# Patient Record
Sex: MALE | Race: WHITE | Employment: FULL TIME | URBAN - METROPOLITAN AREA
[De-identification: names, ages, dates, MRNs, and addresses within clinical notes are randomized per-mention and may not be internally consistent; named-entity substitution may affect disease eponyms.]

---

## 2017-06-19 ENCOUNTER — OFFICE VISIT (OUTPATIENT)
Dept: PRIMARY CARE CLINIC | Age: 47
End: 2017-06-19

## 2017-06-19 VITALS
TEMPERATURE: 98.1 F | DIASTOLIC BLOOD PRESSURE: 101 MMHG | RESPIRATION RATE: 16 BRPM | BODY MASS INDEX: 33.96 KG/M2 | SYSTOLIC BLOOD PRESSURE: 144 MMHG | OXYGEN SATURATION: 97 % | HEIGHT: 71 IN | WEIGHT: 242.6 LBS | HEART RATE: 93 BPM

## 2017-06-19 DIAGNOSIS — I10 ESSENTIAL HYPERTENSION: ICD-10-CM

## 2017-06-19 DIAGNOSIS — L01.00 IMPETIGO: Primary | ICD-10-CM

## 2017-06-19 RX ORDER — MUPIROCIN 20 MG/G
OINTMENT TOPICAL 3 TIMES DAILY
Qty: 22 G | Refills: 0 | Status: SHIPPED | OUTPATIENT
Start: 2017-06-19 | End: 2017-06-26

## 2017-06-19 RX ORDER — DICLOFENAC POTASSIUM 50 MG/1
TABLET, FILM COATED ORAL
Refills: 0 | COMMUNITY
Start: 2017-03-13 | End: 2018-03-28

## 2017-06-19 RX ORDER — CYCLOBENZAPRINE HCL 10 MG
TABLET ORAL
Refills: 0 | COMMUNITY
Start: 2017-03-13 | End: 2018-03-28

## 2017-06-19 NOTE — MR AVS SNAPSHOT
Visit Information Date & Time Provider Department Dept. Phone Encounter #  
 6/19/2017  2:30 PM Emory Simmons NP [address] [address] [phone] [id number] Follow-up Instructions Return if symptoms worsen or fail to improve. Upcoming Health Maintenance Date Due  
 FOOT EXAM Q1 2/9/1978 MICROALBUMIN Q1 2/9/1978 EYE EXAM RETINAL OR DILATED Q1 2/9/1978 Pneumococcal 19-64 Medium Risk (1 of 1 - PPSV23) 2/9/1987 DTaP/Tdap/Td series (1 - Tdap) 2/9/1989 LIPID PANEL Q1 1/22/2014 HEMOGLOBIN A1C Q6M 4/23/2014 INFLUENZA AGE 9 TO ADULT 8/1/2017 Allergies as of 6/19/2017  Review Complete On: 6/19/2017 By: Emory Simmons NP Severity Noted Reaction Type Reactions Lipitor [Atorvastatin] Low 06/24/2010   Side Effect Other (comments) Muscle cramps Current Immunizations  Never Reviewed No immunizations on file. Not reviewed this visit You Were Diagnosed With   
  
 Codes Comments Impetigo    -  Primary ICD-10-CM: L01.00 ICD-9-CM: 635 Essential hypertension     ICD-10-CM: I10 
ICD-9-CM: 401.9 Vitals BP Pulse Temp Resp Height(growth percentile) Weight(growth percentile) (!) 144/101 93 98.1 °F (36.7 °C) (Oral) 16 5' 11\" (1.803 m) 242 lb 9.6 oz (110 kg) SpO2 BMI Smoking Status 97% 33.84 kg/m2 Never Smoker Vitals History BMI and BSA Data Body Mass Index Body Surface Area  
 33.84 kg/m 2 2.35 m 2 Preferred Pharmacy Pharmacy Name Phone [address] [place] [address] Sherri Spencer Said [phone] Your Updated Medication List  
  
   
This list is accurate as of: 6/19/17  3:03 PM.  Always use your most recent med list.  
  
  
  
  
 canagliflozin 100 mg tablet Commonly known as:  Ulysess Mccreedy Take 100 mg by mouth daily. cyclobenzaprine 10 mg tablet Commonly known as:  FLEXERIL  
TAKE 1 TABLET BY MOUTH 3 TIMES A DAY  
  
 diclofenac potassium 50 mg tablet Commonly known as:  CATAFLAM  
TAKE 1 TABLET (50 MG TOTAL) BY MOUTH 2 (TWO) TIMES A DAY AS NEEDED (PAIN) FOR UP TO 7 DAYS. glipiZIDE SR 5 mg CR tablet Commonly known as:  GLUCOTROL XL  
TAKE 1 TABLET BY MOUTH TWICE DAILY losartan-hydroCHLOROthiazide 100-25 mg per tablet Commonly known as:  HYZAAR Take 1 Tab by mouth daily. metFORMIN 500 mg tablet Commonly known as:  GLUCOPHAGE  
TAKE 2 TABLETS BY MOUTH TWICE DAILY  
  
 mupirocin 2 % ointment Commonly known as:  [employer] Apply  to affected area three (3) times daily for 7 days. niacin  mg tablet Commonly known as:  Jordis Alto Take 1 Tab by mouth nightly. omeprazole 40 mg capsule Commonly known as:  PRILOSEC  
TAKE 1 CAPSULE BY MOUTH EVERY DAY  
  
 oxyCODONE-acetaminophen 5-325 mg per tablet Commonly known as:  PERCOCET Take 1 Tab by mouth every four (4) hours as needed for Pain. promethazine 25 mg tablet Commonly known as:  PHENERGAN Take 1 Tab by mouth every six (6) hours as needed for Nausea. simvastatin 40 mg tablet Commonly known as:  ZOCOR Take 1 Tab by mouth nightly. SITagliptin 100 mg tablet Commonly known as:  Janece Nares Take 1 Tab by mouth daily. Prescriptions Sent to Pharmacy Refills  
 mupirocin (BACTROBAN) 2 % ointment 0 Sig: Apply  to affected area three (3) times daily for 7 days. Class: Normal  
 Pharmacy: [facility]ir [facility]ara  at [address] #: [phone] Route: Topical  
  
Follow-up Instructions Return if symptoms worsen or fail to improve. Patient Instructions Impetigo: Care Instructions Your Care Instructions Impetigo (say \"mz-yyf-GO-go\") is a skin infection caused by bacteria. It causes blisters that break and become oozing, yellow, crusty sores. Impetigo can be anywhere on the body.  Scratching the sores may spread the infection to other parts of the body. You can also spread it to others through close contact or when you share towels, clothing, and other items. Prescription antibiotic ointment or pills can usually cure impetigo. (After a day of antibiotics, the infection should not spread.) Follow-up care is a key part of your treatment and safety. Be sure to make and go to all appointments, and call your doctor if you are having problems. It's also a good idea to know your test results and keep a list of the medicines you take. How can you care for yourself at home? · Apply antibiotic ointment exactly as instructed. · If your doctor prescribed antibiotic pills, take them as directed. Do not stop using them just because you feel better. You need to take the full course of antibiotics. · Gently wash the sores with soap and water each day. If crusts form, your doctor may advise you to soften or remove the crusts. You can do this by soaking them in warm water and patting them dry. This can help the cream or ointment treat impetigo. · After you touch the area, wash your hands with soap and water. Or you can use an alcohol-based hand . · Don't share items such as towels, sheets, and clothing until the infection is gone. · Wash anything that may have touched the infected area. · Try to avoid scratching the area. When should you call for help? Call your doctor now or seek immediate medical care if: 
· You have symptoms of a worse infection, such as: 
¨ Increased pain, swelling, warmth, or redness. ¨ Red streaks leading from the area. ¨ Pus draining from the area. ¨ A fever. · Impetigo gets worse or spreads to other areas. Watch closely for changes in your health, and be sure to contact your doctor if: 
· You do not get better as expected. Where can you learn more? Go to http://janny-brien.info/. Enter C917 in the search box to learn more about \"Impetigo: Care Instructions. \" 
 Current as of: July 26, 2016 Content Version: 11.2 © 2243-4353 [employer] [place], [employer]. Care instructions adapted under license by [employer] (which disclaims liability or warranty for this information). If you have questions about a medical condition or this instruction, always ask your healthcare professional. [address] any warranty or liability for your use of this information. Introducing [facility]! Dear Rosalina Powers: Thank you for requesting a inDinero account. Our records indicate that you already have an active inDinero account. You can access your account anytime at [email or web address] Did you know that you can access your hospital and ER discharge instructions at any time in inDinero? You can also review all of your test results from your hospital stay or ER visit. Additional Information If you have questions, please visit the Frequently Asked Questions section of the inDinero website at [email or web address] Remember, inDinero is NOT to be used for urgent needs. For medical emergencies, dial 911. Now available from your iPhone and Android! Please provide this summary of care documentation to your next provider. Your primary care clinician is listed as Pietro. If you have any questions after today's visit, please call [phone].

## 2017-06-19 NOTE — PROGRESS NOTES
Chief Complaint   Patient presents with    Rash     pt here today c/o R on R side of ear, x 2 days, also concerned states he has been breaking out into night sweats and has had feet and hand joint pain, states he recently lost 60 pounds over 2 years,, states currently he is not taking any medications due to weight loss      This note will not be viewable in MyChart.

## 2017-06-19 NOTE — PATIENT INSTRUCTIONS
Impetigo: Care Instructions  Your Care Instructions  Impetigo (say \"cy-hoj-HC-go\") is a skin infection caused by bacteria. It causes blisters that break and become oozing, yellow, crusty sores. Impetigo can be anywhere on the body. Scratching the sores may spread the infection to other parts of the body. You can also spread it to others through close contact or when you share towels, clothing, and other items. Prescription antibiotic ointment or pills can usually cure impetigo. (After a day of antibiotics, the infection should not spread.)  Follow-up care is a key part of your treatment and safety. Be sure to make and go to all appointments, and call your doctor if you are having problems. It's also a good idea to know your test results and keep a list of the medicines you take. How can you care for yourself at home? · Apply antibiotic ointment exactly as instructed. · If your doctor prescribed antibiotic pills, take them as directed. Do not stop using them just because you feel better. You need to take the full course of antibiotics. · Gently wash the sores with soap and water each day. If crusts form, your doctor may advise you to soften or remove the crusts. You can do this by soaking them in warm water and patting them dry. This can help the cream or ointment treat impetigo. · After you touch the area, wash your hands with soap and water. Or you can use an alcohol-based hand . · Don't share items such as towels, sheets, and clothing until the infection is gone. · Wash anything that may have touched the infected area. · Try to avoid scratching the area. When should you call for help? Call your doctor now or seek immediate medical care if:  · You have symptoms of a worse infection, such as:  ¨ Increased pain, swelling, warmth, or redness. ¨ Red streaks leading from the area. ¨ Pus draining from the area. ¨ A fever. · Impetigo gets worse or spreads to other areas.   Watch closely for changes in your health, and be sure to contact your doctor if:  · You do not get better as expected. Where can you learn more? Go to http://janny-brien.info/. Enter U022 in the search box to learn more about \"Impetigo: Care Instructions. \"  Current as of: July 26, 2016  Content Version: 11.2  © 3273-6136 [employer]. Care instructions adapted under license by [employer] (which disclaims liability or warranty for this information). If you have questions about a medical condition or this instruction, always ask your healthcare professional. [address] [address] any warranty or liability for your use of this information.

## 2017-06-19 NOTE — PROGRESS NOTES
HISTORY OF PRESENT ILLNESS  Alli Portillo is a 50 y.o. male. HPI  Chief Complaint   Patient presents with    Rash     pt here today c/o R on R side of ear, x 2 days, also concerned states he has been breaking out into night sweats and has had feet and hand joint pain, states he recently lost 60 pounds over 2 years,, states currently he is not taking any medications due to weight loss      Pt is here today for c/o rash to his ear and neck. Started about 2 days ago. Denies any pain, discomfort, or itching. The area does get a little crust to it. He also mentions that he had some joint pain in hands and feet. He has had some low grade fevers and sweats. He has not seen his PCP in the last few years. Reports a 60lb intentional weight loss in the last 2 years and is trying to lose more weight to be at his goal.  He has stopped his meds for DM, HTN, and hyperlipidemia as he didn't like how he felt on the meds. He plans to see his PCP when on vac the week of July 4. Past Medical History:   Diagnosis Date    Diabetes ([address]     type 2    Hyperlipidemia     Hypertension     Other ill-defined conditions     high cholesterol    Other ill-defined conditions     gout     Past Surgical History:   Procedure Laterality Date    HX APPENDECTOMY      HX GI      polyps removed from colon    HX OTHER SURGICAL      anal fissure     Allergies   Allergen Reactions    Lipitor [Atorvastatin] Other (comments)     Muscle cramps     PCP - Dr. Peter Wren    I  Review of Systems   Constitutional: Positive for fever. Musculoskeletal: Positive for joint pain. Skin: Positive for rash. Physical Exam   Constitutional: He appears well-developed and well-nourished. No distress. Skin:   Erythematous vesicular rash to posterior helix of R ear, small patch to right occipital area     ASSESSMENT and PLAN    ICD-10-CM ICD-9-CM    1. Impetigo L01.00 684    2.  Essential hypertension I10 401.9      Orders Placed This Encounter    mupirocin (BACTROBAN) 2 % ointment     Sig: Apply  to affected area three (3) times daily for 7 days. Dispense:  22 g     Refill:  0   BP rechecked in office, improved but still needs close f/u. Pt needs to see PCP ASAP for routine health maintenance and further eval of his complaints. RTC prn. Leatha Chamberlain NP  This note will not be viewable in [address]

## 2018-03-28 PROBLEM — E11.59 HYPERTENSION COMPLICATING DIABETES (HCC): Status: ACTIVE | Noted: 2018-03-28

## 2018-03-28 PROBLEM — I15.2 HYPERTENSION COMPLICATING DIABETES (HCC): Status: ACTIVE | Noted: 2018-03-28

## 2018-03-31 PROBLEM — E11.21 TYPE 2 DIABETES WITH NEPHROPATHY (HCC): Status: ACTIVE | Noted: 2018-03-31

## 2018-05-11 ENCOUNTER — OFFICE VISIT (OUTPATIENT)
Dept: PRIMARY CARE CLINIC | Age: 48
End: 2018-05-11

## 2018-05-11 VITALS
HEIGHT: 71 IN | DIASTOLIC BLOOD PRESSURE: 92 MMHG | HEART RATE: 104 BPM | WEIGHT: 241.8 LBS | RESPIRATION RATE: 17 BRPM | OXYGEN SATURATION: 95 % | BODY MASS INDEX: 33.85 KG/M2 | TEMPERATURE: 98.7 F | SYSTOLIC BLOOD PRESSURE: 149 MMHG

## 2018-05-11 DIAGNOSIS — L03.031 CELLULITIS OF TOE OF RIGHT FOOT: Primary | ICD-10-CM

## 2018-05-11 RX ORDER — DOXYCYCLINE 100 MG/1
100 CAPSULE ORAL 2 TIMES DAILY
Qty: 20 CAP | Refills: 0 | Status: SHIPPED | OUTPATIENT
Start: 2018-05-11 | End: 2018-05-21

## 2018-05-11 RX ORDER — SODIUM, POTASSIUM,MAG SULFATES 17.5-3.13G
SOLUTION, RECONSTITUTED, ORAL ORAL
Refills: 0 | COMMUNITY
Start: 2018-04-04 | End: 2018-05-11

## 2018-05-11 NOTE — PROGRESS NOTES
Chief Complaint   Patient presents with    Toe Pain   pt c/o R toe pain, he states last week he hit R toe while playing with dogs, he states he has cleaned wound, wants to make sure it is healing properly because he is a type 2 diabetic, he states he does have neuropathy and feels some pressure on R toe. This note will not be viewable in [address]

## 2018-05-11 NOTE — PROGRESS NOTES
Subjective:      Estrella Beltran is an 46 y.o. male who presents for evaluation of a probable skin infection located on the right great toe along the cuticle on the medial side of the nail. Onset of symptoms was gradual, with rapidly worsening since that time. Symptoms include erythema, tenderness, pain and drainage. Patient denies  fever, chills, nausea and vomiting. There is a history trauma to the area. Treatment to date has included OTC analgesics and elevation of the area with minimal relief. Patient Active Problem List   Diagnosis Code    DM (diabetes mellitus) ([facility] E11.9    Encounter for long-term (current) use of other medications Z79.899    Hyperlipidemia E78.5    Hypertension complicating diabetes ([facility] E11.59, I10    Type 2 diabetes with nephropathy ([facility] E11.21     Patient Active Problem List    Diagnosis Date Noted    Type 2 diabetes with nephropathy ([facility] 03/31/2018    Hypertension complicating diabetes ([facility] 03/28/2018    DM (diabetes mellitus) ([facility] 11/16/2011    Encounter for long-term (current) use of other medications 11/16/2011    Hyperlipidemia 11/16/2011     Current Outpatient Prescriptions   Medication Sig Dispense Refill    doxycycline (MONODOX) 100 mg capsule Take 1 Cap by mouth two (2) times a day for 10 days. 20 Cap 0    gabapentin (NEURONTIN) 100 mg capsule Take 1 Cap by mouth two (2) times a day. 60 Cap 6    metFORMIN (GLUCOPHAGE) 500 mg tablet TAKE 2 TABLETS BY MOUTH TWICE DAILY 360 Tab 3    losartan-hydroCHLOROthiazide (HYZAAR) 50-12.5 mg per tablet Take 1 Tab by mouth daily.  90 Tab 3     Allergies   Allergen Reactions    Lipitor [Atorvastatin] Other (comments)     Muscle cramps     Past Medical History:   Diagnosis Date    Diabetes ([address]     type 2    Hyperlipidemia     Hypertension     Other ill-defined conditions(799.89)     high cholesterol    Other ill-defined conditions(799.89)     gout     Past Surgical History:   Procedure Laterality Date    HX APPENDECTOMY      HX GI      polyps removed from colon    HX OTHER SURGICAL      anal fissure     Family History   Problem Relation Age of Onset    Hypertension Mother     Diabetes Mother    [de-identified] Elevated Lipids Mother     Other Mother      GOUT     Social History   Substance Use Topics    Smoking status: Never Smoker    Smokeless tobacco: Never Used    Alcohol use No        Objective:     Visit Vitals    BP (!) 149/92 (BP 1 Location: Left arm, BP Patient Position: Sitting)    Pulse (!) 104    Temp 98.7 °F (37.1 °C) (Oral)    Resp 17    Ht 5' 11\" (1.803 m)    Wt 241 lb 12.8 oz (109.7 kg)    SpO2 95%    BMI 33.72 kg/m2     General appearance: alert, well appearing, and in no distress. Skin exam: cellulitis, erythema, warmth and drainage, scant bloody,noted on the right great toe. I & D completed under aseptic technique, drained of pus and bloody drainage, wound dressed with antibiotic ointment and telfa, rolled gauze, tape. Written instructions given to soak in epsom salt warm water and apply clean dressing at least 2 x daily until healed. Assessment/Plan:     cellulitis as described  I do not believe this to be a high risk lesion for MRSA. Thus I am treating the patient with doxycycline. Antibiotics given. Educational material distributed. Wound cleansed. Wound debrided. Wound dressing applied. ICD-10-CM ICD-9-CM    1. Cellulitis of toe of right foot L03.031 681.10 doxycycline (MONODOX) 100 mg capsule   Elevated blood pressure discussed, he will follow up with PCP.

## 2018-05-11 NOTE — PATIENT INSTRUCTIONS
Paronychia: Care Instructions  Your Care Instructions  Paronychia (say \"pora-um-BF-bronson-uh\") is an infection of the skin around a fingernail or toenail. It happens when germs enter through a break in the skin. The doctor may have made a small cut in the infected area to drain the pus. Most cases of paronychia improve in a few days. But watch your symptoms and follow your doctor's advice. Though rare, a mild case can turn into something more serious and infect your entire finger or toe. Also, it is possible for an infection to return. Follow-up care is a key part of your treatment and safety. Be sure to make and go to all appointments, and call your doctor if you are having problems. It's also a good idea to know your test results and keep a list of the medicines you take. How can you care for yourself at home? · If your doctor told you how to care for your infected nail, follow the doctor's instructions. If you did not get instructions, follow this general advice:  ¨ Wash the area with clean water 2 times a day. Don't use hydrogen peroxide or alcohol, which can slow healing. ¨ You may cover the area with a thin layer of petroleum jelly, such as Vaseline, and a nonstick bandage. ¨ Apply more petroleum jelly and replace the bandage as needed. · If your doctor prescribed antibiotics, take them as directed. Do not stop taking them just because you feel better. You need to take the full course of antibiotics. · Take an over-the-counter pain medicine, such as acetaminophen (Tylenol), ibuprofen (Advil, Motrin), or naproxen (Aleve). Read and follow all instructions on the label. · Do not take two or more pain medicines at the same time unless the doctor told you to. Many pain medicines have acetaminophen, which is Tylenol. Too much acetaminophen (Tylenol) can be harmful. · Prop up the toe or finger so that it is higher than the level of your heart. This will help with pain and swelling. · Apply heat.  Put a warm water bottle, heating pad set on low, or warm cloth on your finger or toe. Do not go to sleep with a heating pad on your skin. · Soak the area in warm water twice a day for 15 minutes each time. After soaking, dry the area well and apply a thin layer of petroleum jelly, such as Vaseline. Put on a new bandage. When should you call for help? Call your doctor now or seek immediate medical care if:  ? · You have signs of new or worsening infection, such as:  ¨ Increased pain, swelling, warmth, or redness. ¨ Red streaks leading from the infected skin. ¨ Pus draining from the area. ¨ A fever. ? Watch closely for changes in your health, and be sure to contact your doctor if:  ? · You do not get better as expected. Where can you learn more? Go to http://janny-brien.info/. Enter C435 in the search box to learn more about \"Paronychia: Care Instructions. \"  Current as of: October 13, 2016  Content Version: 11.4  © 2506-1908 [employer]. Care instructions adapted under license by [employer] (which disclaims liability or warranty for this information). If you have questions about a medical condition or this instruction, always ask your healthcare professional. [address] any warranty or liability for your use of this information.

## 2018-06-27 PROBLEM — E11.40 TYPE 2 DIABETES MELLITUS WITH DIABETIC NEUROPATHY (HCC): Status: ACTIVE | Noted: 2018-06-27

## 2018-10-26 ENCOUNTER — HOSPITAL ENCOUNTER (OUTPATIENT)
Dept: DIABETES SERVICES | Age: 48
Discharge: HOME | End: 2018-10-26
Payer: COMMERCIAL

## 2018-10-26 PROCEDURE — G0108 DIAB MANAGE TRN  PER INDIV: HCPCS

## 2018-11-28 ENCOUNTER — HOSPITAL ENCOUNTER (OUTPATIENT)
Dept: DIABETES SERVICES | Age: 48
Discharge: HOME | End: 2018-11-28
Payer: COMMERCIAL

## 2018-11-28 DIAGNOSIS — E11.9 DIABETES MELLITUS WITHOUT COMPLICATION (HCC): ICD-10-CM

## 2018-11-29 PROCEDURE — G0109 DIAB MANAGE TRN IND/GROUP: HCPCS | Performed by: DIETITIAN, REGISTERED

## 2018-12-17 ENCOUNTER — HOSPITAL ENCOUNTER (OUTPATIENT)
Dept: DIABETES SERVICES | Age: 48
Discharge: HOME | End: 2018-12-17
Payer: COMMERCIAL

## 2018-12-17 DIAGNOSIS — E11.9 DIABETES MELLITUS WITHOUT COMPLICATION (HCC): ICD-10-CM

## 2018-12-17 PROCEDURE — G0109 DIAB MANAGE TRN IND/GROUP: HCPCS

## 2019-06-20 PROBLEM — E66.01 SEVERE OBESITY (HCC): Status: ACTIVE | Noted: 2019-06-20

## 2020-01-17 ENCOUNTER — HOSPITAL ENCOUNTER (EMERGENCY)
Age: 50
Discharge: HOME | End: 2020-01-17
Attending: EMERGENCY MEDICINE
Payer: COMMERCIAL

## 2020-01-17 VITALS
OXYGEN SATURATION: 99 % | RESPIRATION RATE: 16 BRPM | SYSTOLIC BLOOD PRESSURE: 190 MMHG | WEIGHT: 275.8 LBS | HEART RATE: 93 BPM | TEMPERATURE: 98.5 F | DIASTOLIC BLOOD PRESSURE: 97 MMHG | BODY MASS INDEX: 38.61 KG/M2 | HEIGHT: 71 IN
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
## 2020-01-17 DIAGNOSIS — R03.0 ELEVATED BLOOD PRESSURE READING: ICD-10-CM

## 2020-01-17 DIAGNOSIS — S61.412A LACERATION OF LEFT HAND, FOREIGN BODY PRESENCE UNSPECIFIED, INITIAL ENCOUNTER: ICD-10-CM

## 2020-01-17 PROCEDURE — 74011250637 HC RX REV CODE- 250/637: Performed by: PHYSICIAN ASSISTANT

## 2020-01-17 PROCEDURE — 90715 TDAP VACCINE 7 YRS/> IM: CPT | Performed by: PHYSICIAN ASSISTANT

## 2020-01-17 PROCEDURE — 90471 IMMUNIZATION ADMIN: CPT

## 2020-01-17 PROCEDURE — 74011250636 HC RX REV CODE- 250/636: Performed by: PHYSICIAN ASSISTANT

## 2020-01-17 PROCEDURE — 99283 EMERGENCY DEPT VISIT LOW MDM: CPT

## 2020-01-17 PROCEDURE — 75810000293 HC SIMP/SUPERF WND  RPR

## 2020-01-17 RX ORDER — HYDROCODONE BITARTRATE AND ACETAMINOPHEN 5; 325 MG/1; MG/1
1 TABLET ORAL
Qty: 15 TAB | Refills: 0 | Status: SHIPPED | OUTPATIENT
Start: 2020-01-17 | End: 2020-01-20

## 2020-01-17 RX ORDER — AMOXICILLIN AND CLAVULANATE POTASSIUM 875; 125 MG/1; MG/1
1 TABLET, FILM COATED ORAL 2 TIMES DAILY
Qty: 14 TAB | Refills: 0 | Status: SHIPPED | OUTPATIENT
Start: 2020-01-17 | End: 2020-01-24

## 2020-01-17 RX ORDER — BUPIVACAINE HYDROCHLORIDE 5 MG/ML
5 INJECTION, SOLUTION EPIDURAL; INTRACAUDAL
Status: DISCONTINUED | OUTPATIENT
Start: 2020-01-17 | End: 2020-01-18 | Stop reason: HOSPADM

## 2020-01-17 RX ORDER — AMOXICILLIN AND CLAVULANATE POTASSIUM 875; 125 MG/1; MG/1
1 TABLET, FILM COATED ORAL
Status: COMPLETED | OUTPATIENT
Start: 2020-01-17 | End: 2020-01-17

## 2020-01-17 RX ADMIN — AMOXICILLIN AND CLAVULANATE POTASSIUM 1 TABLET: 875; 125 TABLET, FILM COATED ORAL at 23:00

## 2020-01-17 RX ADMIN — TETANUS TOXOID, REDUCED DIPHTHERIA TOXOID AND ACELLULAR PERTUSSIS VACCINE, ADSORBED 0.5 ML: 5; 2.5; 8; 8; 2.5 SUSPENSION INTRAMUSCULAR at 23:00

## 2020-01-17 NOTE — LETTER
[address] 
[facility] EMERGENCY DEPT 
[address] [place] [id number] [phone] Work/School Note Date: 1/17/2020 To Whom It May concern: 
 
Maggy Brandon was seen and treated today in the emergency room by the following provider(s): 
Attending Provider: Radhames Rothman MD 
Physician Assistant: Slade Freed. Maggy Brandon may return to work in 2 days Sincerely, 
 
 
 
 
ADRIAN Brewster

## 2020-01-18 NOTE — ED PROVIDER NOTES
EMERGENCY DEPARTMENT HISTORY AND PHYSICAL EXAM      Date: 1/17/2020  Patient Name: Denita Dutta    History of Presenting Illness     HPI: Denita Dutta is a 44 y.o. male with past medical history of insulin-dependent diabetes, hypertension, hyperlipidemia, gout, presents to the emergency room for laceration to his thumb that happened immediately before arrival to the emergency room. He says that he was breaking up a dog fight between his dogs and accidentally got bit. He reports both dogs are up-to-date on immunizations including rabies. He says he is unsure of when his last tetanus was. Reports his pain is currently a 1 out of 10, constant, throbbing, nonradiating pain. He denies focal weakness, numbness, among other associated symptoms. Pertinent social history: None    Pertinent surgical history: Appendectomy    PCP: Eliza Wong MD    Current Facility-Administered Medications   Medication Dose Route Frequency Provider Last Rate Last Dose    bupivacaine (PF) (MARCAINE) 0.5 % (5 mg/mL) injection 25 mg  5 mL SubCUTAneous NOW ADRIAN Mccann         Current Outpatient Medications   Medication Sig Dispense Refill    HYDROcodone-acetaminophen (NORCO) 5-325 mg per tablet Take 1 Tab by mouth every six (6) hours as needed for Pain for up to 3 days. Max Daily Amount: 4 Tabs. 15 Tab 0    amoxicillin-clavulanate (AUGMENTIN) 875-125 mg per tablet Take 1 Tab by mouth two (2) times a day for 7 days. 14 Tab 0    rosuvastatin (CRESTOR) 5 mg tablet TAKE 0.5 TABS BY MOUTH DAILY. 45 Tab 3    JANUMET 50-1,000 mg per tablet TAKE 1 TABLET BY MOUTH TWICE A DAY WITH FOOD 180 Tab 3    gabapentin (NEURONTIN) 300 mg capsule TAKE 2 CAPS BY MOUTH THREE (3) TIMES DAILY.  540 Cap 3    losartan-hydroCHLOROthiazide (HYZAAR) 100-12.5 mg per tablet TAKE 1 TABLET BY MOUTH EVERY DAY 90 Tab 3    BD ULTRA-FINE MINI PEN NEEDLE 31 gauge x 3/16\" ndle USE WITH LANJARED GONZÁLESAR AS DIRECTED 100 Pen Needle 3    insulin glargine (BASAGLAR KWIKPEN U-100 INSULIN) 100 unit/mL (3 mL) inpn INJECT 45-80 UNITS SUBCUTANEOUSLY EACH MORNING AND INCREASE AS NEEDED/DIRECTED PER PROTOCOL (Patient taking differently: INJECT 100 units every day.) 30 mL 11    glucose blood VI test strips (ASCENSIA AUTODISC VI, ONE TOUCH ULTRA TEST VI) strip Check BS BID, or as directed. 200 Strip 11       Past History     Past Medical History:  Past Medical History:   Diagnosis Date    Diabetes ([address]     type 2    Hyperlipidemia     Hypertension     Other ill-defined conditions(799.89)     high cholesterol    Other ill-defined conditions(799.89)     gout       Past Surgical History:  Past Surgical History:   Procedure Laterality Date    HX APPENDECTOMY      HX GI      polyps removed from colon    HX OTHER SURGICAL      anal fissure       Family History:  Family History   Problem Relation Age of Onset    Hypertension Mother     Diabetes Mother     Elevated Lipids Mother     Other Mother         GOUT       Social History:  Social History     Tobacco Use    Smoking status: Never Smoker    Smokeless tobacco: Never Used   Substance Use Topics    Alcohol use: No    Drug use: No       Allergies: Allergies   Allergen Reactions    Lipitor [Atorvastatin] Other (comments)     Muscle cramps         Review of Systems   Review of Systems   Constitutional: Negative for chills and fever. Respiratory: Negative for shortness of breath. Cardiovascular: Negative for chest pain. Gastrointestinal: Negative for nausea and vomiting. Musculoskeletal: Negative for arthralgias. Skin: Positive for wound. Neurological: Negative for light-headedness and headaches. Physical Exam     Vitals:    01/17/20 2153   BP: (!) 190/97   Pulse: 93   Resp: 16   Temp: 98.5 °F (36.9 °C)   SpO2: 99%   Weight: 125.1 kg (275 lb 12.7 oz)   Height: 5' 11\" (1.803 m)     Physical Exam  Vitals signs and nursing note reviewed.    Constitutional:       Appearance: He is well-developed. HENT:      Head: Normocephalic and atraumatic. Cardiovascular:      Rate and Rhythm: Normal rate and regular rhythm. Heart sounds: Normal heart sounds. Pulmonary:      Effort: Pulmonary effort is normal.      Breath sounds: Normal breath sounds. Musculoskeletal:      Comments: Left hand exam: Patient has approximately 4 cm gaping laceration to pad of thumb that is oozing blood, also there is a 2 cm laceration to dorsal side of thumb overlying the joint, the wound edges appeared lined up on this laceration and there is no bleeding, full range of motion in thumb, mild tenderness to palpation, no surrounding erythema, neurovascularly intact   Skin:     General: Skin is warm and dry. Neurological:      Mental Status: He is alert and oriented to person, place, and time. Psychiatric:         Behavior: Behavior normal.         Thought Content: Thought content normal.         Judgment: Judgment normal.           Diagnostic Study Results     Labs -   No results found for this or any previous visit (from the past 12 hour(s)). Radiologic Studies -   No orders to display     CT Results  (Last 48 hours)    None                Medical Decision Making   I am the first provider for this patient. I reviewed the vital signs, available nursing notes, past medical history, past surgical history, social history    ED Course and Progress notes:   Initial assessment performed. The patients presenting problems have been discussed, and they are in agreement with the care plan formulated and outlined with them. I have encouraged them to ask questions as they arise throughout their visit. on re evaluation pt is resting comfortably, and has no new complaints, changes, or physical findings. The patient has improved and is stable. Procedures:  Procedures     Wound repair Procedure:   Performed by: Lorraine Do PA-C  Verbal and written consent obtained. Consent given by patient.    Risks discussed: bleeding, pain, damage to other organs, infection. Alternatives discussed: no treatment, delayed treatment, alternative treatment, observation, referral.   Patient identity confirmed verbally with patient  Type: laceration  Size: Wound length: 3 cm  Location: thumb  Pre-procedure details: antiseptic wash, betadine, sterile filed established. Sedation: none  Anesthesia method: Digital block with Marcaine  Foreign bodies explored for and none found  Irrigation fluid: 250cc NSS  Debridement: none  Skin closure: Nylon  Number of sutures/staples: 3  Size of suture: 5-0  Approximation: loose  Dressing: antibiotic ointment, non-adhesive pressure dressing placed  Estimated blood loss: 5cc  Pt tolerated the procedure will with no immediate complications    Critical Care Time: none    Vital Signs-Reviewed the patient's vital signs. Vitals:    01/17/20 2153   BP: (!) 190/97   BP 1 Location: Right arm   BP Patient Position: At rest   Pulse: 93   Resp: 16   Temp: 98.5 °F (36.9 °C)   SpO2: 99%   Weight: 125.1 kg (275 lb 12.7 oz)   Height: 5' 11\" (1.803 m)       Medications Administered During ED Course  Medications   bupivacaine (PF) (MARCAINE) 0.5 % (5 mg/mL) injection 25 mg (has no administration in time range)   amoxicillin-clavulanate (AUGMENTIN) 875-125 mg per tablet 1 Tab (1 Tab Oral Given 1/17/20 2300)   diph,Pertuss(AC),Tet Vac-PF (BOOSTRIX) suspension 0.5 mL (0.5 mL IntraMUSCular Given 1/17/20 2300)       HYPERTENSION COUNSELING  Patient denies chest pain, headache, shortness of breath,  sx's, abd pain. Patient is made aware of their elevated blood pressure and is instructed to follow up this week with their Primary Care for a recheck. Patient is counseled regarding consequences of chronic, uncontrolled hypertension including kidney disease, heart disease, stroke or even death. Patient states their understanding and agrees to follow up this week.     Disposition:  D/c home    DISCHARGE NOTE:   The patient was counseled on diagnosis and care plan. All available lab and imaging results have been reviewed and were discussed with the patient, including all incidental findings. The likelihood of other entities in the differential is insufficient to justify any further testing for them. This was explained to the patient. Patient agrees with plan and agrees to follow up with PCP as recommended, or return to the ED immediately if their symptoms worsen. All medications were reviewed with the patient. All of pt's questions and concerns were addressed. The patient was advised that new or worsening symptoms would require further evaluation and should prompt immediate return to the Emergency Department. Discharge instructions have been provided and explained to the patient, along with reasons to return to the ED. Patient voices understanding and is agreeable with the plan for discharge. Patient is ready to go home. Follow-up Information     Follow up With Specialties Details Why Contact Info    Yong Clancy MD Internal Medicine Schedule an appointment as soon as possible for a visit On Monday for above diagnosis [address] [id number]  [phone]      [facility] EMERGENCY DEPT Emergency Medicine Go to If symptoms worsen [address] [facility] [address] [facility] [address]    Homero Dance, MD Hand Surgery Schedule an appointment as soon as possible for a visit As needed [address]  [phone]            Discharge Medication List as of 1/17/2020 11:02 PM      START taking these medications    Details   HYDROcodone-acetaminophen (NORCO) 5-325 mg per tablet Take 1 Tab by mouth every six (6) hours as needed for Pain for up to 3 days. Max Daily Amount: 4 Tabs., Print, Disp-15 Tab, R-0      amoxicillin-clavulanate (AUGMENTIN) 875-125 mg per tablet Take 1 Tab by mouth two (2) times a day for 7 days. , Print, Disp-14 Tab, R-0         CONTINUE these medications which have NOT CHANGED    Details   rosuvastatin (CRESTOR) 5 mg tablet TAKE 0.5 TABS BY MOUTH DAILY., Normal, Disp-45 Tab, R-3      JANUMET 50-1,000 mg per tablet TAKE 1 TABLET BY MOUTH TWICE A DAY WITH FOOD, Normal, Disp-180 Tab, R-3      gabapentin (NEURONTIN) 300 mg capsule TAKE 2 CAPS BY MOUTH THREE (3) TIMES DAILY., No PrintNot to exceed 5 additional fills before 12/17/2019 DX Code Needed  . Disp-540 Cap, R-3      losartan-hydroCHLOROthiazide (HYZAAR) 100-12.5 mg per tablet TAKE 1 TABLET BY MOUTH EVERY DAY, Normal, Disp-90 Tab, R-3      BD ULTRA-FINE MINI PEN NEEDLE 31 gauge x 3/16\" ndle USE WITH LANTUS SOLOSTAR AS DIRECTED, NormalPATIENT IS REQUESTING FOR 3 MONTHS WORTH AND PLEASE SEND TO STOREDisp-100 Pen Needle, R-3      insulin glargine (BASAGLAR KWIKPEN U-100 INSULIN) 100 unit/mL (3 mL) inpn INJECT 45-80 UNITS SUBCUTANEOUSLY EACH MORNING AND INCREASE AS NEEDED/DIRECTED PER PROTOCOL, Normal, Disp-30 mL, R-11      glucose blood VI test strips (ASCENSIA AUTODISC VI, ONE TOUCH ULTRA TEST VI) strip Check BS BID, or as directed., Normal, Disp-200 Strip, R-11             Provider Notes (Medical Decision Making):   DDx: Laceration, contusion, low concern for fracture, flexor tenosynovitis, or tendon injury      Diagnosis     Clinical Impression:   1. Dog bite, initial encounter    2. Laceration of left hand, foreign body presence unspecified, initial encounter    3. Elevated blood pressure reading        Please note that this dictation was completed with [employer], the [employer] voice recognition software. Quite often unanticipated grammatical, syntax, homophones, and other interpretive errors are inadvertently transcribed by the computer software. Please disregard these errors. Please excuse any errors that have escaped final proofreading. This note will not be viewable in [address]

## 2020-01-27 ENCOUNTER — HOSPITAL ENCOUNTER (EMERGENCY)
Age: 50
Discharge: HOME | End: 2020-01-27
Attending: EMERGENCY MEDICINE
Payer: COMMERCIAL

## 2020-01-27 VITALS
RESPIRATION RATE: 20 BRPM | HEIGHT: 71 IN | WEIGHT: 273.15 LBS | SYSTOLIC BLOOD PRESSURE: 146 MMHG | HEART RATE: 96 BPM | BODY MASS INDEX: 38.24 KG/M2 | OXYGEN SATURATION: 98 % | DIASTOLIC BLOOD PRESSURE: 103 MMHG | TEMPERATURE: 98.7 F

## 2020-01-27 DIAGNOSIS — Z48.02 ENCOUNTER FOR REMOVAL OF SUTURES: Primary | ICD-10-CM

## 2020-01-27 PROCEDURE — 75810000275 HC EMERGENCY DEPT VISIT NO LEVEL OF CARE

## 2020-01-27 NOTE — ED PROVIDER NOTES
EMERGENCY DEPARTMENT HISTORY AND PHYSICAL EXAM      Date: 1/27/2020  Patient Name: Celeste Painting    History of Presenting Illness     Chief Complaint   Patient presents with    Suture Removal       History Provided By: Patient    HPI: Celeste Painting, 44 y.o. male with PMHx significant for HTN, HLD, IDDM, gout, presents to the ED for suture removal.  Patient sustained a dog bite to his left thumb when attempting to break up a fight with his own dog 10 days ago. He completed his course of antibiotics without difficulty. Tetanus was updated at prior visit. Patient has no complaints. There are no other complaints, changes, or physical findings at this time. PCP: Max Morin MD    No current facility-administered medications on file prior to encounter. Current Outpatient Medications on File Prior to Encounter   Medication Sig Dispense Refill    rosuvastatin (CRESTOR) 5 mg tablet TAKE 0.5 TABS BY MOUTH DAILY. 45 Tab 3    JANUMET 50-1,000 mg per tablet TAKE 1 TABLET BY MOUTH TWICE A DAY WITH FOOD 180 Tab 3    gabapentin (NEURONTIN) 300 mg capsule TAKE 2 CAPS BY MOUTH THREE (3) TIMES DAILY. 540 Cap 3    losartan-hydroCHLOROthiazide (HYZAAR) 100-12.5 mg per tablet TAKE 1 TABLET BY MOUTH EVERY DAY 90 Tab 3    BD ULTRA-FINE MINI PEN NEEDLE 31 gauge x 3/16\" ndle USE WITH LANTUS SOLOSTAR AS DIRECTED 100 Pen Needle 3    insulin glargine (BASAGLAR KWIKPEN U-100 INSULIN) 100 unit/mL (3 mL) inpn INJECT 45-80 UNITS SUBCUTANEOUSLY EACH MORNING AND INCREASE AS NEEDED/DIRECTED PER PROTOCOL (Patient taking differently: INJECT 100 units every day.) 30 mL 11    glucose blood VI test strips (ASCENSIA AUTODISC VI, ONE TOUCH ULTRA TEST VI) strip Check BS BID, or as directed.  200 Strip 11       Past History     Past Medical History:  Past Medical History:   Diagnosis Date    Diabetes [address] [address]     type 2    Hyperlipidemia     Hypertension     Other ill-defined conditions(799.89)     high cholesterol    Other ill-defined conditions(799.89)     gout       Past Surgical History:  Past Surgical History:   Procedure Laterality Date    HX APPENDECTOMY      HX GI      polyps removed from colon    HX OTHER SURGICAL      anal fissure       Family History:  Family History   Problem Relation Age of Onset    Hypertension Mother     Diabetes Mother     Elevated Lipids Mother     Other Mother         GOUT       Social History:  Social History     Tobacco Use    Smoking status: Never Smoker    Smokeless tobacco: Never Used   Substance Use Topics    Alcohol use: No    Drug use: No       Allergies: Allergies   Allergen Reactions    Lipitor [Atorvastatin] Other (comments)     Muscle cramps         Review of Systems   Review of Systems   Constitutional: Negative for fever. Skin:        S/p lac repair    Hematological: Does not bruise/bleed easily. Physical Exam   Physical Exam  Vitals signs and nursing note reviewed. Constitutional:       General: He is not in acute distress. Appearance: Normal appearance. He is well-developed and well-groomed. He is not toxic-appearing. HENT:      Head: Normocephalic and atraumatic. Nose: Nose normal.   Eyes:      Extraocular Movements: Extraocular movements intact. Conjunctiva/sclera: Conjunctivae normal.   Neck:      Musculoskeletal: Normal range of motion and neck supple. Trachea: Phonation normal.   Pulmonary:      Effort: Pulmonary effort is normal.   Musculoskeletal: Normal range of motion. Comments: L thumb pad with healed laceration, 3 sutures in place, C/D/I. Wound very dry. No erythema or drainage. Skin:     General: Skin is warm and dry. Neurological:      Mental Status: He is alert and oriented to person, place, and time. GCS: GCS eye subscore is 4. GCS verbal subscore is 5. GCS motor subscore is 6. Psychiatric:         Mood and Affect: Mood normal.         Behavior: Behavior normal. Behavior is cooperative.          Diagnostic Study Results     Labs -   No results found for this or any previous visit (from the past 12 hour(s)). Radiologic Studies -   No orders to display     CT Results  (Last 48 hours)    None        CXR Results  (Last 48 hours)    None            Medical Decision Making   I am the first provider for this patient. I reviewed the vital signs, available nursing notes, past medical history, past surgical history, family history and social history. Vital Signs-Reviewed the patient's vital signs. Patient Vitals for the past 12 hrs:   Temp Pulse Resp BP SpO2   01/27/20 1833 98.7 °F (37.1 °C) 96 20 (!) 146/103 98 %       Records Reviewed: Nursing Notes and Old Medical Records    Provider Notes (Medical Decision Making):   Sutures removed without complication. Advised on continued wound care at home. Follow-up with PCP. Other Procedure  Date/Time: 1/27/2020 6:39 PM  Performed by: Jessie Benitez 49Sigifredo Nuñez  Authorized by: Korey Bhat18 Kate Nuñez     Consent:     Consent obtained:  Verbal    Consent given by:  Patient    Risks discussed:  Bleeding, infection and pain  Indications:     Indications:  10 days s/p lac repair. Anesthesia (see MAR for exact dosages): Anesthesia method:  None  Post-procedure details:     Patient tolerance of procedure: Tolerated well, no immediate complications          ED Course:   Initial assessment performed. The patients presenting problems have been discussed, and they are in agreement with the care plan formulated and outlined with them. I have encouraged them to ask questions as they arise throughout their visit. Critical Care Time: None    Disposition:  D/c home. PLAN:  1. Current Discharge Medication List        2.    Follow-up Information     Follow up With Specialties Details Why Contact [facility] EMERGENCY DEPT Emergency Medicine  As needed, If symptoms worsen [address] [facility] [place] [address]    Kyung Martinez MD Internal Medicine For follow up [address]  [phone]          Return to ED if worse     Diagnosis     Clinical Impression:   1. Encounter for removal of sutures          Please note that this dictation was completed with [employer], the computer voice recognition software. Quite often unanticipated grammatical, syntax, homophones, and other interpretive errors are inadvertently transcribed by the computer software. Please disregards these errors. Please excuse any errors that have escaped final proofreading. This note will not be viewable in [address]

## 2020-01-27 NOTE — DISCHARGE INSTRUCTIONS
Patient Education        Learning About Stitches and Staples Removal  When are stitches and staples removed? Your doctor will tell you when to have your stitches or staples removed, usually in 7 to 14 days. How long you'll be told to wait will depend on things like where the wound is located, how big and how deep the wound is, and what your general health is like. Do not remove the stitches on your own. Stitches on the face are usually removed within a week. But stitches and staples on other areas of the body, such as on the back or belly or over a joint, may need to stay in place longer, often a week or two. Be sure to follow your doctor's instructions. How are stitches and staples removed? It usually doesn't hurt when the doctor removes the stitches or staples. You may feel a tug as each stitch or staple is removed. · You will either be seated or lying down. · To remove stitches, the doctor will use scissors to cut each of the knots and then pull the threads out. · To remove staples, the doctor will use a tool to take out the staples one at a time. · The area may still feel tender after the stitches or staples are gone. But it should feel better within a few minutes or up to a few hours. What can you expect after stitches and staples are removed? Depending on the type and location of the cut, you will have a scar. Scars usually fade over time. Keep the area clean, but you won't need a bandage. When should you call for help? Call your doctor now or seek immediate medical care if :  · You have new pain, or your pain gets worse. · You have trouble moving the area near the scar. · You have symptoms of infection, such as:  ? Increased pain, swelling, warmth, or redness around the scar. ? Red streaks leading from the scar. ? Pus draining from the scar. ? A fever. Watch closely for changes in your health, and be sure to contact your doctor if:  · The scar opens.   · You do not get better as expected. Follow-up care is a key part of your treatment and safety. Be sure to make and go to all appointments, and call your doctor if you do not get better as expected. It's also a good idea to keep a list of the medicines you take. Where can you learn more? Go to http://janny-brien.info/. Enter C902 in the search box to learn more about \"Learning About Stitches and Staples Removal.\"  Current as of: June 26, 2019  Content Version: 12.2  © 8822-0224 [employer]. Care instructions adapted under license by [employer] (which disclaims liability or warranty for this information). If you have questions about a medical condition or this instruction, always ask your healthcare professional. [address] any warranty or liability for your use of this information.

## 2020-10-02 ENCOUNTER — TRANSCRIBE ORDER (OUTPATIENT)
Dept: SCHEDULING | Age: 50
End: 2020-10-02

## 2020-10-02 DIAGNOSIS — M86.172 OSTEOMYELITIS, ACUTE, ANKLE OR FOOT, LEFT (HCC): Primary | ICD-10-CM

## 2020-10-12 ENCOUNTER — HOSPITAL ENCOUNTER (OUTPATIENT)
Dept: MRI IMAGING | Age: 50
Discharge: HOME | End: 2020-10-12
Attending: PODIATRIST
Payer: COMMERCIAL

## 2020-10-12 DIAGNOSIS — M86.172 OSTEOMYELITIS, ACUTE, ANKLE OR FOOT, LEFT (HCC): ICD-10-CM

## 2020-10-12 PROCEDURE — 73718 MRI LOWER EXTREMITY W/O DYE: CPT

## 2020-11-05 NOTE — PERIOP NOTES
[facility]  Ambulatory Surgery Unit  Pre-operative Instructions    Surgery/Procedure Date  Thursday, November 12, 2020            Tentative Arrival Time 0900      1. On the day of your surgery/procedure, please report to the Ambulatory Surgery Unit Registration Desk and sign in at your designated time. The Ambulatory Surgery Unit is located in [facility] on the [facility] side of the [facility] across from the [address] [place] [address]. Please have all of your health insurance cards and a photo ID. 2. You must have someone with you to drive you home, as you should not drive a car for 24 hours following anesthesia. Please make arrangements for a responsible adult friend or family member to stay with you for at least the first 24 hours after your surgery. 3. Do not have anything to eat or drink (including water, gum, mints, coffee, juice) after 11:59 PM, Wednesday. This may not apply to medications prescribed by your physician. (Please note below the special instructions with medications to take the morning of surgery, if applicable.)    4. We recommend you do not drink any alcoholic beverages for 24 hours before and after your surgery. 5. Contact your surgeons office for instructions on the following medications: non-steroidal anti-inflammatory drugs (i.e. Advil, Aleve), vitamins, and supplements. (Some surgeons will want you to stop these medications prior to surgery and others may allow you to take them)   **If you are currently taking Plavix, Coumadin, Aspirin and/or other blood-thinning agents, contact your surgeon for instructions. ** Your surgeon will partner with the physician prescribing these medications to determine if it is safe to stop or if you need to continue taking. Please do not stop taking these medications without instructions from your surgeon.     6. In an effort to help prevent surgical site infection, we ask that you shower with an anti-bacterial soap (i.e. Dial/Safeguard, or the soap provided to you at your preadmission testing appointment) for 3 days prior to and on the morning of surgery, using a fresh towel after each shower. (Please begin this process with fresh bed linens.) Do not apply any lotions, powders, or deodorants after the shower on the day of your procedure. If applicable, please do not shave the operative site for 48 hours prior to surgery. 7. Wear comfortable clothes. Wear glasses instead of contacts. Do not bring any jewelry or money (other than copays or fees as instructed). Do not wear make-up, particularly mascara, the morning of your surgery. Do not wear nail polish, particularly if you are having foot /hand surgery. Wear your hair loose or down, no ponytails, buns, yesy pins or clips. All body piercings must be removed. 8. You should understand that if you do not follow these instructions your surgery may be cancelled. If your physical condition changes (i.e. fever, cold or flu) please contact your surgeon as soon as possible. 9. It is important that you be on time. If a situation occurs where you may be late, or if you have any questions or problems, please call [phone].    10. Your surgery time may be subject to change. You will receive a phone call the day prior to surgery to confirm your arrival time. 11. Pediatric patients: please bring a change of clothes, diapers, bottle/sippy cup, pacifier, etc.      Special Instructions: Take all medications and inhalers, as prescribed, on the morning of surgery with a sip of water EXCEPT: no diabetic medications day of surgery. Insulin Dependent Diabetic patients: Take your diabetic medications as prescribed the day before surgery. Hold all diabetic medications the day of surgery. If you are scheduled to arrive for surgery after 8:00 AM, and your AM blood sugar is >200, please call Ambulatory Surgery.     I understand a pre-operative phone call will be made to verify my surgery time. In the event that I am not available, I give permission for a message to be left on my answering service and/or with another person?       yes    Preop instructions reviewed  Pt verbalized understanding.      ___________________      ___________________      ________________  (Signature of Patient)          (Witness)                   (Date and Time)

## 2020-11-08 ENCOUNTER — HOSPITAL ENCOUNTER (OUTPATIENT)
Dept: PREADMISSION TESTING | Age: 50
Discharge: HOME | End: 2020-11-08
Payer: COMMERCIAL

## 2020-11-08 PROCEDURE — 87635 SARS-COV-2 COVID-19 AMP PRB: CPT

## 2020-11-09 LAB
HEALTH STATUS, XMCV2T: NORMAL
SARS-COV-2, COV2NT: NOT DETECTED
SOURCE, COVRS: NORMAL
SPECIMEN SOURCE, FCOV2M: NORMAL
SPECIMEN TYPE, XMCV1T: NORMAL

## 2020-11-10 ENCOUNTER — ANESTHESIA EVENT (OUTPATIENT)
Dept: SURGERY | Age: 50
End: 2020-11-10
Payer: COMMERCIAL

## 2020-11-12 ENCOUNTER — ANESTHESIA (OUTPATIENT)
Dept: SURGERY | Age: 50
End: 2020-11-12
Payer: COMMERCIAL

## 2020-11-12 ENCOUNTER — HOSPITAL ENCOUNTER (OUTPATIENT)
Age: 50
Setting detail: OUTPATIENT SURGERY
Discharge: HOME | End: 2020-11-12
Attending: PODIATRIST | Admitting: PODIATRIST
Payer: COMMERCIAL

## 2020-11-12 VITALS
SYSTOLIC BLOOD PRESSURE: 114 MMHG | OXYGEN SATURATION: 95 % | TEMPERATURE: 98.3 F | HEIGHT: 71 IN | HEART RATE: 84 BPM | WEIGHT: 270 LBS | RESPIRATION RATE: 13 BRPM | BODY MASS INDEX: 37.8 KG/M2 | DIASTOLIC BLOOD PRESSURE: 70 MMHG

## 2020-11-12 DIAGNOSIS — G89.18 POST-OPERATIVE PAIN: Primary | ICD-10-CM

## 2020-11-12 LAB
GLUCOSE BLD STRIP.AUTO-MCNC: 129 MG/DL (ref 65–100)
GLUCOSE BLD STRIP.AUTO-MCNC: 139 MG/DL (ref 65–100)
SERVICE CMNT-IMP: ABNORMAL
SERVICE CMNT-IMP: ABNORMAL

## 2020-11-12 PROCEDURE — 87077 CULTURE AEROBIC IDENTIFY: CPT

## 2020-11-12 PROCEDURE — 88307 TISSUE EXAM BY PATHOLOGIST: CPT

## 2020-11-12 PROCEDURE — 74011000250 HC RX REV CODE- 250: Performed by: PODIATRIST

## 2020-11-12 PROCEDURE — 2709999900 HC NON-CHARGEABLE SUPPLY: Performed by: PODIATRIST

## 2020-11-12 PROCEDURE — 76060000062 HC AMB SURG ANES 1 TO 1.5 HR: Performed by: PODIATRIST

## 2020-11-12 PROCEDURE — 77030000032 HC CUF TRNQT ZIMM -B: Performed by: PODIATRIST

## 2020-11-12 PROCEDURE — 87205 SMEAR GRAM STAIN: CPT

## 2020-11-12 PROCEDURE — 74011250636 HC RX REV CODE- 250/636: Performed by: PODIATRIST

## 2020-11-12 PROCEDURE — 74011000258 HC RX REV CODE- 258: Performed by: PODIATRIST

## 2020-11-12 PROCEDURE — 87186 SC STD MICRODIL/AGAR DIL: CPT

## 2020-11-12 PROCEDURE — 74011250636 HC RX REV CODE- 250/636: Performed by: NURSE ANESTHETIST, CERTIFIED REGISTERED

## 2020-11-12 PROCEDURE — 76030000001 HC AMB SURG OR TIME 1 TO 1.5: Performed by: PODIATRIST

## 2020-11-12 PROCEDURE — 77030031139 HC SUT VCRL2 J&J -A: Performed by: PODIATRIST

## 2020-11-12 PROCEDURE — 87076 CULTURE ANAEROBE IDENT EACH: CPT

## 2020-11-12 PROCEDURE — 87075 CULTR BACTERIA EXCEPT BLOOD: CPT

## 2020-11-12 PROCEDURE — 88311 DECALCIFY TISSUE: CPT

## 2020-11-12 PROCEDURE — 77030039461 HC BLD SURG BN MSNX -E: Performed by: PODIATRIST

## 2020-11-12 PROCEDURE — 76210000046 HC AMBSU PH II REC FIRST 0.5 HR: Performed by: PODIATRIST

## 2020-11-12 PROCEDURE — 77030002916 HC SUT ETHLN J&J -A: Performed by: PODIATRIST

## 2020-11-12 PROCEDURE — 74011250636 HC RX REV CODE- 250/636: Performed by: ANESTHESIOLOGY

## 2020-11-12 PROCEDURE — 82962 GLUCOSE BLOOD TEST: CPT

## 2020-11-12 PROCEDURE — 77030018836 HC SOL IRR NACL ICUM -A: Performed by: PODIATRIST

## 2020-11-12 PROCEDURE — 76210000040 HC AMBSU PH I REC FIRST 0.5 HR: Performed by: PODIATRIST

## 2020-11-12 PROCEDURE — 74011250637 HC RX REV CODE- 250/637: Performed by: PODIATRIST

## 2020-11-12 PROCEDURE — 74011000250 HC RX REV CODE- 250: Performed by: NURSE ANESTHETIST, CERTIFIED REGISTERED

## 2020-11-12 RX ORDER — SODIUM CHLORIDE, SODIUM LACTATE, POTASSIUM CHLORIDE, CALCIUM CHLORIDE 600; 310; 30; 20 MG/100ML; MG/100ML; MG/100ML; MG/100ML
25 INJECTION, SOLUTION INTRAVENOUS CONTINUOUS
Status: DISCONTINUED | OUTPATIENT
Start: 2020-11-12 | End: 2020-11-12 | Stop reason: HOSPADM

## 2020-11-12 RX ORDER — FENTANYL CITRATE 50 UG/ML
INJECTION, SOLUTION INTRAMUSCULAR; INTRAVENOUS AS NEEDED
Status: DISCONTINUED | OUTPATIENT
Start: 2020-11-12 | End: 2020-11-12 | Stop reason: HOSPADM

## 2020-11-12 RX ORDER — HYDROMORPHONE HYDROCHLORIDE 1 MG/ML
.2-.5 INJECTION, SOLUTION INTRAMUSCULAR; INTRAVENOUS; SUBCUTANEOUS ONCE
Status: DISCONTINUED | OUTPATIENT
Start: 2020-11-12 | End: 2020-11-12 | Stop reason: HOSPADM

## 2020-11-12 RX ORDER — DIPHENHYDRAMINE HYDROCHLORIDE 50 MG/ML
12.5 INJECTION, SOLUTION INTRAMUSCULAR; INTRAVENOUS AS NEEDED
Status: DISCONTINUED | OUTPATIENT
Start: 2020-11-12 | End: 2020-11-12 | Stop reason: HOSPADM

## 2020-11-12 RX ORDER — SODIUM CHLORIDE 0.9 % (FLUSH) 0.9 %
5-40 SYRINGE (ML) INJECTION AS NEEDED
Status: DISCONTINUED | OUTPATIENT
Start: 2020-11-12 | End: 2020-11-12 | Stop reason: HOSPADM

## 2020-11-12 RX ORDER — SODIUM CHLORIDE 0.9 % (FLUSH) 0.9 %
5-40 SYRINGE (ML) INJECTION EVERY 8 HOURS
Status: DISCONTINUED | OUTPATIENT
Start: 2020-11-12 | End: 2020-11-12 | Stop reason: HOSPADM

## 2020-11-12 RX ORDER — OXYCODONE AND ACETAMINOPHEN 5; 325 MG/1; MG/1
1 TABLET ORAL
Status: DISCONTINUED | OUTPATIENT
Start: 2020-11-12 | End: 2020-11-12 | Stop reason: HOSPADM

## 2020-11-12 RX ORDER — FENTANYL CITRATE 50 UG/ML
25 INJECTION, SOLUTION INTRAMUSCULAR; INTRAVENOUS
Status: DISCONTINUED | OUTPATIENT
Start: 2020-11-12 | End: 2020-11-12 | Stop reason: HOSPADM

## 2020-11-12 RX ORDER — MORPHINE SULFATE 10 MG/ML
2 INJECTION, SOLUTION INTRAMUSCULAR; INTRAVENOUS
Status: DISCONTINUED | OUTPATIENT
Start: 2020-11-12 | End: 2020-11-12 | Stop reason: HOSPADM

## 2020-11-12 RX ORDER — PHENYLEPHRINE HCL IN 0.9% NACL 0.4MG/10ML
SYRINGE (ML) INTRAVENOUS AS NEEDED
Status: DISCONTINUED | OUTPATIENT
Start: 2020-11-12 | End: 2020-11-12 | Stop reason: HOSPADM

## 2020-11-12 RX ORDER — PROPOFOL 10 MG/ML
INJECTION, EMULSION INTRAVENOUS AS NEEDED
Status: DISCONTINUED | OUTPATIENT
Start: 2020-11-12 | End: 2020-11-12 | Stop reason: HOSPADM

## 2020-11-12 RX ORDER — LIDOCAINE HYDROCHLORIDE 20 MG/ML
INJECTION, SOLUTION EPIDURAL; INFILTRATION; INTRACAUDAL; PERINEURAL AS NEEDED
Status: DISCONTINUED | OUTPATIENT
Start: 2020-11-12 | End: 2020-11-12 | Stop reason: HOSPADM

## 2020-11-12 RX ORDER — HYDROCODONE BITARTRATE AND ACETAMINOPHEN 7.5; 325 MG/1; MG/1
1 TABLET ORAL
Qty: 28 TAB | Refills: 0 | Status: SHIPPED | OUTPATIENT
Start: 2020-11-12 | End: 2020-11-19

## 2020-11-12 RX ORDER — LIDOCAINE HYDROCHLORIDE 10 MG/ML
0.1 INJECTION, SOLUTION EPIDURAL; INFILTRATION; INTRACAUDAL; PERINEURAL AS NEEDED
Status: DISCONTINUED | OUTPATIENT
Start: 2020-11-12 | End: 2020-11-12 | Stop reason: HOSPADM

## 2020-11-12 RX ORDER — ONDANSETRON 2 MG/ML
INJECTION INTRAMUSCULAR; INTRAVENOUS AS NEEDED
Status: DISCONTINUED | OUTPATIENT
Start: 2020-11-12 | End: 2020-11-12 | Stop reason: HOSPADM

## 2020-11-12 RX ORDER — AMOXICILLIN AND CLAVULANATE POTASSIUM 875; 125 MG/1; MG/1
1 TABLET, FILM COATED ORAL 2 TIMES DAILY
Qty: 20 TAB | Refills: 0 | Status: SHIPPED | OUTPATIENT
Start: 2020-11-12 | End: 2020-11-22

## 2020-11-12 RX ORDER — BUPIVACAINE HYDROCHLORIDE 5 MG/ML
20 INJECTION, SOLUTION EPIDURAL; INTRACAUDAL ONCE
Status: COMPLETED | OUTPATIENT
Start: 2020-11-12 | End: 2020-11-12

## 2020-11-12 RX ORDER — PROPOFOL 10 MG/ML
INJECTION, EMULSION INTRAVENOUS
Status: DISCONTINUED | OUTPATIENT
Start: 2020-11-12 | End: 2020-11-12 | Stop reason: HOSPADM

## 2020-11-12 RX ADMIN — Medication 3 AMPULE: at 08:14

## 2020-11-12 RX ADMIN — ONDANSETRON HYDROCHLORIDE 4 MG: 2 INJECTION, SOLUTION INTRAMUSCULAR; INTRAVENOUS at 09:50

## 2020-11-12 RX ADMIN — FENTANYL CITRATE 25 MCG: 50 INJECTION, SOLUTION INTRAMUSCULAR; INTRAVENOUS at 09:12

## 2020-11-12 RX ADMIN — CEFAZOLIN SODIUM 3 G: 10 INJECTION, POWDER, FOR SOLUTION INTRAVENOUS at 09:00

## 2020-11-12 RX ADMIN — Medication 80 MCG: at 09:52

## 2020-11-12 RX ADMIN — PROPOFOL 100 MCG/KG/MIN: 10 INJECTION, EMULSION INTRAVENOUS at 09:00

## 2020-11-12 RX ADMIN — SODIUM CHLORIDE, SODIUM LACTATE, POTASSIUM CHLORIDE, AND CALCIUM CHLORIDE 25 ML/HR: 600; 310; 30; 20 INJECTION, SOLUTION INTRAVENOUS at 08:12

## 2020-11-12 RX ADMIN — PROPOFOL 50 MG: 10 INJECTION, EMULSION INTRAVENOUS at 09:00

## 2020-11-12 RX ADMIN — Medication 80 MCG: at 09:37

## 2020-11-12 RX ADMIN — LIDOCAINE HYDROCHLORIDE 100 MG: 20 INJECTION, SOLUTION INTRAVENOUS at 08:58

## 2020-11-12 RX ADMIN — PROPOFOL 30 MG: 10 INJECTION, EMULSION INTRAVENOUS at 09:03

## 2020-11-12 RX ADMIN — Medication 80 MCG: at 09:57

## 2020-11-12 NOTE — PERIOP NOTES
Patient states that Fe Lim will be with them for at least 24 hours following today's procedure. Permission received to review discharge instructions and discuss private health information with Fe Lim. Mistral-Air warming blanket applied at this time. Set to appropriate setting that is comfortable to patient. Will continue to monitor.

## 2020-11-12 NOTE — PERIOP NOTES
Norberto Kaveh  1970  [id number]    Situation:  Verbal report given from: Joe Dhillon CRNA, Jennifer Peoples, RN  Procedure: Procedure(s):  DEBRIDEMENT OF LEFT FOOT WOUND TO JOINT CAPSULE AND LEFT FOOT OPEN BONE BIOPSY (MAC W/LOCAL BLOCK BY SURGEON)    Background:    Preoperative diagnosis: ULCER LEFT FOOT, BONE, OSTEOMYELITIS, ACUTE LEFT ANKLE, FOOT    Postoperative diagnosis: ULCER LEFT FOOT, BONE, OSTEOMYELITIS, ACUTE LEFT ANKLE, FOOT    :  Dr. Love Chaparro    Assistant(s): Circ-1: Mariam Harding RN  Scrub Tech-1: RT Bella  Scrub Tech-2: Meryl Rodriguez    Specimens:   ID Type Source Tests Collected by Time Destination   1 : LEFT FOOT POSSIBLE MYELITIS BIOPSY 1ST METATARSAL Preservative Foot, left  Kelsi Inks, DP 11/12/2020 6621 Pathology   1 : DIABETIC WOUND LEFT FOOT Wound Foot, left CULTURE, ANAEROBIC, CULTURE, WOUND W Zenon Heft, ANAEROBIC/AEROBIC/GRAM STAIN Kelsi Inks, DP 11/12/2020 0920 Microbiology       Assessment:  Intra-procedure medications         Anesthesia gave intra-procedure sedation and medications, see anesthesia flow sheet     Intravenous fluids: LR@ KVO     Vital signs stable       Recommendation:    Permission to share finding with wife : yes

## 2020-11-12 NOTE — ANESTHESIA PREPROCEDURE EVALUATION
Relevant Problems   No relevant active problems       Anesthetic History   No history of anesthetic complications            Review of Systems / Medical History  Patient summary reviewed, nursing notes reviewed and pertinent labs reviewed    Pulmonary  Within defined limits                 Neuro/Psych   Within defined limits           Cardiovascular    Hypertension          Hyperlipidemia    Exercise tolerance: >4 METS     GI/Hepatic/Renal  Within defined limits              Endo/Other    Diabetes: type 2, using insulin    Obesity     Other Findings   Comments: Neuropathy  Gout           Physical Exam    Airway  Mallampati: II  TM Distance: 4 - 6 cm  Neck ROM: normal range of motion   Mouth opening: Normal     Cardiovascular  Regular rate and rhythm,  S1 and S2 normal,  no murmur, click, rub, or gallop             Dental    Dentition: Caps/crowns     Pulmonary  Breath sounds clear to auscultation               Abdominal  GI exam deferred       Other Findings            Anesthetic Plan    ASA: 2  Anesthesia type: total IV anesthesia and MAC          Induction: Intravenous  Anesthetic plan and risks discussed with: Patient

## 2020-11-12 NOTE — ANESTHESIA POSTPROCEDURE EVALUATION
Procedure(s):  DEBRIDEMENT OF LEFT FOOT WOUND TO JOINT CAPSULE AND LEFT FOOT OPEN BONE BIOPSY (MAC W/LOCAL BLOCK BY SURGEON). total IV anesthesia, MAC    Anesthesia Post Evaluation        Patient location during evaluation: PACU  Note status: Adequate. Level of consciousness: responsive to verbal stimuli and sleepy but conscious  Pain management: satisfactory to patient  Airway patency: patent  Anesthetic complications: no  Cardiovascular status: acceptable  Respiratory status: acceptable  Hydration status: acceptable  Comments: +Post-Anesthesia Evaluation and Assessment    Patient: Jamie Santoyo MRN: [id number]  SSN: [id number]   YOB: 1970  Age: 44 y.o. Sex: male      Cardiovascular Function/Vital Signs    /70 (BP 1 Location: Left arm, BP Patient Position: At rest)   Pulse 84   Temp 36.8 °C (98.3 °F)   Resp 13   Ht 5' 11\" (1.803 m)   Wt 122.5 kg (270 lb)   SpO2 95%   BMI 37.66 kg/m²     Patient is status post Procedure(s):  DEBRIDEMENT OF LEFT FOOT WOUND TO JOINT CAPSULE AND LEFT FOOT OPEN BONE BIOPSY (MAC W/LOCAL BLOCK BY SURGEON). Nausea/Vomiting: Controlled. Postoperative hydration reviewed and adequate. Pain:  Pain Scale 1: Numeric (0 - 10) (11/12/20 1046)  Pain Intensity 1: 0 (11/12/20 1046)   Managed. Neurological Status:   Neuro (WDL): Within Defined Limits (11/12/20 1046)   At baseline. Mental Status and Level of Consciousness: Arousable. Pulmonary Status:   O2 Device: Room air (11/12/20 1046)   Adequate oxygenation and airway patent. Complications related to anesthesia: None    Post-anesthesia assessment completed. No concerns.     Signed By: Brendan Mayorga MD    11/12/2020  Post anesthesia nausea and vomiting:  controlled      INITIAL Post-op Vital signs:   Vitals Value Taken Time   /69 11/12/2020 10:37 AM   Temp 36.8 °C (98.3 °F) 11/12/2020 10:26 AM   Pulse 86 11/12/2020 10:43 AM   Resp 19 11/12/2020 10:43 AM   SpO2 95 % 11/12/2020 10:43 AM Vitals shown include unvalidated device data.

## 2020-11-12 NOTE — DISCHARGE INSTRUCTIONS
1. Call the office for any post op problems, including but not limited to: severe pain, excessive bleeding, fever, nausea, vomiting. 2. Call the office for a post op appointment to be seen in 7-10 days. 3. Take the antibiotic, Augmentin, as directed. Take the pain medication (hydrocodone) as needed/directed. 4. Keep the bandage clean, dry and intact. 5. Walk on the left foot only for essential activity, use the post op shoe when walking. 6. Elevate the left foot frequently. You may apply ice for swelling, but no more than 20 minutes per hour. DO NOT TAKE TYLENOL/ACETAMINOPHEN WITH PERCOCET, [facility], [address] TAKE NARCOTIC PAIN MEDICATIONS WITH FOOD     Narcotics tend to be constipating, we suggest taking a stool softener such as Colace or Miralax (follow package instructions). DO NOT DRIVE WHILE TAKING NARCOTIC PAIN MEDICATIONS. DO NOT TAKE SLEEPING MEDICATIONS OR ANTIANXIETY MEDICATIONS WHILE TAKING NARCOTIC PAIN MEDICATIONS,  ESPECIALLY THE NIGHT OF ANESTHESIA! CPAP PATIENTS BE SURE TO WEAR MACHINE WHENEVER NAPPING OR SLEEPING! DISCHARGE SUMMARY from Nurse    The following personal items collected during your admission are returned to you:   Dental Appliance: Dental Appliances: None  Vision: Visual Aid: None  Hearing Aid:    Jewelry: Jewelry: None  Clothing: Clothing: At bedside  Other Valuables: Other Valuables: None  Valuables sent to safe:        PATIENT INSTRUCTIONS:    After General Anesthesia or Intravenous Sedation, for 24 hours or while taking prescription Narcotics:        Someone should be with you for the next 24 hours. For your own safety, a responsible adult must drive you home. · Limit your activities  · Recommended activity: Rest today, up with assistance today. Do not climb stairs or shower unattended for the next 24 hours. · Please start with a soft bland diet and advance as tolerated (no nausea) to regular diet.   · If you have a sore throat you should try the following: fluids, warm salt water gargles, or throat lozenges. If it does not improve after several days please follow up with your primary physician. · Do not drive and operate hazardous machinery  · Do not make important personal or business decisions  · Do  not drink alcoholic beverages  · If you have not urinated within 8 hours after discharge, please contact your surgeon on call. Report the following to your surgeon:  · Excessive pain, swelling, redness or odor of or around the surgical area  · Temperature over 100.5  · Nausea and vomiting lasting longer than 4 hours or if unable to take medications  · Any signs of decreased circulation or nerve impairment to extremity: change in color, persistent  numbness, tingling, coldness or increase pain      · You will receive a Post Operative Call from one of the Recovery Room Nurses on the day after your surgery to check on you. It is very important for us to know how you are recovering after your surgery. If you have an issue or need to speak with someone, please call your surgeon, do not wait for the post operative call. · You may receive an e-mail or letter in the mail from [employer] regarding your experience with us in the Ambulatory Surgery Unit. Your feedback is valuable to us and we appreciate your participation in the survey. · If the above instructions are not adequate or you are having problems after your surgery, call the physician at their office number. · We wish you a speedy recovery ? What to do at Home:      *  Please give a list of your current medications to your Primary Care Provider. *  Please update this list whenever your medications are discontinued, doses are      changed, or new medications (including over-the-counter products) are added. *  Please carry medication information at all times in case of emergency situations.     If you have not received your influenza and/or pneumococcal vaccine, please follow up with your primary care physician. The discharge information has been reviewed with the patient and caregiver. The patient and caregiver verbalized understanding. TO PREVENT AN INFECTION      1. [address] YOUR HANDS     To prevent infection, good handwashing is the most important thing you or your caregiver can do.  Wash your hands with soap and water or use the hand  we gave you before you touch any wounds. 2. SHOWER     Use the antibacterial soap we gave you when you take a shower.  Shower with this soap until your wounds are healed.  To reach all areas of your body, you may need someone to help you.  Dont forget to clean your belly button with every shower. 3.  USE CLEAN SHEETS     Use freshly cleaned sheets on your bed after surgery.  To keep the surgery site clean, do not allow pets to sleep with you while your wound is still healing. 4. STOP SMOKING     Stop smoking, or at least cut back on smoking     Smoking slows your healing. 5.  CONTROL YOUR BLOOD SUGAR     High blood sugars slow wound healing. If you are diabetic, control your blood sugar levels before and after your surgery. [place] THROMBOSIS AND PULMONARY EMBOLUS    SURGICAL PATIENTS  Surgical patients are the #1 risk for DVT and PE. WHAT IS DVT? WHAT IS PE?  DVT is a serious condition where blood clots develop deep in the veins of the legs. PE occurs when a blood clot breaks loose from the wall of a vein and travels to the lungs blocking the pulmonary artery or one of its branches impairing blood flow from the heart, which could result in death.   RISK FACTORS   Surgery lasting longer than 45 minutes   History of inflammatory bowel disease   Oral contraceptive or hormone replacement therapy   Immobilization   Varicose veins / swollen legs   Smoking    CHF / Acute MI / Irregular heart beat   Family history of thrombosis   General anesthesia greater than 2 hours   Obesity   Infection of less than one month   Less than 1 month postpartum   COPD / Pneumonia   Arthroscopic surgery   Malignancy / cancer   Spine surgery   Blood abnormalities   Stroke / Paralysis / Coma    SIGNS AND SYMPTOMS OF DEEP VEIN TROMBOSIS   -  ER VISIT  Usually occurs in one leg, above or below the knee   Swelling - one calf or thigh may be larger than the other   Feeling increased warmth in the area of the leg that is swollen or painful   Leg pain, which may increase when standing or walking   Swelling along the vein of the leg   When swollen areas is pressed with a finger, a depression may remain   Tenderness of the leg that may be confined to one area   Change in leg color (bluish or red)    SIGNS AND SYMPTOMS OF PULMONARY EMBOLUS  - 911 CALL   Chest pain that gets worse with deep breath, coughing or chest movement   Coughing up blood   Sweating   Shortness of breath or difficulty breathing   Rapid heart beat   Lightheadedness    HOW TO REDUCE THE POSSIBLE RISK OF DVT   Exercise - simple activities as rotating ankles and wrists, wiggling toes and fingers, tightening and relaxing muscles in calves and thighs promotes circulation while recovering from surgery. Please do these exercises every hour during waking hours   Take mediation as prescribed by your physician (Lovenox, Coumadin, Aspirin)   Resume your normal activities as soon as your doctor advises you to do so.  Remember, when traveling, to [place] your legs frequently.         PATIENTS WHO BELIEVE THEY MAY BE EXPERIENCING SIGNS AND SYMPTOMS OF DVT OR PE SHOULD SEEK MEDICAL HELP IMMEDIATELY

## 2020-11-12 NOTE — PERIOP NOTES
Patient: Zoya Self MRN: [id number]  SSN: [id number]   YOB: 1970  Age: 44 y.o. Sex: male     Patient is status post Procedure(s):  DEBRIDEMENT OF LEFT FOOT WOUND TO JOINT CAPSULE AND LEFT FOOT OPEN BONE BIOPSY (MAC W/LOCAL BLOCK BY SURGEON). Surgeon(s) and Role:     * Mg Michele DPM - Primary    Local/Dose/Irrigation:  SEE MAR                  Peripheral IV 11/12/20 Right Forearm (Active)   Site Assessment Clean, dry, & intact 11/12/20 0811   Phlebitis Assessment 0 11/12/20 0811   Infiltration Assessment 0 11/12/20 0811   Dressing Status Clean, dry, & intact 11/12/20 0811   Dressing Type Tape;Transparent 11/12/20 0811   Hub Color/Line Status Pink; Infusing 11/12/20 1527                           Dressing/Packing:  Wound Foot Left-Dressing/Treatment: Other (Comment)(4X4S, ADAPTIC, KATELYNL, KARINA, &COBAN) (11/12/20 0900)

## 2020-11-12 NOTE — BRIEF OP NOTE
Brief Postoperative Note    Patient: Yasemin Harrison  YOB: 1970  MRN: [id number]    Date of Procedure: 11/12/2020     Pre-Op Diagnosis: 1. ULCER LEFT FOOT, BONE, 2. OSTEOMYELITIS, ACUTE LEFT FOOT    Post-Op Diagnosis: 1. Ulcer to joint capsule left 1st MPJ, 2. Possible osteomyelitis left 1st metatarsal head      Procedure(s):  1. DEBRIDEMENT OF LEFT FOOT WOUND TO JOINT CAPSULE AND 2. LEFT FOOT OPEN BONE BIOPSY (MAC W/LOCAL BLOCK BY SURGEON)    Surgeon(s):  Gregoria Michele DPM    Surgical Assistant: None    Anesthesia: MAC     Estimated Blood Loss (mL): Minimal    Complications: None    Specimens:   ID Type Source Tests Collected by Time Destination   1 : LEFT FOOT POSSIBLE MYELITIS BIOPSY 1ST METATARSAL Preservative Foot, left  Casa Villegas DPM 11/12/2020 2293 Pathology   1 : DIABETIC WOUND LEFT FOOT Wound Foot, left CULTURE, ANAEROBIC, CULTURE, WOUND W Zaida Asper, ANAEROBIC/AEROBIC/GRAM STAIN Casa Villegas DPM 11/12/2020 0920 Microbiology        Implants: * No implants in log *    Drains: * No LDAs found *    Findings: Bone left 1st metatarsal head hard and normal to palpation. After debridement of soft tissue, remaining tissue appeared healthy.     Electronically Signed by Maite Bishop DPM on 11/12/2020 at 10:15 AM

## 2020-11-13 NOTE — OP NOTES
[address]  OPERATIVE REPORT    Name:  Soni Gamez  MR#:  [id number]  :  1970  ACCOUNT #:  [de-identified]  DATE OF SERVICE:  2020    PREOPERATIVE DIAGNOSES:  1. Ulcer to bone, left first metatarsophalangeal joint. 2.  Osteomyelitis, left first metatarsal.    POSTOPERATIVE DIAGNOSES:  1. Ulcer to joint capsule, left first metatarsophalangeal joint. 2.  Possible osteomyelitis, left first metatarsal.    PROCEDURE PERFORMED:  1. Debridement of diabetic wound left foot to joint capsule. 2.  Open bone biopsy, left first metatarsal head. SURGEON:  Mony Treviño DPM    ASSISTANT:  Not applicable. ANESTHESIA:  IV sedation with local.    COMPLICATIONS:  None. SPECIMENS REMOVED:  1. Aerobic and anaerobic wound cultures, left foot. 2.  Bone biopsy, possible osteomyelitis, left first metatarsal head. IMPLANTS:  None. ESTIMATED BLOOD LOSS:  Minimal.    PROCEDURE:  The patient was brought into the operating room and placed supine upon the OR table. After administration of IV sedation, the left foot was injected with 20 mL of 0.5% plain Marcaine. The foot was prepped and draped in the usual sterile technique. The foot was elevated for 3 minutes before the inflation of a tourniquet around the left ankle to a pressure of 250 mmHg. Attention was directed to the plantar left first metatarsophalangeal joint. Using a #15 scalpel, I sharply debrided the surface of the wound and then debrided the hypertrophic granulation tissue from within the wound bed. Forceps were used to probe down to the joint, left first metatarsal head. Aerobic and anaerobic cultures were taken from deep within the wound at this point. At this time, I then made a pair of converging semi-elliptical incisions about 3-cm from medial to lateral and 8-cm from proximal to distal encompassing the ulceration on the plantar left first metatarsophalangeal joint.   The skin was excised full-thickness, although the subcutaneous tissue was left in place. I could see additional necrotic soft tissue within the wound after having made this excision of a skin wedge and the necrotic tissue was excisionally debrided with a pair of small curved Metzenbaum scissors to a clean base. Inspection of the wound revealed that the flexor tendons were still intact. There were still some soft tissue coverage over the sesamoid bones and the metatarsophalangeal joint. Therefore, I decided that the amputation would only be necessary if the bones were abnormal.    Probing the bone with forceps revealed it to be hard and normal consistency, so the wound was simply cleaned using the [employer] ultrasonic debriding tool and approximately 400 mL of sterile saline. A Pimovation needle was used to obtain a biopsy from the plantar medial aspect of the left first metatarsal head and this will be sent to pathology, labeled possible osteomyelitis. The wound was then closed utilizing a combination of 3-0 Vicryl and 3-0 nylon. The tourniquet was released and capillary refill was less than 3 seconds to all toes. The foot was cleaned and dried and wrapped with dry sterile dressings incorporating Adaptic over the surgical incision. The patient was then transported to the recovery room with vital signs stable and vascular status intact. He will remain until stable for discharge home. He has written and verbal postoperative instructions, postoperative pain medications and postoperative antibiotics. He will follow up in my office in 7-10 days.         Dean Olea DPM [id number]  D:  11/12/2020 10:27  T:  11/12/2020 21:49  JOB #:  [id number]

## 2020-11-14 LAB
BACTERIA SPEC CULT: ABNORMAL
BACTERIA SPEC CULT: ABNORMAL
GRAM STN SPEC: ABNORMAL
GRAM STN SPEC: ABNORMAL
SERVICE CMNT-IMP: ABNORMAL
SERVICE CMNT-IMP: ABNORMAL

## 2021-01-11 ENCOUNTER — TRANSCRIBE ORDER (OUTPATIENT)
Dept: SCHEDULING | Age: 51
End: 2021-01-11

## 2021-01-11 DIAGNOSIS — M86.472 CHRONIC OSTEOMYELITIS OF LEFT FOOT WITH DRAINING SINUS (HCC): Primary | ICD-10-CM

## 2021-01-15 PROBLEM — E53.8 B12 DEFICIENCY: Status: ACTIVE | Noted: 2021-01-15

## 2021-01-18 ENCOUNTER — HOSPITAL ENCOUNTER (OUTPATIENT)
Dept: MRI IMAGING | Age: 51
Discharge: HOME | End: 2021-01-18
Attending: PODIATRIST
Payer: COMMERCIAL

## 2021-01-18 DIAGNOSIS — M86.472 CHRONIC OSTEOMYELITIS OF LEFT FOOT WITH DRAINING SINUS (HCC): ICD-10-CM

## 2021-01-18 PROCEDURE — A9575 INJ GADOTERATE MEGLUMI 0.1ML: HCPCS | Performed by: PODIATRIST

## 2021-01-18 PROCEDURE — 73720 MRI LWR EXTREMITY W/O&W/DYE: CPT

## 2021-01-18 PROCEDURE — 74011250636 HC RX REV CODE- 250/636: Performed by: PODIATRIST

## 2021-01-18 RX ORDER — GADOTERATE MEGLUMINE 376.9 MG/ML
20 INJECTION INTRAVENOUS
Status: COMPLETED | OUTPATIENT
Start: 2021-01-18 | End: 2021-01-18

## 2021-01-18 RX ADMIN — GADOTERATE MEGLUMINE 20 ML: 376.9 INJECTION INTRAVENOUS at 18:00

## 2021-01-26 NOTE — PERIOP NOTES
lvm for Chelsy Michele's nurse in regards to patient states that the foot that is being operated on is the left foot. Surgery is posted for the right foot. Lm for Chelsy asking to call scheduling department and change to left foot and to call asu pat with any questions.

## 2021-01-26 NOTE — PERIOP NOTES
[facility]  Ambulatory Surgery Unit  Pre-operative Instructions    Surgery/Procedure Date  01/28/2021            Tentative Arrival Time 0730      1. On the day of your surgery/procedure, please report to the Ambulatory Surgery Unit Registration Desk and sign in at your designated time. The Ambulatory Surgery Unit is located in [facility] on the [facility] side of the [facility] across from the [address] [place] [address]. Please have all of your health insurance cards and a photo ID. 2. You must have someone with you to drive you home, as you should not drive a car for 24 hours following anesthesia. Please make arrangements for a responsible adult friend or family member to stay with you for at least the first 24 hours after your surgery. 3. Do not have anything to eat or drink (including water, gum, mints, coffee, juice) after 11:59 PM  Wednesday January 27, 2021 . This may not apply to medications prescribed by your physician. (Please note below the special instructions with medications to take the morning of surgery, if applicable.)    4. We recommend you do not drink any alcoholic beverages for 24 hours before and after your surgery. 5. Contact your surgeons office for instructions on the following medications: non-steroidal anti-inflammatory drugs (i.e. Advil, Aleve), vitamins, and supplements. (Some surgeons will want you to stop these medications prior to surgery and others may allow you to take them)   **If you are currently taking Plavix, Coumadin, Aspirin and/or other blood-thinning agents, contact your surgeon for instructions. ** Your surgeon will partner with the physician prescribing these medications to determine if it is safe to stop or if you need to continue taking. Please do not stop taking these medications without instructions from your surgeon.     6. In an effort to help prevent surgical site infection, we ask that you shower with an anti-bacterial soap (i.e. Dial/Safeguard, or the soap provided to you at your preadmission testing appointment) for 3 days prior to and on the morning of surgery, using a fresh towel after each shower. (Please begin this process with fresh bed linens.) Do not apply any lotions, powders, or deodorants after the shower on the day of your procedure. If applicable, please do not shave the operative site for 48 hours prior to surgery. 7. Wear comfortable clothes. Wear glasses instead of contacts. Do not bring any jewelry or money (other than copays or fees as instructed). Do not wear make-up, particularly mascara, the morning of your surgery. Do not wear nail polish, particularly if you are having foot /hand surgery. Wear your hair loose or down, no ponytails, buns, yesy pins or clips. All body piercings must be removed. 8. You should understand that if you do not follow these instructions your surgery may be cancelled. If your physical condition changes (i.e. fever, cold or flu) please contact your surgeon as soon as possible. 9. It is important that you be on time. If a situation occurs where you may be late, or if you have any questions or problems, please call [phone].    10. Your surgery time may be subject to change. You will receive a phone call the day prior to surgery to confirm your arrival time. 11. Pediatric patients: please bring a change of clothes, diapers, bottle/sippy cup, pacifier, etc.      Special Instructions: Take all medications and inhalers, as prescribed, on the morning of surgery with a sip of water EXCEPT: diabetic medications      Insulin Dependent Diabetic patients: Take your diabetic medications as prescribed the day before surgery. Hold all diabetic medications the day of surgery. If you are scheduled to arrive for surgery after 8:00 AM, and your AM blood sugar is >200, please call Ambulatory Surgery. I understand a pre-operative phone call will be made to verify my surgery time.   In the event that I am not available, I give permission for a message to be left on my answering service and/or with another person?       yes         ___________________      ___________________      ________________  (Signature of Patient)          (Witness)                   (Date and Time)

## 2021-01-27 ENCOUNTER — ANESTHESIA EVENT (OUTPATIENT)
Dept: SURGERY | Age: 51
End: 2021-01-27
Payer: COMMERCIAL

## 2021-01-28 ENCOUNTER — HOSPITAL ENCOUNTER (OUTPATIENT)
Age: 51
Setting detail: OUTPATIENT SURGERY
Discharge: HOME | End: 2021-01-28
Attending: PODIATRIST | Admitting: PODIATRIST
Payer: COMMERCIAL

## 2021-01-28 ENCOUNTER — ANESTHESIA (OUTPATIENT)
Dept: SURGERY | Age: 51
End: 2021-01-28
Payer: COMMERCIAL

## 2021-01-28 VITALS
OXYGEN SATURATION: 96 % | HEART RATE: 73 BPM | WEIGHT: 256 LBS | HEIGHT: 71 IN | BODY MASS INDEX: 35.84 KG/M2 | SYSTOLIC BLOOD PRESSURE: 130 MMHG | DIASTOLIC BLOOD PRESSURE: 86 MMHG | RESPIRATION RATE: 20 BRPM | TEMPERATURE: 97.6 F

## 2021-01-28 DIAGNOSIS — G89.18 POST-OPERATIVE PAIN: Primary | ICD-10-CM

## 2021-01-28 LAB
GLUCOSE BLD STRIP.AUTO-MCNC: 181 MG/DL (ref 65–100)
GLUCOSE BLD STRIP.AUTO-MCNC: 198 MG/DL (ref 65–100)
SERVICE CMNT-IMP: ABNORMAL
SERVICE CMNT-IMP: ABNORMAL

## 2021-01-28 PROCEDURE — 77030042316 HC BLD SAW -B: Performed by: PODIATRIST

## 2021-01-28 PROCEDURE — 74011250636 HC RX REV CODE- 250/636: Performed by: REGISTERED NURSE

## 2021-01-28 PROCEDURE — C1713 ANCHOR/SCREW BN/BN,TIS/BN: HCPCS | Performed by: PODIATRIST

## 2021-01-28 PROCEDURE — 87077 CULTURE AEROBIC IDENTIFY: CPT

## 2021-01-28 PROCEDURE — 87075 CULTR BACTERIA EXCEPT BLOOD: CPT

## 2021-01-28 PROCEDURE — 77030031139 HC SUT VCRL2 J&J -A: Performed by: PODIATRIST

## 2021-01-28 PROCEDURE — 77030039825 HC MSK NSL PAP SUPERNO2VA VYRM -B: Performed by: ANESTHESIOLOGY

## 2021-01-28 PROCEDURE — 74011250636 HC RX REV CODE- 250/636: Performed by: ANESTHESIOLOGY

## 2021-01-28 PROCEDURE — 77030000032 HC CUF TRNQT ZIMM -B: Performed by: PODIATRIST

## 2021-01-28 PROCEDURE — 77030040361 HC SLV COMPR DVT MDII -B: Performed by: PODIATRIST

## 2021-01-28 PROCEDURE — 76060000064 HC AMB SURG ANES 2 TO 2.5 HR: Performed by: PODIATRIST

## 2021-01-28 PROCEDURE — 2709999900 HC NON-CHARGEABLE SUPPLY: Performed by: PODIATRIST

## 2021-01-28 PROCEDURE — 74011000250 HC RX REV CODE- 250: Performed by: PODIATRIST

## 2021-01-28 PROCEDURE — 88305 TISSUE EXAM BY PATHOLOGIST: CPT

## 2021-01-28 PROCEDURE — 87205 SMEAR GRAM STAIN: CPT

## 2021-01-28 PROCEDURE — 76210000040 HC AMBSU PH I REC FIRST 0.5 HR: Performed by: PODIATRIST

## 2021-01-28 PROCEDURE — 74011000250 HC RX REV CODE- 250: Performed by: REGISTERED NURSE

## 2021-01-28 PROCEDURE — 77030018836 HC SOL IRR NACL ICUM -A: Performed by: PODIATRIST

## 2021-01-28 PROCEDURE — 77030002916 HC SUT ETHLN J&J -A: Performed by: PODIATRIST

## 2021-01-28 PROCEDURE — 77030021352 HC CBL LD SYS DISP COVD -B: Performed by: PODIATRIST

## 2021-01-28 PROCEDURE — 87147 CULTURE TYPE IMMUNOLOGIC: CPT

## 2021-01-28 PROCEDURE — 74011250636 HC RX REV CODE- 250/636: Performed by: PODIATRIST

## 2021-01-28 PROCEDURE — 76210000050 HC AMBSU PH II REC 0.5 TO 1 HR: Performed by: PODIATRIST

## 2021-01-28 PROCEDURE — 87186 SC STD MICRODIL/AGAR DIL: CPT

## 2021-01-28 PROCEDURE — 82962 GLUCOSE BLOOD TEST: CPT

## 2021-01-28 PROCEDURE — 76030000004 HC AMB SURG OR TIME 2 TO 2.5: Performed by: PODIATRIST

## 2021-01-28 PROCEDURE — 88311 DECALCIFY TISSUE: CPT

## 2021-01-28 PROCEDURE — 77030016653 HC BUR OVL4 STRY -B: Performed by: PODIATRIST

## 2021-01-28 PROCEDURE — 74011250637 HC RX REV CODE- 250/637: Performed by: PODIATRIST

## 2021-01-28 DEVICE — STIMULAN® RAPID CURE PROVIDED STERILE FOR SINGLE PATIENT USE. STIMULAN® RAPID CURE CONTAINS CALCIUM SULFATE POWDER AND MIXING SOLUTION IN PRE-MEASURED QUANTITIES SO THAT WHEN MIXED TOGETHER IN A STERILE MIXING BOWL, THE RESULTANT PASTE IS TO BE DIGITALLY PACKED INTO OPEN BONE VOID/GAP TO SET INSITU OR PLACED INTO THE MOULD PROVIDED, THE MIXTURE SETS TO FORM BEADS. THE BIODEGRADABLE, RADIOPAQUE BEADS ARE RESORBED IN APPROXIMATELY 30 – 60 DAYS WHEN USED IN ACCORDANCE WITH THE DEVICE LABELLING. STIMULAN® RAPID CURE IS MANUFACTURED FROM SYNTHETIC IMPLANT GRADE CALCIUM SULFATE DIHYDRATE(CASO4.2H2O) THAT RESORBS AND IS REPLACED WITH BONE DURING THE HEALING PROCESS. ALSO, AS THE BONE VOID FILLER BEADS ARE BIODEGRADABLE AND BIOCOMPATIBLE, THEY MAY BE USED AT AN INFECTED SITE.
Type: IMPLANTABLE DEVICE | Site: FOOT | Status: FUNCTIONAL
Brand: STIMULAN® RAPID CURE

## 2021-01-28 RX ORDER — FENTANYL CITRATE 50 UG/ML
25 INJECTION, SOLUTION INTRAMUSCULAR; INTRAVENOUS
Status: DISCONTINUED | OUTPATIENT
Start: 2021-01-28 | End: 2021-01-28 | Stop reason: HOSPADM

## 2021-01-28 RX ORDER — LIDOCAINE HYDROCHLORIDE 20 MG/ML
INJECTION, SOLUTION EPIDURAL; INFILTRATION; INTRACAUDAL; PERINEURAL AS NEEDED
Status: DISCONTINUED | OUTPATIENT
Start: 2021-01-28 | End: 2021-01-28 | Stop reason: HOSPADM

## 2021-01-28 RX ORDER — FENTANYL CITRATE 50 UG/ML
INJECTION, SOLUTION INTRAMUSCULAR; INTRAVENOUS AS NEEDED
Status: DISCONTINUED | OUTPATIENT
Start: 2021-01-28 | End: 2021-01-28 | Stop reason: HOSPADM

## 2021-01-28 RX ORDER — DIPHENHYDRAMINE HYDROCHLORIDE 50 MG/ML
12.5 INJECTION, SOLUTION INTRAMUSCULAR; INTRAVENOUS AS NEEDED
Status: DISCONTINUED | OUTPATIENT
Start: 2021-01-28 | End: 2021-01-28 | Stop reason: HOSPADM

## 2021-01-28 RX ORDER — MIDAZOLAM HYDROCHLORIDE 1 MG/ML
1 INJECTION, SOLUTION INTRAMUSCULAR; INTRAVENOUS AS NEEDED
Status: DISCONTINUED | OUTPATIENT
Start: 2021-01-28 | End: 2021-01-28 | Stop reason: HOSPADM

## 2021-01-28 RX ORDER — ONDANSETRON 2 MG/ML
4 INJECTION INTRAMUSCULAR; INTRAVENOUS AS NEEDED
Status: DISCONTINUED | OUTPATIENT
Start: 2021-01-28 | End: 2021-01-28 | Stop reason: HOSPADM

## 2021-01-28 RX ORDER — MIDAZOLAM HYDROCHLORIDE 1 MG/ML
INJECTION, SOLUTION INTRAMUSCULAR; INTRAVENOUS AS NEEDED
Status: DISCONTINUED | OUTPATIENT
Start: 2021-01-28 | End: 2021-01-28 | Stop reason: HOSPADM

## 2021-01-28 RX ORDER — VANCOMYCIN HYDROCHLORIDE 1 G/20ML
INJECTION, POWDER, LYOPHILIZED, FOR SOLUTION INTRAVENOUS AS NEEDED
Status: DISCONTINUED | OUTPATIENT
Start: 2021-01-28 | End: 2021-01-28 | Stop reason: HOSPADM

## 2021-01-28 RX ORDER — BUPIVACAINE HYDROCHLORIDE 5 MG/ML
INJECTION, SOLUTION EPIDURAL; INTRACAUDAL AS NEEDED
Status: DISCONTINUED | OUTPATIENT
Start: 2021-01-28 | End: 2021-01-28 | Stop reason: HOSPADM

## 2021-01-28 RX ORDER — HYDROMORPHONE HYDROCHLORIDE 1 MG/ML
0.5 INJECTION, SOLUTION INTRAMUSCULAR; INTRAVENOUS; SUBCUTANEOUS
Status: DISCONTINUED | OUTPATIENT
Start: 2021-01-28 | End: 2021-01-28 | Stop reason: HOSPADM

## 2021-01-28 RX ORDER — MIDAZOLAM HYDROCHLORIDE 1 MG/ML
0.5 INJECTION, SOLUTION INTRAMUSCULAR; INTRAVENOUS
Status: DISCONTINUED | OUTPATIENT
Start: 2021-01-28 | End: 2021-01-28 | Stop reason: HOSPADM

## 2021-01-28 RX ORDER — PROPOFOL 10 MG/ML
INJECTION, EMULSION INTRAVENOUS
Status: DISCONTINUED | OUTPATIENT
Start: 2021-01-28 | End: 2021-01-28 | Stop reason: HOSPADM

## 2021-01-28 RX ORDER — HYDROCODONE BITARTRATE AND ACETAMINOPHEN 5; 325 MG/1; MG/1
1 TABLET ORAL AS NEEDED
Status: DISCONTINUED | OUTPATIENT
Start: 2021-01-28 | End: 2021-01-28 | Stop reason: HOSPADM

## 2021-01-28 RX ORDER — HYDROCODONE BITARTRATE AND ACETAMINOPHEN 10; 325 MG/1; MG/1
1 TABLET ORAL
Qty: 28 TAB | Refills: 0 | Status: SHIPPED | OUTPATIENT
Start: 2021-01-28 | End: 2021-02-04

## 2021-01-28 RX ORDER — PROPOFOL 10 MG/ML
INJECTION, EMULSION INTRAVENOUS AS NEEDED
Status: DISCONTINUED | OUTPATIENT
Start: 2021-01-28 | End: 2021-01-28 | Stop reason: HOSPADM

## 2021-01-28 RX ORDER — LIDOCAINE HYDROCHLORIDE 10 MG/ML
0.1 INJECTION, SOLUTION EPIDURAL; INFILTRATION; INTRACAUDAL; PERINEURAL AS NEEDED
Status: DISCONTINUED | OUTPATIENT
Start: 2021-01-28 | End: 2021-01-28 | Stop reason: HOSPADM

## 2021-01-28 RX ORDER — VANCOMYCIN/0.9 % SOD CHLORIDE 1.5G/250ML
1500 PLASTIC BAG, INJECTION (ML) INTRAVENOUS ONCE
Status: COMPLETED | OUTPATIENT
Start: 2021-01-28 | End: 2021-01-28

## 2021-01-28 RX ORDER — SODIUM CHLORIDE, SODIUM LACTATE, POTASSIUM CHLORIDE, CALCIUM CHLORIDE 600; 310; 30; 20 MG/100ML; MG/100ML; MG/100ML; MG/100ML
100 INJECTION, SOLUTION INTRAVENOUS CONTINUOUS
Status: DISCONTINUED | OUTPATIENT
Start: 2021-01-28 | End: 2021-01-28 | Stop reason: HOSPADM

## 2021-01-28 RX ORDER — FENTANYL CITRATE 50 UG/ML
50 INJECTION, SOLUTION INTRAMUSCULAR; INTRAVENOUS AS NEEDED
Status: DISCONTINUED | OUTPATIENT
Start: 2021-01-28 | End: 2021-01-28 | Stop reason: HOSPADM

## 2021-01-28 RX ADMIN — LIDOCAINE HYDROCHLORIDE 40 MG: 20 INJECTION, SOLUTION EPIDURAL; INFILTRATION; INTRACAUDAL; PERINEURAL at 09:00

## 2021-01-28 RX ADMIN — PROPOFOL 50 MG: 10 INJECTION, EMULSION INTRAVENOUS at 09:00

## 2021-01-28 RX ADMIN — FENTANYL CITRATE 50 MCG: 50 INJECTION, SOLUTION INTRAMUSCULAR; INTRAVENOUS at 10:46

## 2021-01-28 RX ADMIN — PROPOFOL 30 MG: 10 INJECTION, EMULSION INTRAVENOUS at 09:02

## 2021-01-28 RX ADMIN — VANCOMYCIN HYDROCHLORIDE 1500 MG: 10 INJECTION, POWDER, LYOPHILIZED, FOR SOLUTION INTRAVENOUS at 08:42

## 2021-01-28 RX ADMIN — FENTANYL CITRATE 25 MCG: 50 INJECTION, SOLUTION INTRAMUSCULAR; INTRAVENOUS at 09:05

## 2021-01-28 RX ADMIN — FENTANYL CITRATE 25 MCG: 50 INJECTION, SOLUTION INTRAMUSCULAR; INTRAVENOUS at 09:17

## 2021-01-28 RX ADMIN — PROPOFOL 20 MG: 10 INJECTION, EMULSION INTRAVENOUS at 09:05

## 2021-01-28 RX ADMIN — MIDAZOLAM HYDROCHLORIDE 2 MG: 1 INJECTION, SOLUTION INTRAMUSCULAR; INTRAVENOUS at 08:54

## 2021-01-28 RX ADMIN — SODIUM CHLORIDE, POTASSIUM CHLORIDE, SODIUM LACTATE AND CALCIUM CHLORIDE 100 ML/HR: 600; 310; 30; 20 INJECTION, SOLUTION INTRAVENOUS at 08:37

## 2021-01-28 RX ADMIN — PROPOFOL INJECTABLE EMULSION 75 MCG/KG/MIN: 10 INJECTION, EMULSION INTRAVENOUS at 09:05

## 2021-01-28 RX ADMIN — Medication 3 AMPULE: at 08:37

## 2021-01-28 NOTE — DISCHARGE INSTRUCTIONS
1. Call the office for any post op problems including but not limited to: Fever, nausea, vomiting, severe pain, severe bleeding. [phone]  2. Call the office for a post op appointment, you should be seen in 7-10 days. 3. Take the pain medication (Norco 10/325 mg) as needed but no more than directed. Call the office if this does not control the pain. 4. Take the antibiotics (Augmentin 875 mg 2x/day) that were prescribed before the surgery. 5. Leave the bandage clean, dry and intact. Call the office if it becomes wet or dirty. 6. You may walk with the CAM boot on the left foot, you do not need to sleep in the boot. Still, keep walking to essential activities until instructed otherwise. 7. Elevate the left foot frequently. Apply ice as needed for pain and swelling, but do not keep ice on the foot more than 20 minutes at a time. DO NOT TAKE TYLENOL/ACETAMINOPHEN WITH PERCOCET, LORTAB, [address] TAKE NARCOTIC PAIN MEDICATIONS WITH FOOD     Narcotics tend to be constipating, we suggest taking a stool softener such as Colace or Miralax (follow package instructions). DO NOT DRIVE WHILE TAKING NARCOTIC PAIN MEDICATIONS. DO NOT TAKE SLEEPING MEDICATIONS OR ANTIANXIETY MEDICATIONS WHILE TAKING NARCOTIC PAIN MEDICATIONS,  ESPECIALLY THE NIGHT OF ANESTHESIA! CPAP PATIENTS BE SURE TO WEAR MACHINE WHENEVER NAPPING OR SLEEPING! DISCHARGE SUMMARY from Nurse    The following personal items collected during your admission are returned to you:   Dental Appliance: Dental Appliances: None  Vision: Visual Aid: None  Hearing Aid:    Jewelry: Jewelry: None  Clothing: Clothing: None, At bedside  Other Valuables: Other Valuables: Other (comment)(surgical boot)  Valuables sent to safe:        PATIENT INSTRUCTIONS:    After General Anesthesia or Intravenous Sedation, for 24 hours or while taking prescription Narcotics:        Someone should be with you for the next 24 hours.         For your own safety, a responsible adult must drive you home. · Limit your activities  · Recommended activity: Rest today, up with assistance today. Do not climb stairs or shower unattended for the next 24 hours. · Please start with a soft bland diet and advance as tolerated (no nausea) to regular diet. · If you have a sore throat you should try the following: fluids, warm salt water gargles, or throat lozenges. If it does not improve after several days please follow up with your primary physician. · Do not drive and operate hazardous machinery  · Do not make important personal or business decisions  · Do  not drink alcoholic beverages  · If you have not urinated within 8 hours after discharge, please contact your surgeon on call. Report the following to your surgeon:  · Excessive pain, swelling, redness or odor of or around the surgical area  · Temperature over 100.5  · Nausea and vomiting lasting longer than 4 hours or if unable to take medications  · Any signs of decreased circulation or nerve impairment to extremity: change in color, persistent  numbness, tingling, coldness or increase pain      · You will receive a Post Operative Call from one of the Recovery Room Nurses on the day after your surgery to check on you. It is very important for us to know how you are recovering after your surgery. If you have an issue or need to speak with someone, please call your surgeon, do not wait for the post operative call. · You may receive an e-mail or letter in the mail from [employer] regarding your experience with us in the Ambulatory Surgery Unit. Your feedback is valuable to us and we appreciate your participation in the survey. · If the above instructions are not adequate or you are having problems after your surgery, call the physician at their office number. · We wish you a speedy recovery ? What to do at Home:      *  Please give a list of your current medications to your Primary Care Provider.     *  Please update this list whenever your medications are discontinued, doses are      changed, or new medications (including over-the-counter products) are added. *  Please carry medication information at all times in case of emergency situations. If you have not received your influenza and/or pneumococcal vaccine, please follow up with your primary care physician. The discharge information has been reviewed with the patient and caregiver. The patient and caregiver verbalized understanding. Patient Education   TO PREVENT AN INFECTION      1. [address] YOUR HANDS     To prevent infection, good handwashing is the most important thing you or your caregiver can do.  Wash your hands with soap and water or use the hand  we gave you before you touch any wounds. 2. SHOWER     Use the antibacterial soap we gave you when you take a shower.  Shower with this soap until your wounds are healed.  To reach all areas of your body, you may need someone to help you.  Dont forget to clean your belly button with every shower. 3.  USE CLEAN SHEETS     Use freshly cleaned sheets on your bed after surgery.  To keep the surgery site clean, do not allow pets to sleep with you while your wound is still healing. 4. STOP SMOKING     Stop smoking, or at least cut back on smoking     Smoking slows your healing. 5.  CONTROL YOUR BLOOD SUGAR     High blood sugars slow wound healing. If you are diabetic, control your blood sugar levels before and after your surgery. Learning About Coronavirus [phone])  Coronavirus [phone]): Overview  What is coronavirus (COVID-19)? The coronavirus disease (COVID-19) is caused by a virus. It is an illness that was first found in [place], [place], in December 2019. It has since spread worldwide. The virus can cause fever, cough, and trouble breathing. In severe cases, it can cause pneumonia and make it hard to breathe without help.  It can cause death.  Coronaviruses are a large group of viruses. They cause the common cold. They also cause more serious illnesses like Middle East respiratory syndrome (MERS) and severe acute respiratory syndrome (SARS). COVID-19 is caused by a novel coronavirus. That means it's a new type that has not been seen in people before. This virus spreads person-to-person through droplets from coughing and sneezing. It can also spread when you are close to someone who is infected. And it can spread when you touch something that has the virus on it, such as a doorknob or a tabletop. What can you do to protect yourself from coronavirus (COVID-19)? The best way to protect yourself from getting sick is to:  · Avoid areas where there is an outbreak. · Avoid contact with people who may be infected. · Wash your hands often with soap or alcohol-based hand sanitizers. · Avoid crowds and try to stay at least 6 feet away from other people. · Wash your hands often, especially after you cough or sneeze. Use soap and water, and scrub for at least 20 seconds. If soap and water aren't available, use an alcohol-based hand . · Avoid touching your mouth, nose, and eyes. What can you do to avoid spreading the virus to others? To help avoid spreading the virus to others:  · Cover your mouth with a tissue when you cough or sneeze. Then throw the tissue in the trash. · Use a disinfectant to clean things that you touch often. · Stay home if you are sick or have been exposed to the virus. Don't go to school, work, or public areas. And don't use public transportation. · If you are sick:  ? Leave your home only if you need to get medical care. But call the doctor's office first so they know you're coming. And wear a face mask, if you have one.  ? If you have a face mask, wear it whenever you're around other people. It can help stop the spread of the virus when you cough or sneeze. ? Clean and disinfect your home every day.  Use household  and disinfectant wipes or sprays. Take special care to clean things that you grab with your hands. These include doorknobs, remote controls, phones, and handles on your refrigerator and microwave. And don't forget countertops, tabletops, bathrooms, and computer keyboards. When to call for help  Call 911 anytime you think you may need emergency care. For example, call if:  · You have severe trouble breathing. (You can't talk at all.)  · You have constant chest pain or pressure. · You are severely dizzy or lightheaded. · You are confused or can't think clearly. · Your face and lips have a blue color. · You pass out (lose consciousness) or are very hard to wake up. Call your doctor now if you develop symptoms such as:  · Shortness of breath. · Fever. · Cough. If you need to get care, call ahead to the doctor's office for instructions before you go. Make sure you wear a face mask, if you have one, to prevent exposing other people to the virus. Where can you get the latest information? The following health organizations are tracking and studying this virus. Their websites contain the most up-to-date information. Lia Ramiro also learn what to do if you think you may have been exposed to the virus. · U.S. Centers for Disease Control and Prevention (CDC): The CDC provides updated news about the disease and travel advice. The website also tells you how to prevent the spread of infection. www.cdc.gov  · World Health Organization [facility]): WHO offers information about the virus outbreaks. WHO also has travel advice. www.who.int  Current as of: April 1, 2020               Content Version: 12.4  © 2465-2509 Healthwise, Incorporated. Care instructions adapted under license by your healthcare professional. If you have questions about a medical condition or this instruction, always ask your healthcare professional. [address] any warranty or liability for your use of this information.

## 2021-01-28 NOTE — ANESTHESIA PREPROCEDURE EVALUATION
Relevant Problems   No relevant active problems       Anesthetic History   No history of anesthetic complications            Review of Systems / Medical History  Patient summary reviewed and pertinent labs reviewed    Pulmonary  Within defined limits                 Neuro/Psych   Within defined limits           Cardiovascular    Hypertension                   GI/Hepatic/Renal         Renal disease: stones       Endo/Other    Diabetes    Obesity     Other Findings   Comments: gout           Physical Exam    Airway  Mallampati: III  TM Distance: 4 - 6 cm  Neck ROM: normal range of motion   Mouth opening: Normal     Cardiovascular  Regular rate and rhythm,  S1 and S2 normal,  no murmur, click, rub, or gallop  Rhythm: regular  Rate: normal         Dental  No notable dental hx       Pulmonary  Breath sounds clear to auscultation               Abdominal  GI exam deferred       Other Findings            Anesthetic Plan    ASA: 2  Anesthesia type: MAC and general - backup          Induction: Intravenous  Anesthetic plan and risks discussed with: Patient

## 2021-01-28 NOTE — BRIEF OP NOTE
Brief Postoperative Note    Patient: Juan Gordon  YOB: 1970  MRN: [id number]    Date of Procedure: 1/28/2021     Pre-Op Diagnosis: OSTEOMYELITIS LEFT FOOT    Post-Op Diagnosis: 1. Osteomyelitis left 1st MPJ, 2. Abscess left foot      Procedure(s):  1. DEBRIDEMENT LEFT  FOOT INCLUDING BONE, 2. AMPUTATION OF LEFT GREAT TOE (MAC/IV SED/LOCAL BLOCK BY SURGEON)     Surgeon(s):  Ria Michele DPM    Surgical Assistant: None    Anesthesia: MAC     Estimated Blood Loss (mL): less than 583     Complications: None    Specimens:   ID Type Source Tests Collected by Time Destination   1 : Osteomyelitis Left Tibial Sesamoid Preservative Foot, left  [place] CARLOTTA Davis 1/28/2021 1569 Pathology   2 : Osteomyelitis Left Fibular Sesamoid Preservative Foot, left  Bettina Davis, [facility] 1/28/2021 0935 Pathology   3 : Left Great Toe with First Metatarsal Head Preservative Foot, left  Bettina Susan [facility] 1/28/2021 5833 Pathology   4 : Bone Margin First Metatarsal Left Foot Preservative Foot, left  Bettina BhaktaCARLOTTA brown 1/28/2021 3421 Pathology   1 : Wound Plantar Left Foot Wound Foot, left CULTURE, ANAEROBIC, CULTURE, WOUND W GRAM STAIN CARLOTTA Low 1/28/2021 0932 Microbiology   2 : Abscess Dorsal Left Foot Wound Foot, left CULTURE, ANAEROBIC, CULTURE, WOUND W [place] Susan V[address] 1/28/2021 4902 Microbiology        Implants:   Implant Name Type Inv.  Item Serial No.  Lot No. LRB No. Used Action   GRAFT BNE SUB 10CC BEAD 25CC CA SULPHATE RAP SET W/ INDIV - SN/A  GRAFT BNE SUB 10CC BEAD 25CC CA SULPHATE RAP SET W/ INDIV N/A [employer]_ [id number] Left 1 Implanted       Drains: * No LDAs found *    Findings: Abscess along EHL tendon dorsally, erosion of sesamoid bones and plantar 1st metatarsal head    Electronically Signed by May Mathis DPM on 1/28/2021 at 11:03 AM

## 2021-01-28 NOTE — ADVANCED PRACTICE NURSE
TATA 6 in PAT assessment. Pt admitted to loud snoring,but denies witnessed apnea while sleeping or ever having been referred for a sleep apnea evaluation. Results faxed to PCP for FU/further recommendations regarding sleep apnea evaluation.

## 2021-01-28 NOTE — PERIOP NOTES
Patient states that [place] will be with them for at least 24 hours following today's procedure. Permission received to review discharge instructions and discuss private health information with [place]. Mistral-Air warming blanket applied at this time. Set to appropriate setting that is comfortable to patient. Will continue to monitor.

## 2021-01-28 NOTE — ANESTHESIA POSTPROCEDURE EVALUATION
Procedure(s):  DEBRIDEMENT LEFT  FOOT INCLUDING BONE, AMPUTATION OF LEFT GREAT TOE (MAC/IV SED/LOCAL BLOCK BY SURGEON) (URGENT). general - backup, general    Anesthesia Post Evaluation        Patient location during evaluation: PACU  Note status: Adequate. Level of consciousness: responsive to verbal stimuli and sleepy but conscious  Pain management: satisfactory to patient  Airway patency: patent  Anesthetic complications: no  Cardiovascular status: acceptable  Respiratory status: acceptable  Hydration status: acceptable  Comments: +Post-Anesthesia Evaluation and Assessment    Patient: Dusty Sparks MRN: [id number]  SSN: [id number]   YOB: 1970  Age: 44 y.o. Sex: male      Cardiovascular Function/Vital Signs    /80   Pulse 76   Temp 36.4 °C (97.6 °F)   Resp 21   Ht 5' 11\" (1.803 m)   Wt 116.1 kg (256 lb)   SpO2 96%   BMI 35.70 kg/m²     Patient is status post Procedure(s):  DEBRIDEMENT LEFT  FOOT INCLUDING BONE, AMPUTATION OF LEFT GREAT TOE (MAC/IV SED/LOCAL BLOCK BY SURGEON) (URGENT). Nausea/Vomiting: Controlled. Postoperative hydration reviewed and adequate. Pain:  Pain Scale 1: Numeric (0 - 10) (01/28/21 1120)  Pain Intensity 1: 0 (01/28/21 1120)   Managed. Neurological Status:   Neuro (WDL): Exceptions to WDL (01/28/21 1109)   At baseline. Mental Status and Level of Consciousness: Arousable. Pulmonary Status:   O2 Device: Room air (01/28/21 1111)   Adequate oxygenation and airway patent. Complications related to anesthesia: None    Post-anesthesia assessment completed. No concerns. Signed By: Tim Mark MD    1/28/2021  Post anesthesia nausea and vomiting:  controlled      INITIAL Post-op Vital signs:   Vitals Value Taken Time   /81 01/28/21 1120   Temp 36.4 °C (97.6 °F) 01/28/21 1120   Pulse 73 01/28/21 1120   Resp 20 01/28/21 1120   SpO2 95 % 01/28/21 1120   Vitals shown include unvalidated device data.

## 2021-01-28 NOTE — OP NOTES
[address]  OPERATIVE REPORT    Name:  Saba Caldwell  MR#:  [id number]  :  1970  ACCOUNT #:  [de-identified]  DATE OF SERVICE:  2021    PREOPERATIVE DIAGNOSIS:  Osteomyelitis, left foot. POSTOPERATIVE DIAGNOSES:  1.  Osteomyelitis, left first metatarsophalangeal joint. 2.  Abscess, dorsal left foot. PROCEDURE PERFORMED:  1. Amputation, left hallux at metatarsophalangeal joint. 2.  Debridement, left foot including bone. SURGEON:  Olamide Gross DPM    ASSISTANT:  Not applicable. ANESTHESIA:  IV sedation with local.    COMPLICATIONS:  None. SPECIMENS REMOVED:  1. Aerobic and anaerobic cultures. 2.  Osteomyelitis, left hallux and first metatarsal head. 3.  Bone margin, left first metatarsal.    IMPLANTS:  Stimulan absorbable beads with 1 g of vancomycin powder introduced to the mix. ESTIMATED BLOOD LOSS:  100 mL. PROCEDURE IN DETAIL:  The patient was brought into the operating room and placed supine upon the OR table. After the administration of IV sedation, I injected the left foot with 18 mL of 0.5% plain Marcaine. The foot was prepped and draped in the usual sterile technique. The foot was elevated for 3 minutes before the inflation of a tourniquet around the left ankle to a pressure of 250 mmHg. Attention was directed to the plantar aspect of the left first metatarsophalangeal joint. There was an open wound that probed to bone. A roughly circular incision was made around this excising the wound full skin thickness. After this had been done, I could directly visualize the tibial sesamoid and it was clearly discolored with erosion of the bone. A #15 scalpel was used to sharply release soft tissue attachments of the sesamoid bone. It was removed as a single unit and will be sent to pathology as a specimen. The wound was then cultured with swabs.   I could now visualize the plantar aspect of the left first metatarsal head with the tibial sesamoid removed and there was erosion of the surface of the metatarsal head. Given the clinical history of the patient, this was consistent with osteomyelitis of the left first metatarsal head. A pair of converging semi-elliptical incisions were made surrounding the toe from superior to inferior. This was carried down to the first metatarsophalangeal joint and the hallux was disarticulated at the joint with a #15 scalpel. Further examination of the wound revealed internal necrotic tissue and thick caseous purulent material surrounding the metatarsal head. I extended the incision dorsally proximally along the extensor hallucis longus tendon following an area of discoloration of the skin and found a dorsal abscess along the course of the tendon. The abscess was sharply debrided with combination of #15 scalpel and small curved Metzenbaum scissors. The EHL was excised the length of the incision. The remaining portion was healthy and was allowed to retract further back into the foot. A power micro sagittal saw was then used to resect the head of the left first metatarsal.  This will be sent with the toe as a specimen labeled osteomyelitis. There still appeared to be some purulent material within the medullary canal of the metatarsal neck, so an additional portion of the metatarsal neck was resected with the power micro sagittal saw. There still appeared to be purulent material within the canal, so an additional portion of bone was removed and this will be sent to pathology as a specimen labeled bone margin, left first metatarsal.  I then used a power rotary bur to ream out the center of the first metatarsal leaving only cortical bone, which did appear to be hard and normal.  It was only the medullary/cancellus bone that appeared to be soft and discolored. The interior of the metatarsal shaft was flushed well with sterile irrigation.   Additional soft tissue excisional debridement was performed with small curved Metzenbaum scissors. Following this, the [employer] ultrasonic debriding tool along with 1 liter of saline was used to thoroughly clean the interior of the wound including the region of the metatarsophalangeal joint and the abscess. After this had been done, only viable appearing tissue remained with the exception of some of the discolored skin dorsally, which may or may not be able to recover from this infection. The Stimulan 10 mL bead-kit was passed onto the field. The powder was mixed with 1 g of vancomycin powder. The diluent was added and small beads were formed from the mix and these were packed into the first metatarsal shaft and into the surrounding wound. Tourniquet was released and there was light bleeding from the wound, but no arterial pulsation. It was not necessary to perform any specific ligation or electrocauterization. The wound was then closed utilizing a combination of 3-0 Vicryl for deep structures, 3-0 nylon on the plantar part of the incision, and 4-0 nylon on the dorsal incision. The foot was cleaned, dried, and wrapped with dry sterile dressings incorporating Adaptic over the surgical incision. The patient was then transported to the recovery room with vital signs stable and vascular status intact. He will remain until stable for discharge home. He has written and verbal postoperative instructions. He already has a CAM boot for ambulation and already has a prescription for Augmentin to take after the surgery. A prescription for pain medication was sent to his pharmacy as well. He will follow up in the office in 7-10 days.         Julieth Rutledge DPM [id number]  D:  01/28/2021 11:21  T:  01/28/2021 15:07  JOB #:  [id number]

## 2021-01-28 NOTE — PERIOP NOTES
Ora Sit  1970  [id number]    Situation:  Verbal report given from: Steph Acuna crna  Procedure: Procedure(s):  DEBRIDEMENT LEFT  FOOT INCLUDING BONE, AMPUTATION OF LEFT GREAT TOE (MAC/IV SED/LOCAL BLOCK BY SURGEON) (URGENT)    Background:    Preoperative diagnosis: OSTEOMYELITIS LEFT ANKLE/FOOT    Postoperative diagnosis: OSTEOMYELITIS LEFT ANKLE/FOOT    :  Dr. Pamela Mtz    Assistant(s): Circ-1: [facility]  Scrub Tech-1: Ab OSORIO, RT    Specimens:   ID Type Source Tests Collected by Time Destination   1 : Osteomyelitis Left Tibial Sesamoid Preservative Foot, left  Taylor Cuff, DP 1/28/2021 6961 Pathology   2 : Osteomyelitis Left Fibular Sesamoid Preservative Foot, left  Taylor Cuff, DP 1/28/2021 0935 Pathology   3 : Left Great Toe with First Metatarsal Head Preservative Foot, left  Jennifer Cuff, DP 1/28/2021 1092 Pathology   4 : Bone Margin First Metatarsal Left Foot Preservative Foot, left  Taylor Cuff, DP 1/28/2021 7056 Pathology   1 : Wound Plantar Left Foot Wound Foot, left CULTURE, ANAEROBIC, CULTURE, WOUND W GRAM STAIN Taylor Cuff, DP 1/28/2021 0932 Microbiology   2 : Abscess Dorsal Left Foot Wound Foot, left CULTURE, ANAEROBIC, CULTURE, WOUND W Alethea Riedel Taylor Cuff, DP 1/28/2021 4041 Microbiology       Assessment:  Intra-procedure medications         Anesthesia gave intra-procedure sedation and medications, see anesthesia flow sheet     Intravenous fluids: LR@ KVO     Vital signs stable       Recommendation:    Permission to share finding with wife, ysabel : yes given preop

## 2021-01-30 LAB
BACTERIA SPEC CULT: ABNORMAL
BACTERIA SPEC CULT: NORMAL
BACTERIA SPEC CULT: NORMAL
GRAM STN SPEC: ABNORMAL
SERVICE CMNT-IMP: ABNORMAL
SERVICE CMNT-IMP: ABNORMAL
SERVICE CMNT-IMP: NORMAL
SERVICE CMNT-IMP: NORMAL

## 2021-02-09 ENCOUNTER — TELEPHONE (OUTPATIENT)
Dept: FAMILY MEDICINE CLINIC | Age: 51
End: 2021-02-09

## 2021-02-09 NOTE — TELEPHONE ENCOUNTER
Returned call to [place], from Dr. Marsha Louie office, regarding referral to Dr. Ehsan Keith. Chelsy to call office back.

## 2021-02-09 NOTE — TELEPHONE ENCOUNTER
Alisha Xiao, from Dr. Patrick Segovia office is calling to refer patient. Please call @[phone].  Ext [phone]

## 2021-02-10 ENCOUNTER — TELEPHONE (OUTPATIENT)
Dept: FAMILY MEDICINE CLINIC | Age: 51
End: 2021-02-10

## 2021-02-10 NOTE — TELEPHONE ENCOUNTER
Chelsy from Dr. Michele's office called regarding patient.  They want to refer patient.  Please call @[phone]. Ext. [phone]

## 2021-02-10 NOTE — TELEPHONE ENCOUNTER
Returned call to [place]. Caller requested last office notes, most recent labs, and referral from Dr. Jamie Valentino to fax for MD to review.

## 2021-02-11 ENCOUNTER — TELEPHONE (OUTPATIENT)
Dept: FAMILY MEDICINE CLINIC | Age: 51
End: 2021-02-11

## 2021-02-11 NOTE — TELEPHONE ENCOUNTER
----- Message from [place] sent at 2/11/2021  2:49 PM EST -----  Regarding: Dr. Mitchel Enriquez Message/Vendor Calls    Caller's first and last name:  [facility] with Dr. La Mata      Reason for call:  Requesting a call back to speak with Raysa. Callback required yes/no and why: yes      Best contact number(s): [phone] ext.  [phone]      Details to clarify the request:  [facility]

## 2021-02-17 ENCOUNTER — TRANSCRIBE ORDER (OUTPATIENT)
Dept: SCHEDULING | Age: 51
End: 2021-02-17

## 2021-02-17 ENCOUNTER — OFFICE VISIT (OUTPATIENT)
Dept: FAMILY MEDICINE CLINIC | Age: 51
End: 2021-02-17
Payer: COMMERCIAL

## 2021-02-17 VITALS
TEMPERATURE: 96.8 F | HEIGHT: 71 IN | SYSTOLIC BLOOD PRESSURE: 134 MMHG | WEIGHT: 264.6 LBS | BODY MASS INDEX: 37.04 KG/M2 | DIASTOLIC BLOOD PRESSURE: 81 MMHG | HEART RATE: 85 BPM | RESPIRATION RATE: 16 BRPM | OXYGEN SATURATION: 97 %

## 2021-02-17 DIAGNOSIS — M86.272 SUBACUTE OSTEOMYELITIS OF LEFT FOOT (HCC): Primary | ICD-10-CM

## 2021-02-17 PROCEDURE — 99244 OFF/OP CNSLTJ NEW/EST MOD 40: CPT | Performed by: STUDENT IN AN ORGANIZED HEALTH CARE EDUCATION/TRAINING PROGRAM

## 2021-02-17 RX ORDER — SILVER SULFADIAZINE 10 G/1000G
CREAM TOPICAL
COMMUNITY
Start: 2021-02-05 | End: 2022-09-30 | Stop reason: ALTCHOICE

## 2021-02-17 NOTE — PROGRESS NOTES
Reviewed record in preparation for visit and have obtained necessary documentation. Identified pt with two pt identifiers(name and ). Chief Complaint   Patient presents with    New Patient     referred by Dr. Yael Rice Maintenance Due   Topic Date Due    Hepatitis C Screening  1970    Foot Exam Q1  1978    COVID-19 Vaccine (1 of 2) 1984    MICROALBUMIN Q1  2019       Mr. Nicole Joya has a reminder for a \"due or due soon\" health maintenance. I have asked that he discuss health maintenance topic(s) due with His  primary care provider. Coordination of Care Questionnaire:  :     1) Have you been to an emergency room, urgent care clinic since your last visit? no   Hospitalized since your last visit? no             2) Have you seen or consulted any other health care providers outside of [address] [place] [address] since your last visit? no  (Include any pap smears or colon screenings in this section.)    3) Do you have an Advance Directive on file? no    4) Are you interested in receiving information on Advance Directives? NO    Patient is accompanied by self I have received verbal consent from Riley Aguirre to discuss any/all medical information while they are present in the room.

## 2021-02-19 ENCOUNTER — HOSPITAL ENCOUNTER (OUTPATIENT)
Dept: INTERVENTIONAL RADIOLOGY/VASCULAR | Age: 51
Discharge: HOME | End: 2021-02-19
Attending: STUDENT IN AN ORGANIZED HEALTH CARE EDUCATION/TRAINING PROGRAM | Admitting: RADIOLOGY
Payer: COMMERCIAL

## 2021-02-19 ENCOUNTER — TELEPHONE (OUTPATIENT)
Dept: FAMILY MEDICINE CLINIC | Age: 51
End: 2021-02-19

## 2021-02-19 VITALS — HEIGHT: 71 IN | HEART RATE: 81 BPM | WEIGHT: 264.55 LBS | BODY MASS INDEX: 37.04 KG/M2

## 2021-02-19 DIAGNOSIS — M86.272 SUBACUTE OSTEOMYELITIS OF LEFT FOOT (HCC): ICD-10-CM

## 2021-02-19 PROCEDURE — 2709999900 HC NON-CHARGEABLE SUPPLY

## 2021-02-19 PROCEDURE — 36573 INSJ PICC RS&I 5 YR+: CPT

## 2021-02-19 PROCEDURE — 74011000250 HC RX REV CODE- 250: Performed by: RADIOLOGY

## 2021-02-19 PROCEDURE — C1751 CATH, INF, PER/CENT/MIDLINE: HCPCS

## 2021-02-19 RX ORDER — LIDOCAINE HYDROCHLORIDE 20 MG/ML
20 INJECTION, SOLUTION INFILTRATION; PERINEURAL ONCE
Status: COMPLETED | OUTPATIENT
Start: 2021-02-19 | End: 2021-02-19

## 2021-02-19 RX ORDER — HEPARIN SODIUM 200 [USP'U]/100ML
400 INJECTION, SOLUTION INTRAVENOUS ONCE
Status: DISCONTINUED | OUTPATIENT
Start: 2021-02-19 | End: 2021-02-20 | Stop reason: HOSPADM

## 2021-02-19 RX ADMIN — LIDOCAINE HYDROCHLORIDE 200 MG: 20 INJECTION, SOLUTION INFILTRATION; PERINEURAL at 13:48

## 2021-02-19 NOTE — TELEPHONE ENCOUNTER
Lorrie Finley, from [address] [place] [address] scheduling. Patient has appt Monday @ 8:30 for a doppler, and the wrong order was sent in. They need correct order. Please call @[phone].

## 2021-02-19 NOTE — DISCHARGE INSTRUCTIONS
[facility]  Department of Interventional Radiology/Special Procedures   [phone]      Radiologist:  Darius Galeana      Date:   February 19, 2021      PICC Catheter Discharge Instructions      Keep the dressing clean and dry. Do not get it wet. No showers. Baths are OK. Watch for signs of infection:  a. Redness/pain  b. Fever/Chills  c. Increased pain  d. Drainage  e. Pus  f. Arm becomes red or swollen    Please call your physician if you note any signs of infection. You may take Tylenol, as directed on the label, for pain. Resume previous diet and follow your Medication Reconciliation list.    Change the dressing every 3 days or any time the dressing becomes soiled or loose. If you have [facility] [facility], they will take care of the dressing changes.

## 2021-02-19 NOTE — TELEPHONE ENCOUNTER
----- Message from Lara Mora sent at 2/18/2021 11:56 AM EST -----  Regarding: Dr Corrinne Harman Message/Vendor Calls    Caller's first and last name: [facility] [employer] [facility] Scheduling      Reason for call: order to be corrected prior to pt appt, please see below      Callback required yes/no and why: Yes, verification of corrected order      Best contact number(s): [phone]      Details to clarify the request: duplex of lower extremity - order needs to be linked and corrected to \"CT\".  Pt is scheduled 02/22 @ 8:30 am       Lara Mora

## 2021-02-22 ENCOUNTER — HOSPITAL ENCOUNTER (OUTPATIENT)
Dept: VASCULAR SURGERY | Age: 51
Discharge: HOME | End: 2021-02-22
Attending: STUDENT IN AN ORGANIZED HEALTH CARE EDUCATION/TRAINING PROGRAM
Payer: COMMERCIAL

## 2021-02-22 DIAGNOSIS — M86.272 SUBACUTE OSTEOMYELITIS OF LEFT FOOT (HCC): ICD-10-CM

## 2021-02-22 PROCEDURE — 93922 UPR/L XTREMITY ART 2 LEVELS: CPT

## 2021-02-22 NOTE — PROGRESS NOTES
Infectious Disease Clinic Note       Reason for visit:   Initial consultation for left foot OM        HPI:  Hospital admission: None    50yo M with with PMH of diabetes, who is being seen in clinic today for Initial consultation for left foot OM, per Dr. Phil Duke, patient's podiatrist.     Patient reports that he started having an ulcer on his left big toe several months ago. After a course of Augmentin which did not help, had an MRI in 10/2020 of Left foot w/o contrast showed \"Plantar ulceration at level of hallux metatarsal head with sinus tract extending to the sesamoid bones. No MRI demonstration of osteomyelitis. \" On 11/12/20, underwent debridement of of diabetic wound left foot to joint capsule, and open bone biopsy of left first metatarsal head. Gram stain had occasional WBCs, no organisms, and cultures grew MSSA, and pathology showed no OM. Patient says that the wound was healing for sometime after the debridement. However, some weeks later it again started worsening and draining. On 1/11, had another left foot non-contrast MRI which showed:    1. New osteomyelitis of the tibial hallux sesamoid. Possible developing  osteomyelitis of the fibular hallux sesamoid. No evidence of metatarsal  osteomyelitis at this time. 2. New complete tear of the FHL at the hallux sesamoids. 2.3 cm retraction. 3. Increased plantar soft tissue ulceration at the hallux sesamoids. Ill-defined  1.1 cm abscess abuts the tibial hallux sesamoid. Surrounding cellulitis. Patient  is at risk for developing MTS and first MTP joint septic arthritis. On 1/31, underwent left foot debridement and amputation of left hallux at metatarsophalangeal joint. Intraoperatively, saw abscess along EHL tendon dorsally, erosion of sesamoid bones and plantar 1st metatarsal head. Cultures had GPC on gram stain and grew MSSA. Pathology showed acute OM (See Pathology section below for details).  He was given a 10-day course of Augmentin, then Keflex recently which he finished on 2/16/21. He says the wound is doing OK, he is dressing it. He has had no drainage from it. He doesn't have much pain. No f/c.          Objective:    Vitals:         Physical Exam:  Gen: No apparent distress  HEENT:  Normocephalic, atraumatic, no scleral icterus  CV: S1,2 heard regularly, no lower extremity edema    Lungs: Clear to auscultation bilaterally, no wheezing  Abdomen: soft, non tender, non distended  Genitourinary:  no jackson catheter   Skin: no rash   Psych: good affect, good eye contact, non tearful  Neuro: alert, oriented to time,  place, and situation, moves all extremities to commands, verbal  Musculoskeletal:  Left hallux amputation site looks CDI, some erythema around. No drainage. Has sensation on the foot, but is diminished, hence cannot assess pain level accurately. Labs:  Reviewed in EMR. Micro:  See HPI      Pathology:    From 1/31/21 surgery:        Imaging:  See HPI    Assessment:  Ethel Corbin is a 46y.o. y.o. year old with -     Left 1st metatarsal, fibular sesamoid and tibial sesamoid acute osteomyelitis from a diabetic foot ulcer. Operative specimens grew MSSA x 2. Recommendations:  - Patient has active acute OM still per the pathology reports and hence referred by Dr. Harshil Dee to us for abx recommendations. Patient just finished a course of Augmentin, then keflex on 2/16/21 per Dr. Harshil Dee. The wound today is appearing stable. We will start with obtaining a PICC line within 1 week, and start cefazolin 2gm q8h. He reports no known allergies to abx. Duration will be 6 weeks from date of start of IV abx, tentative, 2/25/21 to 4/8/21.       - Cefazolin 2gm q8h. · Please have labs done weekly for CBC with diff, CMP, ESR, CRP  and have results sent to me by faxing to  [phone].    · Call with critical labs at [phone]  · Please ensure that patient has PICC care arranged per protocol   · Once IV antibiotics have been discontinued, please ensure that PICC/IV access is promptly removed. · Smoking cessation encouraged if patient is current smoker to aid in infection and wound healing            RETURN TO OFFICE: in 2 weeks. Thank you for the opportunity to participate in the care of this patient. Please contact with questions or concerns.       Oleksandr Ojeda MD  Infectious Diseases

## 2021-02-24 LAB
LEFT ABI: 1.21
LEFT ANTERIOR TIBIAL: 152 MMHG
LEFT ARM BP: 127 MMHG
LEFT ATA BP LEVEL: NORMAL
LEFT POSTERIOR TIBIAL: 160 MMHG
RIGHT ABI: 1.19
RIGHT ANTERIOR TIBIAL: 156 MMHG
RIGHT ARM BP: 132 MMHG
RIGHT ATA BP LEVEL: NORMAL
RIGHT POSTERIOR TIBIAL: 157 MMHG
RIGHT TBI: 1.02
RIGHT TOE PRESSURE: 135 MMHG

## 2021-03-03 ENCOUNTER — OFFICE VISIT (OUTPATIENT)
Dept: FAMILY MEDICINE CLINIC | Age: 51
End: 2021-03-03
Payer: COMMERCIAL

## 2021-03-03 VITALS
WEIGHT: 274.2 LBS | BODY MASS INDEX: 38.39 KG/M2 | TEMPERATURE: 97.1 F | HEART RATE: 100 BPM | RESPIRATION RATE: 16 BRPM | DIASTOLIC BLOOD PRESSURE: 77 MMHG | OXYGEN SATURATION: 100 % | SYSTOLIC BLOOD PRESSURE: 127 MMHG | HEIGHT: 71 IN

## 2021-03-03 DIAGNOSIS — M86.272 SUBACUTE OSTEOMYELITIS OF LEFT FOOT (HCC): Primary | ICD-10-CM

## 2021-03-03 PROCEDURE — 99212 OFFICE O/P EST SF 10 MIN: CPT | Performed by: STUDENT IN AN ORGANIZED HEALTH CARE EDUCATION/TRAINING PROGRAM

## 2021-03-03 NOTE — PROGRESS NOTES
Reviewed record in preparation for visit and have obtained necessary documentation. Identified pt with two pt identifiers(name and ). Chief Complaint   Patient presents with    Follow-up       Health Maintenance Due   Topic Date Due    Hepatitis C Screening  Never done    Foot Exam Q1  Never done    COVID-19 Vaccine (1 of 2) Never done    MICROALBUMIN Q1  2019       Mr. Abraham Tamayo has a reminder for a \"due or due soon\" health maintenance. I have asked that he discuss health maintenance topic(s) due with His  primary care provider. Coordination of Care Questionnaire:  :     1) Have you been to an emergency room, urgent care clinic since your last visit? no   Hospitalized since your last visit? no             2) Have you seen or consulted any other health care providers outside of [address] [place] [address] since your last visit? no  (Include any pap smears or colon screenings in this section.)    3) Do you have an Advance Directive on file? no    4) Are you interested in receiving information on Advance Directives? NO    Patient is accompanied by self I have received verbal consent from Ethel Corbin to discuss any/all medical information while they are present in the room.

## 2021-03-04 NOTE — PROGRESS NOTES
Infectious Disease Clinic Note       Reason for visit: Follow up for  Left 1st metatarsal, fibular sesamoid and tibial sesamoid acute osteomyelitis from a diabetic foot ulcer. HPI:  See ID note from 2/17/21. Events since discharge:  None acute. Got a PICC line and was started on cefazolin. Subjective:   Reports feeling well. Tolerating the cefazolin 2gm q8h well. Objective:    Vitals:       Physical Exam:  Gen: No apparent distress  HEENT:  Normocephalic, atraumatic, no scleral icterus  CV: S1,2 heard regularly, no lower extremity edema    Lungs: Clear to auscultation bilaterally, no wheezing  Abdomen: soft, non tender, non distended  Genitourinary:  no jackson catheter   Skin: no rash   Psych: good affect, good eye contact, non tearful  Neuro: alert, oriented to time,  place, and situation, moves all extremities to commands, verbal  Musculoskeletal:  Left hallux amputation site looks CDI. The incision on the top of the foot is superficially open, non-infectious appearing. No drainage. The ulcer at the planter aspect is healing well.          Labs:      Reviewed in paper records. Will be scanned into system. Micro:  MSSA in operative cultures. Assessment:  Pj Hallman is a 46y.o. y.o. year old with -      Left 1st metatarsal, fibular sesamoid and tibial sesamoid acute osteomyelitis from a diabetic foot ulcer. Operative specimens grew MSSA x 2.        Recommendations:  - Continue cefazolin 2gm q8h. Tolerating it well. Duration will be 6 weeks from date of start of IV abx, tentative, 2/25/21 to 4/8/21.   - Continue f/u with Podiatry Dr. Karina Lambert. RETURN TO OFFICE: 5-6 weeks       Thank you for the opportunity to participate in the care of this patient. Please contact with questions or concerns.       Shmuel Blake MD  Infectious Diseases

## 2021-04-07 ENCOUNTER — DOCUMENTATION ONLY (OUTPATIENT)
Dept: FAMILY MEDICINE CLINIC | Age: 51
End: 2021-04-07

## 2021-04-07 ENCOUNTER — OFFICE VISIT (OUTPATIENT)
Dept: FAMILY MEDICINE CLINIC | Age: 51
End: 2021-04-07
Payer: COMMERCIAL

## 2021-04-07 VITALS
BODY MASS INDEX: 38.72 KG/M2 | SYSTOLIC BLOOD PRESSURE: 122 MMHG | HEART RATE: 90 BPM | DIASTOLIC BLOOD PRESSURE: 76 MMHG | TEMPERATURE: 97.8 F | HEIGHT: 71 IN | OXYGEN SATURATION: 98 % | RESPIRATION RATE: 18 BRPM | WEIGHT: 276.6 LBS

## 2021-04-07 DIAGNOSIS — A49.01 MSSA (METHICILLIN SUSCEPTIBLE STAPHYLOCOCCUS AUREUS) INFECTION: ICD-10-CM

## 2021-04-07 DIAGNOSIS — M86.272 SUBACUTE OSTEOMYELITIS OF LEFT FOOT (HCC): Primary | ICD-10-CM

## 2021-04-07 PROCEDURE — 99242 OFF/OP CONSLTJ NEW/EST SF 20: CPT | Performed by: STUDENT IN AN ORGANIZED HEALTH CARE EDUCATION/TRAINING PROGRAM

## 2021-04-07 RX ORDER — IRBESARTAN AND HYDROCHLOROTHIAZIDE 300; 12.5 MG/1; MG/1
TABLET, FILM COATED ORAL
COMMUNITY
Start: 2021-04-05 | End: 2021-06-27

## 2021-04-07 NOTE — PROGRESS NOTES
Infectious Disease Clinic Note       Reason for visit: Follow up for  Left 1st metatarsal, fibular sesamoid and tibial sesamoid acute osteomyelitis from a diabetic foot ulcer. HPI:  See ID note from 3/3/21      Events since discharge:  None acute      Subjective:   Reports feeling well. Tolerating the cefazolin 2gm q8h well. Not missed any doses. He said the wound is healing up great and has almost closed up. No pain. And walking with a boot shoe without issues. Objective:    Vitals:       Physical Exam:  Gen: No apparent distress  HEENT:  Normocephalic, atraumatic, no scleral icterus  CV: S1,2 heard regularly, no lower extremity edema    Lungs: on room air  Genitourinary:  no jackson catheter   Skin: no rash   Psych: good affect, good eye contact, non tearful  Neuro: alert, oriented to time,  place, and situation, moves all extremities to commands, verbal  Musculoskeletal:  Left hallux amputation site looks CDI. The incision on the top of the foot is near-closed up and healed very well. No open wound,  No drainage. The ulcer at the planter aspect is healing well and also nearly-closed.          Labs:      Reviewed in paper records. Will be scanned into system. Micro:  MSSA in operative cultures. Assessment:  Elizabeth Echeverria is a 46y.o. y.o. year old with -      Left 1st metatarsal, fibular sesamoid and tibial sesamoid acute osteomyelitis from a diabetic foot ulcer. Operative specimens grew MSSA x 2.        Recommendations:  - Wound is healing very well. Pictures in Media. Finished 6 weeks of cefazolin 2gm q8h from 2/25/21 to 4/8/21. Labs reviewed. - Stop cefazolin 4/8/21. PICC line can be removed. - Continue f/u with Podiatry Dr. Ganga Duckworth. RETURN TO OFFICE: PRN      Thank you for the opportunity to participate in the care of this patient. Please contact with questions or concerns.       Jaqueline Choe MD  Infectious Diseases

## 2022-01-15 ENCOUNTER — OFFICE VISIT (OUTPATIENT)
Dept: URGENT CARE | Age: 52
End: 2022-01-15
Payer: COMMERCIAL

## 2022-01-15 VITALS — TEMPERATURE: 98.8 F | HEART RATE: 79 BPM | OXYGEN SATURATION: 95 % | RESPIRATION RATE: 20 BRPM

## 2022-01-15 DIAGNOSIS — Z20.822 SUSPECTED COVID-19 VIRUS INFECTION: Primary | ICD-10-CM

## 2022-01-15 DIAGNOSIS — Z20.822 EXPOSURE TO COVID-19 VIRUS: ICD-10-CM

## 2022-01-15 PROCEDURE — 99202 OFFICE O/P NEW SF 15 MIN: CPT | Performed by: FAMILY MEDICINE

## 2022-01-15 NOTE — PROGRESS NOTES
This patient was seen at [address] [place] [address] Urgent Care while in their vehicle due to COVID-19 pandemic with PPE and focused examination in order to decrease community viral transmission. The patient/guardian gave verbal consent to treat. The history is provided by the patient. Sore Throat   The history is provided by the patient. This is a new problem. The current episode started more than 2 days ago. The problem has not changed since onset. There has been no fever. Associated symptoms include congestion, headaches and cough. Pertinent negatives include no plugged ear sensation and no trouble swallowing. He has tried nothing for the symptoms.         Past Medical History:   Diagnosis Date    B12 deficiency 1/15/2021    Diabetes ([address]     type 2    Gout     Hypercholesteremia     Hyperlipidemia     Hypertension     Kidney stone     Neuropathy     bilateral lower legs        Past Surgical History:   Procedure Laterality Date    HX APPENDECTOMY      HX COLONOSCOPY      colon polyps in past    HX ENDOSCOPY      HX OTHER SURGICAL      anal fissure    HX WISDOM TEETH EXTRACTION           Family History   Problem Relation Age of Onset    Hypertension Mother     Diabetes Mother    Vin [place] Elevated Lipids Mother     Other Mother         GOUT        Social History     Socioeconomic History    Marital status:      Spouse name: Not on file    Number of children: Not on file    Years of education: Not on file    Highest education level: Not on file   Occupational History    Not on file   Tobacco Use    Smoking status: Never Smoker    Smokeless tobacco: Never Used   Substance and Sexual Activity    Alcohol use: No    Drug use: No    Sexual activity: Not Currently   Other Topics Concern    Not on file   Social History Narrative    Not on file     Social Determinants of Health     Financial Resource Strain:     Difficulty of Paying Living Expenses: Not on file   Food Insecurity:     Worried About Running Out of Food in the Last Year: Not on file    Ran Out of Food in the Last Year: Not on file   Transportation Needs:     Lack of Transportation (Medical): Not on file    Lack of Transportation (Non-Medical): Not on file   Physical Activity:     Days of Exercise per Week: Not on file    Minutes of Exercise per Session: Not on file   Stress:     Feeling of Stress : Not on file   Social Connections:     Frequency of Communication with Friends and Family: Not on file    Frequency of Social Gatherings with Friends and Family: Not on file    Attends Congregational Services: Not on file    Active Member of [address] [place] [address] [employer] or Organizations: Not on file    Attends Club or Organization Meetings: Not on file    Marital Status: Not on file   Intimate Partner Violence:     Fear of Current or Ex-Partner: Not on file    Emotionally Abused: Not on file    Physically Abused: Not on file    Sexually Abused: Not on file   Housing Stability:     Unable to Pay for Housing in the Last Year: Not on file    Number of [place] in the Last Year: Not on file    Unstable Housing in the Last Year: Not on file                ALLERGIES: Lipitor [atorvastatin]    Review of Systems   Constitutional: Negative for fatigue and fever. HENT: Positive for congestion and sore throat. Negative for trouble swallowing. Respiratory: Positive for cough. Neurological: Positive for headaches. All other systems reviewed and are negative. Vitals:    01/15/22 1025   Pulse: 79   Resp: 20   Temp: 98.8 °F (37.1 °C)   SpO2: 95%       Physical Exam  Vitals and nursing note reviewed. Constitutional:       General: He is not in acute distress. Appearance: He is not ill-appearing. Pulmonary:      Effort: Pulmonary effort is normal. No respiratory distress. Breath sounds: Normal breath sounds. MDM    Procedures      ICD-10-CM ICD-9-CM    1.  Suspected COVID-19 virus infection  Z20.822 V01.79 NOVEL CORONAVIRUS (COVID-19) 2. Exposure to COVID-19 virus  Z20.822 V01.79 NOVEL CORONAVIRUS (COVID-19)     No orders of the defined types were placed in this encounter. No results found for any visits on 01/15/22. The patients condition was discussed with the patient and they understand. The patient is to follow up with primary care doctor. If signs and symptoms become worse the pt is to go to the ER. The patient is to take medications as prescribed.

## 2022-01-19 LAB — SARS-COV-2, NAA: DETECTED

## 2022-02-05 ENCOUNTER — HOSPITAL ENCOUNTER (EMERGENCY)
Age: 52
Discharge: HOME | End: 2022-02-05
Attending: EMERGENCY MEDICINE
Payer: COMMERCIAL

## 2022-02-05 ENCOUNTER — APPOINTMENT (OUTPATIENT)
Dept: CT IMAGING | Age: 52
End: 2022-02-05
Attending: EMERGENCY MEDICINE
Payer: COMMERCIAL

## 2022-02-05 VITALS
SYSTOLIC BLOOD PRESSURE: 93 MMHG | HEIGHT: 71 IN | WEIGHT: 277.12 LBS | RESPIRATION RATE: 22 BRPM | BODY MASS INDEX: 38.8 KG/M2 | TEMPERATURE: 98.4 F | HEART RATE: 89 BPM | DIASTOLIC BLOOD PRESSURE: 71 MMHG | OXYGEN SATURATION: 96 %

## 2022-02-05 DIAGNOSIS — N20.0 NEPHROLITHIASIS: Primary | ICD-10-CM

## 2022-02-05 LAB
ALBUMIN SERPL-MCNC: 3.8 G/DL (ref 3.5–5)
ALBUMIN/GLOB SERPL: 1 {RATIO} (ref 1.1–2.2)
ALP SERPL-CCNC: 89 U/L (ref 45–117)
ALT SERPL-CCNC: 46 U/L (ref 12–78)
ANION GAP SERPL CALC-SCNC: 5 MMOL/L (ref 5–15)
APPEARANCE UR: CLEAR
AST SERPL-CCNC: 46 U/L (ref 15–37)
BACTERIA URNS QL MICRO: NEGATIVE /HPF
BASOPHILS # BLD: 0 K/UL (ref 0–0.1)
BASOPHILS NFR BLD: 0 % (ref 0–1)
BILIRUB SERPL-MCNC: 0.5 MG/DL (ref 0.2–1)
BILIRUB UR QL: NEGATIVE
BUN SERPL-MCNC: 20 MG/DL (ref 6–20)
BUN/CREAT SERPL: 13 (ref 12–20)
CALCIUM SERPL-MCNC: 9.5 MG/DL (ref 8.5–10.1)
CHLORIDE SERPL-SCNC: 104 MMOL/L (ref 97–108)
CO2 SERPL-SCNC: 28 MMOL/L (ref 21–32)
COLOR UR: ABNORMAL
CREAT SERPL-MCNC: 1.58 MG/DL (ref 0.7–1.3)
DIFFERENTIAL METHOD BLD: ABNORMAL
EOSINOPHIL # BLD: 0.1 K/UL (ref 0–0.4)
EOSINOPHIL NFR BLD: 1 % (ref 0–7)
EPITH CASTS URNS QL MICRO: ABNORMAL /LPF
ERYTHROCYTE [DISTWIDTH] IN BLOOD BY AUTOMATED COUNT: 12.4 % (ref 11.5–14.5)
GLOBULIN SER CALC-MCNC: 3.9 G/DL (ref 2–4)
GLUCOSE SERPL-MCNC: 183 MG/DL (ref 65–100)
GLUCOSE UR STRIP.AUTO-MCNC: NEGATIVE MG/DL
HCT VFR BLD AUTO: 42.6 % (ref 36.6–50.3)
HGB BLD-MCNC: 15.1 G/DL (ref 12.1–17)
HGB UR QL STRIP: ABNORMAL
HYALINE CASTS URNS QL MICRO: ABNORMAL /LPF (ref 0–5)
IMM GRANULOCYTES # BLD AUTO: 0.1 K/UL (ref 0–0.04)
IMM GRANULOCYTES NFR BLD AUTO: 1 % (ref 0–0.5)
KETONES UR QL STRIP.AUTO: NEGATIVE MG/DL
LEUKOCYTE ESTERASE UR QL STRIP.AUTO: NEGATIVE
LIPASE SERPL-CCNC: 74 U/L (ref 73–393)
LYMPHOCYTES # BLD: 1.7 K/UL (ref 0.8–3.5)
LYMPHOCYTES NFR BLD: 14 % (ref 12–49)
MCH RBC QN AUTO: 33 PG (ref 26–34)
MCHC RBC AUTO-ENTMCNC: 35.4 G/DL (ref 30–36.5)
MCV RBC AUTO: 93.2 FL (ref 80–99)
MONOCYTES # BLD: 0.7 K/UL (ref 0–1)
MONOCYTES NFR BLD: 6 % (ref 5–13)
NEUTS SEG # BLD: 9.8 K/UL (ref 1.8–8)
NEUTS SEG NFR BLD: 78 % (ref 32–75)
NITRITE UR QL STRIP.AUTO: NEGATIVE
NRBC # BLD: 0 K/UL (ref 0–0.01)
NRBC BLD-RTO: 0 PER 100 WBC
PH UR STRIP: 6 [PH] (ref 5–8)
PLATELET # BLD AUTO: 217 K/UL (ref 150–400)
PMV BLD AUTO: 10.7 FL (ref 8.9–12.9)
POTASSIUM SERPL-SCNC: 4 MMOL/L (ref 3.5–5.1)
PROT SERPL-MCNC: 7.7 G/DL (ref 6.4–8.2)
PROT UR STRIP-MCNC: NEGATIVE MG/DL
RBC # BLD AUTO: 4.57 M/UL (ref 4.1–5.7)
RBC #/AREA URNS HPF: ABNORMAL /HPF (ref 0–5)
SODIUM SERPL-SCNC: 137 MMOL/L (ref 136–145)
SP GR UR REFRACTOMETRY: 1.02 (ref 1–1.03)
TROPONIN-HIGH SENSITIVITY: 5 NG/L (ref 0–76)
UA: UC IF INDICATED,UAUC: ABNORMAL
UROBILINOGEN UR QL STRIP.AUTO: 0.2 EU/DL (ref 0.2–1)
WBC # BLD AUTO: 12.4 K/UL (ref 4.1–11.1)
WBC URNS QL MICRO: ABNORMAL /HPF (ref 0–4)

## 2022-02-05 PROCEDURE — 74011000636 HC RX REV CODE- 636: Performed by: EMERGENCY MEDICINE

## 2022-02-05 PROCEDURE — 99285 EMERGENCY DEPT VISIT HI MDM: CPT

## 2022-02-05 PROCEDURE — 83690 ASSAY OF LIPASE: CPT

## 2022-02-05 PROCEDURE — 74011250637 HC RX REV CODE- 250/637: Performed by: EMERGENCY MEDICINE

## 2022-02-05 PROCEDURE — 93005 ELECTROCARDIOGRAM TRACING: CPT

## 2022-02-05 PROCEDURE — 85025 COMPLETE CBC W/AUTO DIFF WBC: CPT

## 2022-02-05 PROCEDURE — 74011250636 HC RX REV CODE- 250/636: Performed by: EMERGENCY MEDICINE

## 2022-02-05 PROCEDURE — 96375 TX/PRO/DX INJ NEW DRUG ADDON: CPT

## 2022-02-05 PROCEDURE — 84484 ASSAY OF TROPONIN QUANT: CPT

## 2022-02-05 PROCEDURE — 74177 CT ABD & PELVIS W/CONTRAST: CPT

## 2022-02-05 PROCEDURE — 96374 THER/PROPH/DIAG INJ IV PUSH: CPT

## 2022-02-05 PROCEDURE — 81001 URINALYSIS AUTO W/SCOPE: CPT

## 2022-02-05 PROCEDURE — 80053 COMPREHEN METABOLIC PANEL: CPT

## 2022-02-05 PROCEDURE — 36415 COLL VENOUS BLD VENIPUNCTURE: CPT

## 2022-02-05 RX ORDER — OXYCODONE HYDROCHLORIDE 5 MG/1
5 TABLET ORAL
Qty: 12 TABLET | Refills: 0 | Status: SHIPPED | OUTPATIENT
Start: 2022-02-05 | End: 2022-02-08

## 2022-02-05 RX ORDER — TAMSULOSIN HYDROCHLORIDE 0.4 MG/1
0.4 CAPSULE ORAL
Status: COMPLETED | OUTPATIENT
Start: 2022-02-05 | End: 2022-02-05

## 2022-02-05 RX ORDER — MORPHINE SULFATE 2 MG/ML
4 INJECTION, SOLUTION INTRAMUSCULAR; INTRAVENOUS
Status: COMPLETED | OUTPATIENT
Start: 2022-02-05 | End: 2022-02-05

## 2022-02-05 RX ORDER — TAMSULOSIN HYDROCHLORIDE 0.4 MG/1
0.4 CAPSULE ORAL DAILY
Qty: 15 CAPSULE | Refills: 0 | Status: SHIPPED | OUTPATIENT
Start: 2022-02-05 | End: 2022-02-20

## 2022-02-05 RX ORDER — ONDANSETRON 4 MG/1
4 TABLET, ORALLY DISINTEGRATING ORAL
Qty: 20 TABLET | Refills: 0 | Status: SHIPPED | OUTPATIENT
Start: 2022-02-05 | End: 2022-09-30 | Stop reason: ALTCHOICE

## 2022-02-05 RX ORDER — KETOROLAC TROMETHAMINE 30 MG/ML
15 INJECTION, SOLUTION INTRAMUSCULAR; INTRAVENOUS
Status: COMPLETED | OUTPATIENT
Start: 2022-02-05 | End: 2022-02-05

## 2022-02-05 RX ORDER — ONDANSETRON 2 MG/ML
4 INJECTION INTRAMUSCULAR; INTRAVENOUS
Status: COMPLETED | OUTPATIENT
Start: 2022-02-05 | End: 2022-02-05

## 2022-02-05 RX ORDER — KETOROLAC TROMETHAMINE 10 MG/1
10 TABLET, FILM COATED ORAL
Qty: 20 TABLET | Refills: 0 | Status: SHIPPED | OUTPATIENT
Start: 2022-02-05

## 2022-02-05 RX ORDER — FENTANYL CITRATE 50 UG/ML
100 INJECTION, SOLUTION INTRAMUSCULAR; INTRAVENOUS ONCE
Status: COMPLETED | OUTPATIENT
Start: 2022-02-05 | End: 2022-02-05

## 2022-02-05 RX ADMIN — IOPAMIDOL 100 ML: 755 INJECTION, SOLUTION INTRAVENOUS at 14:33

## 2022-02-05 RX ADMIN — TAMSULOSIN HYDROCHLORIDE 0.4 MG: 0.4 CAPSULE ORAL at 16:48

## 2022-02-05 RX ADMIN — FENTANYL CITRATE 100 MCG: 50 INJECTION, SOLUTION INTRAMUSCULAR; INTRAVENOUS at 13:24

## 2022-02-05 RX ADMIN — SODIUM CHLORIDE 1000 ML: 9 INJECTION, SOLUTION INTRAVENOUS at 13:24

## 2022-02-05 RX ADMIN — ONDANSETRON 4 MG: 2 INJECTION INTRAMUSCULAR; INTRAVENOUS at 13:24

## 2022-02-05 RX ADMIN — MORPHINE SULFATE 4 MG: 2 INJECTION, SOLUTION INTRAMUSCULAR; INTRAVENOUS at 15:13

## 2022-02-05 RX ADMIN — KETOROLAC TROMETHAMINE 15 MG: 30 INJECTION, SOLUTION INTRAMUSCULAR; INTRAVENOUS at 15:13

## 2022-02-05 NOTE — ED PROVIDER NOTES
EMERGENCY DEPARTMENT HISTORY AND PHYSICAL EXAM      Date: 2/5/2022  Patient Name: Kirt Briceno    History of Presenting Illness     Chief Complaint   Patient presents with    Flank Pain     radiates to back, flank pain to both sides and around abdomen, history of kidney stones, states feels worse than kidney with nause and vomiting         HPI: Kirt Briceno, 46 y.o. male with history of previous appendectomy with known adhesions, history of renal stones, presenting to ED with chief complaint of abdominal pain. Started several days ago but improved. It returned overnight and into this morning. He describes it as central radiating to both flanks. It is associated with nonbloody nonbilious vomiting. He states he continues to have a small amount of flatus. There are no other complaints, changes, or physical findings at this time. PCP: Izabel Ochoa MD    No current facility-administered medications on file prior to encounter. Current Outpatient Medications on File Prior to Encounter   Medication Sig Dispense Refill    Basaglar KwikPen U-100 Insulin 100 unit/mL (3 mL) inpn INJECT 120 UNITS BY SUBCUTANEOUS ROUTE DAILY. 105 Each 3    Janumet 50-1,000 mg per tablet TAKE 1 TABLET BY MOUTH TWICE A DAY WITH FOOD 180 Tablet 3    rosuvastatin (CRESTOR) 5 mg tablet Take 1 Tablet by mouth daily. 90 Tablet 3    gabapentin (NEURONTIN) 300 mg capsule TAKE 2 CAPS BY MOUTH THREE (3) TIMES DAILY.  540 Capsule 3    irbesartan-hydroCHLOROthiazide (AVALIDE) 300-12.5 mg per tablet TAKE 1 TABLET BY MOUTH EVERY DAY - REPLACES LOSARTAN-HCTZ 90 Tablet 3    lancets (Accu-Chek Fastclix Lancet Drum) misc Check blood sugar three times daily      Dx code E11.9 1 Each 11    glucose blood VI test strips (Accu-Chek Guide test strips) strip Check blood sugar three times daily   Dx Code  E11.9 300 Strip 11    BD Ultra-Fine Mini Pen Needle 31 gauge x 3/16\" ndle USE WITH AMARI MENDOZA AS DIRECTED 100 Pen Needle 11    silver sulfADIAZINE (SILVADENE) 1 % topical cream APPLY TO WOUND TWICE A DAY WITH BANDAGE CHANGE      cyanocobalamin (Vitamin B-12) 100 mcg tablet Take 100 mcg by mouth daily. Past History     Past Medical History:  Past Medical History:   Diagnosis Date    B12 deficiency 1/15/2021    Diabetes ([address]     type 2    Gout     Hypercholesteremia     Hyperlipidemia     Hypertension     Kidney stone     Neuropathy     bilateral lower legs       Past Surgical History:  Past Surgical History:   Procedure Laterality Date    HX APPENDECTOMY      HX COLONOSCOPY      colon polyps in past    HX ENDOSCOPY      HX OTHER SURGICAL      anal fissure    HX WISDOM TEETH EXTRACTION         Family History:  Family History   Problem Relation Age of Onset    Hypertension Mother     Diabetes Mother     Elevated Lipids Mother     Other Mother         GOUT       Social History:  Social History     Tobacco Use    Smoking status: Never Smoker    Smokeless tobacco: Never Used   Substance Use Topics    Alcohol use: No    Drug use: No       Allergies: Allergies   Allergen Reactions    Lipitor [Atorvastatin] Other (comments)     Muscle cramps         Review of Systems   Review of Systems   Constitutional: Negative for chills and fever. HENT: Negative for sore throat. Eyes: Negative for redness. Respiratory: Negative for shortness of breath. Cardiovascular: Negative for chest pain. Gastrointestinal: Positive for abdominal pain, nausea and vomiting. Genitourinary: Negative for dysuria. Musculoskeletal: Negative for back pain. Neurological: Negative for syncope. Psychiatric/Behavioral: The patient is not nervous/anxious. All other systems reviewed and are negative. Physical Exam   Physical Exam  Vitals and nursing note reviewed. Constitutional:       Comments: Looks uncomfortable   HENT:      Head: Normocephalic and atraumatic.       Mouth/Throat:      Mouth: Mucous membranes are moist. Cardiovascular:      Rate and Rhythm: Normal rate and regular rhythm. Pulmonary:      Effort: Pulmonary effort is normal.      Breath sounds: Normal breath sounds. Abdominal:      Palpations: Abdomen is soft. Tenderness: There is no abdominal tenderness. Musculoskeletal:         General: No deformity. Cervical back: Neck supple. Skin:     General: Skin is warm and dry. Neurological:      General: No focal deficit present. Mental Status: He is alert. Psychiatric:         Mood and Affect: Mood normal.         Behavior: Behavior normal.         Diagnostic Study Results     Labs -     Recent Results (from the past 24 hour(s))   METABOLIC PANEL, COMPREHENSIVE    Collection Time: 02/05/22  1:16 PM   Result Value Ref Range    Sodium 137 136 - 145 mmol/L    Potassium 4.0 3.5 - 5.1 mmol/L    Chloride 104 97 - 108 mmol/L    CO2 28 21 - 32 mmol/L    Anion gap 5 5 - 15 mmol/L    Glucose 183 (H) 65 - 100 mg/dL    BUN 20 6 - 20 MG/DL    Creatinine 1.58 (H) 0.70 - 1.30 MG/DL    BUN/Creatinine ratio 13 12 - 20      GFR est AA 56 (L) >60 ml/min/1.73m2    GFR est non-AA 46 (L) >60 ml/min/1.73m2    Calcium 9.5 8.5 - 10.1 MG/DL    Bilirubin, total 0.5 0.2 - 1.0 MG/DL    ALT (SGPT) 46 12 - 78 U/L    AST (SGOT) 46 (H) 15 - 37 U/L    Alk.  phosphatase 89 45 - 117 U/L    Protein, total 7.7 6.4 - 8.2 g/dL    Albumin 3.8 3.5 - 5.0 g/dL    Globulin 3.9 2.0 - 4.0 g/dL    A-G Ratio 1.0 (L) 1.1 - 2.2     LIPASE    Collection Time: 02/05/22  1:16 PM   Result Value Ref Range    Lipase 74 73 - 393 U/L   CBC WITH AUTOMATED DIFF    Collection Time: 02/05/22  1:16 PM   Result Value Ref Range    WBC 12.4 (H) 4.1 - 11.1 K/uL    RBC 4.57 4.10 - 5.70 M/uL    HGB 15.1 12.1 - 17.0 g/dL    HCT 42.6 36.6 - 50.3 %    MCV 93.2 80.0 - 99.0 FL    MCH 33.0 26.0 - 34.0 PG    MCHC 35.4 30.0 - 36.5 g/dL    RDW 12.4 11.5 - 14.5 %    PLATELET 118 730 - 823 K/uL    MPV 10.7 8.9 - 12.9 FL    NRBC 0.0 0  WBC    ABSOLUTE NRBC 0.00 0.00 - 0.01 K/uL    NEUTROPHILS 78 (H) 32 - 75 %    LYMPHOCYTES 14 12 - 49 %    MONOCYTES 6 5 - 13 %    EOSINOPHILS 1 0 - 7 %    BASOPHILS 0 0 - 1 %    IMMATURE GRANULOCYTES 1 (H) 0.0 - 0.5 %    ABS. NEUTROPHILS 9.8 (H) 1.8 - 8.0 K/UL    ABS. LYMPHOCYTES 1.7 0.8 - 3.5 K/UL    ABS. MONOCYTES 0.7 0.0 - 1.0 K/UL    ABS. EOSINOPHILS 0.1 0.0 - 0.4 K/UL    ABS. BASOPHILS 0.0 0.0 - 0.1 K/UL    ABS. IMM. GRANS. 0.1 (H) 0.00 - 0.04 K/UL    DF AUTOMATED     URINALYSIS W/ REFLEX CULTURE    Collection Time: 02/05/22  1:16 PM    Specimen: Urine   Result Value Ref Range    Color YELLOW/STRAW      Appearance CLEAR CLEAR      Specific gravity 1.021 1.003 - 1.030      pH (UA) 6.0 5.0 - 8.0      Protein Negative NEG mg/dL    Glucose Negative NEG mg/dL    Ketone Negative NEG mg/dL    Bilirubin Negative NEG      Blood LARGE (A) NEG      Urobilinogen 0.2 0.2 - 1.0 EU/dL    Nitrites Negative NEG      Leukocyte Esterase Negative NEG      WBC 0-4 0 - 4 /hpf    RBC 5-10 0 - 5 /hpf    Epithelial cells FEW FEW /lpf    Bacteria Negative NEG /hpf    UA:UC IF INDICATED CULTURE NOT INDICATED BY UA RESULT CNI      Hyaline cast 2-5 0 - 5 /lpf   TROPONIN-HIGH SENSITIVITY    Collection Time: 02/05/22  1:16 PM   Result Value Ref Range    Troponin-High Sensitivity 5 0 - 76 ng/L   EKG, 12 LEAD, INITIAL    Collection Time: 02/05/22  1:20 PM   Result Value Ref Range    Ventricular Rate 73 BPM    Atrial Rate 73 BPM    P-R Interval 176 ms    QRS Duration 88 ms    Q-T Interval 378 ms    QTC Calculation (Bezet) 416 ms    Calculated P Axis 53 degrees    Calculated R Axis 15 degrees    Calculated T Axis 44 degrees    Diagnosis       Normal sinus rhythm  Normal ECG  When compared with ECG of 24-JUN-2010 20:19,  Vent. rate has decreased BY  42 BPM         Radiologic Studies -   CT ABD PELV W CONT   Final Result   Obstructing calculus proximal left ureter.         CT Results  (Last 48 hours)               02/05/22 1433  CT ABD PELV W CONT Final result    Impression: Obstructing calculus proximal left ureter. Narrative:  EXAM: CT ABD PELV W CONT       INDICATION: abd pain, vomiting       COMPARISON: October 2013        CONTRAST: 100 mL of Isovue-370. ORAL CONTRAST: None       TECHNIQUE:    Following the uneventful intravenous administration of contrast, thin axial   images were obtained through the abdomen and pelvis. Coronal and sagittal   reconstructions were generated. CT dose reduction was achieved through use of a   standardized protocol tailored for this examination and automatic exposure   control for dose modulation. FINDINGS:    LOWER THORAX: No significant abnormality in the incidentally imaged lower chest.   LIVER: Small low density lesions could represent cyst or small hemangioma. .   Hepatic steatosis . Likely unchanged. BILIARY TREE: Gallbladder is within normal limits. CBD is not dilated. SPLEEN: within normal limits. PANCREAS: No mass or ductal dilatation. ADRENALS: Unremarkable. KIDNEYS: Multiple bilateral intrarenal calculi. On the left there is a 5 minute   calculus within the proximal ureter with mild hydronephrosis. STOMACH: Unremarkable. SMALL BOWEL: No dilatation or wall thickening. COLON: No dilatation or wall thickening. APPENDIX: Appendectomy   PERITONEUM: No ascites or pneumoperitoneum. RETROPERITONEUM: No lymphadenopathy or aortic aneurysm. URINARY BLADDER: No mass or calculus. BONES: No destructive bone lesion. ABDOMINAL WALL: No mass or hernia. ADDITIONAL COMMENTS: N/A               CXR Results  (Last 48 hours)    None            Medical Decision Making   I am the first provider for this patient. I reviewed the vital signs, available nursing notes, past medical history, past surgical history, family history and social history. Vital Signs-Reviewed the patient's vital signs.   Patient Vitals for the past 24 hrs:   Temp Pulse Resp BP SpO2   02/05/22 1629 -- 89 22 93/71 96 %   02/05/22 1604 -- 89 23 95/74 95 %   02/05/22 1504 -- 73 14 (!) 162/95 95 %   02/05/22 1404 -- 70 (!) 6 (!) 156/97 90 %   02/05/22 1219 98.4 °F (36.9 °C) 77 20 (!) 169/93 100 %         Provider Notes (Medical Decision Making):   Pleasant 57-year-old presenting to ED with abdominal pain, nausea, vomiting. Abdominal exam is benign. Vital signs are normal.  CT imaging reveals ureteral stone. No signs of infection, sepsis, or other pathology at this time. He will be referred to urology for further evaluation. Return precautions given. ED Course:     Initial assessment performed. The patients presenting problems have been discussed, and they are in agreement with the care plan formulated and outlined with them. I have encouraged them to ask questions as they arise throughout their visit. Disposition:  dc    PLAN:  1. Discharge Medication List as of 2/5/2022  4:21 PM      START taking these medications    Details   ketorolac (TORADOL) 10 mg tablet Take 1 Tablet by mouth every six (6) hours as needed for Pain., Normal, Disp-20 Tablet, R-0      ondansetron (ZOFRAN ODT) 4 mg disintegrating tablet Take 1 Tablet by mouth every eight (8) hours as needed for Nausea or Vomiting., Normal, Disp-20 Tablet, R-0      tamsulosin (Flomax) 0.4 mg capsule Take 1 Capsule by mouth daily for 15 days. , Normal, Disp-15 Capsule, R-0      oxyCODONE IR (Roxicodone) 5 mg immediate release tablet Take 1 Tablet by mouth every six (6) hours as needed for Pain for up to 3 days. Max Daily Amount: 20 mg., Normal, Disp-12 Tablet, R-0         CONTINUE these medications which have NOT CHANGED    Details   Basagljose martin HarmonPen U-100 Insulin 100 unit/mL (3 mL) inpn INJECT 120 UNITS BY SUBCUTANEOUS ROUTE DAILY., Normal, Disp-105 Each, R-3      Janumet 50-1,000 mg per tablet TAKE 1 TABLET BY MOUTH TWICE A DAY WITH FOOD, Normal, Disp-180 Tablet, R-3      rosuvastatin (CRESTOR) 5 mg tablet Take 1 Tablet by mouth daily. , Normal, Disp-90 Tablet, R-3      gabapentin (NEURONTIN) 300 mg capsule TAKE 2 CAPS BY MOUTH THREE (3) TIMES DAILY., Normal, Disp-540 Capsule, R-3This request is for a new prescription for a controlled substance as required by Federal/State law. irbesartan-hydroCHLOROthiazide (AVALIDE) 300-12.5 mg per tablet TAKE 1 TABLET BY MOUTH EVERY DAY - REPLACES LOSARTAN-HCTZ, Normal, Disp-90 Tablet, R-3      lancets (Accu-Chek Fastclix Lancet Drum) misc Check blood sugar three times daily      Dx code E11.9, Normal, Disp-1 Each, R-11      glucose blood VI test strips (Accu-Chek Guide test strips) strip Check blood sugar three times daily   Dx Code  E11.9, Normal, Disp-300 Strip, R-11      BD Ultra-Fine Mini Pen Needle 31 gauge x 3/16\" ndle USE WITH LANTUS SOLOSTAR AS DIRECTED, Normal, Disp-100 Pen Needle, R-11      silver sulfADIAZINE (SILVADENE) 1 % topical cream APPLY TO WOUND TWICE A DAY WITH BANDAGE CHANGE, Historical Med      cyanocobalamin (Vitamin B-12) 100 mcg tablet Take 100 mcg by mouth daily. , Historical Med           2.    Follow-up Information     Follow up With Specialties Details Why Contact Info    Kidney Stone Hotline  Call  As needed, If symptoms worsen Call [phone]    Donald Calles MD Urology Schedule an appointment as soon as possible for a visit   [address]  [phone]      [facility] EMERGENCY DEPT Emergency Medicine Go to  As needed, If symptoms worsen [address]  [address] [facility]  [phone]        Return to ED if worse     Diagnosis     Clinical Impression: Ureteral stone

## 2022-02-05 NOTE — DISCHARGE INSTRUCTIONS
You were evaluated in the emergency department for flank pain and abdominal pain. Your examination was reassuring as was your work-up including blood work, urinalysis, and CT scan which demonstrates a 5 mm right proximal ureteral stone. It will be important for you to follow-up with [place] [facility] within 7 days especially if you are having any persistent symptoms despite taking the medications. If you develop worsening symptoms such as fevers, worsening pain, or inability to eat or drink, please return to the emergency department immediately.

## 2022-02-06 LAB
ATRIAL RATE: 73 BPM
CALCULATED P AXIS, ECG09: 53 DEGREES
CALCULATED R AXIS, ECG10: 15 DEGREES
CALCULATED T AXIS, ECG11: 44 DEGREES
DIAGNOSIS, 93000: NORMAL
P-R INTERVAL, ECG05: 176 MS
Q-T INTERVAL, ECG07: 378 MS
QRS DURATION, ECG06: 88 MS
QTC CALCULATION (BEZET), ECG08: 416 MS
VENTRICULAR RATE, ECG03: 73 BPM

## 2022-03-19 PROBLEM — E11.59 HYPERTENSION COMPLICATING DIABETES (HCC): Status: ACTIVE | Noted: 2018-03-28

## 2022-03-19 PROBLEM — E53.8 B12 DEFICIENCY: Status: ACTIVE | Noted: 2021-01-15

## 2022-03-19 PROBLEM — E11.9 HYPERTENSION COMPLICATING DIABETES (HCC): Status: ACTIVE | Noted: 2018-03-28

## 2022-03-19 PROBLEM — E11.21 TYPE 2 DIABETES WITH NEPHROPATHY (HCC): Status: ACTIVE | Noted: 2018-03-31

## 2022-03-19 PROBLEM — E66.01 SEVERE OBESITY (HCC): Status: ACTIVE | Noted: 2019-06-20

## 2022-03-19 PROBLEM — I15.2 HYPERTENSION COMPLICATING DIABETES (HCC): Status: ACTIVE | Noted: 2018-03-28

## 2022-03-19 PROBLEM — I10 HYPERTENSION COMPLICATING DIABETES (HCC): Status: ACTIVE | Noted: 2018-03-28

## 2022-03-19 PROBLEM — E11.40 TYPE 2 DIABETES MELLITUS WITH DIABETIC NEUROPATHY (HCC): Status: ACTIVE | Noted: 2018-06-27

## 2023-10-13 ENCOUNTER — HOSPITAL ENCOUNTER (OUTPATIENT)
Facility: HOSPITAL | Age: 53
Discharge: HOME | End: 2023-10-13
Attending: INTERNAL MEDICINE
Payer: COMMERCIAL

## 2023-10-13 DIAGNOSIS — R60.0 EDEMA LEG: ICD-10-CM

## 2023-10-13 PROCEDURE — 93971 EXTREMITY STUDY: CPT

## 2023-12-09 ENCOUNTER — HOSPITAL ENCOUNTER (INPATIENT)
Facility: HOSPITAL | Age: 53
LOS: 9 days | Discharge: HOME HEALTH | DRG: 853 | End: 2023-12-18
Attending: EMERGENCY MEDICINE | Admitting: STUDENT IN AN ORGANIZED HEALTH CARE EDUCATION/TRAINING PROGRAM
Payer: COMMERCIAL

## 2023-12-09 ENCOUNTER — APPOINTMENT (OUTPATIENT)
Facility: HOSPITAL | Age: 53
DRG: 853 | End: 2023-12-09
Payer: COMMERCIAL

## 2023-12-09 DIAGNOSIS — E11.65 POORLY CONTROLLED DIABETES MELLITUS (HCC): ICD-10-CM

## 2023-12-09 DIAGNOSIS — N17.9 AKI (ACUTE KIDNEY INJURY) (HCC): ICD-10-CM

## 2023-12-09 DIAGNOSIS — L08.9 DIABETIC FOOT INFECTION (HCC): ICD-10-CM

## 2023-12-09 DIAGNOSIS — E11.628 DIABETIC FOOT INFECTION (HCC): ICD-10-CM

## 2023-12-09 DIAGNOSIS — E11.21 TYPE 2 DIABETES WITH NEPHROPATHY (HCC): ICD-10-CM

## 2023-12-09 DIAGNOSIS — A41.9 SEVERE SEPSIS (HCC): Primary | ICD-10-CM

## 2023-12-09 DIAGNOSIS — R65.20 SEVERE SEPSIS (HCC): Primary | ICD-10-CM

## 2023-12-09 DIAGNOSIS — E11.610 CHARCOT FOOT DUE TO DIABETES MELLITUS (HCC): ICD-10-CM

## 2023-12-09 DIAGNOSIS — R78.81 STREPTOCOCCAL BACTEREMIA: ICD-10-CM

## 2023-12-09 DIAGNOSIS — B95.5 STREPTOCOCCAL BACTEREMIA: ICD-10-CM

## 2023-12-09 DIAGNOSIS — A49.9 GRAM-NEGATIVE INFECTION: ICD-10-CM

## 2023-12-09 LAB
ALBUMIN SERPL-MCNC: 3.1 G/DL (ref 3.5–5)
ALBUMIN/GLOB SERPL: 0.6 (ref 1.1–2.2)
ALP SERPL-CCNC: 101 U/L (ref 45–117)
ALT SERPL-CCNC: 22 U/L (ref 12–78)
ANION GAP BLD CALC-SCNC: 8 (ref 10–20)
ANION GAP SERPL CALC-SCNC: 8 MMOL/L (ref 5–15)
AST SERPL-CCNC: 16 U/L (ref 15–37)
BASE EXCESS BLD CALC-SCNC: 4.4 MMOL/L
BASOPHILS # BLD: 0.1 K/UL (ref 0–0.1)
BASOPHILS NFR BLD: 1 % (ref 0–1)
BILIRUB SERPL-MCNC: 0.6 MG/DL (ref 0.2–1)
BUN SERPL-MCNC: 20 MG/DL (ref 6–20)
BUN/CREAT SERPL: 13 (ref 12–20)
CA-I BLD-MCNC: 1.2 MMOL/L (ref 1.12–1.32)
CALCIUM SERPL-MCNC: 9.8 MG/DL (ref 8.5–10.1)
CHLORIDE BLD-SCNC: 103 MMOL/L (ref 100–108)
CHLORIDE SERPL-SCNC: 101 MMOL/L (ref 97–108)
CO2 BLD-SCNC: 27 MMOL/L (ref 19–24)
CO2 SERPL-SCNC: 27 MMOL/L (ref 21–32)
CREAT SERPL-MCNC: 1.5 MG/DL (ref 0.7–1.3)
CREAT UR-MCNC: 1 MG/DL (ref 0.6–1.3)
DIFFERENTIAL METHOD BLD: ABNORMAL
EOSINOPHIL # BLD: 0.1 K/UL (ref 0–0.4)
EOSINOPHIL NFR BLD: 1 % (ref 0–7)
ERYTHROCYTE [DISTWIDTH] IN BLOOD BY AUTOMATED COUNT: 12.6 % (ref 11.5–14.5)
ERYTHROCYTE [SEDIMENTATION RATE] IN BLOOD: 83 MM/HR (ref 0–20)
FLUAV AG NPH QL IA: NEGATIVE
FLUBV AG NOSE QL IA: NEGATIVE
GLOBULIN SER CALC-MCNC: 5.3 G/DL (ref 2–4)
GLUCOSE BLD STRIP.AUTO-MCNC: 220 MG/DL (ref 74–106)
GLUCOSE BLD STRIP.AUTO-MCNC: 223 MG/DL (ref 65–117)
GLUCOSE SERPL-MCNC: 229 MG/DL (ref 65–100)
HCO3 BLDA-SCNC: 28 MMOL/L
HCT VFR BLD AUTO: 37.4 % (ref 36.6–50.3)
HGB BLD-MCNC: 12.8 G/DL (ref 12.1–17)
IMM GRANULOCYTES # BLD AUTO: 0.1 K/UL (ref 0–0.04)
IMM GRANULOCYTES NFR BLD AUTO: 1 % (ref 0–0.5)
LACTATE BLD-SCNC: 2.17 MMOL/L (ref 0.4–2)
LACTATE BLD-SCNC: 8.27 MMOL/L (ref 0.4–2)
LACTATE BLD-SCNC: 9.1 MMOL/L (ref 0.4–2)
LYMPHOCYTES # BLD: 1.2 K/UL (ref 0.8–3.5)
LYMPHOCYTES NFR BLD: 8 % (ref 12–49)
MCH RBC QN AUTO: 32.3 PG (ref 26–34)
MCHC RBC AUTO-ENTMCNC: 34.2 G/DL (ref 30–36.5)
MCV RBC AUTO: 94.4 FL (ref 80–99)
MONOCYTES # BLD: 1 K/UL (ref 0–1)
MONOCYTES NFR BLD: 6 % (ref 5–13)
NEUTS SEG # BLD: 12.8 K/UL (ref 1.8–8)
NEUTS SEG NFR BLD: 83 % (ref 32–75)
NRBC # BLD: 0 K/UL (ref 0–0.01)
NRBC BLD-RTO: 0 PER 100 WBC
PCO2 BLDV: 37.8 MMHG (ref 41–51)
PH BLDV: 7.48 (ref 7.32–7.42)
PLATELET # BLD AUTO: 305 K/UL (ref 150–400)
PMV BLD AUTO: 11 FL (ref 8.9–12.9)
PO2 BLDV: 34 MMHG (ref 25–40)
POTASSIUM BLD-SCNC: 5.1 MMOL/L (ref 3.5–5.5)
POTASSIUM SERPL-SCNC: 4.4 MMOL/L (ref 3.5–5.1)
PROT SERPL-MCNC: 8.4 G/DL (ref 6.4–8.2)
RBC # BLD AUTO: 3.96 M/UL (ref 4.1–5.7)
SAO2 % BLD: 70 %
SARS-COV-2 RDRP RESP QL NAA+PROBE: NOT DETECTED
SERVICE CMNT-IMP: ABNORMAL
SERVICE CMNT-IMP: ABNORMAL
SODIUM BLD-SCNC: 138 MMOL/L (ref 136–145)
SODIUM SERPL-SCNC: 136 MMOL/L (ref 136–145)
SOURCE: NORMAL
SPECIMEN SITE: ABNORMAL
WBC # BLD AUTO: 15.2 K/UL (ref 4.1–11.1)

## 2023-12-09 PROCEDURE — 82962 GLUCOSE BLOOD TEST: CPT

## 2023-12-09 PROCEDURE — 6370000000 HC RX 637 (ALT 250 FOR IP): Performed by: STUDENT IN AN ORGANIZED HEALTH CARE EDUCATION/TRAINING PROGRAM

## 2023-12-09 PROCEDURE — 73620 X-RAY EXAM OF FOOT: CPT

## 2023-12-09 PROCEDURE — 82947 ASSAY GLUCOSE BLOOD QUANT: CPT

## 2023-12-09 PROCEDURE — 96375 TX/PRO/DX INJ NEW DRUG ADDON: CPT

## 2023-12-09 PROCEDURE — 96365 THER/PROPH/DIAG IV INF INIT: CPT

## 2023-12-09 PROCEDURE — 36415 COLL VENOUS BLD VENIPUNCTURE: CPT

## 2023-12-09 PROCEDURE — 83605 ASSAY OF LACTIC ACID: CPT

## 2023-12-09 PROCEDURE — 6360000002 HC RX W HCPCS: Performed by: STUDENT IN AN ORGANIZED HEALTH CARE EDUCATION/TRAINING PROGRAM

## 2023-12-09 PROCEDURE — 2700000000 HC OXYGEN THERAPY PER DAY

## 2023-12-09 PROCEDURE — 2580000003 HC RX 258: Performed by: EMERGENCY MEDICINE

## 2023-12-09 PROCEDURE — 84132 ASSAY OF SERUM POTASSIUM: CPT

## 2023-12-09 PROCEDURE — 87186 SC STD MICRODIL/AGAR DIL: CPT

## 2023-12-09 PROCEDURE — 93005 ELECTROCARDIOGRAM TRACING: CPT | Performed by: EMERGENCY MEDICINE

## 2023-12-09 PROCEDURE — 82803 BLOOD GASES ANY COMBINATION: CPT

## 2023-12-09 PROCEDURE — 99285 EMERGENCY DEPT VISIT HI MDM: CPT

## 2023-12-09 PROCEDURE — 87040 BLOOD CULTURE FOR BACTERIA: CPT

## 2023-12-09 PROCEDURE — 86140 C-REACTIVE PROTEIN: CPT

## 2023-12-09 PROCEDURE — 6370000000 HC RX 637 (ALT 250 FOR IP): Performed by: EMERGENCY MEDICINE

## 2023-12-09 PROCEDURE — 85025 COMPLETE CBC W/AUTO DIFF WBC: CPT

## 2023-12-09 PROCEDURE — 87804 INFLUENZA ASSAY W/OPTIC: CPT

## 2023-12-09 PROCEDURE — 87635 SARS-COV-2 COVID-19 AMP PRB: CPT

## 2023-12-09 PROCEDURE — 82330 ASSAY OF CALCIUM: CPT

## 2023-12-09 PROCEDURE — 2580000003 HC RX 258: Performed by: STUDENT IN AN ORGANIZED HEALTH CARE EDUCATION/TRAINING PROGRAM

## 2023-12-09 PROCEDURE — 80053 COMPREHEN METABOLIC PANEL: CPT

## 2023-12-09 PROCEDURE — 87147 CULTURE TYPE IMMUNOLOGIC: CPT

## 2023-12-09 PROCEDURE — 2060000000 HC ICU INTERMEDIATE R&B

## 2023-12-09 PROCEDURE — 85652 RBC SED RATE AUTOMATED: CPT

## 2023-12-09 PROCEDURE — 6360000002 HC RX W HCPCS: Performed by: EMERGENCY MEDICINE

## 2023-12-09 PROCEDURE — 84295 ASSAY OF SERUM SODIUM: CPT

## 2023-12-09 RX ORDER — MORPHINE SULFATE 4 MG/ML
4 INJECTION, SOLUTION INTRAMUSCULAR; INTRAVENOUS
Status: COMPLETED | OUTPATIENT
Start: 2023-12-09 | End: 2023-12-09

## 2023-12-09 RX ORDER — ACETAMINOPHEN 325 MG/1
650 TABLET ORAL EVERY 6 HOURS PRN
Status: DISCONTINUED | OUTPATIENT
Start: 2023-12-09 | End: 2023-12-18 | Stop reason: HOSPADM

## 2023-12-09 RX ORDER — MORPHINE SULFATE 4 MG/ML
4 INJECTION, SOLUTION INTRAMUSCULAR; INTRAVENOUS EVERY 4 HOURS PRN
Status: DISCONTINUED | OUTPATIENT
Start: 2023-12-09 | End: 2023-12-09

## 2023-12-09 RX ORDER — INSULIN GLARGINE 100 [IU]/ML
70 INJECTION, SOLUTION SUBCUTANEOUS EVERY MORNING
Status: DISCONTINUED | OUTPATIENT
Start: 2023-12-10 | End: 2023-12-11

## 2023-12-09 RX ORDER — INSULIN LISPRO 100 [IU]/ML
0-4 INJECTION, SOLUTION INTRAVENOUS; SUBCUTANEOUS NIGHTLY
Status: DISCONTINUED | OUTPATIENT
Start: 2023-12-09 | End: 2023-12-18 | Stop reason: HOSPADM

## 2023-12-09 RX ORDER — LABETALOL HYDROCHLORIDE 5 MG/ML
5 INJECTION, SOLUTION INTRAVENOUS EVERY 4 HOURS PRN
Status: DISCONTINUED | OUTPATIENT
Start: 2023-12-09 | End: 2023-12-18 | Stop reason: HOSPADM

## 2023-12-09 RX ORDER — OXYCODONE HYDROCHLORIDE 5 MG/1
5 TABLET ORAL EVERY 4 HOURS PRN
Status: DISCONTINUED | OUTPATIENT
Start: 2023-12-09 | End: 2023-12-18 | Stop reason: HOSPADM

## 2023-12-09 RX ORDER — DEXTROSE MONOHYDRATE 100 MG/ML
INJECTION, SOLUTION INTRAVENOUS CONTINUOUS PRN
Status: DISCONTINUED | OUTPATIENT
Start: 2023-12-09 | End: 2023-12-18 | Stop reason: HOSPADM

## 2023-12-09 RX ORDER — SODIUM CHLORIDE 0.9 % (FLUSH) 0.9 %
5-40 SYRINGE (ML) INJECTION PRN
Status: DISCONTINUED | OUTPATIENT
Start: 2023-12-09 | End: 2023-12-18 | Stop reason: HOSPADM

## 2023-12-09 RX ORDER — SODIUM CHLORIDE 9 MG/ML
INJECTION, SOLUTION INTRAVENOUS PRN
Status: DISCONTINUED | OUTPATIENT
Start: 2023-12-09 | End: 2023-12-18 | Stop reason: HOSPADM

## 2023-12-09 RX ORDER — ONDANSETRON 4 MG/1
4 TABLET, ORALLY DISINTEGRATING ORAL EVERY 8 HOURS PRN
Status: DISCONTINUED | OUTPATIENT
Start: 2023-12-09 | End: 2023-12-18 | Stop reason: HOSPADM

## 2023-12-09 RX ORDER — POLYETHYLENE GLYCOL 3350 17 G/17G
17 POWDER, FOR SOLUTION ORAL DAILY PRN
Status: DISCONTINUED | OUTPATIENT
Start: 2023-12-09 | End: 2023-12-18 | Stop reason: HOSPADM

## 2023-12-09 RX ORDER — ACETAMINOPHEN 325 MG/1
650 TABLET ORAL EVERY 4 HOURS PRN
Status: DISCONTINUED | OUTPATIENT
Start: 2023-12-09 | End: 2023-12-09

## 2023-12-09 RX ORDER — GABAPENTIN 300 MG/1
600 CAPSULE ORAL 3 TIMES DAILY
Status: DISCONTINUED | OUTPATIENT
Start: 2023-12-09 | End: 2023-12-18 | Stop reason: HOSPADM

## 2023-12-09 RX ORDER — SODIUM CHLORIDE 9 MG/ML
INJECTION, SOLUTION INTRAVENOUS CONTINUOUS
Status: ACTIVE | OUTPATIENT
Start: 2023-12-09 | End: 2023-12-10

## 2023-12-09 RX ORDER — ACETAMINOPHEN 500 MG
1000 TABLET ORAL
Status: COMPLETED | OUTPATIENT
Start: 2023-12-09 | End: 2023-12-09

## 2023-12-09 RX ORDER — SODIUM CHLORIDE, SODIUM LACTATE, POTASSIUM CHLORIDE, AND CALCIUM CHLORIDE .6; .31; .03; .02 G/100ML; G/100ML; G/100ML; G/100ML
2000 INJECTION, SOLUTION INTRAVENOUS
Status: COMPLETED | OUTPATIENT
Start: 2023-12-09 | End: 2023-12-10

## 2023-12-09 RX ORDER — INSULIN LISPRO 100 [IU]/ML
0-8 INJECTION, SOLUTION INTRAVENOUS; SUBCUTANEOUS
Status: DISCONTINUED | OUTPATIENT
Start: 2023-12-10 | End: 2023-12-18 | Stop reason: HOSPADM

## 2023-12-09 RX ORDER — ONDANSETRON 2 MG/ML
4 INJECTION INTRAMUSCULAR; INTRAVENOUS EVERY 6 HOURS PRN
Status: DISCONTINUED | OUTPATIENT
Start: 2023-12-09 | End: 2023-12-18 | Stop reason: HOSPADM

## 2023-12-09 RX ORDER — ACETAMINOPHEN 650 MG/1
650 SUPPOSITORY RECTAL EVERY 6 HOURS PRN
Status: DISCONTINUED | OUTPATIENT
Start: 2023-12-09 | End: 2023-12-18 | Stop reason: HOSPADM

## 2023-12-09 RX ORDER — ROSUVASTATIN CALCIUM 10 MG/1
5 TABLET, COATED ORAL DAILY
Status: DISCONTINUED | OUTPATIENT
Start: 2023-12-10 | End: 2023-12-18 | Stop reason: HOSPADM

## 2023-12-09 RX ORDER — SODIUM CHLORIDE 0.9 % (FLUSH) 0.9 %
5-40 SYRINGE (ML) INJECTION EVERY 12 HOURS SCHEDULED
Status: DISCONTINUED | OUTPATIENT
Start: 2023-12-09 | End: 2023-12-18 | Stop reason: HOSPADM

## 2023-12-09 RX ADMIN — MORPHINE SULFATE 4 MG: 4 INJECTION, SOLUTION INTRAMUSCULAR; INTRAVENOUS at 21:33

## 2023-12-09 RX ADMIN — Medication 2500 MG: at 22:49

## 2023-12-09 RX ADMIN — SODIUM CHLORIDE, POTASSIUM CHLORIDE, SODIUM LACTATE AND CALCIUM CHLORIDE 2000 ML: 600; 310; 30; 20 INJECTION, SOLUTION INTRAVENOUS at 21:38

## 2023-12-09 RX ADMIN — ACETAMINOPHEN 1000 MG: 500 TABLET ORAL at 21:31

## 2023-12-09 RX ADMIN — PIPERACILLIN AND TAZOBACTAM 4500 MG: 4; .5 INJECTION, POWDER, LYOPHILIZED, FOR SOLUTION INTRAVENOUS at 21:35

## 2023-12-09 RX ADMIN — GABAPENTIN 600 MG: 300 CAPSULE ORAL at 22:43

## 2023-12-09 RX ADMIN — SODIUM CHLORIDE, PRESERVATIVE FREE 10 ML: 5 INJECTION INTRAVENOUS at 22:44

## 2023-12-09 RX ADMIN — SODIUM CHLORIDE: 9 INJECTION, SOLUTION INTRAVENOUS at 22:46

## 2023-12-10 ENCOUNTER — APPOINTMENT (OUTPATIENT)
Facility: HOSPITAL | Age: 53
DRG: 853 | End: 2023-12-10
Payer: COMMERCIAL

## 2023-12-10 ENCOUNTER — ANESTHESIA EVENT (OUTPATIENT)
Facility: HOSPITAL | Age: 53
End: 2023-12-10
Payer: COMMERCIAL

## 2023-12-10 ENCOUNTER — ANESTHESIA (OUTPATIENT)
Facility: HOSPITAL | Age: 53
End: 2023-12-10
Payer: COMMERCIAL

## 2023-12-10 LAB
ANION GAP SERPL CALC-SCNC: 8 MMOL/L (ref 5–15)
BASOPHILS # BLD: 0.1 K/UL (ref 0–0.1)
BASOPHILS NFR BLD: 0 % (ref 0–1)
BUN SERPL-MCNC: 20 MG/DL (ref 6–20)
BUN/CREAT SERPL: 14 (ref 12–20)
CALCIUM SERPL-MCNC: 8.5 MG/DL (ref 8.5–10.1)
CHLORIDE SERPL-SCNC: 103 MMOL/L (ref 97–108)
CO2 SERPL-SCNC: 27 MMOL/L (ref 21–32)
CREAT SERPL-MCNC: 1.48 MG/DL (ref 0.7–1.3)
CRP SERPL-MCNC: 11.4 MG/DL (ref 0–0.6)
DIFFERENTIAL METHOD BLD: ABNORMAL
EKG ATRIAL RATE: 133 BPM
EKG DIAGNOSIS: NORMAL
EKG P AXIS: 38 DEGREES
EKG P-R INTERVAL: 156 MS
EKG Q-T INTERVAL: 272 MS
EKG QRS DURATION: 84 MS
EKG QTC CALCULATION (BAZETT): 404 MS
EKG R AXIS: 12 DEGREES
EKG T AXIS: 55 DEGREES
EKG VENTRICULAR RATE: 133 BPM
EOSINOPHIL # BLD: 0 K/UL (ref 0–0.4)
EOSINOPHIL NFR BLD: 0 % (ref 0–7)
ERYTHROCYTE [DISTWIDTH] IN BLOOD BY AUTOMATED COUNT: 12.9 % (ref 11.5–14.5)
GLUCOSE BLD STRIP.AUTO-MCNC: 170 MG/DL (ref 65–117)
GLUCOSE BLD STRIP.AUTO-MCNC: 181 MG/DL (ref 65–117)
GLUCOSE BLD STRIP.AUTO-MCNC: 182 MG/DL (ref 65–117)
GLUCOSE BLD STRIP.AUTO-MCNC: 215 MG/DL (ref 65–117)
GLUCOSE SERPL-MCNC: 179 MG/DL (ref 65–100)
HCT VFR BLD AUTO: 32.3 % (ref 36.6–50.3)
HGB BLD-MCNC: 10.7 G/DL (ref 12.1–17)
IMM GRANULOCYTES # BLD AUTO: 0.1 K/UL (ref 0–0.04)
IMM GRANULOCYTES NFR BLD AUTO: 1 % (ref 0–0.5)
LACTATE SERPL-SCNC: 1.4 MMOL/L (ref 0.4–2)
LYMPHOCYTES # BLD: 2.2 K/UL (ref 0.8–3.5)
LYMPHOCYTES NFR BLD: 13 % (ref 12–49)
MCH RBC QN AUTO: 32.1 PG (ref 26–34)
MCHC RBC AUTO-ENTMCNC: 33.1 G/DL (ref 30–36.5)
MCV RBC AUTO: 97 FL (ref 80–99)
MONOCYTES # BLD: 1.5 K/UL (ref 0–1)
MONOCYTES NFR BLD: 9 % (ref 5–13)
NEUTS SEG # BLD: 13.2 K/UL (ref 1.8–8)
NEUTS SEG NFR BLD: 77 % (ref 32–75)
NRBC # BLD: 0 K/UL (ref 0–0.01)
NRBC BLD-RTO: 0 PER 100 WBC
PLATELET # BLD AUTO: 277 K/UL (ref 150–400)
PMV BLD AUTO: 10.7 FL (ref 8.9–12.9)
POTASSIUM SERPL-SCNC: 4.6 MMOL/L (ref 3.5–5.1)
RBC # BLD AUTO: 3.33 M/UL (ref 4.1–5.7)
SERVICE CMNT-IMP: ABNORMAL
SODIUM SERPL-SCNC: 138 MMOL/L (ref 136–145)
WBC # BLD AUTO: 17.1 K/UL (ref 4.1–11.1)

## 2023-12-10 PROCEDURE — 6360000004 HC RX CONTRAST MEDICATION: Performed by: EMERGENCY MEDICINE

## 2023-12-10 PROCEDURE — 87075 CULTR BACTERIA EXCEPT BLOOD: CPT

## 2023-12-10 PROCEDURE — 0QBN0ZZ EXCISION OF RIGHT METATARSAL, OPEN APPROACH: ICD-10-PCS | Performed by: PODIATRIST

## 2023-12-10 PROCEDURE — 3600000002 HC SURGERY LEVEL 2 BASE: Performed by: PODIATRIST

## 2023-12-10 PROCEDURE — 83605 ASSAY OF LACTIC ACID: CPT

## 2023-12-10 PROCEDURE — 2060000000 HC ICU INTERMEDIATE R&B

## 2023-12-10 PROCEDURE — 88307 TISSUE EXAM BY PATHOLOGIST: CPT

## 2023-12-10 PROCEDURE — 6360000002 HC RX W HCPCS: Performed by: STUDENT IN AN ORGANIZED HEALTH CARE EDUCATION/TRAINING PROGRAM

## 2023-12-10 PROCEDURE — 2700000000 HC OXYGEN THERAPY PER DAY

## 2023-12-10 PROCEDURE — 2500000003 HC RX 250 WO HCPCS: Performed by: NURSE ANESTHETIST, CERTIFIED REGISTERED

## 2023-12-10 PROCEDURE — 88305 TISSUE EXAM BY PATHOLOGIST: CPT

## 2023-12-10 PROCEDURE — 87070 CULTURE OTHR SPECIMN AEROBIC: CPT

## 2023-12-10 PROCEDURE — 7100000001 HC PACU RECOVERY - ADDTL 15 MIN: Performed by: PODIATRIST

## 2023-12-10 PROCEDURE — 87186 SC STD MICRODIL/AGAR DIL: CPT

## 2023-12-10 PROCEDURE — 80048 BASIC METABOLIC PNL TOTAL CA: CPT

## 2023-12-10 PROCEDURE — 87147 CULTURE TYPE IMMUNOLOGIC: CPT

## 2023-12-10 PROCEDURE — 82962 GLUCOSE BLOOD TEST: CPT

## 2023-12-10 PROCEDURE — 7100000000 HC PACU RECOVERY - FIRST 15 MIN: Performed by: PODIATRIST

## 2023-12-10 PROCEDURE — 85025 COMPLETE CBC W/AUTO DIFF WBC: CPT

## 2023-12-10 PROCEDURE — 75635 CT ANGIO ABDOMINAL ARTERIES: CPT

## 2023-12-10 PROCEDURE — 88311 DECALCIFY TISSUE: CPT

## 2023-12-10 PROCEDURE — 2580000003 HC RX 258: Performed by: NURSE ANESTHETIST, CERTIFIED REGISTERED

## 2023-12-10 PROCEDURE — 87077 CULTURE AEROBIC IDENTIFY: CPT

## 2023-12-10 PROCEDURE — 6360000002 HC RX W HCPCS: Performed by: NURSE ANESTHETIST, CERTIFIED REGISTERED

## 2023-12-10 PROCEDURE — 6370000000 HC RX 637 (ALT 250 FOR IP): Performed by: STUDENT IN AN ORGANIZED HEALTH CARE EDUCATION/TRAINING PROGRAM

## 2023-12-10 PROCEDURE — 73630 X-RAY EXAM OF FOOT: CPT

## 2023-12-10 PROCEDURE — 2580000003 HC RX 258: Performed by: STUDENT IN AN ORGANIZED HEALTH CARE EDUCATION/TRAINING PROGRAM

## 2023-12-10 PROCEDURE — 36415 COLL VENOUS BLD VENIPUNCTURE: CPT

## 2023-12-10 PROCEDURE — 2720000010 HC SURG SUPPLY STERILE: Performed by: PODIATRIST

## 2023-12-10 PROCEDURE — 0Y6S0Z0 DETACHMENT AT LEFT 2ND TOE, COMPLETE, OPEN APPROACH: ICD-10-PCS | Performed by: PODIATRIST

## 2023-12-10 PROCEDURE — 3700000000 HC ANESTHESIA ATTENDED CARE: Performed by: PODIATRIST

## 2023-12-10 PROCEDURE — 6360000002 HC RX W HCPCS: Performed by: PODIATRIST

## 2023-12-10 PROCEDURE — 2709999900 HC NON-CHARGEABLE SUPPLY: Performed by: PODIATRIST

## 2023-12-10 PROCEDURE — 3700000001 HC ADD 15 MINUTES (ANESTHESIA): Performed by: PODIATRIST

## 2023-12-10 PROCEDURE — 87154 CUL TYP ID BLD PTHGN 6+ TRGT: CPT

## 2023-12-10 PROCEDURE — 87205 SMEAR GRAM STAIN: CPT

## 2023-12-10 PROCEDURE — 3600000012 HC SURGERY LEVEL 2 ADDTL 15MIN: Performed by: PODIATRIST

## 2023-12-10 PROCEDURE — 0QBN0ZX EXCISION OF RIGHT METATARSAL, OPEN APPROACH, DIAGNOSTIC: ICD-10-PCS | Performed by: PODIATRIST

## 2023-12-10 RX ORDER — ROCURONIUM BROMIDE 10 MG/ML
INJECTION, SOLUTION INTRAVENOUS PRN
Status: DISCONTINUED | OUTPATIENT
Start: 2023-12-10 | End: 2023-12-10 | Stop reason: SDUPTHER

## 2023-12-10 RX ORDER — ONDANSETRON 2 MG/ML
INJECTION INTRAMUSCULAR; INTRAVENOUS PRN
Status: DISCONTINUED | OUTPATIENT
Start: 2023-12-10 | End: 2023-12-10 | Stop reason: SDUPTHER

## 2023-12-10 RX ORDER — MIDAZOLAM HYDROCHLORIDE 1 MG/ML
INJECTION INTRAMUSCULAR; INTRAVENOUS PRN
Status: DISCONTINUED | OUTPATIENT
Start: 2023-12-10 | End: 2023-12-10 | Stop reason: SDUPTHER

## 2023-12-10 RX ORDER — PROPOFOL 10 MG/ML
INJECTION, EMULSION INTRAVENOUS PRN
Status: DISCONTINUED | OUTPATIENT
Start: 2023-12-10 | End: 2023-12-10 | Stop reason: SDUPTHER

## 2023-12-10 RX ORDER — KETOROLAC TROMETHAMINE 30 MG/ML
15 INJECTION, SOLUTION INTRAMUSCULAR; INTRAVENOUS EVERY 6 HOURS PRN
Status: DISPENSED | OUTPATIENT
Start: 2023-12-10 | End: 2023-12-15

## 2023-12-10 RX ORDER — FENTANYL CITRATE 50 UG/ML
INJECTION, SOLUTION INTRAMUSCULAR; INTRAVENOUS PRN
Status: DISCONTINUED | OUTPATIENT
Start: 2023-12-10 | End: 2023-12-10 | Stop reason: SDUPTHER

## 2023-12-10 RX ORDER — 0.9 % SODIUM CHLORIDE 0.9 %
1000 INTRAVENOUS SOLUTION INTRAVENOUS ONCE
Status: COMPLETED | OUTPATIENT
Start: 2023-12-10 | End: 2023-12-10

## 2023-12-10 RX ORDER — LIDOCAINE HYDROCHLORIDE 20 MG/ML
INJECTION, SOLUTION EPIDURAL; INFILTRATION; INTRACAUDAL; PERINEURAL PRN
Status: DISCONTINUED | OUTPATIENT
Start: 2023-12-10 | End: 2023-12-10 | Stop reason: SDUPTHER

## 2023-12-10 RX ORDER — SODIUM CHLORIDE, SODIUM LACTATE, POTASSIUM CHLORIDE, CALCIUM CHLORIDE 600; 310; 30; 20 MG/100ML; MG/100ML; MG/100ML; MG/100ML
INJECTION, SOLUTION INTRAVENOUS CONTINUOUS PRN
Status: DISCONTINUED | OUTPATIENT
Start: 2023-12-10 | End: 2023-12-10 | Stop reason: SDUPTHER

## 2023-12-10 RX ORDER — SUCCINYLCHOLINE/SOD CL,ISO/PF 200MG/10ML
SYRINGE (ML) INTRAVENOUS PRN
Status: DISCONTINUED | OUTPATIENT
Start: 2023-12-10 | End: 2023-12-10 | Stop reason: SDUPTHER

## 2023-12-10 RX ORDER — BUPIVACAINE HYDROCHLORIDE 5 MG/ML
INJECTION, SOLUTION PERINEURAL PRN
Status: DISCONTINUED | OUTPATIENT
Start: 2023-12-10 | End: 2023-12-10 | Stop reason: ALTCHOICE

## 2023-12-10 RX ADMIN — ROCURONIUM BROMIDE 20 MG: 10 INJECTION INTRAVENOUS at 17:14

## 2023-12-10 RX ADMIN — ROSUVASTATIN CALCIUM 5 MG: 10 TABLET, COATED ORAL at 10:54

## 2023-12-10 RX ADMIN — SODIUM CHLORIDE, POTASSIUM CHLORIDE, SODIUM LACTATE AND CALCIUM CHLORIDE: 600; 310; 30; 20 INJECTION, SOLUTION INTRAVENOUS at 16:17

## 2023-12-10 RX ADMIN — Medication 140 MG: at 16:25

## 2023-12-10 RX ADMIN — MIDAZOLAM HYDROCHLORIDE 2 MG: 1 INJECTION, SOLUTION INTRAMUSCULAR; INTRAVENOUS at 16:16

## 2023-12-10 RX ADMIN — PROPOFOL 150 MG: 10 INJECTION, EMULSION INTRAVENOUS at 16:25

## 2023-12-10 RX ADMIN — SODIUM CHLORIDE 30 ML: 9 INJECTION, SOLUTION INTRAVENOUS at 22:59

## 2023-12-10 RX ADMIN — GABAPENTIN 600 MG: 300 CAPSULE ORAL at 10:55

## 2023-12-10 RX ADMIN — SODIUM CHLORIDE: 9 INJECTION, SOLUTION INTRAVENOUS at 04:40

## 2023-12-10 RX ADMIN — INSULIN GLARGINE 70 UNITS: 100 INJECTION, SOLUTION SUBCUTANEOUS at 10:56

## 2023-12-10 RX ADMIN — SUGAMMADEX 200 MG: 100 INJECTION, SOLUTION INTRAVENOUS at 17:38

## 2023-12-10 RX ADMIN — INSULIN LISPRO 2 UNITS: 100 INJECTION, SOLUTION INTRAVENOUS; SUBCUTANEOUS at 11:25

## 2023-12-10 RX ADMIN — FENTANYL CITRATE 50 MCG: 50 INJECTION, SOLUTION INTRAMUSCULAR; INTRAVENOUS at 16:25

## 2023-12-10 RX ADMIN — ACETAMINOPHEN 650 MG: 325 TABLET ORAL at 21:46

## 2023-12-10 RX ADMIN — PIPERACILLIN AND TAZOBACTAM 3375 MG: 3; .375 INJECTION, POWDER, LYOPHILIZED, FOR SOLUTION INTRAVENOUS at 21:49

## 2023-12-10 RX ADMIN — FENTANYL CITRATE 50 MCG: 50 INJECTION, SOLUTION INTRAMUSCULAR; INTRAVENOUS at 17:42

## 2023-12-10 RX ADMIN — GABAPENTIN 600 MG: 300 CAPSULE ORAL at 21:31

## 2023-12-10 RX ADMIN — VANCOMYCIN HYDROCHLORIDE 750 MG: 750 INJECTION, POWDER, LYOPHILIZED, FOR SOLUTION INTRAVENOUS at 11:21

## 2023-12-10 RX ADMIN — PIPERACILLIN AND TAZOBACTAM 3375 MG: 3; .375 INJECTION, POWDER, LYOPHILIZED, FOR SOLUTION INTRAVENOUS at 16:36

## 2023-12-10 RX ADMIN — VANCOMYCIN HYDROCHLORIDE 750 MG: 750 INJECTION, POWDER, LYOPHILIZED, FOR SOLUTION INTRAVENOUS at 23:04

## 2023-12-10 RX ADMIN — SODIUM CHLORIDE, PRESERVATIVE FREE 10 ML: 5 INJECTION INTRAVENOUS at 21:39

## 2023-12-10 RX ADMIN — ONDANSETRON 4 MG: 2 INJECTION INTRAMUSCULAR; INTRAVENOUS at 16:53

## 2023-12-10 RX ADMIN — ROCURONIUM BROMIDE 30 MG: 10 INJECTION INTRAVENOUS at 16:35

## 2023-12-10 RX ADMIN — SODIUM CHLORIDE 30 ML: 9 INJECTION, SOLUTION INTRAVENOUS at 21:38

## 2023-12-10 RX ADMIN — SODIUM CHLORIDE 1000 ML: 9 INJECTION, SOLUTION INTRAVENOUS at 00:57

## 2023-12-10 RX ADMIN — IOPAMIDOL 100 ML: 755 INJECTION, SOLUTION INTRAVENOUS at 00:23

## 2023-12-10 RX ADMIN — PIPERACILLIN AND TAZOBACTAM 3375 MG: 3; .375 INJECTION, POWDER, LYOPHILIZED, FOR SOLUTION INTRAVENOUS at 04:38

## 2023-12-10 RX ADMIN — LIDOCAINE HYDROCHLORIDE 100 MG: 20 INJECTION, SOLUTION EPIDURAL; INFILTRATION; INTRACAUDAL; PERINEURAL at 16:25

## 2023-12-10 NOTE — PROGRESS NOTES
-Please complete MRI History and Safety Screening Form. - Patient cannot be scanned until this form is completed and reviewed in MRI to ensure patient is SAFE and eligible for MRI. - CALL MRI when this has been successfully completed at [phone].

## 2023-12-10 NOTE — CONSULTS
Seen patient in the room resting verbally communicative  See for Sepsis from the right foot diabetic foot infection and   Charcot deformity   Noticed wound few weeks ago does not feel much in the foot right   Has had several surgeries with the left foot Greg Mccoy and Dr. Alfred Monaco both worked on him   Patient reports that left second toe has a wound which has been draining and reportedly pulled couple of pieces of bone out of the wound in Dr. Yeh Julienne office not sure what the results of that was    Left foot chronic most likely intraarticular ulceration with possible OM   Xrays ordered stat show bone disintegration at the proximal Interphalangeal joint   Right foot severely infected  foot with large mid foot plantar eschar an  ulceration   Xrays right foot shows severely dislocated mid foot with dorsal dislocation     EXAM: XR FOOT RIGHT (2 VIEWS)     INDICATION: charcot foot, swollen, tender, erythema, patient with severe sepsis,  eval bones and subcu gas. COMPARISON: None. FINDINGS: Two views of the right foot demonstrate evidence of a Charcot foot. There is dislocation of the metatarsals relative to the midfoot, age  indeterminate. There is a plantar calcaneal spur. There is no evidence of  osteomyelitis or soft tissue gas. IMPRESSION:  Charcot foot, with age indeterminate mid foot dislocation. No plain  film evidence of osteomyelitis or soft tissue gas. Delorise Pillar EXAM: XR FOOT LEFT (MIN 3 VIEWS)     INDICATION: left big toe osteo. COMPARISON: None. FINDINGS: Three views of the left foot demonstrate previous resection of the  great toe and distal half of the first metatarsal. Chronic periostitis residual  shaft of the first metatarsal.      There is marked soft tissue swelling of the second digit with destruction of the  PIP joint and adjacent erosive change. Remaining bony structures are  unremarkable. Degenerative changes of the midfoot.  Chronic calcifications of the Achilles

## 2023-12-10 NOTE — CARE COORDINATION
Care Management Initial Assessment       RUR: 10% (low RUR)  Readmission? No  1st IM letter given? No - Commercial  1st  letter given: N/a    CM completed initial eval with pt's spouse. CM introduced self and role and completed initial assessment. CM verified demographic and clinical information. Pt lives with wife and DTR in a 2 level home, 4-5 [place]. Pt's wife reports pt being independent in ADLs. Pt denies owned DME at home, but has been borrowing some from a family member that will need it back soon. Pt denies O2/CPAP at home. Pt states they are supported by their family. Patient is an active  when well. Pt plans to discharge home and anticipates wife vs DTR to assist with transportation. Pt wife reports pt may need an RW at d/c. CM will need note of need for RW prior to arranging delivery. CM will continue to follow for d/c needs. 12/10/23 1008   Service Assessment   Patient Orientation Alert and Oriented   Cognition Alert   History Provided By Significant Other   Support Systems Spouse/Significant Other;Children   Patient's Healthcare Decision Maker is: Legal Next of Kin   PCP Verified by CM Yes   Last Visit to PCP Within last 6 months   Prior Functional Level Independent in ADLs/IADLs   Current Functional Level Independent in ADLs/IADLs   Can patient return to prior living arrangement Yes   Ability to make needs known: Good   Family able to assist with home care needs: Yes   Would you like for me to discuss the discharge plan with any other family members/significant others, and if so, who?  Yes  (Discuss with wife)   Social/Functional History   Lives With Spouse   Type of [address] [place] [address]  Two level   [address] to enter with rails   Entrance Stairs - Number of Steps 4-5 [place]ERLING; 14-15 between stairs   Home Equipment None   ADL Assistance Independent   Ambulation Assistance Independent   Transfer Assistance Independent   Active  Yes   Discharge Planning   Type of Residence [employer] [facility]   Living Arrangements Spouse/Significant Other   Current Services Prior To Admission None   Potential Assistance Needed Durable Medical Equipment   Potential DME Needed Luz [place]  (Pt is borrowing DME from a family member, will likely need his own)   DME Ordered? No   Potential Assistance Purchasing Medications No   Type of Home Care Services None   Patient expects to be discharged to: [facility] Discharge   Transition of Care Consult (CM Consult) [facility] Discharge None   [employer] Information Provided? No   Mode of Transport at Discharge Other (see comment)  (Family to transport)   Confirm Follow Up [facility] (ACP) Conversation    Date of Conversation: 12/9/2023  Conducted with: Patient with Decision Making Capacity   Healthcare Decision Maker: Next of Kin by law (only applies in absence of above) (name) [id number] [facility] Decision Maker:  Hanny Loyd; wife    Today we documented Decision Maker(s) consistent with Legal Next of Kin hierarchy.     Content/Action Overview:  Has NO ACP documents/care preferences - information provided, considering goals and options  Reviewed DNR/DNI and patient elects Full Code (Attempt Resuscitation)      Length of Voluntary ACP Conversation in minutes:  <16 minutes (Non-Billable)    Lemmie Bayron, MSW           Jeovany Verma, MSW  Care Management

## 2023-12-10 NOTE — H&P
Hospitalist Admission Note    NAME:  Babita Abarca   :  1970   MRN:  [id number]     Date/Time:  2023 10:23 PM    Patient PCP: Jonathan Jauregui MD    ______________________________________________________________________  Given the patient's current clinical presentation, I have a high level of concern for decompensation if discharged from the emergency department. Complex decision making was performed, which includes reviewing the patient's available past medical records, laboratory results, and x-ray films. My assessment of this patient's clinical condition and my plan of care is as follows. Assessment / Plan: Active Problems:  Right diabetic foot infection  Right Charcot's foot  Severe sepsis secondary to above  Insulin-dependent diabetes mellitus  Diabetic neuropathy  TERE  Essential hypertension  Hyperlipidemia    Plan:  Right diabetic foot infection  Right Charcot's foot  Severe sepsis secondary to above  Admit to stepdown unit  Status post sepsis protocol IV fluids  Podiatry consulted, greatly appreciate their expertise  Continue empiric vancomycin and Zosyn  -Pharmacy consulted for vancomycin dosing  Follow cultures  Obtain ESR and CRP  Oxycodone as needed pain  Elevate right lower extremity  Keep right lower extremity nonweightbearing  Obtain MRI of right foot  Keep n.p.o. and hold DVT chemoprophylaxis in case operative intervention is required    Insulin-dependent diabetes mellitus  Diabetic neuropathy  Decrease PTA glargine to 70 units every morning  Corrective coverage insulin  Diabetic diet after n.p.o.   Hypoglycemia protocol in place    TERE  Status post sepsis protocol fluids  Trend renal function  Renally dose medications and avoid nephrotoxic agents    Essential hypertension  Hyperlipidemia  Hold PTA irbesartan hydrochlorothiazide in setting of TERE  Labetalol as needed hypertensive urgency  Continue PTA rosuvastatin    Medical Decision Making:   I personally reviewed 82

## 2023-12-10 NOTE — BRIEF OP NOTE
Brief Postoperative Note      Patient: Cristy Murillo  YOB: 1970  MRN: [id number]    Date of Procedure: 12/10/2023    Pre-Op Diagnosis Codes:     * Acute hematogenous osteomyelitis, unspecified site ([address]) [M86.00] left second toe and right foot     Post-Op Diagnosis: Same       Procedure(s):  INCISION AND DRAINAGE RIGHT FOOT, LEFT SECOND  TOE AMPUTATION right mid foot bone biopsy Extensive excisional Debridement using Sonic one probe and Curette tips     Surgeon(s):  Ngozi Molina DPM    Assistant:  * No surgical staff found *    Anesthesia: General     Hemostasis: Ankle Tourniquet at 250 mmHg    Estimated Blood Loss (mL): less than 50     Complications: None    Specimens:   ID Type Source Tests Collected by Time Destination   1 : RIGHT FOOT WOUND Swab Foot CULTURE, ANAEROBIC, CULTURE, WOUND Ngozi Molina DPM 12/10/2023 1613    2 : LEFT FOOT WOUND Swab Foot CULTURE, ANAEROBIC, CULTURE, WOUND Ngozi Molina DPM 12/10/2023 1614    3 : LEFT SECOND TOE Tissue Toe SURGICAL PATHOLOGY Ngozi Molina DPM 12/10/2023 1650    4 : RIGHT MIDFOOT BONE BIOPSY Bone Foot SURGICAL PATHOLOGY Ngozi Molina DPM 12/10/2023 1723        Implants:  * No implants in log *      Drains: * No LDAs found * 1/2\" iodoform packing     Findings: large abscess covering most of the midfoot plantar and dorsal extending to the heel and forefoot with extensive area of infection and purulence more than 20 cc's of purulent drainage released and packed with iodoform packing. Most likely needs another trip to the OR for further release and washout. Bone exposed in thru the tracking area on the plantar ulceration dislocated cuboid or the cuneiform was biopsied.        Electronically signed by Ngozi Molina DPM on 12/10/2023 at 6:00 PM

## 2023-12-10 NOTE — ED NOTES
2104: Assumed care of patient at this time from HA. Patient was wheeled back to the room at this time. Patient assisted to bed with 2 Rns bedside. MD Daisy Valencia aware of critical lab values at this time. No orders. Patient placed on monitor x 3, SR x 2, Call bell in reach. Patient o2 sat 87% on room air, RN placed on 2L NC at this time    2119: MD bedside    2148: XRAY bedside     2208: Hospitalist bedside    2215: Foot exposed at this time, patients R foot is swollen and noticeably red. 2317: Pressures soft, MD notified    [phone]: TRANSFER - OUT REPORT:    Verbal report given to Toribiolucas Yolanda on Giacomo Geronimo  being transferred to 2139 for routine progression of patient care       Report consisted of patient's Situation, Background, Assessment and   Recommendations(SBAR). Information from the following report(s) Nurse Handoff Report, Index, ED Encounter Summary, ED SBAR, Adult Overview, and Cardiac Rhythm    was reviewed with the receiving nurse. [place] Fall Assessment:    Presents to emergency department  because of falls (Syncope, seizure, or loss of consciousness): No  Age > 70: No  Altered Mental Status, Intoxication with alcohol or substance confusion (Disorientation, impaired judgment, poor safety awaremess, or inability to follow instructions): No  Impaired Mobility: Ambulates or transfers with assistive devices or assistance; Unable to ambulate or transer.: No  Nursing Judgement: Yes          Lines:   Peripheral IV 12/09/23 Left Antecubital (Active)       Peripheral IV 12/09/23 Left Hand (Active)        Opportunity for questions and clarification was provided.       Patient transported with:  Monitor, O2 @ 2lpm, and Registered Nurse     0000: Patient to CT before transfer upstairs          Clayton Shields RN  12/10/23 0000

## 2023-12-10 NOTE — PROGRESS NOTES
Hospitalist Progress Note    NAME:   Emma Mahoney   : 1970   MRN: [id number]     Date/Time: 12/10/2023 2:17 PM  Patient PCP: Camryn Riley MD          ______________________________________________________________________  Given the patient's current clinical presentation, I have a high level of concern for decompensation if discharged from the emergency department. Complex decision making was performed, which includes reviewing the patient's available past medical records, laboratory results, and x-ray films. My assessment of this patient's clinical condition and my plan of care is as follows. Assessment / Plan:  Plan:  Right diabetic foot infection  Right Charcot's foot  Severe sepsis secondary to above    Status post sepsis protocol IV fluids  Podiatry consulted, going for debridement today  Continue empiric vancomycin and Zosyn  -Pharmacy consulted for vancomycin dosing  Follow cultures  Obtain ESR and CRP  Oxycodone as needed pain  Elevate right lower extremity  Keep right lower extremity nonweightbearing  MRI of the foot pending  CT abdomen runoff for limited study is negative, follow-up complete results     Insulin-dependent diabetes mellitus  Diabetic neuropathy  Continue current insulin regimen  Corrective coverage insulin  Diabetic diet after n.p.o. Hypoglycemia protocol in place     TERE  Status post sepsis protocol fluids  Trend renal function  Renally dose medications and avoid nephrotoxic agents     Essential hypertension  Hyperlipidemia  Hold PTA irbesartan hydrochlorothiazide in setting of TERE  Labetalol as needed hypertensive urgency  Continue PTA rosuvastatin     Medical Decision Making:   I personally reviewed labs: Yes, as listed below  I personally reviewed imaging: Right foot radiographs  Toxic drug monitoring: Vancomycin  Discussed case with: ED provider.  After discussion I am in agreement that acuity of patient's medical condition necessitates hospital

## 2023-12-10 NOTE — ED PROVIDER NOTES
EMERGENCY DEPARTMENT HISTORY AND PHYSICAL EXAM    Date: 12/9/2023  Patient Name: Michelle Cabrera  Patient Age and Sex: 52 y.o. male  MRN:  [id number]  CSN:  [id number]    History of Present Illness     Chief Complaint   Patient presents with    Fever     Pt arrives to triage w cc of fever 103.3 at home and family concerned for AMS. Pt was seen by ortho and podiatry for concern for diabetic infection to right foot. Pt is suppose to surgeon Monday at [address]. Pt says foot is black, decreased sensation. In walking boot upon arrival. Pt is taking antibiotic prescribed by ortho       History Provided By: Patient and Patient's Wife    Ability to gather history was limited by:     HPI: Michelle Cabrera, 52 y.o. male   With history of diabetes, hypertension, diabetic neuropathy, who has been under treatment for Charcot foot and significant infection of the right foot for the past week with oral antibiotics, brought to the emergency department by his wife for concerns for fever of 103.3 degrees at home over the last 24 hours, accompanied by confusion. Tobacco Use      Smoking status: Never      Smokeless tobacco: Never     Past History   The patient's medical, surgical, and social history were reviewed by me today. Current Medications:  No current facility-administered medications on file prior to encounter. Current Outpatient Medications on File Prior to Encounter   Medication Sig Dispense Refill    Semaglutide, 1 MG/DOSE, (OZEMPIC, 1 MG/DOSE,) 4 MG/3ML SOPN INJECT 1 MG BY SUBCUTANEOUS ROUTE EVERY SEVEN (7) DAYS. 9 mL 3    rosuvastatin (CRESTOR) 5 MG tablet TAKE 1 TABLET BY MOUTH EVERY DAY 90 tablet 3    BASAGLAR KWIKPEN 100 UNIT/ML injection pen INJECT 120 UNITS BY SUBCUTANEOUS ROUTE DAILY.  (Patient taking differently: 100 units qd) 36 Adjustable Dose Pre-filled Pen Syringe 3    JANUMET  MG per tablet TAKE 1 TABLET BY MOUTH TWICE A DAY WITH FOOD 180 tablet 1    gabapentin (NEURONTIN) 300 MG capsule TAKE 2 subcutaneous gas      Consults:  IP CONSULT TO HOSPITALIST  IP CONSULT TO PODIATRY  IP CONSULT TO PHARMACY    Final Diagnosis:   1. Severe sepsis ([address])    2. Charcot foot due to diabetes mellitus [facility])        Additional documentation if relevant for this encounter       Critical Care time exclusive of other billable procedures: 40 minutes      [facility] ED SEPSIS NOTE:     23:20 PM EST The patient now meets criteria for: Septic Shock    Fluid resuscitation with: 30 mL/kg crystalloid bolus  Due to concern for rapidly advancing infection and deterioration of patient's condition, antibiotics are started STAT and cultures ordered. Current Ideal Body Weight: Ideal body weight: 77.6 kg (171 lb 1.2 oz)  Adjusted ideal body weight: 86.5 kg (190 lb 10.3 oz)    ===========================================    I performed a sepsis reassessment of the patient's clinical volume status and tissue perfusion at time 12:18 AM EST          Diagnosis and Disposition     Disposition: Admitted 12/09/2023 10:29:44 PM     Final Diagnosis:   1. Severe sepsis ([address])    2. Charcot foot due to diabetes mellitus ([address])           Medication List        ASK your doctor about these medications      Basaglar KwikPen 100 UNIT/ML injection pen  Generic drug: insulin glargine  INJECT 120 UNITS BY SUBCUTANEOUS ROUTE DAILY. cyanocobalamin 100 MCG tablet     gabapentin 300 MG capsule  Commonly known as: NEURONTIN  TAKE 2 CAPSULES BY MOUTH THREE TIMES DAILY     irbesartan-hydroCHLOROthiazide 300-12.5 MG per tablet  Commonly known as: AVALIDE  TAKE 1 TABLET BY MOUTH EVERY DAY - REPLACES LOSARTAN-HCTZ     Janumet  MG per tablet  Generic drug: sitaGLIPtan-metFORMIN  TAKE 1 TABLET BY MOUTH TWICE A DAY WITH FOOD     Ozempic (1 MG/DOSE) 4 MG/3ML Sopn  Generic drug: Semaglutide (1 MG/DOSE)  INJECT 1 MG BY SUBCUTANEOUS ROUTE EVERY SEVEN (7) DAYS.      rosuvastatin 5 MG tablet  Commonly known as: CRESTOR  TAKE 1 TABLET BY MOUTH EVERY DAY               Follow

## 2023-12-10 NOTE — PLAN OF CARE
Problem: Skin/Tissue Integrity  Goal: Absence of new skin breakdown  Description: 1. Monitor for areas of redness and/or skin breakdown  2. Assess vascular access sites hourly  3. Every 4-6 hours minimum:  Change oxygen saturation probe site  4. Every 4-6 hours:  If on nasal continuous positive airway pressure, respiratory therapy assess nares and determine need for appliance change or resting period.   Outcome: Progressing     Problem: Discharge Planning  Goal: Discharge to home or other facility with appropriate resources  Outcome: Progressing  Flowsheets (Taken 12/10/2023 0045)  Discharge to home or other facility with appropriate resources:   Identify barriers to discharge with patient and caregiver   Arrange for needed discharge resources and transportation as appropriate   Arrange for interpreters to assist at discharge as needed   Identify discharge learning needs (meds, wound care, etc)   Refer to discharge planning if patient needs post-hospital services based on physician order or complex needs related to functional status, cognitive ability or social support system     Problem: Pain  Goal: Verbalizes/displays adequate comfort level or baseline comfort level  Outcome: Progressing     Problem: Neurosensory - Adult  Goal: Achieves stable or improved neurological status  Outcome: Progressing     Problem: Respiratory - Adult  Goal: Achieves optimal ventilation and oxygenation  Outcome: Progressing     Problem: Cardiovascular - Adult  Goal: Maintains optimal cardiac output and hemodynamic stability  Outcome: Progressing     Problem: Skin/Tissue Integrity - Adult  Goal: Skin integrity remains intact  Outcome: Progressing  Flowsheets (Taken 12/10/2023 0045)  Skin Integrity Remains Intact:   Monitor for areas of redness and/or skin breakdown   Assess vascular access sites hourly     Problem: Musculoskeletal - Adult  Goal: Return mobility to safest level of function  Outcome: Progressing     Problem:

## 2023-12-11 LAB
ACCESSION NUMBER, LLC1M: ABNORMAL
ACINETOBACTER CALCOAC BAUMANNII COMPLEX BY PCR: NOT DETECTED
ANION GAP SERPL CALC-SCNC: 6 MMOL/L (ref 5–15)
B FRAGILIS DNA BLD POS QL NAA+NON-PROBE: NOT DETECTED
BIOFIRE TEST COMMENT: ABNORMAL
BUN SERPL-MCNC: 12 MG/DL (ref 6–20)
BUN/CREAT SERPL: 10 (ref 12–20)
C ALBICANS DNA BLD POS QL NAA+NON-PROBE: NOT DETECTED
C AURIS DNA BLD POS QL NAA+NON-PROBE: NOT DETECTED
C GATTII+NEOFOR DNA BLD POS QL NAA+N-PRB: NOT DETECTED
C GLABRATA DNA BLD POS QL NAA+NON-PROBE: NOT DETECTED
C KRUSEI DNA BLD POS QL NAA+NON-PROBE: NOT DETECTED
C PARAP DNA BLD POS QL NAA+NON-PROBE: NOT DETECTED
C TROPICLS DNA BLD POS QL NAA+NON-PROBE: NOT DETECTED
CALCIUM SERPL-MCNC: 8.8 MG/DL (ref 8.5–10.1)
CHLORIDE SERPL-SCNC: 106 MMOL/L (ref 97–108)
CO2 SERPL-SCNC: 26 MMOL/L (ref 21–32)
CREAT SERPL-MCNC: 1.19 MG/DL (ref 0.7–1.3)
E CLOAC COMP DNA BLD POS NAA+NON-PROBE: NOT DETECTED
E COLI DNA BLD POS QL NAA+NON-PROBE: NOT DETECTED
E FAECALIS DNA BLD POS QL NAA+NON-PROBE: NOT DETECTED
E FAECIUM DNA BLD POS QL NAA+NON-PROBE: NOT DETECTED
ENTEROBACTERALES DNA BLD POS NAA+N-PRB: NOT DETECTED
ERYTHROCYTE [DISTWIDTH] IN BLOOD BY AUTOMATED COUNT: 12.8 % (ref 11.5–14.5)
GLUCOSE BLD STRIP.AUTO-MCNC: 167 MG/DL (ref 65–117)
GLUCOSE BLD STRIP.AUTO-MCNC: 182 MG/DL (ref 65–117)
GLUCOSE BLD STRIP.AUTO-MCNC: 201 MG/DL (ref 65–117)
GLUCOSE BLD STRIP.AUTO-MCNC: 220 MG/DL (ref 65–117)
GLUCOSE BLD STRIP.AUTO-MCNC: 239 MG/DL (ref 65–117)
GLUCOSE SERPL-MCNC: 192 MG/DL (ref 65–100)
GP B STREP DNA BLD POS QL NAA+NON-PROBE: DETECTED
HAEM INFLU DNA BLD POS QL NAA+NON-PROBE: NOT DETECTED
HCT VFR BLD AUTO: 33.5 % (ref 36.6–50.3)
HGB BLD-MCNC: 11 G/DL (ref 12.1–17)
K OXYTOCA DNA BLD POS QL NAA+NON-PROBE: NOT DETECTED
KLEBSIELLA SP DNA BLD POS QL NAA+NON-PRB: NOT DETECTED
KLEBSIELLA SP DNA BLD POS QL NAA+NON-PRB: NOT DETECTED
L MONOCYTOG DNA BLD POS QL NAA+NON-PROBE: NOT DETECTED
MAGNESIUM SERPL-MCNC: 2 MG/DL (ref 1.6–2.4)
MCH RBC QN AUTO: 32.4 PG (ref 26–34)
MCHC RBC AUTO-ENTMCNC: 32.8 G/DL (ref 30–36.5)
MCV RBC AUTO: 98.8 FL (ref 80–99)
N MEN DNA BLD POS QL NAA+NON-PROBE: NOT DETECTED
NRBC # BLD: 0 K/UL (ref 0–0.01)
NRBC BLD-RTO: 0 PER 100 WBC
P AERUGINOSA DNA BLD POS NAA+NON-PROBE: NOT DETECTED
PLATELET # BLD AUTO: 264 K/UL (ref 150–400)
PMV BLD AUTO: 10.3 FL (ref 8.9–12.9)
POTASSIUM SERPL-SCNC: 4.1 MMOL/L (ref 3.5–5.1)
PROTEUS SP DNA BLD POS QL NAA+NON-PROBE: NOT DETECTED
RBC # BLD AUTO: 3.39 M/UL (ref 4.1–5.7)
RESISTANT GENE TARGETS: ABNORMAL
S AUREUS DNA BLD POS QL NAA+NON-PROBE: NOT DETECTED
S AUREUS+CONS DNA BLD POS NAA+NON-PROBE: NOT DETECTED
S EPIDERMIDIS DNA BLD POS QL NAA+NON-PRB: NOT DETECTED
S LUGDUNENSIS DNA BLD POS QL NAA+NON-PRB: NOT DETECTED
S MALTOPHILIA DNA BLD POS QL NAA+NON-PRB: NOT DETECTED
S MARCESCENS DNA BLD POS NAA+NON-PROBE: NOT DETECTED
S PNEUM DNA BLD POS QL NAA+NON-PROBE: NOT DETECTED
S PYO DNA BLD POS QL NAA+NON-PROBE: NOT DETECTED
SALMONELLA DNA BLD POS QL NAA+NON-PROBE: NOT DETECTED
SERVICE CMNT-IMP: ABNORMAL
SODIUM SERPL-SCNC: 138 MMOL/L (ref 136–145)
STREPTOCOCCUS DNA BLD POS NAA+NON-PROBE: DETECTED
VANCOMYCIN SERPL-MCNC: 8.6 UG/ML
WBC # BLD AUTO: 12.4 K/UL (ref 4.1–11.1)

## 2023-12-11 PROCEDURE — 1100000000 HC RM PRIVATE

## 2023-12-11 PROCEDURE — 85027 COMPLETE CBC AUTOMATED: CPT

## 2023-12-11 PROCEDURE — 6360000002 HC RX W HCPCS: Performed by: STUDENT IN AN ORGANIZED HEALTH CARE EDUCATION/TRAINING PROGRAM

## 2023-12-11 PROCEDURE — 2700000000 HC OXYGEN THERAPY PER DAY

## 2023-12-11 PROCEDURE — 82962 GLUCOSE BLOOD TEST: CPT

## 2023-12-11 PROCEDURE — 83735 ASSAY OF MAGNESIUM: CPT

## 2023-12-11 PROCEDURE — 6370000000 HC RX 637 (ALT 250 FOR IP): Performed by: STUDENT IN AN ORGANIZED HEALTH CARE EDUCATION/TRAINING PROGRAM

## 2023-12-11 PROCEDURE — 2580000003 HC RX 258: Performed by: STUDENT IN AN ORGANIZED HEALTH CARE EDUCATION/TRAINING PROGRAM

## 2023-12-11 PROCEDURE — 36415 COLL VENOUS BLD VENIPUNCTURE: CPT

## 2023-12-11 PROCEDURE — 80202 ASSAY OF VANCOMYCIN: CPT

## 2023-12-11 PROCEDURE — 99223 1ST HOSP IP/OBS HIGH 75: CPT | Performed by: INTERNAL MEDICINE

## 2023-12-11 PROCEDURE — 80048 BASIC METABOLIC PNL TOTAL CA: CPT

## 2023-12-11 PROCEDURE — 87040 BLOOD CULTURE FOR BACTERIA: CPT

## 2023-12-11 RX ORDER — INSULIN GLARGINE 100 [IU]/ML
80 INJECTION, SOLUTION SUBCUTANEOUS EVERY MORNING
Status: DISCONTINUED | OUTPATIENT
Start: 2023-12-12 | End: 2023-12-15

## 2023-12-11 RX ORDER — ENOXAPARIN SODIUM 100 MG/ML
40 INJECTION SUBCUTANEOUS DAILY
Status: DISCONTINUED | OUTPATIENT
Start: 2023-12-11 | End: 2023-12-13

## 2023-12-11 RX ADMIN — VANCOMYCIN HYDROCHLORIDE 1250 MG: 10 INJECTION, POWDER, LYOPHILIZED, FOR SOLUTION INTRAVENOUS at 20:41

## 2023-12-11 RX ADMIN — GABAPENTIN 600 MG: 300 CAPSULE ORAL at 08:42

## 2023-12-11 RX ADMIN — INSULIN LISPRO 2 UNITS: 100 INJECTION, SOLUTION INTRAVENOUS; SUBCUTANEOUS at 08:44

## 2023-12-11 RX ADMIN — PIPERACILLIN AND TAZOBACTAM 3375 MG: 3; .375 INJECTION, POWDER, LYOPHILIZED, FOR SOLUTION INTRAVENOUS at 20:42

## 2023-12-11 RX ADMIN — PIPERACILLIN AND TAZOBACTAM 3375 MG: 3; .375 INJECTION, POWDER, LYOPHILIZED, FOR SOLUTION INTRAVENOUS at 05:15

## 2023-12-11 RX ADMIN — GABAPENTIN 600 MG: 300 CAPSULE ORAL at 20:36

## 2023-12-11 RX ADMIN — OXYCODONE HYDROCHLORIDE 5 MG: 5 TABLET ORAL at 20:36

## 2023-12-11 RX ADMIN — OXYCODONE HYDROCHLORIDE 5 MG: 5 TABLET ORAL at 12:39

## 2023-12-11 RX ADMIN — INSULIN LISPRO 2 UNITS: 100 INJECTION, SOLUTION INTRAVENOUS; SUBCUTANEOUS at 12:40

## 2023-12-11 RX ADMIN — SODIUM CHLORIDE, PRESERVATIVE FREE 10 ML: 5 INJECTION INTRAVENOUS at 08:44

## 2023-12-11 RX ADMIN — INSULIN GLARGINE 70 UNITS: 100 INJECTION, SOLUTION SUBCUTANEOUS at 08:44

## 2023-12-11 RX ADMIN — VANCOMYCIN HYDROCHLORIDE 750 MG: 750 INJECTION, POWDER, LYOPHILIZED, FOR SOLUTION INTRAVENOUS at 10:05

## 2023-12-11 RX ADMIN — PIPERACILLIN AND TAZOBACTAM 3375 MG: 3; .375 INJECTION, POWDER, LYOPHILIZED, FOR SOLUTION INTRAVENOUS at 12:43

## 2023-12-11 RX ADMIN — ENOXAPARIN SODIUM 40 MG: 100 INJECTION SUBCUTANEOUS at 10:07

## 2023-12-11 RX ADMIN — ACETAMINOPHEN 650 MG: 325 TABLET ORAL at 20:45

## 2023-12-11 RX ADMIN — ROSUVASTATIN CALCIUM 5 MG: 10 TABLET, COATED ORAL at 08:42

## 2023-12-11 RX ADMIN — GABAPENTIN 600 MG: 300 CAPSULE ORAL at 15:08

## 2023-12-11 RX ADMIN — SODIUM CHLORIDE, PRESERVATIVE FREE 10 ML: 5 INJECTION INTRAVENOUS at 20:38

## 2023-12-11 NOTE — ACP (ADVANCE CARE PLANNING)
Advance Care Planning     Advance Care Planning Inpatient Note  [facility] Department    Today's Date: 12/11/2023  Unit: [facility] 3 SURG TELE    Received request from IDT Member. Upon review of chart and communication with care team, patient's decision making abilities are not in question. . Patient, Spouse, and Child/Children was/were present in the room during visit. Goals of ACP Conversation:  Discuss advance care planning documents    Health Care Decision Makers:       Primary Decision Maker: [facility] [phone]     Advance Care Planning Documents (Patient Wishes):  Living Will/Advance Directive     Assessment:  Responded to staff ACP cosult request in 3125. Patient was alert and well oriented, spouse and daughter were present at time of visit. he affirmed that staff mentioned AMD and was agreeable to reviewing document. A copy was left, at his request, for further review. He was advised of  to assist complete document.       Interventions:  Encouraged ongoing ACP conversation with future decision makers and loved ones    Care Preferences Communicated:   No    Outcomes/Plan:  ACP Discussion: Completed    Electronically signed by Sunni Estes [facility] on 12/11/2023 at 11:33 AM

## 2023-12-11 NOTE — PROGRESS NOTES
Spiritual Care Assessment/Progress Note  Mohan    Name: Cal Smith MRN: [id number]    Age: 52 y.o. Sex: male   Language: English     Date: 12/11/2023            Total Time Calculated: 37 min              Spiritual Assessment begun in [facility] 3 SURG TELE  Service Provided For[de-identified] Patient and family together  Referral/Consult From[de-identified] Multi-disciplinary team  Encounter Overview/Reason : Advance Care Planning    Spiritual beliefs:      [x] Involved in a walter tradition/spiritual practice:      [] Supported by a walter community:      [] Claims no spiritual orientation:      [] Seeking spiritual identity:           [] Adheres to an individual form of spirituality:      [] Not able to assess:                Identified resources for coping and support system:   Support System: Family members, Latter-day/walter community, Friends/neighbors       [] Prayer                  [] Devotional reading               [] Music                  [] Guided Imagery     [] Pet visits                                        [] Other: (COMMENT)     Specific area/focus of visit   Encounter:    Crisis:    Spiritual/Emotional needs: Type: Spiritual Support  Ritual, Rites and Sacraments:    Grief, Loss, and Adjustments: Type: Life Adjustments, Adjustment to illness  Ethics/Mediation:    Behavioral Health:    Palliative Care: Advance Care Planning:      Plan/Referrals: Continue Support (comment)    Narrative:   Responded to staff ACP cosult request in [phone]. Patient was alert and well oriented, spouse and daughter were present at time of visit. he affirmed that staff mentioned AMD and was agreeable to reviewing document. A copy was left, at his request, for further review. He was advised of  to assist complete document. Patient and family engaged in conversation and shared what led to his hospitalization.  Spouse and a daughter who spent the night with with her day were present, they appeared very supportive

## 2023-12-11 NOTE — PROGRESS NOTES
End of Shift Note    Bedside shift change report given to (oncoming nurse) by Adama Lobo RN (offgoing nurse). Report included the following information SBAR and Kardex    Shift worked:  9516-1321     Shift summary and any significant changes:    Prn for pain x 1. Turned q 2. Heels up. No issues.    Concerns for physician to address:    Zone phone for oncoming shift:

## 2023-12-11 NOTE — PROGRESS NOTES
2146: PRN tylenol given for temp 100.2. SS insulin held for       2312: Temp 99.3    2314: Report called to [facility].  Pt to be transported to 3127

## 2023-12-11 NOTE — CONSULTS
accessory muscle use  CV:                  Regular  rhythm,  No edema  GI:                   Soft, Non distended, Non tender. +Bowel sounds  Neurologic:      Alert and oriented X 3, normal speech,   Psych:             Good insight. Not anxious nor agitated  Skin:                No rashes. No jaundice. Extremities: Bilateral foot dressings present.     Recent Results (from the past 24 hour(s))   POCT Glucose    Collection Time: 12/10/23  3:16 PM   Result Value Ref Range    POC Glucose 182 (H) 65 - 117 mg/dL    Performed by: Delfina Moore RN    Culture, Wound    Collection Time: 12/10/23  4:53 PM    Specimen: Foot, Right   Result Value Ref Range    Special Requests NO SPECIAL REQUESTS      Gram stain RARE WBCS SEEN      Gram stain RARE Gram negative rods      Culture CULTURE IN PROGRESS,FURTHER UPDATES TO FOLLOW     Culture, Wound    Collection Time: 12/10/23  4:53 PM    Specimen: Foot, Left   Result Value Ref Range    Special Requests NO SPECIAL REQUESTS      Gram stain RARE WBCS SEEN      Gram stain NO ORGANISMS SEEN      Culture CULTURE IN PROGRESS,FURTHER UPDATES TO FOLLOW     POCT Glucose    Collection Time: 12/10/23  6:11 PM   Result Value Ref Range    POC Glucose 170 (H) 65 - 117 mg/dL    Performed by: Delfina Moore RN    Culture, Blood, PCR ID Panel    Collection Time: 12/10/23  8:30 PM    Specimen: Blood   Result Value Ref Range    Accession Number [id number]     Enterococcus faecalis by PCR Not detected NOTD      Enterococcus faecium by PCR Not detected NOTD      Listeria monocytogenes by PCR Not detected NOTD      STAPHYLOCOCCUS Not detected NOTD      Staphylococcus Aureus Not detected NOTD      Staphylococcus epidermidis by PCR Not detected NOTD      Staphylococcus lugdunensis by PCR Not detected NOTD      STREPTOCOCCUS Detected (A) NOTD      Streptococcus agalactiae (Group B) Detected (A) NOTD      Strep pneumoniae Not detected NOTD      Strep pyogenes,(Grp. A) Not detected NOTD      Acinetobacter calcoac Nucleated RBCs 0.0 0  WBC    nRBC 0.00 0.00 - 0.01 K/uL   Basic Metabolic Panel    Collection Time: 12/11/23  5:15 AM   Result Value Ref Range    Sodium 138 136 - 145 mmol/L    Potassium 4.1 3.5 - 5.1 mmol/L    Chloride 106 97 - 108 mmol/L    CO2 26 21 - 32 mmol/L    Anion Gap 6 5 - 15 mmol/L    Glucose 192 (H) 65 - 100 mg/dL    BUN 12 6 - 20 MG/DL    Creatinine 1.19 0.70 - 1.30 MG/DL    Bun/Cre Ratio 10 (L) 12 - 20      Est, Glom Filt Rate >60 >60 ml/min/1.73m2    Calcium 8.8 8.5 - 10.1 MG/DL   Magnesium    Collection Time: 12/11/23  5:15 AM   Result Value Ref Range    Magnesium 2.0 1.6 - 2.4 mg/dL   POCT Glucose    Collection Time: 12/11/23  6:29 AM   Result Value Ref Range    POC Glucose 201 (H) 65 - 117 mg/dL    Performed by: Sylvie Valadez PCT    Vancomycin Level, Random    Collection Time: 12/11/23  9:19 AM   Result Value Ref Range    Vancomycin Rm 8.6 UG/ML   POCT Glucose    Collection Time: 12/11/23 11:37 AM   Result Value Ref Range    POC Glucose 239 (H) 65 - 117 mg/dL    Performed by:  Edenilson Koch PCT        Results       Procedure Component Value Units Date/Time    Culture, Blood, PCR ID Panel [[id number]]  (Abnormal) Collected: 12/10/23 2030    Order Status: Completed Specimen: Blood Updated: 12/11/23 0013     Accession Number [id number]     Enterococcus faecalis by PCR Not detected        Enterococcus faecium by PCR Not detected        Listeria monocytogenes by PCR Not detected        STAPHYLOCOCCUS Not detected        Staphylococcus Aureus Not detected        Staphylococcus epidermidis by PCR Not detected        Staphylococcus lugdunensis by PCR Not detected        STREPTOCOCCUS Detected        Streptococcus agalactiae (Group B) Detected        Strep pneumoniae Not detected        Strep pyogenes,(Grp. A) Not detected        Acinetobacter calcoac baumannii complex by PCR Not detected        Bacteroides fragilis by PCR Not detected        Enterobacteriaceae by PCR Not detected        Enterobacter

## 2023-12-12 PROBLEM — N17.9 AKI (ACUTE KIDNEY INJURY) (HCC): Status: ACTIVE | Noted: 2023-12-12

## 2023-12-12 PROBLEM — E11.610 CHARCOT FOOT DUE TO DIABETES MELLITUS (HCC): Status: ACTIVE | Noted: 2023-12-12

## 2023-12-12 PROBLEM — B95.5 STREPTOCOCCAL BACTEREMIA: Status: ACTIVE | Noted: 2023-12-12

## 2023-12-12 PROBLEM — R65.20 SEVERE SEPSIS (HCC): Status: ACTIVE | Noted: 2023-12-12

## 2023-12-12 PROBLEM — A41.9 SEVERE SEPSIS (HCC): Status: ACTIVE | Noted: 2023-12-12

## 2023-12-12 PROBLEM — R78.81 STREPTOCOCCAL BACTEREMIA: Status: ACTIVE | Noted: 2023-12-12

## 2023-12-12 PROBLEM — A49.9 GRAM-NEGATIVE INFECTION: Status: ACTIVE | Noted: 2023-12-12

## 2023-12-12 PROBLEM — E11.65 POORLY CONTROLLED DIABETES MELLITUS (HCC): Status: ACTIVE | Noted: 2023-12-12

## 2023-12-12 LAB
ANION GAP SERPL CALC-SCNC: 3 MMOL/L (ref 5–15)
BACTERIA SPEC CULT: ABNORMAL
BACTERIA SPEC CULT: ABNORMAL
BACTERIA SPEC CULT: NORMAL
BACTERIA SPEC CULT: NORMAL
BUN SERPL-MCNC: 14 MG/DL (ref 6–20)
BUN/CREAT SERPL: 11 (ref 12–20)
CALCIUM SERPL-MCNC: 8.9 MG/DL (ref 8.5–10.1)
CHLORIDE SERPL-SCNC: 106 MMOL/L (ref 97–108)
CO2 SERPL-SCNC: 28 MMOL/L (ref 21–32)
CREAT SERPL-MCNC: 1.22 MG/DL (ref 0.7–1.3)
ERYTHROCYTE [DISTWIDTH] IN BLOOD BY AUTOMATED COUNT: 12.7 % (ref 11.5–14.5)
GLUCOSE BLD STRIP.AUTO-MCNC: 145 MG/DL (ref 65–117)
GLUCOSE BLD STRIP.AUTO-MCNC: 147 MG/DL (ref 65–117)
GLUCOSE BLD STRIP.AUTO-MCNC: 162 MG/DL (ref 65–117)
GLUCOSE BLD STRIP.AUTO-MCNC: 214 MG/DL (ref 65–117)
GLUCOSE SERPL-MCNC: 142 MG/DL (ref 65–100)
HCT VFR BLD AUTO: 32.2 % (ref 36.6–50.3)
HGB BLD-MCNC: 10.3 G/DL (ref 12.1–17)
MCH RBC QN AUTO: 31.1 PG (ref 26–34)
MCHC RBC AUTO-ENTMCNC: 32 G/DL (ref 30–36.5)
MCV RBC AUTO: 97.3 FL (ref 80–99)
NRBC # BLD: 0 K/UL (ref 0–0.01)
NRBC BLD-RTO: 0 PER 100 WBC
PLATELET # BLD AUTO: 302 K/UL (ref 150–400)
PMV BLD AUTO: 10.2 FL (ref 8.9–12.9)
POTASSIUM SERPL-SCNC: 4.3 MMOL/L (ref 3.5–5.1)
RBC # BLD AUTO: 3.31 M/UL (ref 4.1–5.7)
SERVICE CMNT-IMP: ABNORMAL
SERVICE CMNT-IMP: NORMAL
SERVICE CMNT-IMP: NORMAL
SODIUM SERPL-SCNC: 137 MMOL/L (ref 136–145)
WBC # BLD AUTO: 10.4 K/UL (ref 4.1–11.1)

## 2023-12-12 PROCEDURE — 6370000000 HC RX 637 (ALT 250 FOR IP): Performed by: STUDENT IN AN ORGANIZED HEALTH CARE EDUCATION/TRAINING PROGRAM

## 2023-12-12 PROCEDURE — 6360000002 HC RX W HCPCS: Performed by: STUDENT IN AN ORGANIZED HEALTH CARE EDUCATION/TRAINING PROGRAM

## 2023-12-12 PROCEDURE — 80048 BASIC METABOLIC PNL TOTAL CA: CPT

## 2023-12-12 PROCEDURE — 85027 COMPLETE CBC AUTOMATED: CPT

## 2023-12-12 PROCEDURE — 2580000003 HC RX 258: Performed by: STUDENT IN AN ORGANIZED HEALTH CARE EDUCATION/TRAINING PROGRAM

## 2023-12-12 PROCEDURE — 36415 COLL VENOUS BLD VENIPUNCTURE: CPT

## 2023-12-12 PROCEDURE — 82962 GLUCOSE BLOOD TEST: CPT

## 2023-12-12 PROCEDURE — 1100000000 HC RM PRIVATE

## 2023-12-12 RX ADMIN — OXYCODONE HYDROCHLORIDE 5 MG: 5 TABLET ORAL at 16:48

## 2023-12-12 RX ADMIN — KETOROLAC TROMETHAMINE 15 MG: 30 INJECTION, SOLUTION INTRAMUSCULAR; INTRAVENOUS at 05:26

## 2023-12-12 RX ADMIN — VANCOMYCIN HYDROCHLORIDE 1250 MG: 10 INJECTION, POWDER, LYOPHILIZED, FOR SOLUTION INTRAVENOUS at 22:29

## 2023-12-12 RX ADMIN — ROSUVASTATIN CALCIUM 5 MG: 10 TABLET, COATED ORAL at 08:57

## 2023-12-12 RX ADMIN — PIPERACILLIN AND TAZOBACTAM 3375 MG: 3; .375 INJECTION, POWDER, LYOPHILIZED, FOR SOLUTION INTRAVENOUS at 12:33

## 2023-12-12 RX ADMIN — VANCOMYCIN HYDROCHLORIDE 1250 MG: 10 INJECTION, POWDER, LYOPHILIZED, FOR SOLUTION INTRAVENOUS at 11:03

## 2023-12-12 RX ADMIN — ENOXAPARIN SODIUM 40 MG: 100 INJECTION SUBCUTANEOUS at 08:59

## 2023-12-12 RX ADMIN — SODIUM CHLORIDE, PRESERVATIVE FREE 10 ML: 5 INJECTION INTRAVENOUS at 09:00

## 2023-12-12 RX ADMIN — INSULIN LISPRO 2 UNITS: 100 INJECTION, SOLUTION INTRAVENOUS; SUBCUTANEOUS at 16:53

## 2023-12-12 RX ADMIN — GABAPENTIN 600 MG: 300 CAPSULE ORAL at 14:22

## 2023-12-12 RX ADMIN — PIPERACILLIN AND TAZOBACTAM 3375 MG: 3; .375 INJECTION, POWDER, LYOPHILIZED, FOR SOLUTION INTRAVENOUS at 20:44

## 2023-12-12 RX ADMIN — GABAPENTIN 600 MG: 300 CAPSULE ORAL at 20:45

## 2023-12-12 RX ADMIN — SODIUM CHLORIDE, PRESERVATIVE FREE 10 ML: 5 INJECTION INTRAVENOUS at 20:51

## 2023-12-12 RX ADMIN — POLYETHYLENE GLYCOL 3350 17 G: 17 POWDER, FOR SOLUTION ORAL at 20:50

## 2023-12-12 RX ADMIN — INSULIN GLARGINE 80 UNITS: 100 INJECTION, SOLUTION SUBCUTANEOUS at 09:00

## 2023-12-12 RX ADMIN — PIPERACILLIN AND TAZOBACTAM 3375 MG: 3; .375 INJECTION, POWDER, LYOPHILIZED, FOR SOLUTION INTRAVENOUS at 05:23

## 2023-12-12 RX ADMIN — GABAPENTIN 600 MG: 300 CAPSULE ORAL at 08:57

## 2023-12-12 NOTE — PROGRESS NOTES
End of Shift Note    Bedside shift change report given to (oncoming nurse) by Griselda Hernandez RN (offgoing nurse). Report included the following information SBAR and Kardex    Shift worked:  7970-5111     Shift summary and any significant changes:    Prn for pain x 1. Will be npo after breakfast tomorrow for a wash out. Podiatrist in to see pt.    Concerns for physician to address:    Zone phone for oncoming shift:

## 2023-12-12 NOTE — PROGRESS NOTES
End of Shift Note    Bedside shift change report given to ROJELIO Triana  (oncoming nurse) by Curt Chin RN (offgoing nurse). Report included the following information SBAR, Kardex, Intake/Output, MAR, and Recent Results    Shift worked:  0143-0313     Shift summary and any significant changes:     No changes overnight.      Concerns for physician to address:       Zone phone for oncoming shift:                 Length of Stay:  Expected LOS: 4  Actual LOS: 3      Curt Chin RN

## 2023-12-13 ENCOUNTER — ANESTHESIA EVENT (OUTPATIENT)
Facility: HOSPITAL | Age: 53
End: 2023-12-13
Payer: COMMERCIAL

## 2023-12-13 ENCOUNTER — ANESTHESIA (OUTPATIENT)
Facility: HOSPITAL | Age: 53
End: 2023-12-13
Payer: COMMERCIAL

## 2023-12-13 ENCOUNTER — APPOINTMENT (OUTPATIENT)
Facility: HOSPITAL | Age: 53
DRG: 853 | End: 2023-12-13
Attending: INTERNAL MEDICINE
Payer: COMMERCIAL

## 2023-12-13 LAB
ANION GAP SERPL CALC-SCNC: 5 MMOL/L (ref 5–15)
BACTERIA SPEC CULT: ABNORMAL
BUN SERPL-MCNC: 15 MG/DL (ref 6–20)
BUN/CREAT SERPL: 12 (ref 12–20)
CALCIUM SERPL-MCNC: 8.9 MG/DL (ref 8.5–10.1)
CHLORIDE SERPL-SCNC: 106 MMOL/L (ref 97–108)
CO2 SERPL-SCNC: 27 MMOL/L (ref 21–32)
CREAT SERPL-MCNC: 1.27 MG/DL (ref 0.7–1.3)
ERYTHROCYTE [DISTWIDTH] IN BLOOD BY AUTOMATED COUNT: 12.5 % (ref 11.5–14.5)
GLUCOSE BLD STRIP.AUTO-MCNC: 107 MG/DL (ref 65–117)
GLUCOSE BLD STRIP.AUTO-MCNC: 113 MG/DL (ref 65–117)
GLUCOSE BLD STRIP.AUTO-MCNC: 152 MG/DL (ref 65–117)
GLUCOSE BLD STRIP.AUTO-MCNC: 181 MG/DL (ref 65–117)
GLUCOSE BLD STRIP.AUTO-MCNC: 97 MG/DL (ref 65–117)
GLUCOSE SERPL-MCNC: 109 MG/DL (ref 65–100)
GRAM STN SPEC: ABNORMAL
HCT VFR BLD AUTO: 31.8 % (ref 36.6–50.3)
HGB BLD-MCNC: 10.5 G/DL (ref 12.1–17)
MCH RBC QN AUTO: 32.3 PG (ref 26–34)
MCHC RBC AUTO-ENTMCNC: 33 G/DL (ref 30–36.5)
MCV RBC AUTO: 97.8 FL (ref 80–99)
NRBC # BLD: 0 K/UL (ref 0–0.01)
NRBC BLD-RTO: 0 PER 100 WBC
PLATELET # BLD AUTO: 322 K/UL (ref 150–400)
PMV BLD AUTO: 10.2 FL (ref 8.9–12.9)
POTASSIUM SERPL-SCNC: 4 MMOL/L (ref 3.5–5.1)
RBC # BLD AUTO: 3.25 M/UL (ref 4.1–5.7)
SERVICE CMNT-IMP: ABNORMAL
SERVICE CMNT-IMP: NORMAL
SODIUM SERPL-SCNC: 138 MMOL/L (ref 136–145)
WBC # BLD AUTO: 9.5 K/UL (ref 4.1–11.1)

## 2023-12-13 PROCEDURE — 1100000000 HC RM PRIVATE

## 2023-12-13 PROCEDURE — 2580000003 HC RX 258: Performed by: NURSE ANESTHETIST, CERTIFIED REGISTERED

## 2023-12-13 PROCEDURE — 36415 COLL VENOUS BLD VENIPUNCTURE: CPT

## 2023-12-13 PROCEDURE — 3600000012 HC SURGERY LEVEL 2 ADDTL 15MIN: Performed by: PODIATRIST

## 2023-12-13 PROCEDURE — 6360000002 HC RX W HCPCS: Performed by: STUDENT IN AN ORGANIZED HEALTH CARE EDUCATION/TRAINING PROGRAM

## 2023-12-13 PROCEDURE — 2709999900 HC NON-CHARGEABLE SUPPLY: Performed by: PODIATRIST

## 2023-12-13 PROCEDURE — 3700000001 HC ADD 15 MINUTES (ANESTHESIA): Performed by: PODIATRIST

## 2023-12-13 PROCEDURE — 2580000003 HC RX 258: Performed by: ANESTHESIOLOGY

## 2023-12-13 PROCEDURE — C1713 ANCHOR/SCREW BN/BN,TIS/BN: HCPCS | Performed by: PODIATRIST

## 2023-12-13 PROCEDURE — 3600000002 HC SURGERY LEVEL 2 BASE: Performed by: PODIATRIST

## 2023-12-13 PROCEDURE — 6360000002 HC RX W HCPCS: Performed by: NURSE ANESTHETIST, CERTIFIED REGISTERED

## 2023-12-13 PROCEDURE — 3E0V329 INTRODUCTION OF OTHER ANTI-INFECTIVE INTO BONES, PERCUTANEOUS APPROACH: ICD-10-PCS | Performed by: PODIATRIST

## 2023-12-13 PROCEDURE — 6360000002 HC RX W HCPCS: Performed by: PODIATRIST

## 2023-12-13 PROCEDURE — 2500000003 HC RX 250 WO HCPCS: Performed by: NURSE ANESTHETIST, CERTIFIED REGISTERED

## 2023-12-13 PROCEDURE — 2700000000 HC OXYGEN THERAPY PER DAY

## 2023-12-13 PROCEDURE — 0HRMXJZ REPLACEMENT OF RIGHT FOOT SKIN WITH SYNTHETIC SUBSTITUTE, EXTERNAL APPROACH: ICD-10-PCS | Performed by: PODIATRIST

## 2023-12-13 PROCEDURE — 2580000003 HC RX 258: Performed by: STUDENT IN AN ORGANIZED HEALTH CARE EDUCATION/TRAINING PROGRAM

## 2023-12-13 PROCEDURE — 7100000001 HC PACU RECOVERY - ADDTL 15 MIN: Performed by: PODIATRIST

## 2023-12-13 PROCEDURE — 7100000000 HC PACU RECOVERY - FIRST 15 MIN: Performed by: PODIATRIST

## 2023-12-13 PROCEDURE — 6370000000 HC RX 637 (ALT 250 FOR IP): Performed by: STUDENT IN AN ORGANIZED HEALTH CARE EDUCATION/TRAINING PROGRAM

## 2023-12-13 PROCEDURE — 93306 TTE W/DOPPLER COMPLETE: CPT

## 2023-12-13 PROCEDURE — 85027 COMPLETE CBC AUTOMATED: CPT

## 2023-12-13 PROCEDURE — 2720000010 HC SURG SUPPLY STERILE: Performed by: PODIATRIST

## 2023-12-13 PROCEDURE — 2580000003 HC RX 258: Performed by: PODIATRIST

## 2023-12-13 PROCEDURE — 6370000000 HC RX 637 (ALT 250 FOR IP): Performed by: PODIATRIST

## 2023-12-13 PROCEDURE — 93922 UPR/L XTREMITY ART 2 LEVELS: CPT

## 2023-12-13 PROCEDURE — 3700000000 HC ANESTHESIA ATTENDED CARE: Performed by: PODIATRIST

## 2023-12-13 PROCEDURE — 94760 N-INVAS EAR/PLS OXIMETRY 1: CPT

## 2023-12-13 PROCEDURE — 80048 BASIC METABOLIC PNL TOTAL CA: CPT

## 2023-12-13 PROCEDURE — 82962 GLUCOSE BLOOD TEST: CPT

## 2023-12-13 DEVICE — STIMULAN® RAPID CURE PROVIDED STERILE FOR SINGLE PATIENT USE. STIMULAN® RAPID CURE CONTAINS CALCIUM SULFATE POWDER AND MIXING SOLUTION IN PRE-MEASURED QUANTITIES SO THAT WHEN MIXED TOGETHER IN A STERILE MIXING BOWL, THE RESULTANT PASTE IS TO BE DIGITALLY PACKED INTO OPEN BONE VOID/GAP TO SET INSITU OR PLACED INTO THE MOULD PROVIDED, THE MIXTURE SETS TO FORM BEADS. THE BIODEGRADABLE, RADIOPAQUE BEADS ARE RESORBED IN APPROXIMATELY 30 – 60 DAYS WHEN USED IN ACCORDANCE WITH THE DEVICE LABELLING. STIMULAN® RAPID CURE IS MANUFACTURED FROM SYNTHETIC IMPLANT GRADE CALCIUM SULFATE DIHYDRATE(CASO4.2H2O) THAT RESORBS AND IS REPLACED WITH BONE DURING THE HEALING PROCESS. ALSO, AS THE BONE VOID FILLER BEADS ARE BIODEGRADABLE AND BIOCOMPATIBLE, THEY MAY BE USED AT AN INFECTED SITE.
Type: IMPLANTABLE DEVICE | Site: FOOT | Status: FUNCTIONAL
Brand: STIMULAN® RAPID CURE

## 2023-12-13 RX ORDER — PROCHLORPERAZINE EDISYLATE 5 MG/ML
5 INJECTION INTRAMUSCULAR; INTRAVENOUS
Status: DISCONTINUED | OUTPATIENT
Start: 2023-12-13 | End: 2023-12-13

## 2023-12-13 RX ORDER — SODIUM CHLORIDE 9 MG/ML
INJECTION, SOLUTION INTRAVENOUS PRN
Status: DISCONTINUED | OUTPATIENT
Start: 2023-12-13 | End: 2023-12-13

## 2023-12-13 RX ORDER — SODIUM CHLORIDE 0.9 % (FLUSH) 0.9 %
5-40 SYRINGE (ML) INJECTION EVERY 12 HOURS SCHEDULED
Status: DISCONTINUED | OUTPATIENT
Start: 2023-12-13 | End: 2023-12-13 | Stop reason: HOSPADM

## 2023-12-13 RX ORDER — HYDRALAZINE HYDROCHLORIDE 20 MG/ML
10 INJECTION INTRAMUSCULAR; INTRAVENOUS
Status: DISCONTINUED | OUTPATIENT
Start: 2023-12-13 | End: 2023-12-13

## 2023-12-13 RX ORDER — OXYCODONE HYDROCHLORIDE 5 MG/1
5 TABLET ORAL
Status: DISCONTINUED | OUTPATIENT
Start: 2023-12-13 | End: 2023-12-13

## 2023-12-13 RX ORDER — ACETAMINOPHEN 500 MG
1000 TABLET ORAL ONCE
Status: DISCONTINUED | OUTPATIENT
Start: 2023-12-13 | End: 2023-12-13 | Stop reason: HOSPADM

## 2023-12-13 RX ORDER — SODIUM CHLORIDE, SODIUM LACTATE, POTASSIUM CHLORIDE, CALCIUM CHLORIDE 600; 310; 30; 20 MG/100ML; MG/100ML; MG/100ML; MG/100ML
INJECTION, SOLUTION INTRAVENOUS CONTINUOUS
Status: DISCONTINUED | OUTPATIENT
Start: 2023-12-13 | End: 2023-12-15

## 2023-12-13 RX ORDER — FENTANYL CITRATE 50 UG/ML
25 INJECTION, SOLUTION INTRAMUSCULAR; INTRAVENOUS EVERY 5 MIN PRN
Status: DISCONTINUED | OUTPATIENT
Start: 2023-12-13 | End: 2023-12-13

## 2023-12-13 RX ORDER — DEXMEDETOMIDINE HYDROCHLORIDE 100 UG/ML
INJECTION, SOLUTION INTRAVENOUS PRN
Status: DISCONTINUED | OUTPATIENT
Start: 2023-12-13 | End: 2023-12-13 | Stop reason: SDUPTHER

## 2023-12-13 RX ORDER — GENTAMICIN SULFATE 80 MG/100ML
INJECTION, SOLUTION INTRAVENOUS CONTINUOUS PRN
Status: COMPLETED | OUTPATIENT
Start: 2023-12-13 | End: 2023-12-13

## 2023-12-13 RX ORDER — ENOXAPARIN SODIUM 100 MG/ML
40 INJECTION SUBCUTANEOUS DAILY
Status: DISCONTINUED | OUTPATIENT
Start: 2023-12-14 | End: 2023-12-18 | Stop reason: HOSPADM

## 2023-12-13 RX ORDER — BUPIVACAINE HYDROCHLORIDE 5 MG/ML
INJECTION, SOLUTION PERINEURAL PRN
Status: DISCONTINUED | OUTPATIENT
Start: 2023-12-13 | End: 2023-12-13 | Stop reason: ALTCHOICE

## 2023-12-13 RX ORDER — SODIUM CHLORIDE 0.9 % (FLUSH) 0.9 %
5-40 SYRINGE (ML) INJECTION PRN
Status: DISCONTINUED | OUTPATIENT
Start: 2023-12-13 | End: 2023-12-13

## 2023-12-13 RX ORDER — HYDROMORPHONE HYDROCHLORIDE 1 MG/ML
0.25 INJECTION, SOLUTION INTRAMUSCULAR; INTRAVENOUS; SUBCUTANEOUS EVERY 5 MIN PRN
Status: DISCONTINUED | OUTPATIENT
Start: 2023-12-13 | End: 2023-12-13

## 2023-12-13 RX ORDER — VANCOMYCIN HYDROCHLORIDE 1 G/20ML
INJECTION, POWDER, LYOPHILIZED, FOR SOLUTION INTRAVENOUS PRN
Status: DISCONTINUED | OUTPATIENT
Start: 2023-12-13 | End: 2023-12-13 | Stop reason: ALTCHOICE

## 2023-12-13 RX ORDER — FENTANYL CITRATE 50 UG/ML
100 INJECTION, SOLUTION INTRAMUSCULAR; INTRAVENOUS
Status: DISCONTINUED | OUTPATIENT
Start: 2023-12-13 | End: 2023-12-13 | Stop reason: HOSPADM

## 2023-12-13 RX ORDER — ACETAMINOPHEN 325 MG/1
650 TABLET ORAL
Status: DISCONTINUED | OUTPATIENT
Start: 2023-12-13 | End: 2023-12-13

## 2023-12-13 RX ORDER — SODIUM CHLORIDE, SODIUM LACTATE, POTASSIUM CHLORIDE, CALCIUM CHLORIDE 600; 310; 30; 20 MG/100ML; MG/100ML; MG/100ML; MG/100ML
INJECTION, SOLUTION INTRAVENOUS CONTINUOUS PRN
Status: DISCONTINUED | OUTPATIENT
Start: 2023-12-13 | End: 2023-12-13 | Stop reason: SDUPTHER

## 2023-12-13 RX ORDER — SODIUM CHLORIDE 0.9 % (FLUSH) 0.9 %
5-40 SYRINGE (ML) INJECTION EVERY 12 HOURS SCHEDULED
Status: DISCONTINUED | OUTPATIENT
Start: 2023-12-13 | End: 2023-12-13

## 2023-12-13 RX ORDER — SODIUM CHLORIDE 9 MG/ML
INJECTION, SOLUTION INTRAVENOUS PRN
Status: DISCONTINUED | OUTPATIENT
Start: 2023-12-13 | End: 2023-12-13 | Stop reason: HOSPADM

## 2023-12-13 RX ORDER — ONDANSETRON 2 MG/ML
4 INJECTION INTRAMUSCULAR; INTRAVENOUS ONCE
Status: DISCONTINUED | OUTPATIENT
Start: 2023-12-13 | End: 2023-12-13 | Stop reason: HOSPADM

## 2023-12-13 RX ORDER — SODIUM CHLORIDE 0.9 % (FLUSH) 0.9 %
5-40 SYRINGE (ML) INJECTION PRN
Status: DISCONTINUED | OUTPATIENT
Start: 2023-12-13 | End: 2023-12-13 | Stop reason: HOSPADM

## 2023-12-13 RX ORDER — MIDAZOLAM HYDROCHLORIDE 1 MG/ML
INJECTION INTRAMUSCULAR; INTRAVENOUS PRN
Status: DISCONTINUED | OUTPATIENT
Start: 2023-12-13 | End: 2023-12-13 | Stop reason: SDUPTHER

## 2023-12-13 RX ORDER — MIDAZOLAM HYDROCHLORIDE 2 MG/2ML
2 INJECTION, SOLUTION INTRAMUSCULAR; INTRAVENOUS
Status: DISCONTINUED | OUTPATIENT
Start: 2023-12-13 | End: 2023-12-13 | Stop reason: HOSPADM

## 2023-12-13 RX ORDER — DEXAMETHASONE SODIUM PHOSPHATE 4 MG/ML
4 INJECTION, SOLUTION INTRA-ARTICULAR; INTRALESIONAL; INTRAMUSCULAR; INTRAVENOUS; SOFT TISSUE
Status: DISCONTINUED | OUTPATIENT
Start: 2023-12-13 | End: 2023-12-13

## 2023-12-13 RX ADMIN — GABAPENTIN 600 MG: 300 CAPSULE ORAL at 08:26

## 2023-12-13 RX ADMIN — DEXMEDETOMIDINE HYDROCHLORIDE 4 MCG: 100 INJECTION, SOLUTION, CONCENTRATE INTRAVENOUS at 17:44

## 2023-12-13 RX ADMIN — SODIUM CHLORIDE, PRESERVATIVE FREE 10 ML: 5 INJECTION INTRAVENOUS at 08:26

## 2023-12-13 RX ADMIN — MIDAZOLAM HYDROCHLORIDE 2 MG: 1 INJECTION, SOLUTION INTRAMUSCULAR; INTRAVENOUS at 17:40

## 2023-12-13 RX ADMIN — DEXMEDETOMIDINE HYDROCHLORIDE 4 MCG: 100 INJECTION, SOLUTION, CONCENTRATE INTRAVENOUS at 17:49

## 2023-12-13 RX ADMIN — PIPERACILLIN AND TAZOBACTAM 3375 MG: 3; .375 INJECTION, POWDER, LYOPHILIZED, FOR SOLUTION INTRAVENOUS at 13:25

## 2023-12-13 RX ADMIN — GABAPENTIN 600 MG: 300 CAPSULE ORAL at 21:48

## 2023-12-13 RX ADMIN — DEXMEDETOMIDINE HYDROCHLORIDE 4 MCG: 100 INJECTION, SOLUTION, CONCENTRATE INTRAVENOUS at 17:46

## 2023-12-13 RX ADMIN — PIPERACILLIN AND TAZOBACTAM 3375 MG: 3; .375 INJECTION, POWDER, LYOPHILIZED, FOR SOLUTION INTRAVENOUS at 04:56

## 2023-12-13 RX ADMIN — SODIUM CHLORIDE, POTASSIUM CHLORIDE, SODIUM LACTATE AND CALCIUM CHLORIDE: 600; 310; 30; 20 INJECTION, SOLUTION INTRAVENOUS at 17:41

## 2023-12-13 RX ADMIN — SODIUM CHLORIDE, PRESERVATIVE FREE 10 ML: 5 INJECTION INTRAVENOUS at 21:49

## 2023-12-13 RX ADMIN — VANCOMYCIN HYDROCHLORIDE 1250 MG: 10 INJECTION, POWDER, LYOPHILIZED, FOR SOLUTION INTRAVENOUS at 21:59

## 2023-12-13 RX ADMIN — ACETAMINOPHEN 650 MG: 325 TABLET ORAL at 01:26

## 2023-12-13 RX ADMIN — SODIUM CHLORIDE, POTASSIUM CHLORIDE, SODIUM LACTATE AND CALCIUM CHLORIDE: 600; 310; 30; 20 INJECTION, SOLUTION INTRAVENOUS at 21:41

## 2023-12-13 RX ADMIN — PIPERACILLIN AND TAZOBACTAM 3375 MG: 3; .375 INJECTION, POWDER, LYOPHILIZED, FOR SOLUTION INTRAVENOUS at 21:41

## 2023-12-13 RX ADMIN — ROSUVASTATIN CALCIUM 5 MG: 10 TABLET, COATED ORAL at 08:26

## 2023-12-13 RX ADMIN — OXYCODONE HYDROCHLORIDE 5 MG: 5 TABLET ORAL at 01:26

## 2023-12-13 RX ADMIN — VANCOMYCIN HYDROCHLORIDE 1250 MG: 10 INJECTION, POWDER, LYOPHILIZED, FOR SOLUTION INTRAVENOUS at 11:01

## 2023-12-13 RX ADMIN — Medication 3 AMPULE: at 17:25

## 2023-12-13 NOTE — BRIEF OP NOTE
Brief Postoperative Note      Patient: Josue Maurer  YOB: 1970  MRN: [id number]    Date of Procedure: 12/13/2023    Pre-Op Diagnosis Codes:     * Diabetic foot infection ([address]) [E11.628, L08.9] post op I&D right foot    Post-Op Diagnosis: Same       Procedure(s):  RIGHT FOOT WASHOUT AND PLACEMENT OF ANTIBIOTIC BEADS application of Kercis graft     Surgeon(s):  Germain Goodman DPM    Assistant:  * No surgical staff found *    Anesthesia: Monitor Anesthesia Care    Hemostasis: Ankle Tourniquet at 250 mm Hg    Estimated Blood Loss (mL): less than 50     Complications: None    Specimens:   * No specimens in log *    Implants:  Stimulan 10 cc absorbable antibiotic beads impregnated with 2 cc of Vancomycin and 80 mg of Gentamicin     * No implants in log * Kercis meshed [employer] graft 7/10 cm stapled over the ulcer and part was implanted dep into the tracked areas      Drains: * No LDAs found * none    Findings: significantly reduced erythema and very limited drainage and devitalization in the wound and tracked areas   Still exposed mid foot bones       Electronically signed by Germain Goodman DPM on 12/13/2023 at 6:52 PM

## 2023-12-13 NOTE — PROGRESS NOTES
Seen patient in the room for bandage change   Reviewed the results and appreciated normalized WBC  Still awaiting path report from right and left foot    Left foot well coapt post op closure no dehiscence no erythema     Right foot significantly reduced erythema and edema   Packing removed which is soaked yellowish drainage no acute drainage only hemorrhage   Exposed mid foot bone with large tissue void in the midfoot  Cleansed with NS and reapplied packing with Iodoform     Will need further washout   Will schedule tomorrow evening  Orders for npo after breakfast and hold anticoagulants placed    Patient verbally consented for the plan   Most likely will need long term IV abx treatment and skilled wound care   No change in activity still no weight bearing at this point

## 2023-12-13 NOTE — PROGRESS NOTES
End of Shift Note    Bedside shift change report given to ROJELIO Villar (oncoming nurse) by Curt Chin RN (offgoing nurse). Report included the following information SBAR, Kardex, Intake/Output, MAR, and Recent Results    Shift worked:  [id number]     Shift summary and any significant changes:     No changes overnight. Glycolax dosed for c/o constipation. No issues otherwise.    Concerns for physician to address:       Zone phone for oncoming shift:               Length of Stay:  Expected LOS: 4  Actual LOS: 4      Curt Chin RN

## 2023-12-13 NOTE — FLOWSHEET NOTE
12/13/23 1845   Handoff   Communication Given Periop Handoff/Relief   Handoff phase Phase I receiving   Handoff Given To [facility] PACU RN   Handoff Received From OR RN/ [facility] CRNA   Handoff Communication Face to Face; At bedside   Time Handoff Given [address]: Sign out received from Dr. Jonna Georges       12/13/23 2000   Handoff   Communication Given Transfer Handoff   Handoff phase Phase I transferring   Handoff Given To [facility] PACU RN   Handoff Communication Telephone   Time [place]

## 2023-12-13 NOTE — ANESTHESIA PRE PROCEDURE
08:30 PM       POC Tests:   Recent Labs     12/13/23  0633   POCGLU 181*         Coags: No results found for: \"PROTIME\", \"INR\", \"APTT\"    HCG (If Applicable): No results found for: \"PREGTESTUR\", \"PREGSERUM\", \"HCG\", \"HCGQUANT\"     ABGs:   Lab Results   Component Value Date/Time    FYO7DSN 28 12/09/2023 08:44 PM        Type & Screen (If Applicable):  No results found for: \"LABABO\", \"LABRH\"    Drug/Infectious Status (If Applicable):  No results found for: \"HIV\", \"HEPCAB\"    COVID-19 Screening (If Applicable):   Lab Results   Component Value Date/Time    COVID19 Not detected 12/09/2023 10:29 PM    COVID19 Detected 01/15/2022 10:24 AM           Anesthesia Evaluation  Patient summary reviewed and Nursing notes reviewed   no history of anesthetic complications:   Airway: Mallampati: III       Mouth opening: > = 3 FB   Dental: normal exam         Pulmonary:Negative Pulmonary ROS and normal exam  breath sounds clear to auscultation                             Cardiovascular:Negative CV ROS    (+) hypertension: moderate        Rhythm: regular  Rate: normal                    Neuro/Psych:   Negative Neuro/Psych ROS  (+) neuromuscular disease:            GI/Hepatic/Renal: Neg GI/Hepatic/Renal ROS  (+) renal disease: kidney stones          Endo/Other: Negative Endo/Other ROS   (+) DiabetesType II DM. ROS comment: On ozempic Abdominal:             Vascular: negative vascular ROS. Other Findings:         Anesthesia Plan      MAC     ASA 3     (Last dose of ozempic was 2 weeks ago. )  Induction: intravenous. Anesthetic plan and risks discussed with patient. Plan discussed with CRNA.                 Noah Madden MD   12/13/2023

## 2023-12-14 PROBLEM — A49.01 MSSA (METHICILLIN SUSCEPTIBLE STAPHYLOCOCCUS AUREUS) INFECTION: Status: ACTIVE | Noted: 2023-12-14

## 2023-12-14 LAB
ANION GAP SERPL CALC-SCNC: 5 MMOL/L (ref 5–15)
BUN SERPL-MCNC: 14 MG/DL (ref 6–20)
BUN/CREAT SERPL: 12 (ref 12–20)
CALCIUM SERPL-MCNC: 9 MG/DL (ref 8.5–10.1)
CHLORIDE SERPL-SCNC: 106 MMOL/L (ref 97–108)
CO2 SERPL-SCNC: 27 MMOL/L (ref 21–32)
CREAT SERPL-MCNC: 1.19 MG/DL (ref 0.7–1.3)
ECHO AO ROOT DIAM: 3.6 CM
ECHO AO ROOT INDEX: 1.62 CM/M2
ECHO AV AREA PEAK VELOCITY: 3.5 CM2
ECHO AV AREA/BSA PEAK VELOCITY: 1.6 CM2/M2
ECHO AV PEAK GRADIENT: 9 MMHG
ECHO AV PEAK VELOCITY: 1.5 M/S
ECHO AV VELOCITY RATIO: 0.8
ECHO BSA: 2.25 M2
ECHO LA DIAMETER INDEX: 1.58 CM/M2
ECHO LA DIAMETER: 3.5 CM
ECHO LA TO AORTIC ROOT RATIO: 0.97
ECHO LA VOL A-L A2C: 58 ML (ref 18–58)
ECHO LA VOL A-L A4C: 80 ML (ref 18–58)
ECHO LA VOL MOD A2C: 56 ML (ref 18–58)
ECHO LA VOL MOD A4C: 77 ML (ref 18–58)
ECHO LA VOLUME AREA LENGTH: 76 ML
ECHO LA VOLUME INDEX A-L A2C: 26 ML/M2 (ref 16–34)
ECHO LA VOLUME INDEX A-L A4C: 36 ML/M2 (ref 16–34)
ECHO LA VOLUME INDEX AREA LENGTH: 34 ML/M2 (ref 16–34)
ECHO LA VOLUME INDEX MOD A2C: 25 ML/M2 (ref 16–34)
ECHO LA VOLUME INDEX MOD A4C: 35 ML/M2 (ref 16–34)
ECHO LV E' LATERAL VELOCITY: 9 CM/S
ECHO LV E' SEPTAL VELOCITY: 7 CM/S
ECHO LV EDV A2C: 77 ML
ECHO LV EDV NDEX A2C: 35 ML/M2
ECHO LV EJECTION FRACTION A2C: 56 %
ECHO LV ESV A2C: 34 ML
ECHO LV ESV INDEX A2C: 15 ML/M2
ECHO LV FRACTIONAL SHORTENING: 28 % (ref 28–44)
ECHO LV INTERNAL DIMENSION DIASTOLE INDEX: 1.94 CM/M2
ECHO LV INTERNAL DIMENSION DIASTOLIC: 4.3 CM (ref 4.2–5.9)
ECHO LV INTERNAL DIMENSION SYSTOLIC INDEX: 1.4 CM/M2
ECHO LV INTERNAL DIMENSION SYSTOLIC: 3.1 CM
ECHO LV IVSD: 1.5 CM (ref 0.6–1)
ECHO LV MASS 2D: 258.1 G (ref 88–224)
ECHO LV MASS INDEX 2D: 116.3 G/M2 (ref 49–115)
ECHO LV POSTERIOR WALL DIASTOLIC: 1.5 CM (ref 0.6–1)
ECHO LV RELATIVE WALL THICKNESS RATIO: 0.7
ECHO LVOT AREA: 4.2 CM2
ECHO LVOT DIAM: 2.3 CM
ECHO LVOT PEAK GRADIENT: 6 MMHG
ECHO LVOT PEAK VELOCITY: 1.2 M/S
ECHO MV A VELOCITY: 0.74 M/S
ECHO MV E DECELERATION TIME (DT): 148.6 MS
ECHO MV E VELOCITY: 0.95 M/S
ECHO MV E/A RATIO: 1.28
ECHO MV E/E' LATERAL: 10.56
ECHO MV E/E' RATIO (AVERAGED): 12.06
ECHO RA VOLUME: 50 ML
ECHO RA VOLUME: 52 ML
ECHO RV TAPSE: 2.1 CM (ref 1.7–?)
GLUCOSE BLD STRIP.AUTO-MCNC: 173 MG/DL (ref 65–117)
GLUCOSE BLD STRIP.AUTO-MCNC: 194 MG/DL (ref 65–117)
GLUCOSE BLD STRIP.AUTO-MCNC: 244 MG/DL (ref 65–117)
GLUCOSE BLD STRIP.AUTO-MCNC: 254 MG/DL (ref 65–117)
GLUCOSE BLD STRIP.AUTO-MCNC: 256 MG/DL (ref 65–117)
GLUCOSE SERPL-MCNC: 169 MG/DL (ref 65–100)
POTASSIUM SERPL-SCNC: 4.1 MMOL/L (ref 3.5–5.1)
SERVICE CMNT-IMP: ABNORMAL
SODIUM SERPL-SCNC: 138 MMOL/L (ref 136–145)

## 2023-12-14 PROCEDURE — 6360000002 HC RX W HCPCS: Performed by: INTERNAL MEDICINE

## 2023-12-14 PROCEDURE — 6360000002 HC RX W HCPCS: Performed by: PODIATRIST

## 2023-12-14 PROCEDURE — 97535 SELF CARE MNGMENT TRAINING: CPT

## 2023-12-14 PROCEDURE — 2580000003 HC RX 258: Performed by: PODIATRIST

## 2023-12-14 PROCEDURE — 97161 PT EVAL LOW COMPLEX 20 MIN: CPT | Performed by: PHYSICAL THERAPIST

## 2023-12-14 PROCEDURE — 2580000003 HC RX 258: Performed by: STUDENT IN AN ORGANIZED HEALTH CARE EDUCATION/TRAINING PROGRAM

## 2023-12-14 PROCEDURE — 97530 THERAPEUTIC ACTIVITIES: CPT | Performed by: PHYSICAL THERAPIST

## 2023-12-14 PROCEDURE — 99233 SBSQ HOSP IP/OBS HIGH 50: CPT | Performed by: INTERNAL MEDICINE

## 2023-12-14 PROCEDURE — 1100000000 HC RM PRIVATE

## 2023-12-14 PROCEDURE — 97166 OT EVAL MOD COMPLEX 45 MIN: CPT

## 2023-12-14 PROCEDURE — 36415 COLL VENOUS BLD VENIPUNCTURE: CPT

## 2023-12-14 PROCEDURE — 80048 BASIC METABOLIC PNL TOTAL CA: CPT

## 2023-12-14 PROCEDURE — 6360000002 HC RX W HCPCS: Performed by: STUDENT IN AN ORGANIZED HEALTH CARE EDUCATION/TRAINING PROGRAM

## 2023-12-14 PROCEDURE — 2580000003 HC RX 258: Performed by: ANESTHESIOLOGY

## 2023-12-14 PROCEDURE — 6370000000 HC RX 637 (ALT 250 FOR IP): Performed by: INTERNAL MEDICINE

## 2023-12-14 PROCEDURE — 6370000000 HC RX 637 (ALT 250 FOR IP): Performed by: STUDENT IN AN ORGANIZED HEALTH CARE EDUCATION/TRAINING PROGRAM

## 2023-12-14 PROCEDURE — 82962 GLUCOSE BLOOD TEST: CPT

## 2023-12-14 PROCEDURE — 6370000000 HC RX 637 (ALT 250 FOR IP): Performed by: PODIATRIST

## 2023-12-14 PROCEDURE — 2580000003 HC RX 258: Performed by: INTERNAL MEDICINE

## 2023-12-14 RX ORDER — LACTOBACILLUS RHAMNOSUS GG 10B CELL
1 CAPSULE ORAL
Status: DISCONTINUED | OUTPATIENT
Start: 2023-12-14 | End: 2023-12-18 | Stop reason: HOSPADM

## 2023-12-14 RX ADMIN — AMPICILLIN SODIUM, SULBACTAM SODIUM 3000 MG: 2; 1 INJECTION, POWDER, FOR SOLUTION INTRAMUSCULAR; INTRAVENOUS at 10:56

## 2023-12-14 RX ADMIN — INSULIN LISPRO 4 UNITS: 100 INJECTION, SOLUTION INTRAVENOUS; SUBCUTANEOUS at 12:37

## 2023-12-14 RX ADMIN — SODIUM CHLORIDE, PRESERVATIVE FREE 10 ML: 5 INJECTION INTRAVENOUS at 21:50

## 2023-12-14 RX ADMIN — SODIUM CHLORIDE, POTASSIUM CHLORIDE, SODIUM LACTATE AND CALCIUM CHLORIDE: 600; 310; 30; 20 INJECTION, SOLUTION INTRAVENOUS at 07:29

## 2023-12-14 RX ADMIN — GABAPENTIN 600 MG: 300 CAPSULE ORAL at 21:45

## 2023-12-14 RX ADMIN — VANCOMYCIN HYDROCHLORIDE 1250 MG: 10 INJECTION, POWDER, LYOPHILIZED, FOR SOLUTION INTRAVENOUS at 09:38

## 2023-12-14 RX ADMIN — GABAPENTIN 600 MG: 300 CAPSULE ORAL at 14:12

## 2023-12-14 RX ADMIN — SODIUM CHLORIDE 100 ML: 9 INJECTION, SOLUTION INTRAVENOUS at 10:54

## 2023-12-14 RX ADMIN — SODIUM CHLORIDE, POTASSIUM CHLORIDE, SODIUM LACTATE AND CALCIUM CHLORIDE: 600; 310; 30; 20 INJECTION, SOLUTION INTRAVENOUS at 23:33

## 2023-12-14 RX ADMIN — ROSUVASTATIN CALCIUM 5 MG: 10 TABLET, COATED ORAL at 08:12

## 2023-12-14 RX ADMIN — GABAPENTIN 600 MG: 300 CAPSULE ORAL at 08:11

## 2023-12-14 RX ADMIN — AMPICILLIN SODIUM, SULBACTAM SODIUM 3000 MG: 2; 1 INJECTION, POWDER, FOR SOLUTION INTRAMUSCULAR; INTRAVENOUS at 16:36

## 2023-12-14 RX ADMIN — Medication 1 CAPSULE: at 10:56

## 2023-12-14 RX ADMIN — AMPICILLIN SODIUM, SULBACTAM SODIUM 3000 MG: 2; 1 INJECTION, POWDER, FOR SOLUTION INTRAMUSCULAR; INTRAVENOUS at 21:41

## 2023-12-14 RX ADMIN — PIPERACILLIN AND TAZOBACTAM 3375 MG: 3; .375 INJECTION, POWDER, LYOPHILIZED, FOR SOLUTION INTRAVENOUS at 04:45

## 2023-12-14 RX ADMIN — INSULIN GLARGINE 80 UNITS: 100 INJECTION, SOLUTION SUBCUTANEOUS at 08:11

## 2023-12-14 RX ADMIN — SODIUM CHLORIDE, PRESERVATIVE FREE 10 ML: 5 INJECTION INTRAVENOUS at 08:13

## 2023-12-14 RX ADMIN — ENOXAPARIN SODIUM 40 MG: 100 INJECTION SUBCUTANEOUS at 08:11

## 2023-12-14 RX ADMIN — OXYCODONE HYDROCHLORIDE 5 MG: 5 TABLET ORAL at 14:13

## 2023-12-14 RX ADMIN — INSULIN LISPRO 4 UNITS: 100 INJECTION, SOLUTION INTRAVENOUS; SUBCUTANEOUS at 18:18

## 2023-12-14 NOTE — PLAN OF CARE
Problem: Physical Therapy - Adult  Goal: By Discharge: Performs mobility at highest level of function for planned discharge setting. See evaluation for individualized goals. Description: FUNCTIONAL STATUS PRIOR TO ADMISSION: Patient was independent without use of DME. Patient with B foot sound issues for years. Has crutches but does not feel his balance is good enough to use them. HOME SUPPORT PRIOR TO ADMISSION: The patient lived with wife but did not require assist.    Physical Therapy Goals  Initiated 12/14/2023  1. Patient will move from supine to sit and sit to supine , scoot up and down, and roll side to side in bed with independence within 7 day(s). 2.  Patient will transfer from bed to chair and chair to bed with modified independence using the least restrictive device within 7 day(s). 3.  Patient will perform sit to stand with modified independence within 7 day(s). 4.  Patient will ambulate with modified independence for 10 feet with the least restrictive device within 7 day(s). 5.  Patient will ascend/descend 5 stairs with 1 handrail(s) and use of one crutch with supervision/set-up within 7 day(s).       PHYSICAL THERAPY EVALUATION    Patient: Humera Arnold (38 y.o. male)  Date: 12/14/2023  Primary Diagnosis: Charcot foot due to diabetes mellitus ([address]) [E11.610]  Diabetic foot infection ([address]) [E11.628, L08.9]  Severe sepsis ([address]) [A41.9, R65.20]  Procedure(s) (LRB):  RIGHT FOOT WASHOUT AND PLACEMENT OF ANTIBIOTIC BEADS (Right) 1 Day Post-Op   Precautions: Weight Bearing (per podiatry:  Can use the heel for transfers and limited use for brief time for stability) Right Lower Extremity Weight Bearing:  (Can use the heel for transfers and limited use for brief time for stability) Left Lower Extremity Weight Bearing: Weight Bearing As Tolerated                ASSESSMENT :   Patient seen for PT evaluation following s/p R 2nd toe amputation (12/ followed by R foot washout and placement of 1/15/2021    Diabetes ([address] [facility])     type 2    Gout     Hypercholesteremia     Hyperlipidemia     Hypertension     Kidney stone     Neuropathy     bilateral lower legs     Past Surgical History:   Procedure Laterality Date    APPENDECTOMY      COLONOSCOPY      colon polyps in past    OTHER SURGICAL HISTORY      anal fissure    TOE AMPUTATION Bilateral 12/10/2023    I&D of foot    TOE AMPUTATION Left     1st & 2nd toe amputated    UPPER GASTROINTESTINAL ENDOSCOPY      WISDOM TOOTH EXTRACTION         Home Situation:  Social/Functional History  Lives With: Spouse  Type of Home: House  Home Layout: Able to Live on Main level with bedroom/bathroom  Home Access: Stairs to enter with rails  Entrance Stairs - Number of Steps: 4-5  Home Equipment: Crutches (knee scooter)  Has the patient had two or more falls in the past year or any fall with injury in the past year?: No  ADL Assistance: Independent  Ambulation Assistance: Independent  Transfer Assistance: Independent  Active : Yes    Cognitive/Behavioral Status:      Patient is alert and oriented x 4    Skin:   B foot dressings dry and intact; dried drainage noted over the plantar aspect of the mid foot. Strength:    Strength:  Within functional limits    Tone & Sensation:   Tone: Normal       Coordination:  Coordination: Within functional limits    Range Of Motion:  AROM: Within functional limits       Functional Mobility:  Bed Mobility:     Bed Mobility Training  Bed Mobility Training: Yes  Rolling: Modified independent  Supine to Sit: Stand-by assistance  Scooting: Stand-by assistance  Transfers:     Transfer Training  Transfer Training: Yes  Sit to Stand: Minimum assistance;Contact-guard assistance;Assist X1  Stand to Sit: Contact-guard assistance  Bed to Chair: Contact-guard assistance  Balance:               Balance  Sitting: Intact  Standing: Impaired  Standing - Static: Good;Constant support  Standing - Dynamic: Fair;Good;Constant support  Ambulation/Gait

## 2023-12-14 NOTE — PROGRESS NOTES
Post op significant improvement on the right foot   Reviewed the path report which shows Osteo present in the margins on the left and possible OM since there is clinical correlation with exposed bone and mid foot joints in the large abscess     Still recommend long term IV antibiotics as per ID choice   Can be discharged with skilled home health for Wound care and PT/OT   Knee walker use for the right and very limited weight bearing on the right heel for transfers and stability   Wound care right foot with adaptic and padded dressing loosely applied kirlex and ace wrap ; left foot dry gauze bandage two times a week  Follow up in my office in 2 weeks post op     If need further assistance from me please reach out to me on perfect serve or my cell [phone]

## 2023-12-14 NOTE — PLAN OF CARE
Problem: Occupational Therapy - Adult  Goal: By Discharge: Performs self-care activities at highest level of function for planned discharge setting. See evaluation for individualized goals. Description: FUNCTIONAL STATUS PRIOR TO ADMISSION:  I PTA  Receives Help From: Family, ADL Assistance: Independent,  ,  ,  ,  ,  ,  , Ambulation Assistance: Independent, Transfer Assistance: Independent, Active : Yes     HOME SUPPORT: Patient lived with wife who works during the day but didn't require assistance. Patient Works full time at [employer], can work from home    Occupational Therapy Goals:  Initiated 12/14/2023  1. Patient will perform grooming n standing VSS with Stand by Assist within 7 day(s). 2.  Patient will perform bathing with Stand by Assist within 7 day(s). 3.  Patient will perform upper body dressing and lower body dressing with Stand by Assist within 7 day(s). 4.  Patient will perform toilet transfers with Stand by Assist  within 7 day(s). 5.  Patient will perform all aspects of toileting with Stand by Assist within 7 day(s). 6.  Patient will participate in upper extremity therapeutic exercise/activities with Stand by Assist for 5 minutes within 7 day(s). 7.  Patient will utilize energy conservation techniques during functional activities with verbal cues within 7 day(s).    12/14/2023 1658 by Linh Comer OT  Outcome: Progressing   OCCUPATIONAL THERAPY EVALUATION    Patient: Albin Potter (69 y.o. male)  Date: 12/14/2023  Primary Diagnosis: Charcot foot due to diabetes mellitus ([address]) [E11.610]  Diabetic foot infection ([address]) [E11.628, L08.9]  Severe sepsis ([address]) [A41.9, R65.20]  Procedure(s) (LRB):  RIGHT FOOT WASHOUT AND PLACEMENT OF ANTIBIOTIC BEADS (Right) 1 Day Post-Op     Precautions: Weight Bearing (per podiatry:  Can use the heel for transfers and limited use for brief time for stability) Right Lower Extremity Weight Bearing:  (Can use the heel for transfers and limited use No of what a patient could do. 2. The main aim is to establish degree of independence from any help, physical or verbal, however minor and for whatever reason. 3. The need for supervision renders the patient not independent. 4. A patient's performance should be established using the best available evidence. Asking the patient, friends/relatives and nurses are the usual sources, but direct observation and common sense are also important. However direct testing is not needed. 5. Usually the patient's performance over the preceding 24-48 hours is important, but occasionally longer periods will be relevant. 6. Middle categories imply that the patient supplies over 50 per cent of the effort. 7. Use of aids to be independent is allowed. Score Interpretation (from [address] [place] [address])    Independent   60-79 Minimally independent   40-59 Partially dependent   20-39 Very dependent   <20 Totally dependent     -Holley Louis., Barthel, DKaeW. (1965). Functional evaluation: the Barthel Index. [facility] [place] ([address] (1997). The Barthel activities of daily living index: self-reporting versus actual performance in the old (> or = 75 years). Journal of [facility] 45(7), [address], J.J.M.F, Keny Wu., Sharon Hein. (1999). Measuring the change in disability after inpatient rehabilitation; comparison of the responsiveness of the Barthel Index and Functional Charles Measure. Journal of Neurology, Neurosurgery, and Psychiatry, 66(4), 507-952. Amandeep Regalado, N.J.A, MARY JO Benedict, & Elaina Santiago MKaeA. (2004) Assessment of post-stroke quality of life in cost-effectiveness studies: The usefulness of the Barthel Index and the EuroQoL-5D.  Quality of [address] [place] [address], 13, 942-39

## 2023-12-14 NOTE — PROGRESS NOTES
Hospitalist Progress Note    NAME:   Giacomo Geronimo   : 1970   MRN: [id number]     Date/Time: 2023 6:33 PM  Patient PCP: Jennifer Brannon MD    Estimated discharge date:  2023    Needed for discharge: final pathology, ID and podiatry recs    ssessment / Plan:    Septic shock POA WBC 15.2, temp 102.4, , lactate 9.10   Right diabetic foot infection with large right foot abscess POA  Left second toe osteomyelitis POA  Grp B streptococcus bacteremia POA  Right Charcot's foot POA  Right foot swelling, fevers/chills, malaise  Right foot X-ray IMPRESSION:  Charcot foot, with age indeterminate mid foot dislocation. No plain film evidence of osteomyelitis or soft tissue gas. Left foot X-ray IMPRESSION:  Osteomyelitis/septic arthritis of the second toe PIP joint. CTA abdominal aorta with bilateral runoff IMPRESSION:  1. Right midfoot dislocation and fragmentation, most consistent with Charcot foot. Soft tissue fluid/edema and skin thickening is suggestive of cellulitis. No osseous changes to suggest osteomyelitis, but evaluation is limited on CT and  would recommend MRI foot if there is a strong concern for osteoarthritis. No soft tissue gas. 2.  The right dorsalis pedis artery is displaced and occluded at the level of the mid foot dislocation, but there is reconstitution. Otherwise, there is normal bilateral runoff. 3.  Hepatic steatosis. No MRI of the foot was done since 2021. Admit [facility]   with Grp B streptococcus 1 of 2 sets   F/U [facility] 2023 are now growth  IVF  IV vanco and zosyn at admit  Sepsis improved  Dr Nita Goodpasture took to OR 12/10/2023  Right foot I+D  Left second toe amputation  Right midfoot bone biopsy  OR 12/10 Findings:   \"large abscess covering most of the midfoot plantar and dorsal extending to the heel and forefoot with extensive area of infection and purulence more than 20 cc's of purulent drainage released and packed with iodoform packing.  Most likely 10.5*   HCT 32.2* 31.8*    322       Recent Labs     12/12/23  0509 12/13/23  0131 12/14/23  0313    138 138   K 4.3 4.0 4.1    106 106   CO2 28 27 27   GLUCOSE 142* 109* 169*   BUN 14 15 14   CREATININE 1.22 1.27 1.19   CALCIUM 8.9 8.9 9.0         Signed: Juan Byrne MD

## 2023-12-14 NOTE — ANESTHESIA POSTPROCEDURE EVALUATION
Department of Anesthesiology  Postprocedure Note    Patient: John Mckenzie  MRN: [id number]  YOB: 1970  Date of evaluation: 12/14/2023    Procedure Summary       Date: 12/13/23 Room / Location: \[facility]\"" MAIN OR M3 / \[facility]\"" MAIN OR    Anesthesia Start: 1741 Anesthesia Stop: 4290    Procedure: RIGHT FOOT WASHOUT AND PLACEMENT OF ANTIBIOTIC BEADS (Right: Foot) Diagnosis:       Diabetic foot infection ([address])      (Diabetic foot infection ([address]) [A50.178, L08.9])    Providers: Jesus Manuel Hernandez DPM Responsible Provider: Judi Guthrie MD    Anesthesia Type: MAC ASA Status: 3            Anesthesia Type: MAC    Nikolas Phase I: Nikolas Score: 10    Nikolas Phase II:      Anesthesia Post Evaluation    Patient location during evaluation: PACU  Patient participation: complete - patient participated  Level of consciousness: sleepy but conscious  Airway patency: patent  Nausea & Vomiting: no nausea and no vomiting  Cardiovascular status: hemodynamically stable  Respiratory status: acceptable and room air  Hydration status: stable  Multimodal analgesia pain management approach    No notable events documented.

## 2023-12-15 ENCOUNTER — TELEPHONE (OUTPATIENT)
Age: 53
End: 2023-12-15

## 2023-12-15 LAB
ANION GAP SERPL CALC-SCNC: 5 MMOL/L (ref 5–15)
BACTERIA SPEC CULT: NORMAL
BUN SERPL-MCNC: 12 MG/DL (ref 6–20)
BUN/CREAT SERPL: 10 (ref 12–20)
CALCIUM SERPL-MCNC: 9.2 MG/DL (ref 8.5–10.1)
CHLORIDE SERPL-SCNC: 108 MMOL/L (ref 97–108)
CO2 SERPL-SCNC: 27 MMOL/L (ref 21–32)
CREAT SERPL-MCNC: 1.17 MG/DL (ref 0.7–1.3)
GLUCOSE BLD STRIP.AUTO-MCNC: 206 MG/DL (ref 65–117)
GLUCOSE BLD STRIP.AUTO-MCNC: 208 MG/DL (ref 65–117)
GLUCOSE BLD STRIP.AUTO-MCNC: 247 MG/DL (ref 65–117)
GLUCOSE BLD STRIP.AUTO-MCNC: 279 MG/DL (ref 65–117)
GLUCOSE SERPL-MCNC: 214 MG/DL (ref 65–100)
POTASSIUM SERPL-SCNC: 4.3 MMOL/L (ref 3.5–5.1)
SERVICE CMNT-IMP: ABNORMAL
SERVICE CMNT-IMP: NORMAL
SODIUM SERPL-SCNC: 140 MMOL/L (ref 136–145)

## 2023-12-15 PROCEDURE — 82962 GLUCOSE BLOOD TEST: CPT

## 2023-12-15 PROCEDURE — 99233 SBSQ HOSP IP/OBS HIGH 50: CPT | Performed by: INTERNAL MEDICINE

## 2023-12-15 PROCEDURE — 80048 BASIC METABOLIC PNL TOTAL CA: CPT

## 2023-12-15 PROCEDURE — 97535 SELF CARE MNGMENT TRAINING: CPT | Performed by: OCCUPATIONAL THERAPIST

## 2023-12-15 PROCEDURE — 2580000003 HC RX 258: Performed by: INTERNAL MEDICINE

## 2023-12-15 PROCEDURE — 36415 COLL VENOUS BLD VENIPUNCTURE: CPT

## 2023-12-15 PROCEDURE — 6360000002 HC RX W HCPCS: Performed by: INTERNAL MEDICINE

## 2023-12-15 PROCEDURE — 6370000000 HC RX 637 (ALT 250 FOR IP): Performed by: INTERNAL MEDICINE

## 2023-12-15 PROCEDURE — 1100000000 HC RM PRIVATE

## 2023-12-15 PROCEDURE — 97530 THERAPEUTIC ACTIVITIES: CPT | Performed by: PHYSICAL THERAPIST

## 2023-12-15 PROCEDURE — 2580000003 HC RX 258: Performed by: PODIATRIST

## 2023-12-15 PROCEDURE — 6360000002 HC RX W HCPCS: Performed by: PODIATRIST

## 2023-12-15 PROCEDURE — 6370000000 HC RX 637 (ALT 250 FOR IP): Performed by: STUDENT IN AN ORGANIZED HEALTH CARE EDUCATION/TRAINING PROGRAM

## 2023-12-15 PROCEDURE — 6370000000 HC RX 637 (ALT 250 FOR IP): Performed by: PODIATRIST

## 2023-12-15 PROCEDURE — 2580000003 HC RX 258: Performed by: ANESTHESIOLOGY

## 2023-12-15 RX ORDER — INSULIN LISPRO 100 [IU]/ML
8 INJECTION, SOLUTION INTRAVENOUS; SUBCUTANEOUS
Status: DISCONTINUED | OUTPATIENT
Start: 2023-12-15 | End: 2023-12-15

## 2023-12-15 RX ORDER — INSULIN LISPRO 100 [IU]/ML
12 INJECTION, SOLUTION INTRAVENOUS; SUBCUTANEOUS
Status: DISCONTINUED | OUTPATIENT
Start: 2023-12-16 | End: 2023-12-17

## 2023-12-15 RX ORDER — INSULIN GLARGINE 100 [IU]/ML
90 INJECTION, SOLUTION SUBCUTANEOUS EVERY MORNING
Status: DISCONTINUED | OUTPATIENT
Start: 2023-12-16 | End: 2023-12-18 | Stop reason: HOSPADM

## 2023-12-15 RX ADMIN — ROSUVASTATIN CALCIUM 5 MG: 10 TABLET, COATED ORAL at 10:12

## 2023-12-15 RX ADMIN — INSULIN LISPRO 8 UNITS: 100 INJECTION, SOLUTION INTRAVENOUS; SUBCUTANEOUS at 18:51

## 2023-12-15 RX ADMIN — INSULIN GLARGINE 80 UNITS: 100 INJECTION, SOLUTION SUBCUTANEOUS at 10:11

## 2023-12-15 RX ADMIN — AMPICILLIN SODIUM, SULBACTAM SODIUM 3000 MG: 2; 1 INJECTION, POWDER, FOR SOLUTION INTRAMUSCULAR; INTRAVENOUS at 13:36

## 2023-12-15 RX ADMIN — AMPICILLIN SODIUM, SULBACTAM SODIUM 3000 MG: 2; 1 INJECTION, POWDER, FOR SOLUTION INTRAMUSCULAR; INTRAVENOUS at 22:21

## 2023-12-15 RX ADMIN — AMPICILLIN SODIUM, SULBACTAM SODIUM 3000 MG: 2; 1 INJECTION, POWDER, FOR SOLUTION INTRAMUSCULAR; INTRAVENOUS at 04:23

## 2023-12-15 RX ADMIN — Medication 1 CAPSULE: at 13:03

## 2023-12-15 RX ADMIN — GABAPENTIN 600 MG: 300 CAPSULE ORAL at 21:20

## 2023-12-15 RX ADMIN — INSULIN LISPRO 4 UNITS: 100 INJECTION, SOLUTION INTRAVENOUS; SUBCUTANEOUS at 13:10

## 2023-12-15 RX ADMIN — GABAPENTIN 600 MG: 300 CAPSULE ORAL at 18:50

## 2023-12-15 RX ADMIN — SODIUM CHLORIDE 15 ML: 9 INJECTION, SOLUTION INTRAVENOUS at 13:35

## 2023-12-15 RX ADMIN — ENOXAPARIN SODIUM 40 MG: 100 INJECTION SUBCUTANEOUS at 10:11

## 2023-12-15 RX ADMIN — INSULIN LISPRO 2 UNITS: 100 INJECTION, SOLUTION INTRAVENOUS; SUBCUTANEOUS at 18:53

## 2023-12-15 RX ADMIN — SODIUM CHLORIDE, POTASSIUM CHLORIDE, SODIUM LACTATE AND CALCIUM CHLORIDE: 600; 310; 30; 20 INJECTION, SOLUTION INTRAVENOUS at 07:15

## 2023-12-15 RX ADMIN — SODIUM CHLORIDE, PRESERVATIVE FREE 10 ML: 5 INJECTION INTRAVENOUS at 13:28

## 2023-12-15 RX ADMIN — INSULIN LISPRO 2 UNITS: 100 INJECTION, SOLUTION INTRAVENOUS; SUBCUTANEOUS at 10:20

## 2023-12-15 RX ADMIN — GABAPENTIN 600 MG: 300 CAPSULE ORAL at 10:12

## 2023-12-15 RX ADMIN — INSULIN LISPRO 8 UNITS: 100 INJECTION, SOLUTION INTRAVENOUS; SUBCUTANEOUS at 13:27

## 2023-12-15 RX ADMIN — SODIUM CHLORIDE, PRESERVATIVE FREE 10 ML: 5 INJECTION INTRAVENOUS at 21:21

## 2023-12-15 NOTE — PLAN OF CARE
Problem: Physical Therapy - Adult  Goal: By Discharge: Performs mobility at highest level of function for planned discharge setting. See evaluation for individualized goals. Description: FUNCTIONAL STATUS PRIOR TO ADMISSION: Patient was independent without use of DME. Patient with B foot sound issues for years. Has crutches but does not feel his balance is good enough to use them. HOME SUPPORT PRIOR TO ADMISSION: The patient lived with wife but did not require assist.    Physical Therapy Goals  Initiated 12/14/2023  1. Patient will move from supine to sit and sit to supine , scoot up and down, and roll side to side in bed with independence within 7 day(s). 2.  Patient will transfer from bed to chair and chair to bed with modified independence using the least restrictive device within 7 day(s). 3.  Patient will perform sit to stand with modified independence within 7 day(s). 4.  Patient will ambulate with modified independence for 10 feet with the least restrictive device within 7 day(s). 5.  Patient will ascend/descend 5 stairs with 1 handrail(s) and use of one crutch with supervision/set-up within 7 day(s).    Outcome: Progressing   PHYSICAL THERAPY TREATMENT    Patient: Deloris Gerber (36 y.o. male)  Date: 12/15/2023  Diagnosis: Charcot foot due to diabetes mellitus ([address]) [E11.610]  Diabetic foot infection ([address]) [E11.628, L08.9]  Severe sepsis ([address]) [A41.9, R65.20] Diabetic foot infection ([address])  Procedure(s) (LRB):  RIGHT FOOT WASHOUT AND PLACEMENT OF ANTIBIOTIC BEADS (Right) 2 Days Post-Op  Precautions: Weight Bearing (per podiatry:  Can use the heel for transfers and limited use for brief time for stability) Right Lower Extremity Weight Bearing:  (Can use the heel for transfers and limited use for brief time for stability) Left Lower Extremity Weight Bearing: Weight Bearing As Tolerated                ASSESSMENT:  Patient continues to benefit from skilled PT services and is progressing towards the wheelchair. SUBJECTIVE:   Patient stated, \"It feels so good to be up and moving. \"    OBJECTIVE DATA SUMMARY:   Critical Behavior:   Patient is A&Ox4       Functional Mobility Training:  Bed Mobility:  Bed Mobility Training  Rolling: Independent  Supine to Sit: Independent  Scooting: Independent  Transfers:  Transfer Training  Interventions: Verbal cues  Sit to Stand: Stand-by assistance  Stand to Sit: Stand-by assistance  Bed to Chair: Stand-by assistance  Toilet Transfer: Stand-by assistance  Balance:  Balance  Sitting: Intact  Standing: Impaired  Standing - Static: Good;Constant support  Standing - Dynamic: Good;Constant support   Ambulation/Gait Training:     Gait  Overall Level of Assistance: Stand-by assistance (VC to actviely dorsiflex R ankle to facilitate neutral ankle position)  Assistive Device: Gait belt;Walker, rolling  Base of Support: Shift to left; Widened  Speed/Hallie: Pace decreased (< 100 feet/min); Slow  Step Length: Left shortened;Right shortened  Gait Abnormalities: Decreased step clearance        Activity Tolerance:   Good    After treatment:   Patient left in no apparent distress sitting up in chair, Call bell within reach, and Caregiver / family present      COMMUNICATION/EDUCATION:   The patient's plan of care was discussed with: occupational therapist, registered nurse, and            Morelia Gutierres PT  Minutes: 72

## 2023-12-15 NOTE — PLAN OF CARE
Problem: Physical Therapy - Adult  Goal: By Discharge: Performs mobility at highest level of function for planned discharge setting. See evaluation for individualized goals. Description: FUNCTIONAL STATUS PRIOR TO ADMISSION: Patient was independent without use of DME. Patient with B foot sound issues for years. Has crutches but does not feel his balance is good enough to use them. HOME SUPPORT PRIOR TO ADMISSION: The patient lived with wife but did not require assist.    Physical Therapy Goals  Initiated 12/14/2023  1. Patient will move from supine to sit and sit to supine , scoot up and down, and roll side to side in bed with independence within 7 day(s). 2.  Patient will transfer from bed to chair and chair to bed with modified independence using the least restrictive device within 7 day(s). 3.  Patient will perform sit to stand with modified independence within 7 day(s). 4.  Patient will ambulate with modified independence for 10 feet with the least restrictive device within 7 day(s). 5.  Patient will ascend/descend 5 stairs with 1 handrail(s) and use of one crutch with supervision/set-up within 7 day(s).    12/15/2023 1633 by Kevin Nieto PT  Outcome: Progressing   PHYSICAL THERAPY TREATMENT    Patient: Solo Garay (89 y.o. male)  Date: 12/15/2023  Diagnosis: Charcot foot due to diabetes mellitus ([address]) [E11.610]  Diabetic foot infection ([address]) [E11.628, L08.9]  Severe sepsis ([address]) [A41.9, R65.20] Diabetic foot infection ([address])  Procedure(s) (LRB):  RIGHT FOOT WASHOUT AND PLACEMENT OF ANTIBIOTIC BEADS (Right) 2 Days Post-Op  Precautions: Weight Bearing (per podiatry:  Can use the heel for transfers and limited use for brief time for stability) Right Lower Extremity Weight Bearing:  (Can use the heel for transfers and limited use for brief time for stability) Left Lower Extremity Weight Bearing: Weight Bearing As Tolerated                ASSESSMENT:  Patient continues to benefit from skilled PT wheelchair. SUBJECTIVE:   Patient stated, \"I feel good about the stairs. \"    OBJECTIVE DATA SUMMARY:       Functional Mobility Training:  Bed Mobility:  Bed Mobility Training  Rolling: Independent  Supine to Sit: Independent  Scooting: Independent  Transfers:  Transfer Training  Interventions: Verbal cues  Sit to Stand: Stand-by assistance  Stand to Sit: Stand-by assistance  Bed to Chair: Stand-by assistance  Toilet Transfer: Stand-by assistance  Balance:  Balance  Sitting: Intact  Standing: Impaired  Standing - Static: Good;Constant support  Standing - Dynamic: Good;Constant support   Ambulation/Gait Training:     Gait  Overall Level of Assistance: Stand-by assistance (VC to actviely dorsiflex R ankle to facilitate neutral ankle position)  Assistive Device: Gait belt;Walker, rolling  Base of Support: Shift to left; Widened  Speed/Hallie: Pace decreased (< 100 feet/min); Slow  Step Length: Left shortened;Right shortened  Gait Abnormalities: Decreased step clearance  Rail Use: Left (and use of crutch on R)  Stairs - Level of Assistance: Contact-guard assistance;Minimum assistance;Assist X2  Number of Stairs Trained: 4        Activity Tolerance:   Good    After treatment:   Patient left in no apparent distress sitting up in chair, Call bell within reach, and Caregiver / family present      COMMUNICATION/EDUCATION:   The patient's plan of care was discussed with: registered nurse and            Tai Peace, PT  Minutes: 74

## 2023-12-15 NOTE — CARE COORDINATION
Transition of Care Plan:    RUR: 9%  Prior Level of Functioning:   Disposition: Home w/HH & IV ABX  If SNF or IPR: Date FOC offered:   Date FOC received:   Accepting facility:   Date authorization started with reference number:   Date authorization received and expires: Follow up appointments:   DME needed: BSC, R/W & W/C  Transportation at discharge: family   IM/IMM Medicare/ letter given: n/a  Is patient a [place] and connected with [facility]? If yes, was [employer] transfer form completed and VA notified? Caregiver Contact:   Discharge Caregiver contacted prior to discharge? Care Conference needed? Barriers to discharge:     4:03  Patients insurance will cover w/c. [place] will call pt's wife for co-pay. 2:35  CM sent referrals for a w/c & a r/w.  CM also sent a referral for HH. [facility] accepted, but SN is unable to see pt until Monday. CM Perfect served MD to see if that was acceptable. [facility]. CM sent a referral to [facility] & will continue to follow.     Chirag Dixon  Ext [phone]

## 2023-12-15 NOTE — PLAN OF CARE
Problem: Occupational Therapy - Adult  Goal: By Discharge: Performs self-care activities at highest level of function for planned discharge setting. See evaluation for individualized goals. Description: FUNCTIONAL STATUS PRIOR TO ADMISSION:  I PTA  Receives Help From: Family, ADL Assistance: Independent,  ,  ,  ,  ,  ,  , Ambulation Assistance: Independent, Transfer Assistance: Independent, Active : Yes     HOME SUPPORT: Patient lived with wife who works during the day but didn't require assistance. Patient Works full time at [employer], can work from home    Occupational Therapy Goals:  Initiated 12/14/2023  1. Patient will perform grooming n standing VSS with Stand by Assist within 7 day(s). 2.  Patient will perform bathing with Stand by Assist within 7 day(s). 3.  Patient will perform upper body dressing and lower body dressing with Stand by Assist within 7 day(s). 4.  Patient will perform toilet transfers with Stand by Assist  within 7 day(s). 5.  Patient will perform all aspects of toileting with Stand by Assist within 7 day(s). 6.  Patient will participate in upper extremity therapeutic exercise/activities with Stand by Assist for 5 minutes within 7 day(s). 7.  Patient will utilize energy conservation techniques during functional activities with verbal cues within 7 day(s).    Outcome: Progressing   OCCUPATIONAL THERAPY TREATMENT  Patient: Jaz Kimbrough (69 y.o. male)  Date: 12/15/2023  Primary Diagnosis: Charcot foot due to diabetes mellitus ([address]) [E11.610]  Diabetic foot infection ([address]) [E11.628, L08.9]  Severe sepsis ([address]) [A41.9, R65.20]  Procedure(s) (LRB):  RIGHT FOOT WASHOUT AND PLACEMENT OF ANTIBIOTIC BEADS (Right) 2 Days Post-Op   Precautions: Weight Bearing (per podiatry:  Can use the heel for transfers and limited use for brief time for stability) Right Lower Extremity Weight Bearing:  (Can use the heel for transfers and limited use for brief time for stability) Left Lower Extremity Weight Bearing: Weight Bearing As Tolerated            Chart, occupational therapy assessment, plan of care, and goals were reviewed. ASSESSMENT  Patient continues to benefit from skilled OT services and is progressing towards goals. Pt verbalizes and demonstrates awareness of precautions wearing the B post op shoes--both feet bandaged and using the RW for transfers and standing to manage clothing. Pt able to perform transfers this date with stand by assistance using the RW and verbal cues. Pt plans to perform sponge bathing with his wife's assistance and educated on bed level vs. Seated dressing. Pt educated on the benefits of BUE exercises to maintain safe transfers while his activity level/ambulation is very limited per MD, and able to perform transfers only at this time. Pt  was able to demonstrate arm chair push ups while seated in recliner. [facility] to initiate a home strengthening program.  OT at home is recommended to assist pt with setting up his environment/equipment safely to best suit his adl needs and safety during adls. Kye Logan PLAN :  Patient continues to benefit from skilled intervention to address the above impairments. Continue treatment per established plan of care to address goals. Recommend with staff: encourage frequent position changes and participation in [address] [place] [address] next OT session: POC    Recommendation for discharge: (in order for the patient to meet his/her long term goals):  Therapy 2 days/week in the home and also see \"other factors to consider\" below for additional discharge concerns/needs    Other factors to consider for discharge: available support system works or is unable to provide adequate supervision and the patient would be alone, high risk for falls, concern for safely navigating or managing the home environment, and new weight bearing restrictions limiting activity or patient is unable to maintain--pt needs cues to

## 2023-12-15 NOTE — PROGRESS NOTES
PICC order acknowledged. Pt's discharge placement was not determined yet. Discussed with Dr Tegan Mendoza. Will evaluate again on Monday. Notified to primary nurse.      Zhanna Page RN Vascular access team. PICC nurse

## 2023-12-15 NOTE — PROGRESS NOTES
imaging  D/w -hospitalist, RN      John Mckenzie is a 52year old male with past medical history significant for Diabetes, hypertension, diabetic neuropathy, who has been under treatment for Charcot foot and significant infection of the right foot for the past week with oral antibiotics, brought to the emergency department by his wife for concerns for fever of 103.3 degrees at home over the last 24 hours, accompanied by confusion . Pt had seen his podiatrist prior to admission & placed on an antibiotic. Pt does not recall the name. Pt had problem in left foot & had been told by Podiatry that it was healing. Patient has been admitted to hospitalist service. Blood cultures 12/9 + for probable strep agalactiae 1/4  Final ID, [facility] pending. X-ray R/foot + for Charcot foot  Indeterminate midfoot dislocation  No x-ray evidence of OM/soft tissue gas  X-ray of left foot+ OM/septic arthritis of second toe PIP joint  Previous resection of great toe. S/p I&D of  R/ foot & L/ 2nd toe amputation 12/10  R/mid foot bone biopsy, debridement. Patient seen today resting in bed. .  Feels better overall.   D/w podiatry-May DC on 6 weeks of antibiotics    Past Medical History:   Diagnosis Date    B12 deficiency 1/15/2021    Diabetes ([address])     type 2    Gout     Hypercholesteremia     Hyperlipidemia     Hypertension     Kidney stone     Neuropathy     bilateral lower legs       Past Surgical History:   Procedure Laterality Date    APPENDECTOMY      COLONOSCOPY      colon polyps in past    OTHER SURGICAL HISTORY      anal fissure    TOE AMPUTATION Bilateral 12/10/2023    I&D of foot    TOE AMPUTATION Left     1st & 2nd toe amputated    UPPER GASTROINTESTINAL ENDOSCOPY      WISDOM TOOTH EXTRACTION         Allergies   Allergen Reactions    Atorvastatin Other (See Comments)     Muscle cramps       Tobacco Use: Low Risk  (12/13/2023)    Patient History     Smoking Tobacco Use: Never     Smokeless Tobacco Use: Never     Passive Exposure: COVID-19, Rapid [[id number]] Collected: 12/09/23 2229    Order Status: Completed Specimen: Nasopharyngeal Updated: 12/09/23 2306     Source Nasopharyngeal        SARS-CoV-2, Rapid Not detected        Comment: Rapid Abbott ID Now     Rapid NAAT:  The specimen is NEGATIVE for SARS-CoV-2, the novel coronavirus associated with COVID-19. Negative results should be treated as presumptive and, if inconsistent with clinical signs and symptoms or necessary for patient management, should be tested with an alternative molecular assay. Negative results do not preclude SARS-CoV-2 infection and should not be used as the sole basis for patient management decisions. This test has been authorized by the FDA under an Emergency Use Authorization (EUA) for use by authorized laboratories. Fact sheet for Healthcare Providers:  http://www.yarely.rene/  Fact sheet for Patients: http://www.yarely.rene/     Methodology: Isothermal Nucleic Acid Amplification         Rapid influenza A/B antigens [[id number]] Collected: 12/09/23 2229    Order Status: Completed Specimen: Nasopharyngeal Updated: 12/09/23 2255     Influenza A Ag Negative        Influenza B Ag Negative       Culture, Blood 1 [[id number]]  (Abnormal)  (Susceptibility) Collected: 12/09/23 2030    Order Status: Completed Specimen: Blood Updated: 12/12/23 1134     Special Requests --        NO SPECIAL REQUESTS  LEFT  Antecubital       Culture       STREPTOCOCCUS AGALACTIAE SERO GROUP B GROWING IN 1 OF 2 BOTTLES DRAWN No Site Indicated            (NOTE) APPARENT GRAM POSITIVE COCCI  GROWING IN 1 OF 2 BOTTLES CALLED TO READ BACK BY Judge Elena SERRATO [facility] AT 8252 ON 12/10/23.  [facility]    Susceptibility        Beta Hemolytic Streptococcus - Group B      BACTERIAL SUSCEPTIBILITY PANEL [facility]      ampicillin <=0.25 ug/mL Sensitive      cefotaxime <=0.12 ug/mL Sensitive      cefTRIAXone <=0.12 ug/mL Sensitive      clindamycin <=0.25 ug/mL Sensitive

## 2023-12-16 LAB
ANION GAP SERPL CALC-SCNC: 6 MMOL/L (ref 5–15)
BASOPHILS # BLD: 0 K/UL (ref 0–0.1)
BASOPHILS NFR BLD: 0 % (ref 0–1)
BUN SERPL-MCNC: 13 MG/DL (ref 6–20)
BUN/CREAT SERPL: 12 (ref 12–20)
CALCIUM SERPL-MCNC: 9.7 MG/DL (ref 8.5–10.1)
CHLORIDE SERPL-SCNC: 106 MMOL/L (ref 97–108)
CO2 SERPL-SCNC: 26 MMOL/L (ref 21–32)
CREAT SERPL-MCNC: 1.06 MG/DL (ref 0.7–1.3)
DIFFERENTIAL METHOD BLD: ABNORMAL
EOSINOPHIL # BLD: 0.6 K/UL (ref 0–0.4)
EOSINOPHIL NFR BLD: 5 % (ref 0–7)
ERYTHROCYTE [DISTWIDTH] IN BLOOD BY AUTOMATED COUNT: 12.6 % (ref 11.5–14.5)
EST. AVERAGE GLUCOSE BLD GHB EST-MCNC: 189 MG/DL
GLUCOSE BLD STRIP.AUTO-MCNC: 174 MG/DL (ref 65–117)
GLUCOSE BLD STRIP.AUTO-MCNC: 180 MG/DL (ref 65–117)
GLUCOSE BLD STRIP.AUTO-MCNC: 203 MG/DL (ref 65–117)
GLUCOSE BLD STRIP.AUTO-MCNC: 253 MG/DL (ref 65–117)
GLUCOSE SERPL-MCNC: 189 MG/DL (ref 65–100)
HBA1C MFR BLD: 8.2 % (ref 4–5.6)
HCT VFR BLD AUTO: 33.8 % (ref 36.6–50.3)
HGB BLD-MCNC: 11.1 G/DL (ref 12.1–17)
IMM GRANULOCYTES # BLD AUTO: 0 K/UL (ref 0–0.04)
IMM GRANULOCYTES NFR BLD AUTO: 0 % (ref 0–0.5)
LACTATE SERPL-SCNC: 0.8 MMOL/L (ref 0.4–2)
LYMPHOCYTES # BLD: 3.3 K/UL (ref 0.8–3.5)
LYMPHOCYTES NFR BLD: 27 % (ref 12–49)
MCH RBC QN AUTO: 32.1 PG (ref 26–34)
MCHC RBC AUTO-ENTMCNC: 32.8 G/DL (ref 30–36.5)
MCV RBC AUTO: 97.7 FL (ref 80–99)
METAMYELOCYTES NFR BLD MANUAL: 1 %
MONOCYTES # BLD: 0.4 K/UL (ref 0–1)
MONOCYTES NFR BLD: 3 % (ref 5–13)
NEUTS SEG # BLD: 7.9 K/UL (ref 1.8–8)
NEUTS SEG NFR BLD: 64 % (ref 32–75)
NRBC # BLD: 0 K/UL (ref 0–0.01)
NRBC BLD-RTO: 0 PER 100 WBC
PLATELET # BLD AUTO: 377 K/UL (ref 150–400)
PMV BLD AUTO: 10.4 FL (ref 8.9–12.9)
POTASSIUM SERPL-SCNC: 4.1 MMOL/L (ref 3.5–5.1)
RBC # BLD AUTO: 3.46 M/UL (ref 4.1–5.7)
RBC MORPH BLD: ABNORMAL
SERVICE CMNT-IMP: ABNORMAL
SODIUM SERPL-SCNC: 138 MMOL/L (ref 136–145)
WBC # BLD AUTO: 12.4 K/UL (ref 4.1–11.1)

## 2023-12-16 PROCEDURE — 1100000000 HC RM PRIVATE

## 2023-12-16 PROCEDURE — 85025 COMPLETE CBC W/AUTO DIFF WBC: CPT

## 2023-12-16 PROCEDURE — 6370000000 HC RX 637 (ALT 250 FOR IP): Performed by: PODIATRIST

## 2023-12-16 PROCEDURE — 6370000000 HC RX 637 (ALT 250 FOR IP): Performed by: INTERNAL MEDICINE

## 2023-12-16 PROCEDURE — 2580000003 HC RX 258: Performed by: PODIATRIST

## 2023-12-16 PROCEDURE — 83036 HEMOGLOBIN GLYCOSYLATED A1C: CPT

## 2023-12-16 PROCEDURE — 83605 ASSAY OF LACTIC ACID: CPT

## 2023-12-16 PROCEDURE — 80048 BASIC METABOLIC PNL TOTAL CA: CPT

## 2023-12-16 PROCEDURE — 6360000002 HC RX W HCPCS: Performed by: INTERNAL MEDICINE

## 2023-12-16 PROCEDURE — 2580000003 HC RX 258: Performed by: INTERNAL MEDICINE

## 2023-12-16 PROCEDURE — 36415 COLL VENOUS BLD VENIPUNCTURE: CPT

## 2023-12-16 PROCEDURE — 82962 GLUCOSE BLOOD TEST: CPT

## 2023-12-16 PROCEDURE — 6360000002 HC RX W HCPCS: Performed by: PODIATRIST

## 2023-12-16 RX ADMIN — INSULIN LISPRO 12 UNITS: 100 INJECTION, SOLUTION INTRAVENOUS; SUBCUTANEOUS at 12:45

## 2023-12-16 RX ADMIN — ROSUVASTATIN CALCIUM 5 MG: 10 TABLET, COATED ORAL at 09:06

## 2023-12-16 RX ADMIN — AMPICILLIN SODIUM, SULBACTAM SODIUM 3000 MG: 2; 1 INJECTION, POWDER, FOR SOLUTION INTRAMUSCULAR; INTRAVENOUS at 03:33

## 2023-12-16 RX ADMIN — AMPICILLIN SODIUM, SULBACTAM SODIUM 3000 MG: 2; 1 INJECTION, POWDER, FOR SOLUTION INTRAMUSCULAR; INTRAVENOUS at 11:07

## 2023-12-16 RX ADMIN — ENOXAPARIN SODIUM 40 MG: 100 INJECTION SUBCUTANEOUS at 09:06

## 2023-12-16 RX ADMIN — INSULIN LISPRO 2 UNITS: 100 INJECTION, SOLUTION INTRAVENOUS; SUBCUTANEOUS at 12:46

## 2023-12-16 RX ADMIN — INSULIN LISPRO 12 UNITS: 100 INJECTION, SOLUTION INTRAVENOUS; SUBCUTANEOUS at 09:07

## 2023-12-16 RX ADMIN — SODIUM CHLORIDE, PRESERVATIVE FREE 10 ML: 5 INJECTION INTRAVENOUS at 20:14

## 2023-12-16 RX ADMIN — SODIUM CHLORIDE, PRESERVATIVE FREE 10 ML: 5 INJECTION INTRAVENOUS at 09:09

## 2023-12-16 RX ADMIN — GABAPENTIN 600 MG: 300 CAPSULE ORAL at 20:12

## 2023-12-16 RX ADMIN — AMPICILLIN SODIUM, SULBACTAM SODIUM 3000 MG: 2; 1 INJECTION, POWDER, FOR SOLUTION INTRAMUSCULAR; INTRAVENOUS at 17:49

## 2023-12-16 RX ADMIN — GABAPENTIN 600 MG: 300 CAPSULE ORAL at 09:06

## 2023-12-16 RX ADMIN — AMPICILLIN SODIUM, SULBACTAM SODIUM 3000 MG: 2; 1 INJECTION, POWDER, FOR SOLUTION INTRAMUSCULAR; INTRAVENOUS at 22:10

## 2023-12-16 RX ADMIN — INSULIN LISPRO 12 UNITS: 100 INJECTION, SOLUTION INTRAVENOUS; SUBCUTANEOUS at 17:47

## 2023-12-16 RX ADMIN — Medication 1 CAPSULE: at 12:45

## 2023-12-16 RX ADMIN — GABAPENTIN 600 MG: 300 CAPSULE ORAL at 14:42

## 2023-12-16 RX ADMIN — INSULIN GLARGINE 90 UNITS: 100 INJECTION, SOLUTION SUBCUTANEOUS at 09:07

## 2023-12-16 NOTE — PROGRESS NOTES
End of Shift Note    Bedside shift change report given to Torrie Mcpherson (oncoming nurse) by Ross Monge RN (offgoing nurse). Report included the following information SBAR, Kardex, OR Summary, Procedure Summary, Intake/Output, MAR, and Recent Results    Shift worked:  [id number]     Shift summary and any significant changes:     Uneventful night. No bleeding noted from feet. Concerns for physician to address:       Zone phone for oncoming shift:   [phone]       Activity:     Number times ambulated in hallways past shift:   Number of times OOB to chair past shift: 1    Cardiac:   Cardiac Monitoring: No           Access:  Current line(s): PIV     Genitourinary:   Urinary status: voiding    Respiratory:      Chronic home O2 use?: NO  Incentive spirometer at bedside: YES       GI:     Current diet:  ADULT DIET; Regular; 4 carb choices (60 gm/meal)  Passing flatus: YES  Tolerating current diet: YES       Pain Management:   Patient states pain is manageable on current regimen: YES    Skin:     Interventions: float heels, increase time out of bed, and nutritional support    Patient Safety:  Fall Score:    Interventions: assistive device (walker, cane.  etc), gripper socks, pt to call before getting OOB, and stay with me (per policy)       Length of Stay:  Expected LOS: 4  Actual LOS: ROJELIO Viveros

## 2023-12-16 NOTE — PROGRESS NOTES
End of Shift Note    Bedside shift change report given to Gilbert Epps (oncoming nurse) by Fredrick Briceño LPN (offgoing nurse). Report included the following information SBAR    Shift worked:  7a-7p     Shift summary and any significant changes:     Pt rested in bed today. Pt had no complaints of pain. Breakthrough drainaeg noted on bilateral foot dressings. Uneventful day.       Concerns for physician to address:  none     Zone phone for oncoming shift:   Jus Vasquez LPN

## 2023-12-16 NOTE — PROGRESS NOTES
End of Shift Note    Bedside shift change report given to Candido (oncoming nurse) by Yenny Adler RN (offgoing nurse). Report included the following information SBAR, Kardex, ED Summary, Procedure Summary, Intake/Output, MAR, Accordion, Recent Results, and Med Rec Status    Shift worked:  7a-7p     Shift summary and any significant changes:     Pts bilat foot drsgs CDI, no pain meds this shift, pt worked with PT, up to chair majority of shift. Otherwise uneventful shift. Concerns for physician to address:  none     Zone phone for oncoming shift:   [phone]       Activity:     Number times ambulated in hallways past shift: 1  Number of times OOB to chair past shift: 1, majority of shift    Cardiac:   Cardiac Monitoring: No           Access:  Current line(s): PIV     Genitourinary:   Urinary status: voiding    Respiratory:      Chronic home O2 use?: NO  Incentive spirometer at bedside: NO       GI:     Current diet:  ADULT DIET; Regular; 4 carb choices (60 gm/meal)  Passing flatus: YES  Tolerating current diet: YES       Pain Management:   Patient states pain is manageable on current regimen: N/A    Skin:     Interventions: float heels    Patient Safety:  Fall Score:    Interventions: assistive device (walker, cane.  etc), gripper socks, and pt to call before getting OOB       Length of Stay:  Expected LOS: 4  Actual LOS: 6      Yenny Adler RN

## 2023-12-17 LAB
BACTERIA SPEC CULT: NORMAL
BACTERIA SPEC CULT: NORMAL
GLUCOSE BLD STRIP.AUTO-MCNC: 187 MG/DL (ref 65–117)
GLUCOSE BLD STRIP.AUTO-MCNC: 195 MG/DL (ref 65–117)
GLUCOSE BLD STRIP.AUTO-MCNC: 245 MG/DL (ref 65–117)
GLUCOSE BLD STRIP.AUTO-MCNC: 251 MG/DL (ref 65–117)
SERVICE CMNT-IMP: ABNORMAL
SERVICE CMNT-IMP: NORMAL
SERVICE CMNT-IMP: NORMAL

## 2023-12-17 PROCEDURE — 82962 GLUCOSE BLOOD TEST: CPT

## 2023-12-17 PROCEDURE — 1100000000 HC RM PRIVATE

## 2023-12-17 PROCEDURE — 6360000002 HC RX W HCPCS: Performed by: PODIATRIST

## 2023-12-17 PROCEDURE — 6370000000 HC RX 637 (ALT 250 FOR IP): Performed by: INTERNAL MEDICINE

## 2023-12-17 PROCEDURE — 6360000002 HC RX W HCPCS: Performed by: INTERNAL MEDICINE

## 2023-12-17 PROCEDURE — 2580000003 HC RX 258: Performed by: PODIATRIST

## 2023-12-17 PROCEDURE — 2580000003 HC RX 258: Performed by: INTERNAL MEDICINE

## 2023-12-17 PROCEDURE — 6370000000 HC RX 637 (ALT 250 FOR IP): Performed by: PODIATRIST

## 2023-12-17 RX ORDER — INSULIN LISPRO 100 [IU]/ML
15 INJECTION, SOLUTION INTRAVENOUS; SUBCUTANEOUS
Status: DISCONTINUED | OUTPATIENT
Start: 2023-12-18 | End: 2023-12-18 | Stop reason: HOSPADM

## 2023-12-17 RX ADMIN — AMPICILLIN SODIUM, SULBACTAM SODIUM 3000 MG: 2; 1 INJECTION, POWDER, FOR SOLUTION INTRAMUSCULAR; INTRAVENOUS at 10:20

## 2023-12-17 RX ADMIN — OXYCODONE HYDROCHLORIDE 5 MG: 5 TABLET ORAL at 16:42

## 2023-12-17 RX ADMIN — Medication 1 CAPSULE: at 12:47

## 2023-12-17 RX ADMIN — AMPICILLIN SODIUM, SULBACTAM SODIUM 3000 MG: 2; 1 INJECTION, POWDER, FOR SOLUTION INTRAMUSCULAR; INTRAVENOUS at 22:19

## 2023-12-17 RX ADMIN — INSULIN LISPRO 4 UNITS: 100 INJECTION, SOLUTION INTRAVENOUS; SUBCUTANEOUS at 12:48

## 2023-12-17 RX ADMIN — AMPICILLIN SODIUM, SULBACTAM SODIUM 3000 MG: 2; 1 INJECTION, POWDER, FOR SOLUTION INTRAMUSCULAR; INTRAVENOUS at 04:48

## 2023-12-17 RX ADMIN — INSULIN LISPRO 12 UNITS: 100 INJECTION, SOLUTION INTRAVENOUS; SUBCUTANEOUS at 12:48

## 2023-12-17 RX ADMIN — SODIUM CHLORIDE, PRESERVATIVE FREE 10 ML: 5 INJECTION INTRAVENOUS at 09:19

## 2023-12-17 RX ADMIN — GABAPENTIN 600 MG: 300 CAPSULE ORAL at 20:24

## 2023-12-17 RX ADMIN — ROSUVASTATIN CALCIUM 5 MG: 10 TABLET, COATED ORAL at 09:16

## 2023-12-17 RX ADMIN — INSULIN LISPRO 12 UNITS: 100 INJECTION, SOLUTION INTRAVENOUS; SUBCUTANEOUS at 18:28

## 2023-12-17 RX ADMIN — GABAPENTIN 600 MG: 300 CAPSULE ORAL at 15:03

## 2023-12-17 RX ADMIN — INSULIN LISPRO 12 UNITS: 100 INJECTION, SOLUTION INTRAVENOUS; SUBCUTANEOUS at 09:17

## 2023-12-17 RX ADMIN — ENOXAPARIN SODIUM 40 MG: 100 INJECTION SUBCUTANEOUS at 09:16

## 2023-12-17 RX ADMIN — AMPICILLIN SODIUM, SULBACTAM SODIUM 3000 MG: 2; 1 INJECTION, POWDER, FOR SOLUTION INTRAMUSCULAR; INTRAVENOUS at 16:36

## 2023-12-17 RX ADMIN — GABAPENTIN 600 MG: 300 CAPSULE ORAL at 09:16

## 2023-12-17 RX ADMIN — SODIUM CHLORIDE, PRESERVATIVE FREE 10 ML: 5 INJECTION INTRAVENOUS at 20:25

## 2023-12-17 RX ADMIN — INSULIN GLARGINE 90 UNITS: 100 INJECTION, SOLUTION SUBCUTANEOUS at 09:17

## 2023-12-17 NOTE — PROGRESS NOTES
Hospitalist Progress Note    NAME:   Inna Cedillo   : 1970   MRN: [id number]     Patient PCP: Vy Trujillo MD    Estimated discharge date:  2023    Needed for discharge: PICC line, discharge planning    Assessment / Plan:    Septic shock POA WBC 15.2, temp 102.4, , lactate 9.10   Right diabetic foot infection with large right foot abscess POA  Left second toe osteomyelitis POA  Grp B streptococcus bacteremia POA  Right Charcot's foot POA  Right foot swelling, fevers/chills, malaise  Right foot X-ray IMPRESSION:  Charcot foot, with age indeterminate mid foot dislocation. No plain film evidence of osteomyelitis or soft tissue gas. Left foot X-ray IMPRESSION:  Osteomyelitis/septic arthritis of the second toe PIP joint. CTA abdominal aorta with bilateral runoff IMPRESSION:  1. Right midfoot dislocation and fragmentation, most consistent with Charcot foot. Soft tissue fluid/edema and skin thickening is suggestive of cellulitis. No osseous changes to suggest osteomyelitis, but evaluation is limited on CT and  would recommend MRI foot if there is a strong concern for osteoarthritis. No soft tissue gas. 2.  The right dorsalis pedis artery is displaced and occluded at the level of the mid foot dislocation, but there is reconstitution. Otherwise, there is normal bilateral runoff. 3.  Hepatic steatosis. No MRI of the foot was done since 2021. Admit [facility]   with Grp B streptococcus 1 of 2 sets   F/U [facility] 2023 are no growth  IVF  IV vanco and zosyn at admit  Sepsis improved  Dr Mohit Garcia took to OR 12/10/2023  Right foot I+D  Left second toe amputation  Right midfoot bone biopsy  OR 12/10 Findings:   \"large abscess covering most of the midfoot plantar and dorsal extending to the heel and forefoot with extensive area of infection and purulence more than 20 cc's of purulent drainage released and packed with iodoform packing.  Most likely needs another trip to the OR for further

## 2023-12-17 NOTE — PROGRESS NOTES
End of Shift Note    Bedside shift change report given to [address] (oncoming nurse) by Tree Medina LPN (offgoing nurse). Report included the following information SBAR    Shift worked:  7a-7p     Shift summary and any significant changes:     Pt up to chair for most of the day, tolerated well. Pt had complaints of pain, prn pain medication given with positive effect.     Concerns for physician to address:  none     Zone phone for oncoming shift:   Sly Casey LPN

## 2023-12-18 ENCOUNTER — HOME HEALTH ADMISSION (OUTPATIENT)
Dept: HOME HEALTH SERVICES | Facility: HOME HEALTH | Age: 53
End: 2023-12-18
Payer: COMMERCIAL

## 2023-12-18 VITALS
DIASTOLIC BLOOD PRESSURE: 83 MMHG | WEIGHT: 220 LBS | RESPIRATION RATE: 16 BRPM | TEMPERATURE: 98.2 F | SYSTOLIC BLOOD PRESSURE: 134 MMHG | BODY MASS INDEX: 29.8 KG/M2 | HEIGHT: 72 IN | HEART RATE: 87 BPM | OXYGEN SATURATION: 94 %

## 2023-12-18 LAB
ANION GAP SERPL CALC-SCNC: 7 MMOL/L (ref 5–15)
BASOPHILS # BLD: 0 K/UL (ref 0–0.1)
BASOPHILS NFR BLD: 0 % (ref 0–1)
BUN SERPL-MCNC: 17 MG/DL (ref 6–20)
BUN/CREAT SERPL: 14 (ref 12–20)
CALCIUM SERPL-MCNC: 9.5 MG/DL (ref 8.5–10.1)
CHLORIDE SERPL-SCNC: 104 MMOL/L (ref 97–108)
CO2 SERPL-SCNC: 26 MMOL/L (ref 21–32)
CREAT SERPL-MCNC: 1.19 MG/DL (ref 0.7–1.3)
DIFFERENTIAL METHOD BLD: ABNORMAL
ECHO BSA: 2.25 M2
EOSINOPHIL # BLD: 0.7 K/UL (ref 0–0.4)
EOSINOPHIL NFR BLD: 5 % (ref 0–7)
ERYTHROCYTE [DISTWIDTH] IN BLOOD BY AUTOMATED COUNT: 13 % (ref 11.5–14.5)
GLUCOSE BLD STRIP.AUTO-MCNC: 218 MG/DL (ref 65–117)
GLUCOSE BLD STRIP.AUTO-MCNC: 246 MG/DL (ref 65–117)
GLUCOSE SERPL-MCNC: 220 MG/DL (ref 65–100)
HCT VFR BLD AUTO: 32.9 % (ref 36.6–50.3)
HGB BLD-MCNC: 11 G/DL (ref 12.1–17)
IMM GRANULOCYTES # BLD AUTO: 0 K/UL (ref 0–0.04)
IMM GRANULOCYTES NFR BLD AUTO: 0 % (ref 0–0.5)
LYMPHOCYTES # BLD: 3.7 K/UL (ref 0.8–3.5)
LYMPHOCYTES NFR BLD: 27 % (ref 12–49)
MCH RBC QN AUTO: 31.8 PG (ref 26–34)
MCHC RBC AUTO-ENTMCNC: 33.4 G/DL (ref 30–36.5)
MCV RBC AUTO: 95.1 FL (ref 80–99)
METAMYELOCYTES NFR BLD MANUAL: 2 %
MONOCYTES # BLD: 1 K/UL (ref 0–1)
MONOCYTES NFR BLD: 7 % (ref 5–13)
NEUTS SEG # BLD: 8.1 K/UL (ref 1.8–8)
NEUTS SEG NFR BLD: 59 % (ref 32–75)
NRBC # BLD: 0 K/UL (ref 0–0.01)
NRBC BLD-RTO: 0 PER 100 WBC
PLATELET # BLD AUTO: 488 K/UL (ref 150–400)
PMV BLD AUTO: 10.7 FL (ref 8.9–12.9)
POTASSIUM SERPL-SCNC: 3.8 MMOL/L (ref 3.5–5.1)
RBC # BLD AUTO: 3.46 M/UL (ref 4.1–5.7)
RBC MORPH BLD: ABNORMAL
SERVICE CMNT-IMP: ABNORMAL
SERVICE CMNT-IMP: ABNORMAL
SODIUM SERPL-SCNC: 137 MMOL/L (ref 136–145)
VAS LEFT ABI: 1.19
VAS LEFT ARM BP: 135 MMHG
VAS LEFT DORSALIS PEDIS BP: 159 MMHG
VAS LEFT PTA BP: 162 MMHG
VAS RIGHT ABI: 1.18
VAS RIGHT ARM BP: 136 MMHG
VAS RIGHT DORSALIS PEDIS BP: 160 MMHG
VAS RIGHT PTA BP: 152 MMHG
VAS RIGHT TBI: 0.79
VAS RIGHT TOE PRESSURE: 107 MMHG
WBC # BLD AUTO: 13.7 K/UL (ref 4.1–11.1)

## 2023-12-18 PROCEDURE — 76937 US GUIDE VASCULAR ACCESS: CPT

## 2023-12-18 PROCEDURE — 85025 COMPLETE CBC W/AUTO DIFF WBC: CPT

## 2023-12-18 PROCEDURE — 36415 COLL VENOUS BLD VENIPUNCTURE: CPT

## 2023-12-18 PROCEDURE — 6370000000 HC RX 637 (ALT 250 FOR IP): Performed by: PODIATRIST

## 2023-12-18 PROCEDURE — C1751 CATH, INF, PER/CENT/MIDLINE: HCPCS

## 2023-12-18 PROCEDURE — 36569 INSJ PICC 5 YR+ W/O IMAGING: CPT

## 2023-12-18 PROCEDURE — 2580000003 HC RX 258: Performed by: PODIATRIST

## 2023-12-18 PROCEDURE — 6360000002 HC RX W HCPCS: Performed by: PODIATRIST

## 2023-12-18 PROCEDURE — 6370000000 HC RX 637 (ALT 250 FOR IP): Performed by: INTERNAL MEDICINE

## 2023-12-18 PROCEDURE — 82962 GLUCOSE BLOOD TEST: CPT

## 2023-12-18 PROCEDURE — 2580000003 HC RX 258: Performed by: INTERNAL MEDICINE

## 2023-12-18 PROCEDURE — 6360000002 HC RX W HCPCS: Performed by: INTERNAL MEDICINE

## 2023-12-18 PROCEDURE — 02HV33Z INSERTION OF INFUSION DEVICE INTO SUPERIOR VENA CAVA, PERCUTANEOUS APPROACH: ICD-10-PCS | Performed by: STUDENT IN AN ORGANIZED HEALTH CARE EDUCATION/TRAINING PROGRAM

## 2023-12-18 PROCEDURE — 80048 BASIC METABOLIC PNL TOTAL CA: CPT

## 2023-12-18 RX ORDER — INSULIN ASPART 100 [IU]/ML
INJECTION, SOLUTION INTRAVENOUS; SUBCUTANEOUS
Qty: 5 ADJUSTABLE DOSE PRE-FILLED PEN SYRINGE | Refills: 3 | Status: CANCELLED | OUTPATIENT
Start: 2023-12-18

## 2023-12-18 RX ORDER — AMPICILLIN AND SULBACTAM 2; 1 G/1; G/1
3000 INJECTION, POWDER, FOR SOLUTION INTRAMUSCULAR; INTRAVENOUS EVERY 6 HOURS
Qty: 148 EACH | Refills: 0 | Status: SHIPPED | OUTPATIENT
Start: 2023-12-18 | End: 2024-01-24

## 2023-12-18 RX ADMIN — INSULIN LISPRO 15 UNITS: 100 INJECTION, SOLUTION INTRAVENOUS; SUBCUTANEOUS at 10:40

## 2023-12-18 RX ADMIN — INSULIN GLARGINE 90 UNITS: 100 INJECTION, SOLUTION SUBCUTANEOUS at 10:54

## 2023-12-18 RX ADMIN — AMPICILLIN SODIUM, SULBACTAM SODIUM 3000 MG: 2; 1 INJECTION, POWDER, FOR SOLUTION INTRAMUSCULAR; INTRAVENOUS at 04:24

## 2023-12-18 RX ADMIN — ROSUVASTATIN CALCIUM 5 MG: 10 TABLET, COATED ORAL at 10:40

## 2023-12-18 RX ADMIN — GABAPENTIN 600 MG: 300 CAPSULE ORAL at 14:34

## 2023-12-18 RX ADMIN — Medication 1 CAPSULE: at 12:32

## 2023-12-18 RX ADMIN — AMPICILLIN SODIUM, SULBACTAM SODIUM 3000 MG: 2; 1 INJECTION, POWDER, FOR SOLUTION INTRAMUSCULAR; INTRAVENOUS at 10:49

## 2023-12-18 RX ADMIN — ENOXAPARIN SODIUM 40 MG: 100 INJECTION SUBCUTANEOUS at 10:39

## 2023-12-18 RX ADMIN — INSULIN LISPRO 15 UNITS: 100 INJECTION, SOLUTION INTRAVENOUS; SUBCUTANEOUS at 14:31

## 2023-12-18 RX ADMIN — SODIUM CHLORIDE, PRESERVATIVE FREE 10 ML: 5 INJECTION INTRAVENOUS at 10:54

## 2023-12-18 RX ADMIN — AMPICILLIN SODIUM, SULBACTAM SODIUM 3000 MG: 2; 1 INJECTION, POWDER, FOR SOLUTION INTRAMUSCULAR; INTRAVENOUS at 15:59

## 2023-12-18 RX ADMIN — INSULIN LISPRO 2 UNITS: 100 INJECTION, SOLUTION INTRAVENOUS; SUBCUTANEOUS at 14:31

## 2023-12-18 RX ADMIN — GABAPENTIN 600 MG: 300 CAPSULE ORAL at 10:39

## 2023-12-18 RX ADMIN — INSULIN LISPRO 2 UNITS: 100 INJECTION, SOLUTION INTRAVENOUS; SUBCUTANEOUS at 10:41

## 2023-12-18 NOTE — CARE COORDINATION
Transition of Care Plan:     RUR: 10%  Prior Level of Functioning:   Disposition: Home w/[facility] & [facility] IV ABX  If SNF or IPR: Date FOC offered:   Date FOC received:   Accepting facility:   Date authorization started with reference number:   Date authorization received and expires: Follow up appointments: PCP requires pt to schedule appointment  DME needed: w/c w/leg rests-[facility] to deliver to pt's home today  Transportation at discharge: wife  IM/IMM Medicare/ letter given: n/a  Is patient a [place] and connected with [facility]? If yes, was [place] transfer form completed and VA notified? Caregiver Contact:   Discharge Caregiver contacted prior to discharge? wife at [facility] needed? Barriers to discharge:     Patient is d/c home today with [facility] & IV ABX. Patient is in agreement w/HH & does not have a provider preference. [address] [facility] has accepted. Patients PCP requires pt's to schedule their own appointments. [facility] will deliver w/c to pt's home today. No other needs or concerns identified. Patient is d/c home today w/HH & IV ABX.

## 2023-12-18 NOTE — PROGRESS NOTES
Hospitalist Progress Note    NAME:   Ward Brink   : 1970   MRN: [id number]     Patient PCP: Checo Maradiaga MD    Estimated discharge date:  2023    Needed for discharge: PICC line, discharge planning    Assessment / Plan:    Septic shock POA WBC 15.2, temp 102.4, , lactate 9.10   Right diabetic foot infection with large right foot abscess POA  Left second toe osteomyelitis POA  Grp B streptococcus bacteremia POA  Right Charcot's foot POA  Right foot swelling, fevers/chills, malaise  Right foot X-ray IMPRESSION:  Charcot foot, with age indeterminate mid foot dislocation. No plain film evidence of osteomyelitis or soft tissue gas. Left foot X-ray IMPRESSION:  Osteomyelitis/septic arthritis of the second toe PIP joint. CTA abdominal aorta with bilateral runoff IMPRESSION:  1. Right midfoot dislocation and fragmentation, most consistent with Charcot foot. Soft tissue fluid/edema and skin thickening is suggestive of cellulitis. No osseous changes to suggest osteomyelitis, but evaluation is limited on CT and  would recommend MRI foot if there is a strong concern for osteoarthritis. No soft tissue gas. 2.  The right dorsalis pedis artery is displaced and occluded at the level of the mid foot dislocation, but there is reconstitution. Otherwise, there is normal bilateral runoff. 3.  Hepatic steatosis. No MRI of the foot was done since 2021. Admit [facility]   with Grp B streptococcus 1 of 2 sets   F/U [facility] 2023 are no growth  IVF  IV vanco and zosyn at admit  Sepsis improved  Dr Romario Stallings took to OR 12/10/2023  Right foot I+D  Left second toe amputation  Right midfoot bone biopsy  OR 12/10 Findings:   \"large abscess covering most of the midfoot plantar and dorsal extending to the heel and forefoot with extensive area of infection and purulence more than 20 cc's of purulent drainage released and packed with iodoform packing.  Most likely needs another trip to the OR for further sounds  Neurologic:  Alert and oriented X 3, normal speech,   Psych:   Not anxious nor agitated  Skin:  Right foot bandaged    Reviewed most current lab test results and cultures  YES  Reviewed most current radiology test results   YES  Review and summation of old records today    NO  Reviewed patient's current orders and MAR    YES  PMH/SH reviewed - no change compared to H&P    Procedures: see electronic medical records for all procedures/Xrays and details which were not copied into this note but were reviewed prior to creation of Plan. LABS:  I reviewed today's most current labs and imaging studies.   Pertinent labs include:  Recent Labs     12/16/23  0344   WBC 12.4*   HGB 11.1*   HCT 33.8*          Recent Labs     12/15/23  0338 12/16/23  0344    138   K 4.3 4.1    106   CO2 27 26   GLUCOSE 214* 189*   BUN 12 13   CREATININE 1.17 1.06   CALCIUM 9.2 9.7         Signed: Dorenda Shone, MD

## 2023-12-18 NOTE — PROGRESS NOTES
Physical Therapy  Attempting to see patient for PT this pm. Patient reports he is getting ready for discharge and is awaiting arrival of his wife. He states that he feels comfortable with transfers and stairs. RW has been purchased and w/c will be delivered to his home today. No further acute care therapy needs identified. Continue to recommend HHPT following discharge. Will sign off.   Thank you,  Karyle Helm, PT

## 2023-12-18 NOTE — PLAN OF CARE
Problem: Skin/Tissue Integrity  Goal: Absence of new skin breakdown  Description: 1. Monitor for areas of redness and/or skin breakdown  2. Assess vascular access sites hourly  3. Every 4-6 hours minimum:  Change oxygen saturation probe site  4. Every 4-6 hours:  If on nasal continuous positive airway pressure, respiratory therapy assess nares and determine need for appliance change or resting period.   Outcome: Progressing     Problem: Discharge Planning  Goal: Discharge to home or other facility with appropriate resources  Outcome: Progressing     Problem: Pain  Goal: Verbalizes/displays adequate comfort level or baseline comfort level  Outcome: Progressing     Problem: Neurosensory - Adult  Goal: Achieves stable or improved neurological status  Outcome: Progressing     Problem: Respiratory - Adult  Goal: Achieves optimal ventilation and oxygenation  Outcome: Progressing     Problem: Cardiovascular - Adult  Goal: Maintains optimal cardiac output and hemodynamic stability  Outcome: Progressing     Problem: Skin/Tissue Integrity - Adult  Goal: Skin integrity remains intact  Outcome: Progressing     Problem: Musculoskeletal - Adult  Goal: Return mobility to safest level of function  Outcome: Progressing     Problem: Gastrointestinal - Adult  Goal: Minimal or absence of nausea and vomiting  Outcome: Progressing     Problem: Genitourinary - Adult  Goal: Absence of urinary retention  Outcome: Progressing     Problem: Infection - Adult  Goal: Absence of infection at discharge  Outcome: Progressing     Problem: Metabolic/Fluid and Electrolytes - Adult  Goal: Electrolytes maintained within normal limits  Outcome: Progressing     Problem: Hematologic - Adult  Goal: Maintains hematologic stability  Outcome: Progressing     Problem: Safety - Adult  Goal: Free from fall injury  Outcome: Progressing     Problem: ABCDS Injury Assessment  Goal: Absence of physical injury  Outcome: Progressing     Problem: Chronic Conditions and Co-morbidities  Goal: Patient's chronic conditions and co-morbidity symptoms are monitored and maintained or improved  Outcome: Progressing

## 2023-12-18 NOTE — DISCHARGE INSTRUCTIONS
Hold irbesartan-HCTZ or avalide for now, may need to resume in future   Check Blood pressure 1-2 times per day, if blood pressure > 160 or ?> 90, then resume   Otherwise wait till PCP follow up    Final ID recs from Dr Khadar Mckenna   Antimicrobial orders for discharge  -Unasyn 3G IV every 6 hours end date 1/24/24  -Pull line at end of therapy & after seen by Dr Khadar Mckenna in the clinic(see below)    -Weekly CBC, CMP-& ESR/ CRP every other week  -Fax reports to [phone], call with critical labs              at [phone]/ [phone]  -Encourage adequate fluids, daily probiotic/yogurt  -If line malfunction occurs and home health cannot reposition  please send patient to ED immediately  -ID follow-up -1/25/2024 at 1230-in person or virtual  -if persistent side effects occur stop antibiotic and call-ID.

## 2023-12-21 ENCOUNTER — HOME CARE VISIT (OUTPATIENT)
Facility: HOME HEALTH | Age: 53
End: 2023-12-21
Payer: COMMERCIAL

## 2023-12-21 PROCEDURE — G0300 HHS/HOSPICE OF LPN EA 15 MIN: HCPCS

## 2023-12-23 ENCOUNTER — HOME CARE VISIT (OUTPATIENT)
Facility: HOME HEALTH | Age: 53
End: 2023-12-23
Payer: COMMERCIAL

## 2023-12-23 PROCEDURE — G0299 HHS/HOSPICE OF RN EA 15 MIN: HCPCS

## 2023-12-25 VITALS
OXYGEN SATURATION: 97 % | HEART RATE: 82 BPM | SYSTOLIC BLOOD PRESSURE: 100 MMHG | RESPIRATION RATE: 18 BRPM | TEMPERATURE: 97.3 F | DIASTOLIC BLOOD PRESSURE: 72 MMHG

## 2023-12-25 NOTE — HOME HEALTH
Subjective: \"I have been doing my own IV for awhile and my foot is draining. \"  Falls since last visit No(if yes complete the Fall Tracking Form and include bsrifallreport):   Caregiver involvement changes: no  Home health supplies by type and quantity ordered/delivered this visit include: Adaptic    Clinician asked if patient has had any physician contact since last home care visit and patient states: NO  Clinician asked if patient has any new or changed medications and patient states:  NO   If Yes, were medications reconciled? N/A   Was the certifying physician notified of changes in medications? N/A     Clinical assessment (what this visit means for the patient overall and need for ongoing skilled care) and progress or lack of progress towards SPECIFIC goals: No medication changes, no falls, no pain reported. Patient is performing IV abx independently. Patient reported that he is feeling tired lately and that the staff in the hospital told him that he has sleep apnea, patient spouse reported that patient is snoring, SN contacted attending to request sleep apnea home study. SN educated on side effects for IV abx, patient reported he is eating yogurt with probiotics. During visit SN contacted Dr. Obdulio Sung (podiatry) regarding update of wound, MD reported ok to use non-adherent dressing in place of adaptic. Photo and measurements uploaded via media. Assessment complete, vital signs within range, all questions answered for this visit. Written Teaching Material Utilized: wound care supplies    Interdisciplinary communication with: Marco Antonio Mckeon MD; Janet Glass, ROJELIO; Cain Oppenheim, ROJELIO; Josh Peña PT, Dr. Obdulio Sung    for the purpose of [address] [place] [address] collaboration. Discharge planning as follows:  When goals are met    Specific plan for next visit: Wound care and assessment

## 2023-12-26 ENCOUNTER — HOME CARE VISIT (OUTPATIENT)
Facility: HOME HEALTH | Age: 53
End: 2023-12-26
Payer: COMMERCIAL

## 2023-12-26 PROCEDURE — G0300 HHS/HOSPICE OF LPN EA 15 MIN: HCPCS

## 2023-12-26 NOTE — OP NOTE
[facility]  OPERATIVE REPORT    Name:  Carlos Eduardo Salcedo  MR#:  [id number]  :  1970  ACCOUNT #:  [de-identified]  DATE OF SERVICE:  2023    PREOPERATIVE DIAGNOSIS:  Diabetic foot infection, postop incision and drainage, right foot. POSTOPERATIVE DIAGNOSIS:   Diabetic foot infection, postop incision and drainage, right foot. PROCEDURE PERFORMED:  Right foot washout and placement of antibiotic beads and applications of Kerecis graft. SURGEON:  Jen Bautista DPM    ASSISTANT:  None. ANESTHESIA:  MAC.    COMPLICATIONS:  None. SPECIMENS REMOVED:  None. IMPLANTS:  Stimulan 10 mL absorbable antibiotic beads impregnated with 2 mL of vancomycin and 80 mg of gentamicin and the Kerecis mesh SurgiClose graft 7 x 10 cm stapled over the ulcer and part was implanted deep into the tract areas. ESTIMATED BLOOD LOSS:  Less than 50 mL. HEMOSTASIS:   Ankle tourniquet at 250 mmHg. DRAINS:  None. PROCEDURE:  This patient was admitted through the ER and had gone through initial incision and drainage in the right foot and the patient has significantly improved in the infection control and the patient still was receiving antibiotic treatment, and due to the extensive tracking in the mid foot, the patient was brought back again for a washout. Tourniquet was inflated to the previously mentioned setting after exsanguination via elevation of lower limb and that was adjusted to the bottom of the right foot. The previously placed Iodoform gauze packing was removed entirely, which showed significant amount of purulence which was not acutely seen after removing the packing with a Misonix curette and probe debrider. The deep tract area was once again washed out thoroughly and debrided for any nonviable tissues with sharp and blunt and dissection along with SonicOne debrider.   After thorough debridement, deep bleeders were cauterized and the Stimulan 10 mL absorbable antibiotic beads
foot.  The exposed bones were biopsied and sent for bone biopsy and also for cultures. Deep cultures were taken at this point after releasing all the purulence. The area was then copiously irrigated with Irrisept followed with normal saline and the wound was packed with a 1/2-inch iodoform gauze, and after cauterizing the bleeders, a mild compressive bandage was applied, releasing the tourniquet noting hyperemia and the vascular status returned to the right foot and this bandage was secured with Ace wrap and the vascular status was once again checked to be intact in the recovery room as well. The patient tolerated the procedure and anesthesia well without complications and sent back to the medical floor for medical management and may have to be brought back to the second washout in a few days after confirming any further pathology reports.         ANTONINO [id number]  D:  12/26/2023 12:00  T:  12/26/2023 14:00  JOB #:  [id number]

## 2023-12-27 VITALS
HEART RATE: 68 BPM | DIASTOLIC BLOOD PRESSURE: 80 MMHG | RESPIRATION RATE: 20 BRPM | OXYGEN SATURATION: 98 % | TEMPERATURE: 97.5 F | SYSTOLIC BLOOD PRESSURE: 110 MMHG

## 2023-12-27 LAB
ALBUMIN SERPL-MCNC: 3.6 G/DL (ref 3.8–4.9)
ALBUMIN/GLOB SERPL: 1.2 {RATIO} (ref 1.2–2.2)
ALP SERPL-CCNC: 89 IU/L (ref 44–121)
ALT SERPL-CCNC: 15 IU/L (ref 0–44)
AST SERPL-CCNC: 16 IU/L (ref 0–40)
BILIRUB SERPL-MCNC: 0.3 MG/DL (ref 0–1.2)
BUN SERPL-MCNC: 15 MG/DL (ref 6–24)
BUN/CREAT SERPL: 15 (ref 9–20)
CALCIUM SERPL-MCNC: 9.6 MG/DL (ref 8.7–10.2)
CHLORIDE SERPL-SCNC: 100 MMOL/L (ref 96–106)
CO2 SERPL-SCNC: 21 MMOL/L (ref 20–29)
CREAT SERPL-MCNC: 1.01 MG/DL (ref 0.76–1.27)
EGFRCR SERPLBLD CKD-EPI 2021: 88 ML/MIN/1.73
ERYTHROCYTE [DISTWIDTH] IN BLOOD BY AUTOMATED COUNT: 12.1 % (ref 11.6–15.4)
ERYTHROCYTE [SEDIMENTATION RATE] IN BLOOD BY WESTERGREN METHOD: 100 MM/HR (ref 0–30)
GLOBULIN SER CALC-MCNC: 3.1 G/DL (ref 1.5–4.5)
GLUCOSE SERPL-MCNC: 141 MG/DL (ref 70–99)
HCT VFR BLD AUTO: 36.3 % (ref 37.5–51)
HGB BLD-MCNC: 12 G/DL (ref 13–17.7)
MCH RBC QN AUTO: 31.1 PG (ref 26.6–33)
MCHC RBC AUTO-ENTMCNC: 33.1 G/DL (ref 31.5–35.7)
MCV RBC AUTO: 94 FL (ref 79–97)
PLATELET # BLD AUTO: 339 X10E3/UL (ref 150–450)
POTASSIUM SERPL-SCNC: 4.9 MMOL/L (ref 3.5–5.2)
PROT SERPL-MCNC: 6.7 G/DL (ref 6–8.5)
RBC # BLD AUTO: 3.86 X10E6/UL (ref 4.14–5.8)
SODIUM SERPL-SCNC: 138 MMOL/L (ref 134–144)
WBC # BLD AUTO: 9.7 X10E3/UL (ref 3.4–10.8)

## 2023-12-28 ENCOUNTER — HOME CARE VISIT (OUTPATIENT)
Facility: HOME HEALTH | Age: 53
End: 2023-12-28
Payer: COMMERCIAL

## 2023-12-28 PROCEDURE — G0300 HHS/HOSPICE OF LPN EA 15 MIN: HCPCS

## 2023-12-30 ENCOUNTER — HOME CARE VISIT (OUTPATIENT)
Facility: HOME HEALTH | Age: 53
End: 2023-12-30
Payer: COMMERCIAL

## 2023-12-30 PROCEDURE — G0300 HHS/HOSPICE OF LPN EA 15 MIN: HCPCS

## 2023-12-31 VITALS
TEMPERATURE: 96.5 F | SYSTOLIC BLOOD PRESSURE: 104 MMHG | OXYGEN SATURATION: 98 % | DIASTOLIC BLOOD PRESSURE: 80 MMHG | RESPIRATION RATE: 18 BRPM | HEART RATE: 95 BPM

## 2023-12-31 NOTE — HOME HEALTH
Subjective: Pt states he has follow up with Dr. Castaneda tomorrow  Falls since last visit No(if yes complete the Fall Tracking Form and include bsrifallreport):   Caregiver involvement changes: Wife is primary caregiver   Home health supplies by type and quantity ordered/delivered this visit include: none    Clinician asked if patient has had any physician contact since last home care visit and patient states: NO  Clinician asked if patient has any new or changed medications and patient states:  NO   If Yes, were medications reconciled? NO   Was the certifying physician notified of changes in medications? NO     Clinical assessment (what this visit means for the patient overall and need for ongoing skilled care) and progress or lack of progress towards SPECIFIC goals: Pt continues to require SN for wound care, weekly lab draw, Monitoring for s/s of infection and sterile PICC line dressing changes     Written Teaching Material Utilized: N/A    Interdisciplinary communication with: NA for the purpose of NA     Discharge planning as follows: When wound is 100% healed    Specific plan for next visit: Wound care

## 2024-01-01 VITALS
RESPIRATION RATE: 16 BRPM | OXYGEN SATURATION: 99 % | DIASTOLIC BLOOD PRESSURE: 64 MMHG | SYSTOLIC BLOOD PRESSURE: 122 MMHG | HEART RATE: 87 BPM | TEMPERATURE: 97.4 F

## 2024-01-02 ENCOUNTER — HOSPITAL ENCOUNTER (OUTPATIENT)
Facility: HOSPITAL | Age: 54
Discharge: HOME | End: 2024-01-02
Attending: PODIATRIST
Payer: COMMERCIAL

## 2024-01-02 VITALS
TEMPERATURE: 97.5 F | HEART RATE: 82 BPM | DIASTOLIC BLOOD PRESSURE: 70 MMHG | SYSTOLIC BLOOD PRESSURE: 112 MMHG | OXYGEN SATURATION: 98 % | RESPIRATION RATE: 18 BRPM

## 2024-01-02 VITALS
TEMPERATURE: 98.9 F | HEART RATE: 83 BPM | SYSTOLIC BLOOD PRESSURE: 125 MMHG | DIASTOLIC BLOOD PRESSURE: 73 MMHG | RESPIRATION RATE: 16 BRPM

## 2024-01-02 PROCEDURE — 11044 DBRDMT BONE 1ST 20 SQ CM/<: CPT

## 2024-01-02 ASSESSMENT — ENCOUNTER SYMPTOMS
HEMOPTYSIS: 0
HEMOPTYSIS: 0

## 2024-01-02 NOTE — HOME HEALTH
Subjective: Pt states I had to add extra padding to dressing because of drainage   Falls since last visit No(if yes complete the Fall Tracking Form and include bsrifallreport):   Caregiver involvement changes: Wife is primary caregiver   Home health supplies by type and quantity ordered/delivered this visit include: none     Clinician asked if patient has had any physician contact since last home care visit and patient states: NO  Clinician asked if patient has any new or changed medications and patient states:  NO   If Yes, were medications reconciled? NO   Was the certifying physician notified of changes in medications? NO     Clinical assessment (what this visit means for the patient overall and need for ongoing skilled care) and progress or lack of progress towards SPECIFIC goals: Pt continues to require SN for wound care, Weekly labs, monitoring for s/s of infection, Sterile PICC line dressing changes     Written Teaching Material Utilized: N/A    Interdisciplinary communication with: N/A for the purpose of NA     Discharge planning as follows: When wound is 100% healed    Specific plan for next visit: Wound care

## 2024-01-02 NOTE — HOME HEALTH
Subjective: Pt states wound drains alot and has to be changed in between wound care days   Falls since last visit No(if yes complete the Fall Tracking Form and include bsrifallreport):   Caregiver involvement changes: Wife is primnary caregiver   Home health supplies by type and quantity ordered/delivered this visit include: none     Clinician asked if patient has had any physician contact since last home care visit and patient states: NO  Clinician asked if patient has any new or changed medications and patient states:  NO   If Yes, were medications reconciled? NO   Was the certifying physician notified of changes in medications? NO     Clinical assessment (what this visit means for the patient overall and need for ongoing skilled care) and progress or lack of progress towards SPECIFIC goals: Pt continues to require SN for wound care, Weekly labs, and sterile PICC line changes     Written Teaching Material Utilized: N/A    Interdisciplinary communication with: N/A for the purpose of NA     Discharge planning as follows: When wound is 100% healed    Specific plan for next visit: Wound care

## 2024-01-03 ENCOUNTER — HOME CARE VISIT (OUTPATIENT)
Facility: HOME HEALTH | Age: 54
End: 2024-01-03
Payer: COMMERCIAL

## 2024-01-03 LAB
ALBUMIN SERPL-MCNC: 3.8 G/DL (ref 3.8–4.9)
ALBUMIN/GLOB SERPL: 1.4 {RATIO} (ref 1.2–2.2)
ALP SERPL-CCNC: 96 IU/L (ref 44–121)
ALT SERPL-CCNC: 13 IU/L (ref 0–44)
AST SERPL-CCNC: 17 IU/L (ref 0–40)
BILIRUB SERPL-MCNC: <0.2 MG/DL (ref 0–1.2)
BUN SERPL-MCNC: 14 MG/DL (ref 6–24)
BUN/CREAT SERPL: 12 (ref 9–20)
CALCIUM SERPL-MCNC: 9 MG/DL (ref 8.7–10.2)
CHLORIDE SERPL-SCNC: 105 MMOL/L (ref 96–106)
CO2 SERPL-SCNC: 19 MMOL/L (ref 20–29)
CREAT SERPL-MCNC: 1.17 MG/DL (ref 0.76–1.27)
EGFRCR SERPLBLD CKD-EPI 2021: 74 ML/MIN/1.73
ERYTHROCYTE [DISTWIDTH] IN BLOOD BY AUTOMATED COUNT: 12.5 % (ref 11.6–15.4)
ERYTHROCYTE [SEDIMENTATION RATE] IN BLOOD BY WESTERGREN METHOD: 70 MM/HR (ref 0–30)
GLOBULIN SER CALC-MCNC: 2.8 G/DL (ref 1.5–4.5)
GLUCOSE SERPL-MCNC: 130 MG/DL (ref 70–99)
HCT VFR BLD AUTO: 35.7 % (ref 37.5–51)
HGB BLD-MCNC: 11.9 G/DL (ref 13–17.7)
MCH RBC QN AUTO: 30.7 PG (ref 26.6–33)
MCHC RBC AUTO-ENTMCNC: 33.3 G/DL (ref 31.5–35.7)
MCV RBC AUTO: 92 FL (ref 79–97)
PLATELET # BLD AUTO: 235 X10E3/UL (ref 150–450)
POTASSIUM SERPL-SCNC: 4.9 MMOL/L (ref 3.5–5.2)
PROT SERPL-MCNC: 6.6 G/DL (ref 6–8.5)
RBC # BLD AUTO: 3.88 X10E6/UL (ref 4.14–5.8)
SODIUM SERPL-SCNC: 144 MMOL/L (ref 134–144)
WBC # BLD AUTO: 7.7 X10E3/UL (ref 3.4–10.8)

## 2024-01-03 PROCEDURE — G0300 HHS/HOSPICE OF LPN EA 15 MIN: HCPCS

## 2024-01-03 NOTE — DISCHARGE INSTRUCTIONS
Dietary:  [] Diet as tolerated: [x] Calorie Diabetic Diet: [] No Added Salt:  [x] Increase Protein: [] Other:   Activity:  [] Activity as tolerated:  [x] Patient has  activity restrictions to elevate more often.    [] Strict Bedrest: [] Remain off Work:     [] May return to full duty work:                                   [] Return to work with restrictions:             Return Appointment:  [x] Wound and dressing supply provider:   [x] ECF or Home Healthcare:  [x] Wound Assessment: [] Physician or NP scheduled for Wound Assessment:   [x] Return Appointment: 1 Week(s)  [] Ordered tests:      Electronically signed STALIN STEWART LPN on 1/3/2024 at 11:18 AM     Wound Care Center Information: Should you experience any significant changes in your wound(s) or have questions about your wound care, please contact the [facility] at MONDAY - FRIDAY 8:00 am - 4:30.  If you need help with your wound outside these hours and cannot wait until we are again available, contact your PCP or go to the hospital emergency room.     PLEASE NOTE: IF YOU ARE UNABLE TO OBTAIN WOUND SUPPLIES, CONTINUE TO USE THE SUPPLIES YOU HAVE AVAILABLE UNTIL YOU ARE ABLE TO REACH US. IT IS MOST IMPORTANT TO KEEP THE WOUND COVERED AT ALL TIMES.     Physician Signature:_______________________    Date: ___________ Time:  ____________

## 2024-01-03 NOTE — WOUND CARE
Discharge Instructions from  [facility]  [facility]  [address] [place] [address]  [phone] Fax [phone]    NAME:  Juwan Espinoza  YOB: 1970  MEDICAL RECORD NUMBER:  [id number]  DATE:  1/2/2024    Wound Cleansing:   Do not scrub or use excessive force.  Cleanse wound prior to applying a clean dressing with:  [x] Other:  Vashe    Topical Treatments:  Do not apply lotions, creams, or ointments to wound bed unless directed.     [x] Apply thin film of moisture barrier ointment to skin immediately around wound.       Dressings:           Wound Location Right Foot   [x] Apply Primary Dressing:       [x] Alginate with Silver   [x] Cover and Secure with:     [x] Gauze [x] Cheryl [x] Kerlix   [x] Ace Wrap  [x] ABD        Avoid contact of tape with skin.  [x] Change dressing: [x] Two times per week until new orders.    Negative Pressure has been order, waiting for insurance approval.        Pressure Relief:  [x] Turn every 2 hours when in bed.  Avoid position directing pressure on wound site.  Limit side lying to 30 degree tilt.  Limit HOB elevation to 30 degrees.     Edema Control:  Apply: [x]Right Foot   [x] Elevate leg(s) above the level of the heart when sitting.    [x] Avoid prolonged standing in one place.     Compression:  Apply: Ace wraps   [x] Elevate leg(s) above the level of the heart when sitting.    [x] Avoid prolonged standing in one place.    Off-Loading:   [x] Off-loading when [x] walking  [x] in bed [x] sitting  [x] non-weight bearing  [x] Right Foot    [x] Assistive Device [] Walker [x] Cane  [x] Wheelchair     [x] Surgical shoe   [x] Roll About           Dietary:    [x] Increase Protein:    Activity:   [x] Patient has activity restrictions                                      Return Appointment:  [x] Wound and dressing supply provider:   [x] ECF or Home Healthcare:  [x] Wound Assessment: [x] Physician or NP scheduled for Wound Assessment:

## 2024-01-04 NOTE — HOME HEALTH
Subjective: \"I'm alright.\"  Falls since last visit No(if yes complete the Fall Tracking Form and include bsrifallreport):   Caregiver involvement changes: No  Home health supplies by type and quantity ordered/delivered this visit include: n/a    Clinician asked if patient has had any physician contact since last home care visit and patient states: NO  Clinician asked if patient has any new or changed medications and patient states:  NO   If Yes, were medications reconciled? N/A   Was the certifying physician notified of changes in medications? N/A     Clinical assessment (what this visit means for the patient overall and need for ongoing skilled care) and progress or lack of progress towards SPECIFIC goals: Pt at risk for rehospitalization r/t fall risk, infection, wound exacerbation.  Wound healing goals not met.    Written Teaching Material Utilized: N/A    Interdisciplinary communication with: N/A for the purpose of n/a    Discharge planning as follows: When wound is 100% healed and When goals are met    Specific plan for next visit: Assessment, wound care, education as needed

## 2024-01-04 NOTE — FLOWSHEET NOTE
01/02/24 1400   Anesthetic   Anesthetic 2% Lidocaine Gel Topical   Right Leg Edema Point of Measurement   Compression Therapy Compression not ordered   Left Leg Edema Point of Measurement   Compression Therapy Compression not ordered   Wound 01/02/24 Foot Right;Plantar   Date First Assessed/Time First Assessed: 01/02/24 1400   Present on Original Admission: Yes  Wound Approximate Age at First Assessment (Weeks): 3 weeks  Primary Wound Type: Diabetic Ulcer  Location: Foot  Wound Location Orientation: Right;Plantar   Wound Image     Wound Etiology Diabetic   Dressing Status New dressing applied;Intact;New drainage noted   Wound Cleansed Vashe;Wound cleanser   Dressing/Treatment ABD;Alginate with Ag;Ace wrap;Iodoform gauze;Packing;Roll gauze   Offloading for Diabetic Foot Ulcers Offloading not ordered   Dressing Change Due 01/09/24   Wound Length (cm) 3.5 cm   Wound Width (cm) 5.5 cm   Wound Depth (cm) 3.5 cm   Wound Surface Area (cm^2) 19.25 cm^2   Wound Volume (cm^3) 67.375 cm^3   Post-Procedure Length (cm) 3.7 cm   Post-Procedure Width (cm) 5.7 cm   Post-Procedure Depth (cm) 3.6 cm   Post-Procedure Surface Area (cm^2) 21.09 cm^2   Post-Procedure Volume (cm^3) 75.924 cm^3   Distance Tunneling (cm) 4.7 cm   Tunneling Position ___ O'Clock 6   Wound Assessment Devitalized tissue;Denuded;Erythema   Drainage Amount Moderate (25-50%)   Drainage Description Serosanguinous   Odor Mild   Allyn-wound Assessment Denuded;Edematous;Blanchable erythema   Margins Epibole (rolled edges);Unattached edges;Undefined edges   Wound Thickness Description not for Pressure Injury Full thickness

## 2024-01-05 ENCOUNTER — HOME CARE VISIT (OUTPATIENT)
Facility: HOME HEALTH | Age: 54
End: 2024-01-05
Payer: COMMERCIAL

## 2024-01-05 PROCEDURE — G0300 HHS/HOSPICE OF LPN EA 15 MIN: HCPCS

## 2024-01-08 ENCOUNTER — HOME CARE VISIT (OUTPATIENT)
Facility: HOME HEALTH | Age: 54
End: 2024-01-08
Payer: COMMERCIAL

## 2024-01-08 VITALS
TEMPERATURE: 97.4 F | RESPIRATION RATE: 16 BRPM | DIASTOLIC BLOOD PRESSURE: 68 MMHG | SYSTOLIC BLOOD PRESSURE: 140 MMHG | HEART RATE: 84 BPM | OXYGEN SATURATION: 95 %

## 2024-01-08 PROCEDURE — G0300 HHS/HOSPICE OF LPN EA 15 MIN: HCPCS

## 2024-01-08 NOTE — HOME HEALTH
Subjective: \"I'm supposed to get the wound vac tomorrow.\"  Falls since last visit No(if yes complete the Fall Tracking Form and include bsrifallreport):   Caregiver involvement changes: No  Home health supplies by type and quantity ordered/delivered this visit include: 4x4 gauze    Clinician asked if patient has had any physician contact since last home care visit and patient states: NO  Clinician asked if patient has any new or changed medications and patient states:  NO   If Yes, were medications reconciled? N/A   Was the certifying physician notified of changes in medications? N/A     Clinical assessment (what this visit means for the patient overall and need for ongoing skilled care) and progress or lack of progress towards SPECIFIC goals: Pt at risk for rehospitalization r/t fall risk, infection, wound exacerbation.  Wound healing goals not met.    Written Teaching Material Utilized: N/A    Interdisciplinary communication with: N/A for the purpose of n/a    Discharge planning as follows: When wound is 100% healed and When goals are met    Specific plan for next visit: Assessment, wound care, education as needed

## 2024-01-09 ENCOUNTER — HOSPITAL ENCOUNTER (OUTPATIENT)
Facility: HOSPITAL | Age: 54
Discharge: HOME | End: 2024-01-09
Attending: PODIATRIST
Payer: COMMERCIAL

## 2024-01-09 LAB
ALBUMIN SERPL-MCNC: 3.6 G/DL (ref 3.8–4.9)
ALBUMIN/GLOB SERPL: 1.2 {RATIO} (ref 1.2–2.2)
ALP SERPL-CCNC: 82 IU/L (ref 44–121)
ALT SERPL-CCNC: 12 IU/L (ref 0–44)
AST SERPL-CCNC: 11 IU/L (ref 0–40)
BASOPHILS # BLD AUTO: 0 X10E3/UL (ref 0–0.2)
BASOPHILS NFR BLD AUTO: 0 %
BILIRUB SERPL-MCNC: 0.2 MG/DL (ref 0–1.2)
BUN SERPL-MCNC: 17 MG/DL (ref 6–24)
BUN/CREAT SERPL: 16 (ref 9–20)
CALCIUM SERPL-MCNC: 8.8 MG/DL (ref 8.7–10.2)
CHLORIDE SERPL-SCNC: 101 MMOL/L (ref 96–106)
CO2 SERPL-SCNC: 19 MMOL/L (ref 20–29)
CREAT SERPL-MCNC: 1.06 MG/DL (ref 0.76–1.27)
EGFRCR SERPLBLD CKD-EPI 2021: 83 ML/MIN/1.73
EOSINOPHIL # BLD AUTO: 0.3 X10E3/UL (ref 0–0.4)
EOSINOPHIL NFR BLD AUTO: 3 %
ERYTHROCYTE [DISTWIDTH] IN BLOOD BY AUTOMATED COUNT: 12.9 % (ref 11.6–15.4)
GLOBULIN SER CALC-MCNC: 3.1 G/DL (ref 1.5–4.5)
GLUCOSE SERPL-MCNC: 115 MG/DL (ref 70–99)
HCT VFR BLD AUTO: 34.7 % (ref 37.5–51)
HGB BLD-MCNC: 11.7 G/DL (ref 13–17.7)
IMM GRANULOCYTES # BLD AUTO: 0 X10E3/UL (ref 0–0.1)
IMM GRANULOCYTES NFR BLD AUTO: 1 %
LYMPHOCYTES # BLD AUTO: 2.2 X10E3/UL (ref 0.7–3.1)
LYMPHOCYTES NFR BLD AUTO: 25 %
MCH RBC QN AUTO: 30.6 PG (ref 26.6–33)
MCHC RBC AUTO-ENTMCNC: 33.7 G/DL (ref 31.5–35.7)
MCV RBC AUTO: 91 FL (ref 79–97)
MONOCYTES # BLD AUTO: 0.7 X10E3/UL (ref 0.1–0.9)
MONOCYTES NFR BLD AUTO: 8 %
NEUTROPHILS # BLD AUTO: 5.5 X10E3/UL (ref 1.4–7)
NEUTROPHILS NFR BLD AUTO: 63 %
PLATELET # BLD AUTO: 234 X10E3/UL (ref 150–450)
POTASSIUM SERPL-SCNC: 4.7 MMOL/L (ref 3.5–5.2)
PROT SERPL-MCNC: 6.7 G/DL (ref 6–8.5)
RBC # BLD AUTO: 3.82 X10E6/UL (ref 4.14–5.8)
SODIUM SERPL-SCNC: 141 MMOL/L (ref 134–144)
WBC # BLD AUTO: 8.7 X10E3/UL (ref 3.4–10.8)

## 2024-01-09 PROCEDURE — 97606 NEG PRS WND THER DME>50 SQCM: CPT

## 2024-01-10 ENCOUNTER — HOME CARE VISIT (OUTPATIENT)
Facility: HOME HEALTH | Age: 54
End: 2024-01-10
Payer: COMMERCIAL

## 2024-01-10 VITALS
TEMPERATURE: 99.2 F | SYSTOLIC BLOOD PRESSURE: 134 MMHG | HEART RATE: 85 BPM | RESPIRATION RATE: 16 BRPM | OXYGEN SATURATION: 96 % | DIASTOLIC BLOOD PRESSURE: 76 MMHG

## 2024-01-10 VITALS
HEART RATE: 90 BPM | SYSTOLIC BLOOD PRESSURE: 126 MMHG | DIASTOLIC BLOOD PRESSURE: 62 MMHG | OXYGEN SATURATION: 97 % | RESPIRATION RATE: 16 BRPM | TEMPERATURE: 97.7 F

## 2024-01-10 PROCEDURE — G0300 HHS/HOSPICE OF LPN EA 15 MIN: HCPCS

## 2024-01-10 NOTE — HOME HEALTH
Subjective: \"I'm really impressed with the wound vac.\"  Falls since last visit No(if yes complete the Fall Tracking Form and include bsrifallreport):   Caregiver involvement changes: No  Home health supplies by type and quantity ordered/delivered this visit include: n/a    Clinician asked if patient has had any physician contact since last home care visit and patient states: YES  Clinician asked if patient has any new or changed medications and patient states:  NO   If Yes, were medications reconciled? N/A   Was the certifying physician notified of changes in medications? N/A     Clinical assessment (what this visit means for the patient overall and need for ongoing skilled care) and progress or lack of progress towards SPECIFIC goals: Pt at risk for rehospitalization r/t fall risk, infection, wound exacerbation.  Wound healing goals not met.    Written Teaching Material Utilized: N/A    Interdisciplinary communication with: N/A for the purpose of n/a    Discharge planning as follows: When wound is 100% healed and When goals are met    Specific plan for next visit: Assessment, NPWT, education as needed

## 2024-01-10 NOTE — PLAN OF CARE
Discharge Instructions from  [facility]  [facility]  [address] [place] [address]  [phone] Fax [phone]    NAME:  Juwan Espinoza  YOB: 1970  MEDICAL RECORD NUMBER:  [id number]  DATE:  1/9/2024    Wound Cleansing:   Do not scrub or use excessive force.  Cleanse wound prior to applying a clean dressing with:   [x] Wound Cleanser       Topical Treatments:  Do not apply lotions, creams, or ointments to wound bed unless directed.       [x] Apply thin film of moisture barrier ointment to skin immediately around wound.       Dressings:           Wound Location Right Foot   [x] Apply Primary Dressing:          [x] Collagen with Silver                    [x] Pack wound loosely with  [x] Other Collagen  [x] Cover and Secure with:   [x] Kerlix   [x] Ace Wrap         Avoid contact of tape with skin.  [x] Change dressing:  [x] Three times per week        Negative Pressure:           Wound Location:   [x] Pressure@           mm/Hg  [x]Continuous    [x] Black  [x] White Foam over bone    [x]Change dressing: [x]Three times per week        Pressure Relief:  [x] When sitting, shift position or do seat lifts every 15 minutes.  [x] Wheelchair cushion [] Specialty Bed/Mattress  [x] Turn every 2 hours when in bed.  Avoid position directing pressure on wound site.  Limit side lying to 30 degree tilt.  Limit HOB elevation to 30 degrees.          Compression:  Apply: [x] Multilayer Compression Wrap Applied in Clinic [x]Right Leg Ace wrap   [] Multi-layer compression.  Do not get leg(s) with wrap wet.  If wraps become too tight call the center or completely remove the wrap.      [x] Elevate leg(s) above the level of the heart when sitting.    [] Avoid prolonged standing in one place.    Off-Loading:   [x] Off-loading when [x] walking  [x] in bed [x] sitting  [x] Total non-weight bearing  [x] Right Leg     [x] Assistive Device  [x] Cane  [x] Wheelchair    [x] Surgical shoe

## 2024-01-10 NOTE — FLOWSHEET NOTE
01/09/24 1621   Anesthetic   Anesthetic 2% Lidocaine Gel Topical   Right Leg Edema Point of Measurement   Compression Therapy Compression not ordered   Left Leg Edema Point of Measurement   Compression Therapy Compression not ordered   Negative Pressure Wound Therapy Foot Right;Plantar   Placement Date/Time: 01/09/24 1630   Present on Admission/Arrival: No  Location: Foot  Wound Location Orientation: Right;Plantar   $ Standard NPWT >50 sq cm PER TX $ Yes   Wound Type Surgical   Unit Type ACT VAC   Dressing Type Black Foam;White Foam   Number of pieces used 2   Cycle Continuous   Target Pressure (mmHg) 125   Intensity 5   Canister changed? No   Dressing Status New dressing applied   Wound Assessment Devitalized tissue;Denuded;Exposed structure bone;Fibrinous;Granulation tissue   Allyn-wound Assessment Maceration   Shape ROUND   Odor Mild   Wound 01/02/24 Foot Right;Plantar   Date First Assessed/Time First Assessed: 01/02/24 1400   Present on Original Admission: Yes  Wound Approximate Age at First Assessment (Weeks): 3 weeks  Primary Wound Type: Diabetic Ulcer  Location: Foot  Wound Location Orientation: Right;Plantar   Wound Image    Wound Etiology Diabetic   Dressing Status New dressing applied   Wound Cleansed Wound cleanser   Dressing/Treatment Collagen with Ag;Negative pressure wound therapy;Cast padding;Ace wrap   Offloading for Diabetic Foot Ulcers Offloading ordered   Dressing Change Due 01/16/24   Wound Length (cm) 3.5 cm   Wound Width (cm) 5.5 cm   Wound Depth (cm) 3.5 cm   Wound Surface Area (cm^2) 19.25 cm^2   Change in Wound Size % (l*w) 0   Wound Volume (cm^3) 67.375 cm^3   Wound Healing % 0   Tunneling Position ___ O'Clock 11   Undermining Maxium Distance (cm) 5.5   Wound Assessment Denuded   Drainage Amount Copious (>75 % saturated)   Drainage Description Serosanguinous   Odor Mild   Allyn-wound Assessment Blanchable erythema   Margins Epibole (rolled edges)   Wound Thickness Description not for

## 2024-01-10 NOTE — DISCHARGE INSTRUCTIONS
Activity as tolerated:  [] Patient has no activity restrictions     [] Strict Bedrest: [] Remain off Work:     [] May return to full duty work:                                   [] Return to work with restrictions:             Return Appointment:  [x] Wound and dressing supply provider:   [x] ECF or Home Healthcare:  [x] Wound Assessment: [x] Physician or NP scheduled for Wound Assessment:   [x] Return Appointment: 1  Week(s)  [] Ordered tests:      Electronically signed STALIN STEWART LPN on 1/10/2024 at 1:39 PM     Wound Care Center Information: Should you experience any significant changes in your wound(s) or have questions about your wound care, please contact the [facility] at MONDAY - FRIDAY 8:00 am - 4:30.  If you need help with your wound outside these hours and cannot wait until we are again available, contact your PCP or go to the hospital emergency room.     PLEASE NOTE: IF YOU ARE UNABLE TO OBTAIN WOUND SUPPLIES, CONTINUE TO USE THE SUPPLIES YOU HAVE AVAILABLE UNTIL YOU ARE ABLE TO REACH US. IT IS MOST IMPORTANT TO KEEP THE WOUND COVERED AT ALL TIMES.     Physician Signature:_______________________    Date: ___________ Time:  ____________

## 2024-01-11 VITALS
DIASTOLIC BLOOD PRESSURE: 84 MMHG | SYSTOLIC BLOOD PRESSURE: 130 MMHG | DIASTOLIC BLOOD PRESSURE: 72 MMHG | RESPIRATION RATE: 20 BRPM | TEMPERATURE: 98 F | OXYGEN SATURATION: 98 % | OXYGEN SATURATION: 98 % | RESPIRATION RATE: 20 BRPM | TEMPERATURE: 98.4 F | HEART RATE: 94 BPM | SYSTOLIC BLOOD PRESSURE: 132 MMHG | HEART RATE: 93 BPM

## 2024-01-11 ASSESSMENT — ENCOUNTER SYMPTOMS
HEMOPTYSIS: 0
HEMOPTYSIS: 0

## 2024-01-11 NOTE — HOME HEALTH
Subjective: Pt states Dr. Castaneda is ordering a wound vac   Falls since last visit No(if yes complete the Fall Tracking Form and include bsrifallreport):   Caregiver involvement changes: Wife is primary caregiver   Home health supplies by type and quantity ordered/delivered this visit include: none     Clinician asked if patient has had any physician contact since last home care visit and patient states: NO  Clinician asked if patient has any new or changed medications and patient states:  NO   If Yes, were medications reconciled? NO   Was the certifying physician notified of changes in medications? NO     Clinical assessment (what this visit means for the patient overall and need for ongoing skilled care) and progress or lack of progress towards SPECIFIC goals: Pt continues to require SN for wound care, Sterile PICC line dressing changes, Weekly labs     Written Teaching Material Utilized: N/A    Interdisciplinary communication with: N/A for the purpose of NA     Discharge planning as follows: When wound is 100% healed    Specific plan for next visit: Wound care

## 2024-01-11 NOTE — HOME HEALTH
Subjective: Pt states he feels fine   Falls since last visit No(if yes complete the Fall Tracking Form and include bsrifallreport):   Caregiver involvement changes: Wife is primary caregiver   Home health supplies by type and quantity ordered/delivered this visit include: none     Clinician asked if patient has had any physician contact since last home care visit and patient states: NO  Clinician asked if patient has any new or changed medications and patient states:  NO   If Yes, were medications reconciled? NO   Was the certifying physician notified of changes in medications? NO     Clinical assessment (what this visit means for the patient overall and need for ongoing skilled care) and progress or lack of progress towards SPECIFIC goals: Pt continues to require SN for wound care, Sterile PICC line dressing changes, weekly labs     Written Teaching Material Utilized: N/A    Interdisciplinary communication with: N/A for the purpose of NA     Discharge planning as follows: When wound is 100% healed    Specific plan for next visit: Wound care, Obtain CBC/CMP, PICC line dressing changes

## 2024-01-12 ENCOUNTER — HOME CARE VISIT (OUTPATIENT)
Facility: HOME HEALTH | Age: 54
End: 2024-01-12
Payer: COMMERCIAL

## 2024-01-12 PROCEDURE — G0300 HHS/HOSPICE OF LPN EA 15 MIN: HCPCS

## 2024-01-14 VITALS
DIASTOLIC BLOOD PRESSURE: 76 MMHG | TEMPERATURE: 97.6 F | RESPIRATION RATE: 16 BRPM | HEART RATE: 84 BPM | SYSTOLIC BLOOD PRESSURE: 140 MMHG | OXYGEN SATURATION: 96 %

## 2024-01-15 ENCOUNTER — HOME CARE VISIT (OUTPATIENT)
Dept: HOME HEALTH SERVICES | Facility: HOME HEALTH | Age: 54
End: 2024-01-15
Payer: COMMERCIAL

## 2024-01-15 PROCEDURE — G0300 HHS/HOSPICE OF LPN EA 15 MIN: HCPCS

## 2024-01-16 ENCOUNTER — HOSPITAL ENCOUNTER (OUTPATIENT)
Facility: HOSPITAL | Age: 54
Discharge: HOME | End: 2024-01-16
Attending: PODIATRIST
Payer: COMMERCIAL

## 2024-01-16 LAB
ALBUMIN SERPL-MCNC: 3.6 G/DL (ref 3.8–4.9)
ALBUMIN/GLOB SERPL: 1.2 {RATIO} (ref 1.2–2.2)
ALP SERPL-CCNC: 82 IU/L (ref 44–121)
ALT SERPL-CCNC: 11 IU/L (ref 0–44)
AST SERPL-CCNC: 14 IU/L (ref 0–40)
BILIRUB SERPL-MCNC: 0.2 MG/DL (ref 0–1.2)
BUN SERPL-MCNC: 16 MG/DL (ref 6–24)
BUN/CREAT SERPL: 12 (ref 9–20)
CALCIUM SERPL-MCNC: 9.1 MG/DL (ref 8.7–10.2)
CHLORIDE SERPL-SCNC: 100 MMOL/L (ref 96–106)
CO2 SERPL-SCNC: 20 MMOL/L (ref 20–29)
CREAT SERPL-MCNC: 1.3 MG/DL (ref 0.76–1.27)
CRP SERPL-MCNC: 110 MG/L (ref 0–10)
EGFRCR SERPLBLD CKD-EPI 2021: 65 ML/MIN/1.73
ERYTHROCYTE [DISTWIDTH] IN BLOOD BY AUTOMATED COUNT: 12.3 % (ref 11.6–15.4)
ERYTHROCYTE [SEDIMENTATION RATE] IN BLOOD BY WESTERGREN METHOD: 104 MM/HR (ref 0–30)
GLOBULIN SER CALC-MCNC: 2.9 G/DL (ref 1.5–4.5)
GLUCOSE SERPL-MCNC: 200 MG/DL (ref 70–99)
HCT VFR BLD AUTO: 33.4 % (ref 37.5–51)
HGB BLD-MCNC: 11 G/DL (ref 13–17.7)
MCH RBC QN AUTO: 30 PG (ref 26.6–33)
MCHC RBC AUTO-ENTMCNC: 32.9 G/DL (ref 31.5–35.7)
MCV RBC AUTO: 91 FL (ref 79–97)
PLATELET # BLD AUTO: 363 X10E3/UL (ref 150–450)
POTASSIUM SERPL-SCNC: 4.8 MMOL/L (ref 3.5–5.2)
PROT SERPL-MCNC: 6.5 G/DL (ref 6–8.5)
RBC # BLD AUTO: 3.67 X10E6/UL (ref 4.14–5.8)
SODIUM SERPL-SCNC: 139 MMOL/L (ref 134–144)
WBC # BLD AUTO: 11.4 X10E3/UL (ref 3.4–10.8)

## 2024-01-16 PROCEDURE — 97597 DBRDMT OPN WND 1ST 20 CM/<: CPT

## 2024-01-16 PROCEDURE — 12001 RPR S/N/AX/GEN/TRNK 2.5CM/<: CPT

## 2024-01-17 ENCOUNTER — HOME CARE VISIT (OUTPATIENT)
Facility: HOME HEALTH | Age: 54
End: 2024-01-17
Payer: COMMERCIAL

## 2024-01-17 PROCEDURE — G0300 HHS/HOSPICE OF LPN EA 15 MIN: HCPCS

## 2024-01-17 NOTE — WOUND CARE
EXTRACTION         FAMILY HISTORY    Family History   Problem Relation Age of Onset    Elevated Lipids Mother     Other Mother         GOUT    Hypertension Mother     Diabetes Mother        SOCIAL HISTORY    Social History     Tobacco Use    Smoking status: Never    Smokeless tobacco: Never   Substance Use Topics    Alcohol use: No    Drug use: No       ALLERGIES    Allergies   Allergen Reactions    Atorvastatin Other (See Comments)     Muscle cramps       MEDICATIONS    Current Outpatient Medications on File Prior to Encounter   Medication Sig Dispense Refill    Continuous Blood Gluc Sensor (DEXCOM G6 SENSOR) MISC APPLY SENSOR AND CHANGE EVERY 10 DAYS. 2 each 3    JANUMET  MG per tablet TAKE 1 TABLET BY MOUTH TWICE A DAY WITH FOOD 180 tablet 1    gabapentin (NEURONTIN) 300 MG capsule TAKE 2 CAPSULES BY MOUTH 3 TIMES A  capsule 3    insulin glargine (LANTUS SOLOSTAR) 100 UNIT/ML injection pen Inject 120 Units into the skin daily (Patient taking differently: Inject 100 Units into the skin daily) 108 mL 5    Multiple Vitamins-Minerals (MULTIVITAL PO) Take 1 tablet by mouth daily.      ampicillin-sulbactam (UNASYN 3GM) 3 (2-1) g SOLR Infuse 3,000 mg intravenously every 6 hours 148 each 0    Semaglutide, 1 MG/DOSE, (OZEMPIC, 1 MG/DOSE,) 4 MG/3ML SOPN INJECT 1 MG BY SUBCUTANEOUS ROUTE EVERY SEVEN (7) DAYS. 9 mL 3    rosuvastatin (CRESTOR) 5 MG tablet TAKE 1 TABLET BY MOUTH EVERY DAY 90 tablet 3    cyanocobalamin 100 MCG tablet Take 1 tablet by mouth daily       No current facility-administered medications on file prior to encounter.       REVIEW OF SYSTEMS    Pertinent items are noted in HPI.    Objective:     There were no vitals taken for this visit.    Wt Readings from Last 3 Encounters:   12/13/23 99.8 kg (220 lb)   10/03/23 126.1 kg (278 lb)   03/31/23 127 kg (280 lb)       PHYSICAL EXAM    Pertinent items are noted in HPI.    Assessment:      Problem List Items Addressed This Visit    None      Active

## 2024-01-18 VITALS
SYSTOLIC BLOOD PRESSURE: 145 MMHG | HEART RATE: 89 BPM | OXYGEN SATURATION: 97 % | RESPIRATION RATE: 16 BRPM | DIASTOLIC BLOOD PRESSURE: 76 MMHG | TEMPERATURE: 98.1 F

## 2024-01-18 NOTE — HOME CARE
Discharge Instructions from  [facility]  [address] [address]  [phone] Fax [phone]  HOME HEALTH  NAME:  Juwan Espinoza  YOB: 1970  MEDICAL RECORD NUMBER:  [id number]  DATE:  @ED@    Wound Cleansing:   Do not scrub or use excessive force.  Cleanse wound prior to applying a clean dressing with:  [] Normal Saline [x] Keep Wound Dry in Shower    [x] Wound Cleanser   [] Cleanse wound with Mild Soap & Water  [] May Shower at Discharge   [] Other:      Dressings:           Wound Location : Right plantar foot wound   [] Apply Primary Dressing:       [] MediHoney Gel [] Alginate with Silver [] Alginate   [] Collagen [] Collagen with Silver   [] Santyl with Moisten saline gauze     [] Hydrocolloid   [] MediHoney Alginate [] Foam with Silver   [] Foam   [] Hydrofera Blue    [] Mepilex Border    [] Moisten with Saline [] Hydrogel [] Mepitel     [] Bactroban/Mupirocin [] Polysporin  [] Other:    [] Pack wound loosely with  [] Iodoform   [] Plain Packing  [] Other   [] Cover and Secure with:     [] Gauze [] Cheryl [] Kerlix   [] Ace Wrap [] Cover Roll Tape [] ABD     [x] Other: steri strips, allkare skin prep     [x] Change dressing: [] Daily    [] Every Other Day [] Three times per week   [x] Once a week other than clinic day [] Do Not Change Dressing   [] Other:     Negative Pressure:           Wound Location:   [x] Pressure@  125 mm/Hg  [x]Continuous []Intermittent   [x] Black  [] White Foam [] Other:   [x]Change dressing: []Three times per week    [x]Other: 1 x other than clinic visit, Fri or Sat      Make sure all skin is covered with drape and black sponge is not directly on skin. Use 3 steri strips across the wound to help hold closed and to allow the wound vac to work. Make sure black sponge covers all the sutures as for them not to puncture the cover drape. Please make sure periwound is dry and skin prep is used to assure drape

## 2024-01-18 NOTE — FLOWSHEET NOTE
01/16/24 1600   Wound 01/02/24 Foot Right;Plantar   Date First Assessed/Time First Assessed: 01/02/24 1400   Present on Original Admission: Yes  Wound Approximate Age at First Assessment (Weeks): 3 weeks  Primary Wound Type: Diabetic Ulcer  Location: Foot  Wound Location Orientation: Right;Plantar   Wound Image      Wound Etiology Diabetic   Dressing Status Reinforced dressing;New dressing applied   Wound Cleansed Wound cleanser;Vashe   Dressing/Treatment Ace wrap;Foam;Roll gauze;Steri-strips;Negative pressure wound therapy   Offloading for Diabetic Foot Ulcers Offloading ordered   Dressing Change Due 01/19/24   Wound Length (cm) 4 cm   Wound Width (cm) 4 cm   Wound Depth (cm) 4 cm   Wound Surface Area (cm^2) 16 cm^2   Change in Wound Size % (l*w) 16.88   Wound Volume (cm^3) 64 cm^3   Wound Healing % 5   Post-Procedure Length (cm) 6 cm  (wound sutured together)   Post-Procedure Width (cm) 1 cm   Post-Procedure Surface Area (cm^2) 6 cm^2   Wound Assessment Bleeding;Devitalized tissue   Drainage Amount Copious (>75 % saturated)   Drainage Description Serosanguinous   Odor Mild   Bailey-wound Assessment Maceration   Margins Epibole (rolled edges)   Wound Thickness Description not for Pressure Injury Full thickness   Klever Scale   Sensory Perceptions 2   Moisture 4   Activity 3   Mobility 3   Nutrition 3   Friction and Shear 3   Klever Scale Score 18           Negative Pressure Wound Therapy    NAME:  Juwan Espinoza  YOB: 1970  MEDICAL RECORD NUMBER:  [id number]  DATE:  1/16/2024    Applied Negative Pressure to right plantar foot wound(s)/ulcer(s).  [x] Applied skin barrier prep to bailey-wound.   [x] Cut strips of plastic drape to picture frame wound so that bailey-wound is     covered with the drape.   [x] If bridging dressing to less prominent site, cover any intact skin that will come in contact with the Negative Pressure Therapy sponge, gauze or channel drain with plastic drape. The sponge should never

## 2024-01-19 ENCOUNTER — HOME CARE VISIT (OUTPATIENT)
Facility: HOME HEALTH | Age: 54
End: 2024-01-19
Payer: COMMERCIAL

## 2024-01-19 PROCEDURE — G0300 HHS/HOSPICE OF LPN EA 15 MIN: HCPCS

## 2024-01-21 PROBLEM — N17.9 AKI (ACUTE KIDNEY INJURY) (HCC): Status: RESOLVED | Noted: 2023-12-12 | Resolved: 2024-01-21

## 2024-01-22 ENCOUNTER — HOME CARE VISIT (OUTPATIENT)
Facility: HOME HEALTH | Age: 54
End: 2024-01-22
Payer: COMMERCIAL

## 2024-01-22 PROCEDURE — G0300 HHS/HOSPICE OF LPN EA 15 MIN: HCPCS

## 2024-01-23 ENCOUNTER — HOSPITAL ENCOUNTER (OUTPATIENT)
Facility: HOSPITAL | Age: 54
Discharge: HOME | End: 2024-01-23
Attending: PODIATRIST
Payer: COMMERCIAL

## 2024-01-23 DIAGNOSIS — E11.628 DIABETIC FOOT INFECTION (HCC): ICD-10-CM

## 2024-01-23 DIAGNOSIS — L08.9 DIABETIC FOOT INFECTION (HCC): ICD-10-CM

## 2024-01-23 DIAGNOSIS — E11.610 CHARCOT FOOT DUE TO DIABETES MELLITUS (HCC): Primary | ICD-10-CM

## 2024-01-23 LAB
ALBUMIN SERPL-MCNC: 4.1 G/DL (ref 3.8–4.9)
ALBUMIN/GLOB SERPL: 1.6 {RATIO} (ref 1.2–2.2)
ALP SERPL-CCNC: 85 IU/L (ref 44–121)
ALT SERPL-CCNC: 20 IU/L (ref 0–44)
AST SERPL-CCNC: 19 IU/L (ref 0–40)
BILIRUB SERPL-MCNC: <0.2 MG/DL (ref 0–1.2)
BUN SERPL-MCNC: 18 MG/DL (ref 6–24)
BUN/CREAT SERPL: 17 (ref 9–20)
CALCIUM SERPL-MCNC: 9.1 MG/DL (ref 8.7–10.2)
CHLORIDE SERPL-SCNC: 103 MMOL/L (ref 96–106)
CO2 SERPL-SCNC: 19 MMOL/L (ref 20–29)
CREAT SERPL-MCNC: 1.09 MG/DL (ref 0.76–1.27)
EGFRCR SERPLBLD CKD-EPI 2021: 80 ML/MIN/1.73
ERYTHROCYTE [DISTWIDTH] IN BLOOD BY AUTOMATED COUNT: 12.8 % (ref 11.6–15.4)
ERYTHROCYTE [SEDIMENTATION RATE] IN BLOOD BY WESTERGREN METHOD: 101 MM/HR (ref 0–30)
GLOBULIN SER CALC-MCNC: 2.5 G/DL (ref 1.5–4.5)
GLUCOSE SERPL-MCNC: 133 MG/DL (ref 70–99)
HCT VFR BLD AUTO: 35.6 % (ref 37.5–51)
HGB BLD-MCNC: 11.8 G/DL (ref 13–17.7)
MCH RBC QN AUTO: 30.2 PG (ref 26.6–33)
MCHC RBC AUTO-ENTMCNC: 33.1 G/DL (ref 31.5–35.7)
MCV RBC AUTO: 91 FL (ref 79–97)
PLATELET # BLD AUTO: 425 X10E3/UL (ref 150–450)
POTASSIUM SERPL-SCNC: 4.8 MMOL/L (ref 3.5–5.2)
PROT SERPL-MCNC: 6.6 G/DL (ref 6–8.5)
RBC # BLD AUTO: 3.91 X10E6/UL (ref 4.14–5.8)
SODIUM SERPL-SCNC: 142 MMOL/L (ref 134–144)
WBC # BLD AUTO: 9.2 X10E3/UL (ref 3.4–10.8)

## 2024-01-23 PROCEDURE — 17250 CHEM CAUT OF GRANLTJ TISSUE: CPT

## 2024-01-23 RX ORDER — GENTAMICIN SULFATE 1 MG/G
OINTMENT TOPICAL ONCE
OUTPATIENT
Start: 2024-01-23 | End: 2024-01-23

## 2024-01-23 RX ORDER — LIDOCAINE 50 MG/G
OINTMENT TOPICAL ONCE
OUTPATIENT
Start: 2024-01-23 | End: 2024-01-23

## 2024-01-23 RX ORDER — LIDOCAINE HYDROCHLORIDE 40 MG/ML
SOLUTION TOPICAL ONCE
OUTPATIENT
Start: 2024-01-23 | End: 2024-01-23

## 2024-01-23 RX ORDER — LIDOCAINE HYDROCHLORIDE 20 MG/ML
JELLY TOPICAL ONCE
OUTPATIENT
Start: 2024-01-23 | End: 2024-01-23

## 2024-01-23 RX ORDER — CLOBETASOL PROPIONATE 0.5 MG/G
OINTMENT TOPICAL ONCE
OUTPATIENT
Start: 2024-01-23 | End: 2024-01-23

## 2024-01-23 RX ORDER — IBUPROFEN 200 MG
TABLET ORAL ONCE
OUTPATIENT
Start: 2024-01-23 | End: 2024-01-23

## 2024-01-23 RX ORDER — LIDOCAINE 40 MG/G
CREAM TOPICAL ONCE
OUTPATIENT
Start: 2024-01-23 | End: 2024-01-23

## 2024-01-23 RX ORDER — TRIAMCINOLONE ACETONIDE 1 MG/G
OINTMENT TOPICAL ONCE
OUTPATIENT
Start: 2024-01-23 | End: 2024-01-23

## 2024-01-23 RX ORDER — GINSENG 100 MG
CAPSULE ORAL ONCE
OUTPATIENT
Start: 2024-01-23 | End: 2024-01-23

## 2024-01-23 RX ORDER — BACITRACIN ZINC AND POLYMYXIN B SULFATE 500; 1000 [USP'U]/G; [USP'U]/G
OINTMENT TOPICAL ONCE
OUTPATIENT
Start: 2024-01-23 | End: 2024-01-23

## 2024-01-23 RX ORDER — BETAMETHASONE DIPROPIONATE 0.5 MG/G
CREAM TOPICAL ONCE
OUTPATIENT
Start: 2024-01-23 | End: 2024-01-23

## 2024-01-23 RX ORDER — SODIUM CHLOR/HYPOCHLOROUS ACID 0.033 %
SOLUTION, IRRIGATION IRRIGATION ONCE
OUTPATIENT
Start: 2024-01-23 | End: 2024-01-23

## 2024-01-24 ENCOUNTER — HOME CARE VISIT (OUTPATIENT)
Facility: HOME HEALTH | Age: 54
End: 2024-01-24
Payer: COMMERCIAL

## 2024-01-24 VITALS
OXYGEN SATURATION: 96 % | SYSTOLIC BLOOD PRESSURE: 130 MMHG | DIASTOLIC BLOOD PRESSURE: 74 MMHG | TEMPERATURE: 98.6 F | HEART RATE: 80 BPM | RESPIRATION RATE: 18 BRPM

## 2024-01-24 VITALS
HEART RATE: 73 BPM | SYSTOLIC BLOOD PRESSURE: 134 MMHG | OXYGEN SATURATION: 100 % | DIASTOLIC BLOOD PRESSURE: 80 MMHG | TEMPERATURE: 97.8 F | RESPIRATION RATE: 18 BRPM

## 2024-01-24 VITALS
OXYGEN SATURATION: 98 % | RESPIRATION RATE: 20 BRPM | HEART RATE: 78 BPM | TEMPERATURE: 97.5 F | DIASTOLIC BLOOD PRESSURE: 69 MMHG | SYSTOLIC BLOOD PRESSURE: 130 MMHG

## 2024-01-24 ASSESSMENT — ENCOUNTER SYMPTOMS
HEMOPTYSIS: 0
HEMOPTYSIS: 0

## 2024-01-24 NOTE — WOUND CARE
Chemical Cautery    Performed by: Celso Castaneda DPM    Consent obtained? Yes     Time out taken: Yes    Tissue: Hypergranulation    Method: silver nitrate    Location of Chemical Cautery:     Wound/Ulcer Number: 1           Procedural Pain: 0 / 10     Post Procedural Pain: 0 / 10     Response to treatment: Well tolerated by patient  Reinforced the closure with additional sutures

## 2024-01-24 NOTE — HOME HEALTH
Subjective: Pt states Dr. Castaneda sutured the foot up and applied wound vac on top of sutures   Falls since last visit No(if yes complete the Fall Tracking Form and include bsrifallreport):   Caregiver involvement changes: Wife is primary caregiver   Home health supplies by type and quantity ordered/delivered this visit include: none     Clinician asked if patient has had any physician contact since last home care visit and patient states: YES  Clinician asked if patient has any new or changed medications and patient states:  NO   If Yes, were medications reconciled? NO   Was the certifying physician notified of changes in medications? NO     Clinical assessment (what this visit means for the patient overall and need for ongoing skilled care) and progress or lack of progress towards SPECIFIC goals: Pt continues to require SN for wound care     Written Teaching Material Utilized: N/A    Interdisciplinary communication with: N/A for the purpose of NA     Discharge planning as follows: When wound is 100% healed    Specific plan for next visit: Wound care

## 2024-01-24 NOTE — PLAN OF CARE
sponge or it will produce suction damaging intact skin.  Total number of individual pieces of foam used within the wound bed: 1    [x] If bridging the dressing away from the primary site, be sure the bridge leads to a piece of sponge large enough to hold the entire flange without allowing any of the flange to overlap onto intact skin.   [x] Covered sponge, gauze or channel drain with plastic drape.   [x] Cut a hole in this plastic drape directly over the sponge the same size as the plastic drain tubing.   [x] Removed plastic liner from flange and apply it directly over the hole you cut.   [x] Removed the plastic cover from the flange.   [x] Attached the tubing to the wound/ulcer Negative Pressure Therapy and turn it on to be sure a vacuum is created and that there are no leaks.   [x] If air leaks occur, use plastic drape to patch them.   [x] Secured Negative Pressure Therapy dressing with ace wrap loosely if located on an extremity. Maintain tubing outside of ace wrap. Tubing must not exert pressure on intact skin.    Applied per  Guidelines        Edema Control:  Apply: [] Compression Stocking []Right Leg []Left Leg   [] Tubigrip []Right Leg Double Layer []Left Leg Double Layer       []Right Leg Single Layer []Left Leg Single Layer   [] SpandaGrip []Right Leg  []Left Leg      []Low compression 5-10 mm/Hg      []Medium compression 10-20 mm/Hg     []High compression  20-30 mm/Hg   every morning immediately when getting up should be applied to affected leg(s) from mid foot to knee making sure to cover the heel.  Remove every night before going to bed.   [x] Elevate leg(s) above the level of the heart when sitting.    [x] Avoid prolonged standing in one place.         Off-Loading:   [x] Off-loading when [x] walking  [] in bed [] sitting  [] Total non-weight bearing  [] Right Leg  [] Left Leg   [] Assistive Device [] Walker [] Cane  [] Wheelchair  [] Crutches   [] Surgical shoe    [] Podus Boot(s)   [] Foam  97.7

## 2024-01-24 NOTE — HOME HEALTH
Subjective: Pt states he has VV with Dr. Bolton on 1/25/24  Falls since last visit No(if yes complete the Fall Tracking Form and include bsrifallreport):   Caregiver involvement changes: Wife is primary caregiver   Home health supplies by type and quantity ordered/delivered this visit include: none     Clinician asked if patient has had any physician contact since last home care visit and patient states: NO  Clinician asked if patient has any new or changed medications and patient states:  NO   If Yes, were medications reconciled? NO   Was the certifying physician notified of changes in medications? NO     Clinical assessment (what this visit means for the patient overall and need for ongoing skilled care) and progress or lack of progress towards SPECIFIC goals: Pt continues to require SN for wound care, PICC , and weekly lab draws     Written Teaching Material Utilized: N/A    Interdisciplinary communication with: N/A for the purpose of NA     Discharge planning as follows: When wound is 100% healed    Specific plan for next visit: Wound care

## 2024-01-25 ENCOUNTER — TELEMEDICINE (OUTPATIENT)
Age: 54
End: 2024-01-25
Payer: COMMERCIAL

## 2024-01-25 ENCOUNTER — TELEPHONE (OUTPATIENT)
Age: 54
End: 2024-01-25

## 2024-01-25 VITALS
OXYGEN SATURATION: 98 % | DIASTOLIC BLOOD PRESSURE: 70 MMHG | TEMPERATURE: 97 F | RESPIRATION RATE: 18 BRPM | HEART RATE: 76 BPM | TEMPERATURE: 97.3 F | SYSTOLIC BLOOD PRESSURE: 123 MMHG | RESPIRATION RATE: 20 BRPM | OXYGEN SATURATION: 96 % | SYSTOLIC BLOOD PRESSURE: 118 MMHG | DIASTOLIC BLOOD PRESSURE: 74 MMHG | HEART RATE: 78 BPM

## 2024-01-25 DIAGNOSIS — E11.628 DIABETIC FOOT INFECTION (HCC): ICD-10-CM

## 2024-01-25 DIAGNOSIS — D72.825 BANDEMIA: ICD-10-CM

## 2024-01-25 DIAGNOSIS — L08.9 DIABETIC INFECTION OF RIGHT FOOT (HCC): ICD-10-CM

## 2024-01-25 DIAGNOSIS — A49.01 STAPH AUREUS INFECTION: ICD-10-CM

## 2024-01-25 DIAGNOSIS — E11.21 TYPE 2 DIABETES WITH NEPHROPATHY (HCC): ICD-10-CM

## 2024-01-25 DIAGNOSIS — A49.1 GROUP B STREPTOCOCCAL INFECTION: ICD-10-CM

## 2024-01-25 DIAGNOSIS — M86.072 ACUTE HEMATOGENOUS OSTEOMYELITIS OF LEFT FOOT (HCC): ICD-10-CM

## 2024-01-25 DIAGNOSIS — R78.81 STREPTOCOCCAL BACTEREMIA: ICD-10-CM

## 2024-01-25 DIAGNOSIS — A41.9 SEVERE SEPSIS (HCC): Primary | ICD-10-CM

## 2024-01-25 DIAGNOSIS — L08.9 DIABETIC FOOT INFECTION (HCC): ICD-10-CM

## 2024-01-25 DIAGNOSIS — B95.5 STREPTOCOCCAL BACTEREMIA: ICD-10-CM

## 2024-01-25 DIAGNOSIS — M14.60 CHARCOT'S ARTHROPATHY: ICD-10-CM

## 2024-01-25 DIAGNOSIS — R65.20 SEVERE SEPSIS (HCC): Primary | ICD-10-CM

## 2024-01-25 DIAGNOSIS — E11.628 DIABETIC INFECTION OF RIGHT FOOT (HCC): ICD-10-CM

## 2024-01-25 DIAGNOSIS — E11.65 POORLY CONTROLLED DIABETES MELLITUS (HCC): ICD-10-CM

## 2024-01-25 DIAGNOSIS — N17.9 AKI (ACUTE KIDNEY INJURY) (HCC): ICD-10-CM

## 2024-01-25 PROCEDURE — 99214 OFFICE O/P EST MOD 30 MIN: CPT | Performed by: INTERNAL MEDICINE

## 2024-01-25 ASSESSMENT — PATIENT HEALTH QUESTIONNAIRE - PHQ9
SUM OF ALL RESPONSES TO PHQ QUESTIONS 1-9: 0
2. FEELING DOWN, DEPRESSED OR HOPELESS: 0
SUM OF ALL RESPONSES TO PHQ9 QUESTIONS 1 & 2: 0
1. LITTLE INTEREST OR PLEASURE IN DOING THINGS: 0
SUM OF ALL RESPONSES TO PHQ QUESTIONS 1-9: 0

## 2024-01-25 ASSESSMENT — ENCOUNTER SYMPTOMS
HEMOPTYSIS: 0
HEMOPTYSIS: 0

## 2024-01-25 NOTE — TELEPHONE ENCOUNTER
Please notify home health of new antibiotic orders.  Patient CRP and ESR are elevated, he has increased drainage from his wound  Cannot tolerate order of Unasyn so changing to Zosyn.  Please have them send supplies to him right away.  Thanks    Antimicrobial orders for discharge  -Zosyn 3.375 g IV every 8 hourly planned new end date 2/7/24  -Do not pull line at end of therapy -please call ID.    -Weekly CBC, CMP-& ESR/ CRP every other week  -Fax reports to [phone], call with critical labs  at [phone]/ [phone]  -Encourage adequate fluids, daily probiotic/yogurt  -If line malfunction occurs and home health cannot reposition  please send patient to ED immediately  --if persistent side effects occur stop antibiotic and call-ID.

## 2024-01-25 NOTE — TELEPHONE ENCOUNTER
Spoke with Mahesh from [facility] from [facility]. Advised of updated antibiotic orders. Also faxed orders to [facility]

## 2024-01-25 NOTE — PROGRESS NOTES
Infectious Disease Clinic        Impression  Sepsis resolved  Fever Tmax 102.4, currently afebrile  Leukocytosis WBC 15.2, currently 9.5    Strep agalactiae bacteremia  Blood cultures 12/9 + for probable strep agalactiae 1/4  Low repeat BC - 12/11- NG  ECHO negative.    Bilateral Diabetic Foot infections  Right diabetic foot infection  X-ray R/foot + for Charcot foot  Indeterminate midfoot dislocation  No x-ray evidence of OM/soft tissue gas  X-ray of left foot+ OM/septic arthritis of second toe PIP joint  Previous resection of great toe.  S/p I&D of  R/ foot & L/ 2nd toe amputation 12/10  R/mid foot bone biopsy, debridement.  IntraOp cultures+ for MSSA , Gp B strep beta hemolytic  Pahology 12/10 + Acute & chronic OM, chronic OM at bone margins  R/Foot -Reactive bone & acute inflammation, possible acute focal OM  S/p R/foot washout & antibiotic beads 12/13  Cultures, Pathology pending.    ABIs - R&L normal resting DANG  R/ TBI -0.79    Diabetes mellitus  Elevated blood sugars  A1c 7.8    TERE resolved  Cr 1.19    ESR 83, CRP 11.4    Plan  Patient has completed 6 weeks of antibiotics 1/24  Continued wound drainage, sometimes even out of the wound VAC  Elevated inflammatory markers-most recent ,   Patient is able to do extending therapy  Antibiotic has offensive order-will change to Zosyn    Antimicrobial orders for discharge  -Zosyn 3.375 g IV every 8 hourly planned new end date 2/7/24  -Do not pull line at end of therapy -please call ID.    -Weekly CBC, CMP-& ESR/ CRP every other week  -Fax reports to [phone], call with critical labs  at [phone]/ [phone]  -Encourage adequate fluids, daily probiotic/yogurt  -If line malfunction occurs and home health cannot reposition  please send patient to ED immediately  --if persistent side effects occur stop antibiotic and call-ID.        Extensive review of chart notes, labs, imaging, cultures done  Additionally review of done: Recent reports-Labs, cultures,

## 2024-01-25 NOTE — HOME HEALTH
Subjective: Pt states he goes to wound care tomorrow   Falls since last visit No(if yes complete the Fall Tracking Form and include bsrifallreport):   Caregiver involvement changes: Wife is primary caregiver   Home health supplies by type and quantity ordered/delivered this visit include: none     Clinician asked if patient has had any physician contact since last home care visit and patient states: NO  Clinician asked if patient has any new or changed medications and patient states:  NO   If Yes, were medications reconciled? NO   Was the certifying physician notified of changes in medications? NO     Clinical assessment (what this visit means for the patient overall and need for ongoing skilled care) and progress or lack of progress towards SPECIFIC goals: Pt continues to require SN for weekly labs, PICC , and wound care     Written Teaching Material Utilized: N/A    Interdisciplinary communication with: N/A for the purpose of N/A    Discharge planning as follows: When wound is 100% healed    Specific plan for next visit: Wound care

## 2024-01-25 NOTE — HOME HEALTH
Subjective: Pt states foot gets \"juicy\" after 24 hours and he has to add extra absorbancy pads to foot   Falls since last visit No(if yes complete the Fall Tracking Form and include bsrifallreport):   Caregiver involvement changes: Wife is primary caregiver   Home health supplies by type and quantity ordered/delivered this visit include: none     Clinician asked if patient has had any physician contact since last home care visit and patient states: NO  Clinician asked if patient has any new or changed medications and patient states:  NO   If Yes, were medications reconciled? NO   Was the certifying physician notified of changes in medications? NO     Clinical assessment (what this visit means for the patient overall and need for ongoing skilled care) and progress or lack of progress towards SPECIFIC goals: Pt continues to require SN for wound care, Sterile PICC line dressings, weekly labwork     Written Teaching Material Utilized: N/A    Interdisciplinary communication with: N/A for the purpose of NA     Discharge planning as follows: When wound is 100% healed    Specific plan for next visit: Wound care

## 2024-01-25 NOTE — PROGRESS NOTES
Chief Complaint   Patient presents with    Follow-up     1. Have you been to the ER, urgent care clinic since your last visit?  Hospitalized since your last visit?No    2. Have you seen or consulted any other health care providers outside of the [facility] since your last visit?  Include any pap smears or colon screening. No  Patient identified by two identifiers    No concerns noted at this time

## 2024-01-26 ENCOUNTER — HOME CARE VISIT (OUTPATIENT)
Facility: HOME HEALTH | Age: 54
End: 2024-01-26
Payer: COMMERCIAL

## 2024-01-26 ENCOUNTER — TELEPHONE (OUTPATIENT)
Age: 54
End: 2024-01-26

## 2024-01-26 PROCEDURE — G0300 HHS/HOSPICE OF LPN EA 15 MIN: HCPCS

## 2024-01-26 NOTE — TELEPHONE ENCOUNTER
Spoke with Mahesh from [facility]. States that the first dose of  Zosyn will be on Sunday 2/28/2024 due to patient has never been on that antibiotic before and will need to be monitored. That will change the end date to 2/9/2024

## 2024-01-28 VITALS
SYSTOLIC BLOOD PRESSURE: 112 MMHG | OXYGEN SATURATION: 99 % | TEMPERATURE: 97.6 F | RESPIRATION RATE: 20 BRPM | DIASTOLIC BLOOD PRESSURE: 78 MMHG | HEART RATE: 78 BPM

## 2024-01-28 NOTE — HOME HEALTH
Subjective: Pt states his daughters wedding is this weekend and he wont be able to have foot propped up  Falls since last visit No(if yes complete the Fall Tracking Form and include bsrifallreport):   Caregiver involvement changes: Wife is primary caregiver   Home health supplies by type and quantity ordered/delivered this visit include: none     Clinician asked if patient has had any physician contact since last home care visit and patient states: NO  Clinician asked if patient has any new or changed medications and patient states:  NO   If Yes, were medications reconciled? NO   Was the certifying physician notified of changes in medications? NO     Clinical assessment (what this visit means for the patient overall and need for ongoing skilled care) and progress or lack of progress towards SPECIFIC goals: Pt continues to require SN for wound care    Written Teaching Material Utilized: N/A    Interdisciplinary communication with: N/A for the purpose of NA     Discharge planning as follows: When wound is 100% healed    Specific plan for next visit: Wound care

## 2024-01-29 ENCOUNTER — HOME CARE VISIT (OUTPATIENT)
Facility: HOME HEALTH | Age: 54
End: 2024-01-29
Payer: COMMERCIAL

## 2024-01-29 PROCEDURE — G0300 HHS/HOSPICE OF LPN EA 15 MIN: HCPCS

## 2024-01-30 ENCOUNTER — HOSPITAL ENCOUNTER (OUTPATIENT)
Facility: HOSPITAL | Age: 54
Discharge: HOME | End: 2024-01-30
Attending: PODIATRIST
Payer: COMMERCIAL

## 2024-01-30 VITALS
OXYGEN SATURATION: 98 % | TEMPERATURE: 99.1 F | SYSTOLIC BLOOD PRESSURE: 107 MMHG | DIASTOLIC BLOOD PRESSURE: 67 MMHG | HEART RATE: 102 BPM | RESPIRATION RATE: 18 BRPM

## 2024-01-30 DIAGNOSIS — E11.610 CHARCOT FOOT DUE TO DIABETES MELLITUS (HCC): Primary | ICD-10-CM

## 2024-01-30 DIAGNOSIS — E11.628 DIABETIC FOOT INFECTION (HCC): ICD-10-CM

## 2024-01-30 DIAGNOSIS — L08.9 DIABETIC FOOT INFECTION (HCC): ICD-10-CM

## 2024-01-30 LAB
ALBUMIN SERPL-MCNC: 3.7 G/DL (ref 3.8–4.9)
ALBUMIN/GLOB SERPL: 1.1 {RATIO} (ref 1.2–2.2)
ALP SERPL-CCNC: 75 IU/L (ref 44–121)
ALT SERPL-CCNC: 15 IU/L (ref 0–44)
AST SERPL-CCNC: 28 IU/L (ref 0–40)
BILIRUB SERPL-MCNC: 0.4 MG/DL (ref 0–1.2)
BUN SERPL-MCNC: 20 MG/DL (ref 6–24)
BUN/CREAT SERPL: 15 (ref 9–20)
CALCIUM SERPL-MCNC: 9.1 MG/DL (ref 8.7–10.2)
CHLORIDE SERPL-SCNC: 100 MMOL/L (ref 96–106)
CO2 SERPL-SCNC: 16 MMOL/L (ref 20–29)
CREAT SERPL-MCNC: 1.34 MG/DL (ref 0.76–1.27)
EGFRCR SERPLBLD CKD-EPI 2021: 63 ML/MIN/1.73
ERYTHROCYTE [DISTWIDTH] IN BLOOD BY AUTOMATED COUNT: 12.8 % (ref 11.6–15.4)
GLOBULIN SER CALC-MCNC: 3.4 G/DL (ref 1.5–4.5)
GLUCOSE SERPL-MCNC: 159 MG/DL (ref 70–99)
HCT VFR BLD AUTO: 36.1 % (ref 37.5–51)
HGB BLD-MCNC: 12.1 G/DL (ref 13–17.7)
MCH RBC QN AUTO: 30.5 PG (ref 26.6–33)
MCHC RBC AUTO-ENTMCNC: 33.5 G/DL (ref 31.5–35.7)
MCV RBC AUTO: 91 FL (ref 79–97)
PLATELET # BLD AUTO: 288 X10E3/UL (ref 150–450)
POTASSIUM SERPL-SCNC: 5.2 MMOL/L (ref 3.5–5.2)
PROT SERPL-MCNC: 7.1 G/DL (ref 6–8.5)
RBC # BLD AUTO: 3.97 X10E6/UL (ref 4.14–5.8)
SODIUM SERPL-SCNC: 140 MMOL/L (ref 134–144)
WBC # BLD AUTO: 7.2 X10E3/UL (ref 3.4–10.8)

## 2024-01-30 PROCEDURE — 97597 DBRDMT OPN WND 1ST 20 CM/<: CPT

## 2024-01-30 PROCEDURE — 97598 DBRDMT OPN WND ADDL 20CM/<: CPT

## 2024-01-30 RX ORDER — CLOBETASOL PROPIONATE 0.5 MG/G
OINTMENT TOPICAL ONCE
OUTPATIENT
Start: 2024-01-30 | End: 2024-01-30

## 2024-01-30 RX ORDER — LIDOCAINE HYDROCHLORIDE 20 MG/ML
JELLY TOPICAL ONCE
OUTPATIENT
Start: 2024-01-30 | End: 2024-01-30

## 2024-01-30 RX ORDER — BACITRACIN ZINC AND POLYMYXIN B SULFATE 500; 1000 [USP'U]/G; [USP'U]/G
OINTMENT TOPICAL ONCE
OUTPATIENT
Start: 2024-01-30 | End: 2024-01-30

## 2024-01-30 RX ORDER — LIDOCAINE 40 MG/G
CREAM TOPICAL ONCE
OUTPATIENT
Start: 2024-01-30 | End: 2024-01-30

## 2024-01-30 RX ORDER — GINSENG 100 MG
CAPSULE ORAL ONCE
OUTPATIENT
Start: 2024-01-30 | End: 2024-01-30

## 2024-01-30 RX ORDER — LIDOCAINE 50 MG/G
OINTMENT TOPICAL ONCE
OUTPATIENT
Start: 2024-01-30 | End: 2024-01-30

## 2024-01-30 RX ORDER — TRIAMCINOLONE ACETONIDE 1 MG/G
OINTMENT TOPICAL ONCE
OUTPATIENT
Start: 2024-01-30 | End: 2024-01-30

## 2024-01-30 RX ORDER — GENTAMICIN SULFATE 1 MG/G
OINTMENT TOPICAL ONCE
OUTPATIENT
Start: 2024-01-30 | End: 2024-01-30

## 2024-01-30 RX ORDER — LIDOCAINE HYDROCHLORIDE 40 MG/ML
SOLUTION TOPICAL ONCE
OUTPATIENT
Start: 2024-01-30 | End: 2024-01-30

## 2024-01-30 RX ORDER — BETAMETHASONE DIPROPIONATE 0.5 MG/G
CREAM TOPICAL ONCE
OUTPATIENT
Start: 2024-01-30 | End: 2024-01-30

## 2024-01-30 RX ORDER — IBUPROFEN 200 MG
TABLET ORAL ONCE
OUTPATIENT
Start: 2024-01-30 | End: 2024-01-30

## 2024-01-30 RX ORDER — SODIUM CHLOR/HYPOCHLOROUS ACID 0.033 %
SOLUTION, IRRIGATION IRRIGATION ONCE
OUTPATIENT
Start: 2024-01-30 | End: 2024-01-30

## 2024-01-30 NOTE — FLOWSHEET NOTE
01/30/24 1535   Right Leg Edema Point of Measurement   Compression Therapy 2 layer compression wrap   Negative Pressure Wound Therapy Foot Right;Plantar   Placement Date/Time: 01/09/24 1630   Present on Admission/Arrival: No  Location: Foot  Wound Location Orientation: Right;Plantar   Wound Type Diabetic foot ulcer   Unit Type ACT VAC   Dressing Type Black Foam;Other (Comment)  (iodaform gauze 1/4 inch)   Number of pieces used 2  (1 black 1 iodaform gauze packing)   Number of pieces removed 1   Cycle Continuous   Target Pressure (mmHg) 150   Intensity 5   Wound 01/02/24 Foot Right;Plantar   Date First Assessed/Time First Assessed: 01/02/24 1400   Present on Original Admission: Yes  Wound Approximate Age at First Assessment (Weeks): 3 weeks  Primary Wound Type: Diabetic Ulcer  Location: Foot  Wound Location Orientation: Right;Plantar   Wound Image    Wound Etiology Diabetic   Dressing Status New dressing applied   Wound Cleansed Wound cleanser   Dressing/Treatment Negative pressure wound therapy;Alginate with Ag   Wound Length (cm) 5 cm   Wound Width (cm) 5 cm   Wound Depth (cm) 2 cm   Wound Surface Area (cm^2) 25 cm^2   Change in Wound Size % (l*w) -29.87   Wound Volume (cm^3) 50 cm^3   Wound Healing % 26   Post-Procedure Length (cm) 5.1 cm   Post-Procedure Width (cm) 5.1 cm   Post-Procedure Depth (cm) 2 cm   Post-Procedure Surface Area (cm^2) 26.01 cm^2   Post-Procedure Volume (cm^3) 52.02 cm^3   Wound Assessment Devitalized tissue;Denuded;Hyper granulation tissue;Granulation tissue   Drainage Amount Copious (>75 % saturated)   Drainage Description Serosanguinous   Odor None   Allyn-wound Assessment Maceration   Margins Epibole (rolled edges)   Wound Thickness Description not for Pressure Injury Full thickness     Negative Pressure Wound Therapy    NAME:  Juwan Espinoza  YOB: 1970  MEDICAL RECORD NUMBER:  [id number]  DATE:  1/30/2024    Applied Negative Pressure to right plantar  wound.  [x] Applied

## 2024-01-31 ENCOUNTER — HOME CARE VISIT (OUTPATIENT)
Facility: HOME HEALTH | Age: 54
End: 2024-01-31
Payer: COMMERCIAL

## 2024-01-31 PROCEDURE — G0300 HHS/HOSPICE OF LPN EA 15 MIN: HCPCS

## 2024-01-31 NOTE — PLAN OF CARE
Discharge Instructions from  Wound Care Clinic  [address] [address]  [phone] Fax [phone]    NAME:  Juwan Espinoza  YOB: 1970  MEDICAL RECORD NUMBER:  [id number]  DATE:  1/30/2024    Wound Cleansing:   Do not scrub or use excessive force.  Cleanse wound prior to applying a clean dressing with:  [x] Normal Saline    [x] Wound Cleanser   [x] Cleanse wound with Mild Soap & Water     Topical Treatments:  Do not apply lotions, creams, or ointments to wound bed unless directed.   [x] Apply thin film of stoma paste around wound bed.        Dressings:           Wound Location right foot   [x] Apply Primary Dressing:   :    [x] Pack wound loosely with  [x] 1.4\" Iodoform into the tunnels   [x] Black sponge with a hole. Pull packing into hole and place vac disc straight over packing and the hole. NO need to bridge.   Avoid contact of tape with skin.  [x] Apply a two layer compression wrap from mpj to popliteal space.   [x] Change dressing:     [x] Once a week      Negative Pressure:            [x] Pressure@     175      mm/Hg  [x]Continuous    [x] Black  [x] Other: Packing  [x] Cut strips of plastic drape to picture frame wound so that bailey-wound is     covered with the drape.    [x] Cut sponge, gauze or channel drain to size which will fit into the wound/ulcer bed without being forced.   [x] Be sure the sponge is large enough to hold the entire round plastic flange which is attached to the tubing. Never allow flange to be larger than the sponge or it will produce suction damaging intact skin.   [x] Covered sponge, gauze or channel drain with plastic drape.   [x] Cut a hole in this plastic drape directly over the sponge the same size as the plastic drain tubing.   [x] Removed plastic liner from flange and apply it directly over the hole you cut.   [x] Removed the plastic cover from the flange.   [x] Attached the tubing to the wound/ulcer Negative Pressure

## 2024-02-01 VITALS
DIASTOLIC BLOOD PRESSURE: 80 MMHG | RESPIRATION RATE: 18 BRPM | OXYGEN SATURATION: 93 % | HEART RATE: 92 BPM | TEMPERATURE: 98 F | SYSTOLIC BLOOD PRESSURE: 122 MMHG

## 2024-02-01 NOTE — HOME HEALTH
Subjective: My wound vac says clogged and i dont know how to fix it   Falls since last visit No(if yes complete the Fall Tracking Form and include bsrifallreport):   Caregiver involvement changes: Wife is primary caregiver   Home health supplies by type and quantity ordered/delivered this visit include: none     Clinician asked if patient has had any physician contact since last home care visit and patient states: NO  Clinician asked if patient has any new or changed medications and patient states:  NO   If Yes, were medications reconciled? NO   Was the certifying physician notified of changes in medications? NO     Clinical assessment (what this visit means for the patient overall and need for ongoing skilled care) and progress or lack of progress towards SPECIFIC goals: Pt text SN at 6:44am and made her aware that wound vac was not working and that wound vac says blockage. SN arrived to patients home at 8:30 am. SN observed that pt now has 2 layer calamine compression wrap. Pt states that Dr. Castaneda will be sending over new orders this morning. SN recieved an email with new orders for pt. SN removed dressing in its entirety with some difficulty, Cleansed wound with normal saline, applied iodine to areas of maceration and then used skin prep to areas of maceration. SN then used tegraderm to window pedraza periwound. SN then applied bulkee guaze to tunnel inside of wound because 1/4 iodoform packing caused blockage to wound bed. SN then applied black foam to wound bed and covered with tegraderm and wound vac disc. SN then applied 2 layer calamine compression wrap to RLE. Pt had no compliants of pain or discomfort voiced during wound care.     Written Teaching Material Utilized: N/A    Interdisciplinary communication with: N/A for the purpose of NA     Discharge planning as follows: When wound is 100% healed    Specific plan for next visit: Wound care 152.4

## 2024-02-02 ENCOUNTER — TELEPHONE (OUTPATIENT)
Age: 54
End: 2024-02-02

## 2024-02-02 ENCOUNTER — HOME CARE VISIT (OUTPATIENT)
Facility: HOME HEALTH | Age: 54
End: 2024-02-02
Payer: COMMERCIAL

## 2024-02-02 VITALS
DIASTOLIC BLOOD PRESSURE: 77 MMHG | OXYGEN SATURATION: 98 % | RESPIRATION RATE: 16 BRPM | SYSTOLIC BLOOD PRESSURE: 128 MMHG | TEMPERATURE: 98 F | HEART RATE: 88 BPM

## 2024-02-02 PROCEDURE — G0299 HHS/HOSPICE OF RN EA 15 MIN: HCPCS

## 2024-02-02 NOTE — TELEPHONE ENCOUNTER
----- Message from SELVIN Guerrero NP sent at 2/2/2024  1:41 PM EST -----  Regarding: blood work  Recent lab reviewed;  Creat 1.3 (1/29), was 1.0 on 1/22  No CRP    Please add CRP for next blood work.    Thank you!

## 2024-02-03 ENCOUNTER — HOME CARE VISIT (OUTPATIENT)
Dept: HOME HEALTH SERVICES | Facility: HOME HEALTH | Age: 54
End: 2024-02-03
Payer: COMMERCIAL

## 2024-02-03 ENCOUNTER — HOME CARE VISIT (OUTPATIENT)
Facility: HOME HEALTH | Age: 54
End: 2024-02-03
Payer: COMMERCIAL

## 2024-02-03 VITALS
DIASTOLIC BLOOD PRESSURE: 84 MMHG | TEMPERATURE: 98.5 F | OXYGEN SATURATION: 99 % | SYSTOLIC BLOOD PRESSURE: 132 MMHG | RESPIRATION RATE: 16 BRPM | HEART RATE: 70 BPM

## 2024-02-03 PROCEDURE — G0299 HHS/HOSPICE OF RN EA 15 MIN: HCPCS

## 2024-02-04 VITALS
OXYGEN SATURATION: 97 % | HEART RATE: 76 BPM | TEMPERATURE: 97.7 F | DIASTOLIC BLOOD PRESSURE: 84 MMHG | RESPIRATION RATE: 18 BRPM | SYSTOLIC BLOOD PRESSURE: 127 MMHG

## 2024-02-05 ENCOUNTER — HOME CARE VISIT (OUTPATIENT)
Facility: HOME HEALTH | Age: 54
End: 2024-02-05
Payer: COMMERCIAL

## 2024-02-05 PROCEDURE — G0300 HHS/HOSPICE OF LPN EA 15 MIN: HCPCS

## 2024-02-06 ENCOUNTER — HOSPITAL ENCOUNTER (OUTPATIENT)
Facility: HOSPITAL | Age: 54
Discharge: HOME | End: 2024-02-06
Attending: PODIATRIST
Payer: COMMERCIAL

## 2024-02-06 VITALS
TEMPERATURE: 97.8 F | HEART RATE: 88 BPM | SYSTOLIC BLOOD PRESSURE: 99 MMHG | OXYGEN SATURATION: 100 % | RESPIRATION RATE: 18 BRPM | DIASTOLIC BLOOD PRESSURE: 68 MMHG

## 2024-02-06 DIAGNOSIS — E11.628 DIABETIC FOOT INFECTION (HCC): ICD-10-CM

## 2024-02-06 DIAGNOSIS — E11.610 CHARCOT FOOT DUE TO DIABETES MELLITUS (HCC): Primary | ICD-10-CM

## 2024-02-06 DIAGNOSIS — L08.9 DIABETIC FOOT INFECTION (HCC): ICD-10-CM

## 2024-02-06 LAB
ERYTHROCYTE [DISTWIDTH] IN BLOOD BY AUTOMATED COUNT: 12.8 % (ref 11.6–15.4)
ERYTHROCYTE [SEDIMENTATION RATE] IN BLOOD BY WESTERGREN METHOD: 109 MM/HR (ref 0–30)
HCT VFR BLD AUTO: 38.4 % (ref 37.5–51)
HGB BLD-MCNC: 12.5 G/DL (ref 13–17.7)
MCH RBC QN AUTO: 29.3 PG (ref 26.6–33)
MCHC RBC AUTO-ENTMCNC: 32.6 G/DL (ref 31.5–35.7)
MCV RBC AUTO: 90 FL (ref 79–97)
PLATELET # BLD AUTO: 384 X10E3/UL (ref 150–450)
RBC # BLD AUTO: 4.26 X10E6/UL (ref 4.14–5.8)
WBC # BLD AUTO: 10.2 X10E3/UL (ref 3.4–10.8)

## 2024-02-06 PROCEDURE — 11046 DBRDMT MUSC&/FSCA EA ADDL: CPT

## 2024-02-06 PROCEDURE — 11043 DBRDMT MUSC&/FSCA 1ST 20/<: CPT

## 2024-02-06 RX ORDER — CLOBETASOL PROPIONATE 0.5 MG/G
OINTMENT TOPICAL ONCE
OUTPATIENT
Start: 2024-02-06 | End: 2024-02-06

## 2024-02-06 RX ORDER — IBUPROFEN 200 MG
TABLET ORAL ONCE
OUTPATIENT
Start: 2024-02-06 | End: 2024-02-06

## 2024-02-06 RX ORDER — LIDOCAINE 50 MG/G
OINTMENT TOPICAL ONCE
OUTPATIENT
Start: 2024-02-06 | End: 2024-02-06

## 2024-02-06 RX ORDER — LIDOCAINE HYDROCHLORIDE 20 MG/ML
JELLY TOPICAL ONCE
OUTPATIENT
Start: 2024-02-06 | End: 2024-02-06

## 2024-02-06 RX ORDER — BETAMETHASONE DIPROPIONATE 0.5 MG/G
CREAM TOPICAL ONCE
OUTPATIENT
Start: 2024-02-06 | End: 2024-02-06

## 2024-02-06 RX ORDER — GINSENG 100 MG
CAPSULE ORAL ONCE
OUTPATIENT
Start: 2024-02-06 | End: 2024-02-06

## 2024-02-06 RX ORDER — TRIAMCINOLONE ACETONIDE 1 MG/G
OINTMENT TOPICAL ONCE
OUTPATIENT
Start: 2024-02-06 | End: 2024-02-06

## 2024-02-06 RX ORDER — BACITRACIN ZINC AND POLYMYXIN B SULFATE 500; 1000 [USP'U]/G; [USP'U]/G
OINTMENT TOPICAL ONCE
OUTPATIENT
Start: 2024-02-06 | End: 2024-02-06

## 2024-02-06 RX ORDER — LIDOCAINE HYDROCHLORIDE 40 MG/ML
SOLUTION TOPICAL ONCE
OUTPATIENT
Start: 2024-02-06 | End: 2024-02-06

## 2024-02-06 RX ORDER — LIDOCAINE 40 MG/G
CREAM TOPICAL ONCE
OUTPATIENT
Start: 2024-02-06 | End: 2024-02-06

## 2024-02-06 RX ORDER — GENTAMICIN SULFATE 1 MG/G
OINTMENT TOPICAL ONCE
OUTPATIENT
Start: 2024-02-06 | End: 2024-02-06

## 2024-02-06 RX ORDER — SODIUM CHLOR/HYPOCHLOROUS ACID 0.033 %
SOLUTION, IRRIGATION IRRIGATION ONCE
OUTPATIENT
Start: 2024-02-06 | End: 2024-02-06

## 2024-02-07 ENCOUNTER — HOME CARE VISIT (OUTPATIENT)
Facility: HOME HEALTH | Age: 54
End: 2024-02-07
Payer: COMMERCIAL

## 2024-02-07 LAB
ALBUMIN SERPL-MCNC: 4.4 G/DL (ref 3.8–4.9)
ALBUMIN/GLOB SERPL: 1.2 {RATIO} (ref 1.2–2.2)
ALP SERPL-CCNC: 94 IU/L (ref 44–121)
ALT SERPL-CCNC: 36 IU/L (ref 0–44)
AST SERPL-CCNC: 45 IU/L (ref 0–40)
BILIRUB SERPL-MCNC: <0.2 MG/DL (ref 0–1.2)
BUN SERPL-MCNC: 19 MG/DL (ref 6–24)
BUN/CREAT SERPL: 14 (ref 9–20)
CALCIUM SERPL-MCNC: 10 MG/DL (ref 8.7–10.2)
CHLORIDE SERPL-SCNC: 112 MMOL/L (ref 96–106)
CO2 SERPL-SCNC: 15 MMOL/L (ref 20–29)
CREAT SERPL-MCNC: 1.36 MG/DL (ref 0.76–1.27)
EGFRCR SERPLBLD CKD-EPI 2021: 61 ML/MIN/1.73
GLOBULIN SER CALC-MCNC: 3.6 G/DL (ref 1.5–4.5)
GLUCOSE SERPL-MCNC: 129 MG/DL (ref 70–99)
POTASSIUM SERPL-SCNC: 5.8 MMOL/L (ref 3.5–5.2)
PROT SERPL-MCNC: 8 G/DL (ref 6–8.5)
SODIUM SERPL-SCNC: 155 MMOL/L (ref 134–144)

## 2024-02-07 NOTE — PLAN OF CARE
Discharge Instructions from  [facility]  [facility]  [address] [place] [address]  [phone] Fax [phone]    NAME:  Juwan Espinoza  YOB: 1970  MEDICAL RECORD NUMBER:  [id number]  DATE:  2/6/2024    Wound Cleansing:   Do not scrub or use excessive force.  Cleanse wound prior to applying a clean dressing with:  [x] Normal Saline           [x] Wound Cleanser   [x] Cleanse wound with Mild Soap & Water      Topical Treatments:  Do not apply lotions, creams, or ointments to wound bed unless directed.   [x] May apply thin film of stoma paste to help with seal                    Dressings:                  Wound Location right foot    [x] Apply White sponge to tunnels                :         [x] Pull wound together with vac drape, apply black sponge and cover.  NO need to bridge.          [x] Apply a two layer compression wrap from mpj to popliteal space.   [x] Change dressing:                 [x] Once a week                          Negative Pressure:            [x] Pressure@  150     mm/Hg                    [x]Continuous                [x] Black          [x] white   [x] Cut strips of plastic drape to picture frame wound so that bailey-wound is     covered with the drape.    [x] Cut sponge, gauze or channel drain to size which will fit into the wound/ulcer bed without being forced.   [x] Be sure the sponge is large enough to hold the entire round plastic flange which is attached to the tubing. Never allow flange to be larger than the sponge or it will produce suction damaging intact skin.   [x] Covered sponge, gauze or channel drain with plastic drape.   [x] Cut a hole in this plastic drape directly over the sponge the same size as the plastic drain tubing.   [x] Removed plastic liner from flange and apply it directly over the hole you cut.   [x] Removed the plastic cover from the flange.   [x] Attached the tubing to the wound/ulcer Negative Pressure

## 2024-02-07 NOTE — FLOWSHEET NOTE
02/06/24 1615   Wound 01/02/24 Foot Right;Plantar   Date First Assessed/Time First Assessed: 01/02/24 1400   Present on Original Admission: Yes  Wound Approximate Age at First Assessment (Weeks): 3 weeks  Primary Wound Type: Diabetic Ulcer  Location: Foot  Wound Location Orientation: Right;Plantar   Wound Image     Wound Etiology Diabetic   Dressing Status New dressing applied;Intact   Wound Cleansed Wound cleanser   Dressing/Treatment Negative pressure wound therapy  (2 layer wrap)   Offloading for Diabetic Foot Ulcers Knee scooter   Dressing Change Due 02/13/24   Wound Length (cm) 4.8 cm   Wound Width (cm) 3.9 cm   Wound Depth (cm) 2.1 cm   Wound Surface Area (cm^2) 18.72 cm^2   Change in Wound Size % (l*w) 2.75   Wound Volume (cm^3) 39.312 cm^3   Wound Healing % 42   Post-Procedure Length (cm) 4.8 cm   Post-Procedure Width (cm) 4 cm   Post-Procedure Depth (cm) 2.1 cm   Post-Procedure Surface Area (cm^2) 19.2 cm^2   Post-Procedure Volume (cm^3) 40.32 cm^3   Wound Assessment Granulation tissue;Pink/red;Exposed structure bone   Drainage Amount Large (50-75% saturated)   Drainage Description Serosanguinous;Thick   Odor None   Bailey-wound Assessment Fragile;Maceration   Margins Epibole (rolled edges)   Wound Thickness Description not for Pressure Injury Full thickness       Negative Pressure Wound Therapy    NAME:  Juwan Espinoza  YOB: 1970  MEDICAL RECORD NUMBER:  [id number]  DATE:  2/6/2024    Applied Negative Pressure to Left foot wound(s)/ulcer(s).  [x] Applied skin barrier prep to bailey-wound.   [x] Cut strips of plastic drape to picture frame wound so that bailey-wound is     covered with the drape.   [] If bridging dressing to less prominent site, cover any intact skin that will come in contact with the Negative Pressure Therapy sponge, gauze or channel drain with plastic drape. The sponge should never touch intact skin.   [x] Cut sponge, gauze or channel drain to size which will fit into the

## 2024-02-08 ENCOUNTER — HOME CARE VISIT (OUTPATIENT)
Dept: HOME HEALTH SERVICES | Facility: HOME HEALTH | Age: 54
End: 2024-02-08
Payer: COMMERCIAL

## 2024-02-08 ENCOUNTER — HOME CARE VISIT (OUTPATIENT)
Facility: HOME HEALTH | Age: 54
End: 2024-02-08
Payer: COMMERCIAL

## 2024-02-08 VITALS
OXYGEN SATURATION: 99 % | HEART RATE: 89 BPM | DIASTOLIC BLOOD PRESSURE: 64 MMHG | SYSTOLIC BLOOD PRESSURE: 122 MMHG | RESPIRATION RATE: 6 BRPM | TEMPERATURE: 97.4 F

## 2024-02-08 LAB — CRP SERPL-MCNC: 45 MG/L (ref 0–10)

## 2024-02-08 PROCEDURE — G0299 HHS/HOSPICE OF RN EA 15 MIN: HCPCS

## 2024-02-09 ENCOUNTER — HOME CARE VISIT (OUTPATIENT)
Facility: HOME HEALTH | Age: 54
End: 2024-02-09
Payer: COMMERCIAL

## 2024-02-09 ENCOUNTER — TELEPHONE (OUTPATIENT)
Age: 54
End: 2024-02-09

## 2024-02-09 VITALS
DIASTOLIC BLOOD PRESSURE: 78 MMHG | HEART RATE: 77 BPM | RESPIRATION RATE: 20 BRPM | OXYGEN SATURATION: 98 % | TEMPERATURE: 97.8 F | SYSTOLIC BLOOD PRESSURE: 135 MMHG

## 2024-02-09 PROCEDURE — G0300 HHS/HOSPICE OF LPN EA 15 MIN: HCPCS

## 2024-02-09 ASSESSMENT — ENCOUNTER SYMPTOMS: HEMOPTYSIS: 0

## 2024-02-09 NOTE — TELEPHONE ENCOUNTER
Spoke with patient HIPAA verified . Spoke with Mahesh from [facility] advised that PICC line should remain in until lab results come in. Patient is also aware.

## 2024-02-09 NOTE — TELEPHONE ENCOUNTER
Spoke with [facility] and Patient HIPAA verified. Advised of repeat BMP and advised patient to Follow up with PCP. Patient verbalized understanding

## 2024-02-09 NOTE — HOME HEALTH
Subjective: Pt states he cant feel anything in regards to wound care  Falls since last visit No(if yes complete the Fall Tracking Form and include bsrifallreport):   Caregiver involvement changes: Wife is primary caregiver   Home health supplies by type and quantity ordered/delivered this visit include: none     Clinician asked if patient has had any physician contact since last home care visit and patient states: NO  Clinician asked if patient has any new or changed medications and patient states:  NO   If Yes, were medications reconciled? NO   Was the certifying physician notified of changes in medications? NO     Clinical assessment (what this visit means for the patient overall and need for ongoing skilled care) and progress or lack of progress towards SPECIFIC goals: Pt continues to require SN for wound care     Written Teaching Material Utilized: N/A    Interdisciplinary communication with: N/A for the purpose of NA     Discharge planning as follows: When wound is 100% healed    Specific plan for next visit: Wound care ]

## 2024-02-09 NOTE — HOME HEALTH
Subjective: Patient denies pain  Falls since last visit No(if yes complete the Fall Tracking Form and include bsrifallreport):   Caregiver involvement changes: no  Home health supplies by type and quantity ordered/delivered this visit include: no    Clinician asked if patient has had any physician contact since last home care visit and patient states: N/A  Clinician asked if patient has any new or changed medications and patient states:  N/A   If Yes, were medications reconciled? N/A   Was the certifying physician notified of changes in medications? N/A     Clinical assessment (what this visit means for the patient overall and need for ongoing skilled care) and progress or lack of progress towards SPECIFIC goals: Patients wound care is complete.SN needed for further eval and treatment.    Written Teaching Material Utilized: N/A    Interdisciplinary communication with: N/A     Discharge planning as follows: When goals are met    Specific plan for next visit: Instruct in safety issues regarding Diet

## 2024-02-11 VITALS
RESPIRATION RATE: 20 BRPM | SYSTOLIC BLOOD PRESSURE: 120 MMHG | OXYGEN SATURATION: 98 % | HEART RATE: 84 BPM | TEMPERATURE: 98.1 F | DIASTOLIC BLOOD PRESSURE: 88 MMHG

## 2024-02-11 NOTE — HOME HEALTH
Subjective: Pt states wound vac shows blockage   Falls since last visit No(if yes complete the Fall Tracking Form and include bsrifallreport):   Caregiver involvement changes: Wife is primary caregiver   Home health supplies by type and quantity ordered/delivered this visit include: none     Clinician asked if patient has had any physician contact since last home care visit and patient states: NO  Clinician asked if patient has any new  or changed medications and patient states:  NO   If Yes, were medications reconciled? NO   Was the certifying physician notified of changes in medications? NO     Clinical assessment (what this visit means for the patient overall and need for ongoing skilled care) and progress or lack of progress towards SPECIFIC goals: Pt continues to require SN for wound care     Written Teaching Material Utilized: N/A    Interdisciplinary communicati on with: N/A for the purpose of NA     Discharge planning as follows: When wound is 100% healed    Specific plan for next visit: Wound care

## 2024-02-12 ENCOUNTER — HOME CARE VISIT (OUTPATIENT)
Facility: HOME HEALTH | Age: 54
End: 2024-02-12
Payer: COMMERCIAL

## 2024-02-12 PROCEDURE — G0300 HHS/HOSPICE OF LPN EA 15 MIN: HCPCS

## 2024-02-13 ENCOUNTER — HOSPITAL ENCOUNTER (OUTPATIENT)
Facility: HOSPITAL | Age: 54
Discharge: HOME | End: 2024-02-13
Attending: PODIATRIST
Payer: COMMERCIAL

## 2024-02-13 DIAGNOSIS — E11.628 DIABETIC FOOT INFECTION (HCC): ICD-10-CM

## 2024-02-13 DIAGNOSIS — E11.610 CHARCOT FOOT DUE TO DIABETES MELLITUS (HCC): Primary | ICD-10-CM

## 2024-02-13 DIAGNOSIS — L08.9 DIABETIC FOOT INFECTION (HCC): ICD-10-CM

## 2024-02-13 LAB
ALBUMIN SERPL-MCNC: 3.8 G/DL (ref 3.8–4.9)
ALBUMIN/GLOB SERPL: 1.2 {RATIO} (ref 1.2–2.2)
ALP SERPL-CCNC: 88 IU/L (ref 44–121)
ALT SERPL-CCNC: 36 IU/L (ref 0–44)
AST SERPL-CCNC: 28 IU/L (ref 0–40)
BILIRUB SERPL-MCNC: 0.3 MG/DL (ref 0–1.2)
BUN SERPL-MCNC: 19 MG/DL (ref 6–24)
BUN/CREAT SERPL: 18 (ref 9–20)
CALCIUM SERPL-MCNC: 9.8 MG/DL (ref 8.7–10.2)
CHLORIDE SERPL-SCNC: 98 MMOL/L (ref 96–106)
CO2 SERPL-SCNC: 19 MMOL/L (ref 20–29)
CREAT SERPL-MCNC: 1.04 MG/DL (ref 0.76–1.27)
EGFRCR SERPLBLD CKD-EPI 2021: 84 ML/MIN/1.73
ERYTHROCYTE [DISTWIDTH] IN BLOOD BY AUTOMATED COUNT: 13.3 % (ref 11.6–15.4)
ERYTHROCYTE [SEDIMENTATION RATE] IN BLOOD BY WESTERGREN METHOD: 116 MM/HR (ref 0–30)
GLOBULIN SER CALC-MCNC: 3.2 G/DL (ref 1.5–4.5)
GLUCOSE SERPL-MCNC: 86 MG/DL (ref 70–99)
HCT VFR BLD AUTO: 38.1 % (ref 37.5–51)
HGB BLD-MCNC: 12.5 G/DL (ref 13–17.7)
MCH RBC QN AUTO: 29.8 PG (ref 26.6–33)
MCHC RBC AUTO-ENTMCNC: 32.8 G/DL (ref 31.5–35.7)
MCV RBC AUTO: 91 FL (ref 79–97)
PLATELET # BLD AUTO: 393 X10E3/UL (ref 150–450)
POTASSIUM SERPL-SCNC: 5.5 MMOL/L (ref 3.5–5.2)
PROT SERPL-MCNC: 7 G/DL (ref 6–8.5)
RBC # BLD AUTO: 4.19 X10E6/UL (ref 4.14–5.8)
SODIUM SERPL-SCNC: 140 MMOL/L (ref 134–144)
WBC # BLD AUTO: 10.5 X10E3/UL (ref 3.4–10.8)

## 2024-02-13 PROCEDURE — 6370000000 HC RX 637 (ALT 250 FOR IP): Performed by: PODIATRIST

## 2024-02-13 PROCEDURE — 11044 DBRDMT BONE 1ST 20 SQ CM/<: CPT

## 2024-02-13 RX ORDER — BACITRACIN ZINC AND POLYMYXIN B SULFATE 500; 1000 [USP'U]/G; [USP'U]/G
OINTMENT TOPICAL ONCE
OUTPATIENT
Start: 2024-02-13 | End: 2024-02-13

## 2024-02-13 RX ORDER — CLOBETASOL PROPIONATE 0.5 MG/G
OINTMENT TOPICAL ONCE
OUTPATIENT
Start: 2024-02-13 | End: 2024-02-13

## 2024-02-13 RX ORDER — LIDOCAINE 50 MG/G
OINTMENT TOPICAL ONCE
OUTPATIENT
Start: 2024-02-13 | End: 2024-02-13

## 2024-02-13 RX ORDER — GENTAMICIN SULFATE 1 MG/G
OINTMENT TOPICAL ONCE
OUTPATIENT
Start: 2024-02-13 | End: 2024-02-13

## 2024-02-13 RX ORDER — BETAMETHASONE DIPROPIONATE 0.5 MG/G
CREAM TOPICAL ONCE
OUTPATIENT
Start: 2024-02-13 | End: 2024-02-13

## 2024-02-13 RX ORDER — TRIAMCINOLONE ACETONIDE 1 MG/G
OINTMENT TOPICAL ONCE
OUTPATIENT
Start: 2024-02-13 | End: 2024-02-13

## 2024-02-13 RX ORDER — LIDOCAINE 40 MG/G
CREAM TOPICAL ONCE
OUTPATIENT
Start: 2024-02-13 | End: 2024-02-13

## 2024-02-13 RX ORDER — SODIUM CHLOR/HYPOCHLOROUS ACID 0.033 %
SOLUTION, IRRIGATION IRRIGATION ONCE
Status: COMPLETED | OUTPATIENT
Start: 2024-02-13 | End: 2024-02-13

## 2024-02-13 RX ORDER — LIDOCAINE HYDROCHLORIDE 40 MG/ML
SOLUTION TOPICAL ONCE
OUTPATIENT
Start: 2024-02-13 | End: 2024-02-13

## 2024-02-13 RX ORDER — IBUPROFEN 200 MG
TABLET ORAL ONCE
OUTPATIENT
Start: 2024-02-13 | End: 2024-02-13

## 2024-02-13 RX ORDER — LIDOCAINE HYDROCHLORIDE 20 MG/ML
JELLY TOPICAL ONCE
Status: COMPLETED | OUTPATIENT
Start: 2024-02-13 | End: 2024-02-13

## 2024-02-13 RX ORDER — LIDOCAINE HYDROCHLORIDE 20 MG/ML
JELLY TOPICAL ONCE
OUTPATIENT
Start: 2024-02-13 | End: 2024-02-13

## 2024-02-13 RX ORDER — GINSENG 100 MG
CAPSULE ORAL ONCE
OUTPATIENT
Start: 2024-02-13 | End: 2024-02-13

## 2024-02-13 RX ORDER — SODIUM CHLOR/HYPOCHLOROUS ACID 0.033 %
SOLUTION, IRRIGATION IRRIGATION ONCE
OUTPATIENT
Start: 2024-02-13 | End: 2024-02-13

## 2024-02-13 RX ADMIN — Medication 1 BOTTLE: at 16:32

## 2024-02-13 RX ADMIN — LIDOCAINE HYDROCHLORIDE 2 %: 20 JELLY TOPICAL at 16:31

## 2024-02-14 ENCOUNTER — HOME CARE VISIT (OUTPATIENT)
Facility: HOME HEALTH | Age: 54
End: 2024-02-14

## 2024-02-14 VITALS
DIASTOLIC BLOOD PRESSURE: 64 MMHG | SYSTOLIC BLOOD PRESSURE: 128 MMHG | RESPIRATION RATE: 16 BRPM | HEART RATE: 98 BPM | OXYGEN SATURATION: 98 %

## 2024-02-14 PROCEDURE — G0299 HHS/HOSPICE OF RN EA 15 MIN: HCPCS

## 2024-02-14 NOTE — HOME HEALTH
Justification for continued intermittent care: patient with wound care concerns as follows Negative Pressure pump      MARVIN Jones MD approved of the following: the need for continued Skilled Nursing home health services and plan of care for Skilled Nursing for: additional assessment and  services to right foot  Subjective: Patient states that he is having some pain sharp in nature.    Clinical assessment (what this visit means for the patient overall and need for ongoing skilled care) and progress or lack of progress towards SPECIFIC goals: Patient will be recertified for 9 weeks for further wound care and evaluation. PICC line is still intact due to waiting for labs to be sure no more antibiotic therapy is needed.SN needed for further treatment and evaluation of wound.     Falls since last visit No(if yes complete the Fall Tracking Form and include bsrifallreport):     Written Teaching Material Utilized: N/A    Interdisciplinary communication with: N/A     Medications reconciled and all medications are available in the home this visit. A list of reconciled medications has been given to the patient/caregiver     Patient/caregiver instructed on plan of care and are agreeable to plan of care at this time.      Discharge planning as follows: Per physician order    Specific plan for next visit: Instruct in safety issues regarding Proper use of assistive device

## 2024-02-15 ENCOUNTER — HOME CARE VISIT (OUTPATIENT)
Facility: HOME HEALTH | Age: 54
End: 2024-02-15
Payer: COMMERCIAL

## 2024-02-15 PROCEDURE — G0299 HHS/HOSPICE OF RN EA 15 MIN: HCPCS

## 2024-02-15 NOTE — DISCHARGE INSTR - COC
Continuity of Care Form    Patient Name: Juwan Espinoza   :  1970  MRN:  [id number]    Admit date:  2024  Discharge date:  ***    Code Status Order: Full Code   Advance Directives:     Admitting Physician:  No admitting provider for patient encounter.  PCP: MARVIN Jones MD    Discharging Nurse: ***  Discharging Hospital Unit/Room#: No information available for this encounter.  Discharging Unit Phone Number: ***    Emergency Contact:   Extended Emergency Contact Information  Primary Emergency Contact: [place] [address] [facility]  Home Phone: [phone]  Mobile Phone: [phone]  Relation: Spouse    Past Surgical History:  Past Surgical History:   Procedure Laterality Date    APPENDECTOMY      COLONOSCOPY      colon polyps in past    FOOT DEBRIDEMENT Right 2023    RIGHT FOOT WASHOUT AND PLACEMENT OF ANTIBIOTIC BEADS performed by Celso Castaneda DPM at [facility] MAIN OR    OTHER SURGICAL HISTORY      anal fissure    TOE AMPUTATION Bilateral 12/10/2023    I&D of foot    TOE AMPUTATION Left     1st & 2nd toe amputated    UPPER GASTROINTESTINAL ENDOSCOPY      WISDOM TOOTH EXTRACTION         Immunization History:   Immunization History   Administered Date(s) Administered    COVID-19, J&J, (age 18y+), IM, 0.5 mL 2021    COVID-19, PFIZER PURPLE top, DILUTE for use, (age 12 y+), 30mcg/0.3mL 10/27/2021    Influenza, AFLURIA (age 3 yrs+), FLUZONE, (age 6 mo+), MDV, 0.5mL 10/03/2023    Influenza, Triv, 3 Years and older, IM, PF (Afluria 5yrs and older) 10/02/2018, 10/28/2019, 2020, 2021, 2022    Influenza, Trivalent PF 10/02/2018, 10/28/2019, 2020, 2021, 2022    Pneumococcal, PPSV23, PNEUMOVAX 23, (age 2y+), SC/IM, 0.5mL 2020    TDaP, ADACEL (age 10y-64y), BOOSTRIX (age 10y+), IM, 0.5mL 2018, 2020    Zoster Recombinant (Shingrix) 2020, 02/15/2021       Active Problems:  Patient Active

## 2024-02-16 ENCOUNTER — HOME CARE VISIT (OUTPATIENT)
Facility: HOME HEALTH | Age: 54
End: 2024-02-16
Payer: COMMERCIAL

## 2024-02-16 PROCEDURE — G0300 HHS/HOSPICE OF LPN EA 15 MIN: HCPCS

## 2024-02-17 VITALS
HEART RATE: 78 BPM | SYSTOLIC BLOOD PRESSURE: 118 MMHG | DIASTOLIC BLOOD PRESSURE: 67 MMHG | RESPIRATION RATE: 18 BRPM | TEMPERATURE: 98 F | OXYGEN SATURATION: 99 %

## 2024-02-17 NOTE — HOME HEALTH
Subjective: Patient states his machine is new, but it still shows blockage often  Falls since last visit No(if yes complete the Fall Tracking Form and include bsrifallreport):   Caregiver involvement changes: N/a  Home health supplies by type and quantity ordered/delivered this visit include: N/A    Clinician asked if patient has had any physician contact since last home care visit and patient states: NO  Clinician asked if patient has any new or changed medications and patient states:  N/A   If Yes, were medications reconciled? N/A   Was the certifying physician notified of changes in medications? N/A no    Clinical assessment (what this visit means for the patient overall and need for ongoing skilled care) and progress or lack of progress towards SPECIFIC goals: Patient is 55 y/o male with wound to right foot and hx of DM2 living in two story home with wife as caregivers at all times.   Medication education done this visit includes n/a.  Education given to patient on contacting HHA.   Patient response to visit includes understanding of when to contact HHA.   Needs continued SN to prevent infection and rehospitalization due to illness/diagnosis.   Needs continued SN for wound care.         Written Teaching Material Utilized: N/A    Interdisciplinary communication with: N/A    Discharge planning as follows: Per physician order    Specific plan for next visit:wound care

## 2024-02-19 ENCOUNTER — HOME CARE VISIT (OUTPATIENT)
Facility: HOME HEALTH | Age: 54
End: 2024-02-19
Payer: COMMERCIAL

## 2024-02-19 VITALS
OXYGEN SATURATION: 98 % | RESPIRATION RATE: 20 BRPM | HEART RATE: 81 BPM | DIASTOLIC BLOOD PRESSURE: 80 MMHG | TEMPERATURE: 97.8 F | SYSTOLIC BLOOD PRESSURE: 110 MMHG

## 2024-02-19 VITALS
DIASTOLIC BLOOD PRESSURE: 60 MMHG | SYSTOLIC BLOOD PRESSURE: 126 MMHG | TEMPERATURE: 97.6 F | HEART RATE: 78 BPM | OXYGEN SATURATION: 97 % | RESPIRATION RATE: 18 BRPM

## 2024-02-19 PROCEDURE — G0300 HHS/HOSPICE OF LPN EA 15 MIN: HCPCS

## 2024-02-19 ASSESSMENT — ENCOUNTER SYMPTOMS
HEMOPTYSIS: 0
STOOL DESCRIPTION: SOFT FORMED
HEMOPTYSIS: 0

## 2024-02-19 NOTE — HOME HEALTH
Subjective: Pt states that his wound vac shows blockage  Falls since last visit No(if yes complete the Fall Tracking Form and include bsrifallreport):   Caregiver involvement changes: Wife is primary caregiver   Home health supplies by type and quantity ordered/delivered this visit include: none     Clinician asked if patient has had any physician contact since last home care visit and patient states: NO  Clinician asked if patient has any new or changed medications and patient states:  NO   If Yes, were medications reconciled? NO   Was the certifying physician notified of changes in medications? NO     Clinical assessment (what this visit means for the patient overall and need for ongoing skilled care) and progress or lack of progress towards SPECIFIC goals: Pt continues to require SN for wound care, and wound vac maintenance. Vital signs were not taken as pt was on a zoom meeting for work     Written Teaching Material Utilized: N/A    Interdisciplinary communication with: N/A for the purpose of NA     Discharge planning as follows: When wound is 100% healed    Specific plan for next visit: Wound care

## 2024-02-19 NOTE — HOME HEALTH
Subjective: Pt states he is ready to be done with PICC line   Falls since last visit No(if yes complete the Fall Tracking Form and include bsrifallreport):   Caregiver involvement changes: Wife is primary caregiver   Home health supplies by type and quantity ordered/delivered this visit include: none     Clinician asked if patient has had any physician contact since last home care visit and patient states: NO  Clinician asked if patient has any new or changed medications and patient states:  NO   If Yes, were medications reconciled? NO   Was the certifying physician notified of changes in medications? NO     Clinical assessment (what this visit means for the patient overall and need for ongoing skilled care) and progress or lack of progress towards SPECIFIC goals: Pt continues to require SN for wound care     Written Teaching Material Utilized: N/A    Interdisciplinary communication with: N/A for the purpose of NA     Discharge planning as follows: When wound is 100% healed    Specific plan for next visit: Wound care

## 2024-02-20 ENCOUNTER — HOSPITAL ENCOUNTER (OUTPATIENT)
Facility: HOSPITAL | Age: 54
Discharge: HOME | End: 2024-02-20
Attending: PODIATRIST
Payer: COMMERCIAL

## 2024-02-20 ENCOUNTER — HOME CARE VISIT (OUTPATIENT)
Facility: HOME HEALTH | Age: 54
End: 2024-02-20
Payer: COMMERCIAL

## 2024-02-20 ENCOUNTER — TELEPHONE (OUTPATIENT)
Age: 54
End: 2024-02-20

## 2024-02-20 DIAGNOSIS — E11.610 CHARCOT FOOT DUE TO DIABETES MELLITUS (HCC): Primary | ICD-10-CM

## 2024-02-20 DIAGNOSIS — L92.2 GRANULOMA FACIALE: ICD-10-CM

## 2024-02-20 DIAGNOSIS — E11.628 DIABETIC FOOT INFECTION (HCC): ICD-10-CM

## 2024-02-20 DIAGNOSIS — L08.9 DIABETIC FOOT INFECTION (HCC): ICD-10-CM

## 2024-02-20 PROCEDURE — 11044 DBRDMT BONE 1ST 20 SQ CM/<: CPT

## 2024-02-20 RX ORDER — TRIAMCINOLONE ACETONIDE 1 MG/G
OINTMENT TOPICAL ONCE
OUTPATIENT
Start: 2024-02-20 | End: 2024-02-20

## 2024-02-20 RX ORDER — BETAMETHASONE DIPROPIONATE 0.5 MG/G
CREAM TOPICAL ONCE
OUTPATIENT
Start: 2024-02-20 | End: 2024-02-20

## 2024-02-20 RX ORDER — GENTAMICIN SULFATE 1 MG/G
OINTMENT TOPICAL ONCE
OUTPATIENT
Start: 2024-02-20 | End: 2024-02-20

## 2024-02-20 RX ORDER — LIDOCAINE 50 MG/G
OINTMENT TOPICAL ONCE
OUTPATIENT
Start: 2024-02-20 | End: 2024-02-20

## 2024-02-20 RX ORDER — CLOBETASOL PROPIONATE 0.5 MG/G
OINTMENT TOPICAL ONCE
OUTPATIENT
Start: 2024-02-20 | End: 2024-02-20

## 2024-02-20 RX ORDER — IBUPROFEN 200 MG
TABLET ORAL ONCE
OUTPATIENT
Start: 2024-02-20 | End: 2024-02-20

## 2024-02-20 RX ORDER — LIDOCAINE HYDROCHLORIDE 40 MG/ML
SOLUTION TOPICAL ONCE
OUTPATIENT
Start: 2024-02-20 | End: 2024-02-20

## 2024-02-20 RX ORDER — GINSENG 100 MG
CAPSULE ORAL ONCE
OUTPATIENT
Start: 2024-02-20 | End: 2024-02-20

## 2024-02-20 RX ORDER — LIDOCAINE 40 MG/G
CREAM TOPICAL ONCE
OUTPATIENT
Start: 2024-02-20 | End: 2024-02-20

## 2024-02-20 RX ORDER — LIDOCAINE HYDROCHLORIDE 20 MG/ML
JELLY TOPICAL ONCE
OUTPATIENT
Start: 2024-02-20 | End: 2024-02-20

## 2024-02-20 RX ORDER — BACITRACIN ZINC AND POLYMYXIN B SULFATE 500; 1000 [USP'U]/G; [USP'U]/G
OINTMENT TOPICAL ONCE
OUTPATIENT
Start: 2024-02-20 | End: 2024-02-20

## 2024-02-20 RX ORDER — SODIUM CHLOR/HYPOCHLOROUS ACID 0.033 %
SOLUTION, IRRIGATION IRRIGATION ONCE
OUTPATIENT
Start: 2024-02-20 | End: 2024-02-20

## 2024-02-20 RX ORDER — DOXYCYCLINE HYCLATE 100 MG
100 TABLET ORAL 2 TIMES DAILY
Qty: 28 TABLET | Refills: 0 | Status: SHIPPED | OUTPATIENT
Start: 2024-02-20 | End: 2024-03-05

## 2024-02-20 ASSESSMENT — PAIN SCALES - GENERAL: PAINLEVEL_OUTOF10: 0

## 2024-02-20 NOTE — HOME HEALTH
Subjective: Patient stated doing well and not sure when this PICC line is coming out  Falls since last visit No(if yes complete the Fall Tracking Form and include bsrifallreport):   Caregiver involvement changes: none  Home health supplies by type and quantity ordered/delivered this visit include: none    Clinician asked if patient has had any physician contact since last home care visit and patient states: NO  Clinician asked if patient has any new or changed medications and patient states:  NO   If Yes, were medications reconciled? N/A   Was the certifying physician notified of changes in medications? NO     Clinical assessment (what this visit means for the patient overall and need for ongoing skilled care) and progress or lack of progress towards SPECIFIC goals: Patient being seen for wound care, wound is free of infection and has beefy red in color with maceration around the wound, removed 1 piece of black foam, 1 white piece of foam and applied 1 piece of black foam and 1 piece of white foam. Educated patient about troublshooting the wound vac and how to apply wet to dry if needed, patient was able to teach back at 100%. Zehra LPN checking about PICC line and when to be removed.    Written Teaching Material Utilized: N/A    Interdisciplinary communication with: N/A     Discharge planning as follows: When wound is 100% healed, Per physician order and When goals are met    Specific plan for next visit: SN perform wound care

## 2024-02-20 NOTE — WOUND CARE
Debridement Wound Care        Problem List Items Addressed This Visit    None      Procedure Note  Indications:  Based on my examination of this patient's wound(s)/ulcer(s) today, debridement is  required to promote healing and evaluate the wound base.    Performed by: Celso Castaneda DPM    Consent obtained: Yes    Time out taken: Yes    Debridement: excisional    Using tissue nippers the wound(s)/ulcer(s) was/were sharply debrided down through and including the removal of    epidermis, dermis, subcutaneous tissue, muscle/fascia, and bone    Devitalized Tissue Debrided: fibrin, biofilm, and bone    Pre Debridement Measurements:  Are located in the Wound/Ulcer Documentation Flow Sheet    Diabetic ulcer, bone exposed    Wound/Ulcer #: 1    Post Debridement Measurements:  Wound/Ulcer Descriptions are Pre Debridement except measurements:    Wound Care Documentation:  Negative Pressure Wound Therapy Foot Right;Plantar (Active)   $ Standard NPWT >50 sq cm PER TX $ Yes 01/09/24 1621   Wound Type Diabetic foot ulcer 02/19/24 1125   Unit Type 3m 02/16/24 1320   Dressing Type Black Foam;White Foam 02/19/24 1125   Number of pieces used 2 02/19/24 1125   Number of pieces removed 2 02/19/24 1125   Cycle Continuous 02/19/24 1125   Target Pressure (mmHg) 150 02/19/24 1125   Intensity 5 02/02/24 1230   Irrigation Solution Sodium chloride 0.9% 02/19/24 1125   Canister changed? No 02/19/24 1125   Dressing Status New dressing applied 02/19/24 1125   Dressing Changed Changed/New 02/19/24 1125   Drainage Amount Small 02/19/24 1125   Drainage Description Serosanguinous 02/19/24 1125   Wound Assessment Granulation tissue 02/16/24 1320   Allyn-wound Assessment Maceration 02/19/24 1125   Shape ROUND 01/09/24 1621   Odor None 02/19/24 1125   Number of days: 41       Wound 01/02/24 Foot Right;Plantar (Active)   Wound Image    02/06/24 1615   Wound Etiology Diabetic 02/19/24 1125   Dressing Status New dressing applied 02/19/24 1125

## 2024-02-20 NOTE — TELEPHONE ENCOUNTER
----- Message from SELVIN Guerrero NP sent at 2/19/2024 10:25 AM EST -----  Regarding: RE: PICC LINE  Contact: [phone]  Could you send me his latest lab?   ----- Message -----  From: Maryam Warren LPN  Sent: 2/19/2024   9:41 AM EST  To: SELVIN Guerrero NP  Subject: FW: PICC LINE                                      ----- Message -----  From: Juwan Espinoza \"Arnav\"  Sent: 2/17/2024   4:07 PM EST  To: #  Subject: PICC LINE                                        Hi, Dr. Bolton:  I am just wondering what the status was on this PICC line.  I finished the antibiotics last Saturday.    Thanks.

## 2024-02-21 ENCOUNTER — HOME CARE VISIT (OUTPATIENT)
Facility: HOME HEALTH | Age: 54
End: 2024-02-21
Payer: COMMERCIAL

## 2024-02-21 VITALS
SYSTOLIC BLOOD PRESSURE: 118 MMHG | TEMPERATURE: 97.9 F | DIASTOLIC BLOOD PRESSURE: 78 MMHG | RESPIRATION RATE: 16 BRPM | OXYGEN SATURATION: 99 % | HEART RATE: 84 BPM

## 2024-02-21 PROBLEM — L92.2: Status: ACTIVE | Noted: 2024-02-21

## 2024-02-21 PROCEDURE — G0299 HHS/HOSPICE OF RN EA 15 MIN: HCPCS

## 2024-02-22 VITALS
SYSTOLIC BLOOD PRESSURE: 123 MMHG | HEART RATE: 92 BPM | RESPIRATION RATE: 16 BRPM | DIASTOLIC BLOOD PRESSURE: 74 MMHG | TEMPERATURE: 99.2 F

## 2024-02-22 NOTE — HOME HEALTH
Subjective: Patient denies pain for this visit.  Falls since last visit No(if yes complete the Fall Tracking Form and include bsrifallreport):   Caregiver involvement changes: no  Home health supplies by type and quantity ordered/delivered this visit include: no    Clinician asked if patient has had any physician contact since last home care visit and patient states: N/A  Clinician asked if patient has any new or changed medications and patient states:  N/A   If Yes, were medications reconciled? N/A   Was the certifying physician notified of changes in medications? N/A     Clinical assessment (what this visit means for the patient overall and need for ongoing skilled care) and progress or lack of progress towards SPECIFIC goals: Patient PICC was pulled today without incident. Patient is very relieved. Patient wound care is complete and wound is showing signs of improvement.SN needed for further eval and treatment.    Written Teaching Material Utilized: N/A    Interdisciplinary communication with: N/A     Discharge planning as follows: When goals are met    Specific plan for next visit: Educate in s/s of infection and when to call MD CRISTINA or [phone]

## 2024-02-22 NOTE — FLOWSHEET NOTE
02/20/24 1600   Wound 01/02/24 Foot Right;Plantar   Date First Assessed/Time First Assessed: 01/02/24 1400   Present on Original Admission: Yes  Wound Approximate Age at First Assessment (Weeks): 3 weeks  Primary Wound Type: Diabetic Ulcer  Location: Foot  Wound Location Orientation: Right;Plantar   Wound Image     Wound Etiology Diabetic   Dressing Status New dressing applied   Wound Cleansed Wound cleanser   Dressing/Treatment Negative pressure wound therapy   Offloading for Diabetic Foot Ulcers Knee scooter;Offloading ordered   Wound Length (cm) 4 cm   Wound Width (cm) 3.5 cm   Wound Depth (cm) 1.7 cm   Wound Surface Area (cm^2) 14 cm^2   Change in Wound Size % (l*w) 27.27   Wound Volume (cm^3) 23.8 cm^3   Wound Healing % 65   Wound Assessment Exposed structure muscle;Exposed structure fascia   Drainage Amount Moderate (25-50%)   Odor None   Bailey-wound Assessment Maceration   Margins Defined edges   Wound Thickness Description not for Pressure Injury Full thickness   Wound Follow Up   Require Follow Up Yes     Negative Pressure Wound Therapy    NAME:  Juwan Espinoza  YOB: 1970  MEDICAL RECORD NUMBER:  [id number]  DATE:  2/20/2024    Applied Negative Pressure to right foot wound(s)/ulcer(s).  [x] Applied skin barrier prep to bailey-wound.   [x] Cut strips of plastic drape to picture frame wound so that bailey-wound is     covered with the drape.  [x] Cut sponge, gauze or channel drain to size which will fit into the wound/ulcer bed without being forced.   [x] Be sure the sponge is large enough to hold the entire round plastic flange which is attached to the tubing. Never allow flange to be larger than the sponge or it will produce suction damaging intact skin.  Total number of individual pieces of foam used within the wound bed: 2  1 white and 1 black    [x] Covered sponge, gauze or channel drain with plastic drape.   [x] Cut a hole in this plastic drape directly over the sponge the same size as the

## 2024-02-23 ENCOUNTER — HOME CARE VISIT (OUTPATIENT)
Facility: HOME HEALTH | Age: 54
End: 2024-02-23
Payer: COMMERCIAL

## 2024-02-23 PROCEDURE — G0299 HHS/HOSPICE OF RN EA 15 MIN: HCPCS

## 2024-02-24 VITALS
SYSTOLIC BLOOD PRESSURE: 122 MMHG | DIASTOLIC BLOOD PRESSURE: 88 MMHG | HEART RATE: 92 BPM | OXYGEN SATURATION: 99 % | TEMPERATURE: 97.1 F | RESPIRATION RATE: 16 BRPM

## 2024-02-25 ENCOUNTER — HOME CARE VISIT (OUTPATIENT)
Facility: HOME HEALTH | Age: 54
End: 2024-02-25
Payer: COMMERCIAL

## 2024-02-25 VITALS
OXYGEN SATURATION: 97 % | HEART RATE: 83 BPM | DIASTOLIC BLOOD PRESSURE: 80 MMHG | TEMPERATURE: 98.2 F | SYSTOLIC BLOOD PRESSURE: 122 MMHG | RESPIRATION RATE: 18 BRPM

## 2024-02-25 PROCEDURE — G0299 HHS/HOSPICE OF RN EA 15 MIN: HCPCS

## 2024-02-26 ENCOUNTER — HOME CARE VISIT (OUTPATIENT)
Dept: HOME HEALTH SERVICES | Facility: HOME HEALTH | Age: 54
End: 2024-02-26
Payer: COMMERCIAL

## 2024-02-27 ENCOUNTER — HOME CARE VISIT (OUTPATIENT)
Facility: HOME HEALTH | Age: 54
End: 2024-02-27
Payer: COMMERCIAL

## 2024-02-27 PROCEDURE — G0300 HHS/HOSPICE OF LPN EA 15 MIN: HCPCS

## 2024-02-28 ENCOUNTER — HOME CARE VISIT (OUTPATIENT)
Facility: HOME HEALTH | Age: 54
End: 2024-02-28
Payer: COMMERCIAL

## 2024-03-01 ENCOUNTER — HOME CARE VISIT (OUTPATIENT)
Facility: HOME HEALTH | Age: 54
End: 2024-03-01
Payer: COMMERCIAL

## 2024-03-01 PROCEDURE — G0300 HHS/HOSPICE OF LPN EA 15 MIN: HCPCS

## 2024-03-04 ENCOUNTER — HOME CARE VISIT (OUTPATIENT)
Facility: HOME HEALTH | Age: 54
End: 2024-03-04
Payer: COMMERCIAL

## 2024-03-04 PROCEDURE — G0300 HHS/HOSPICE OF LPN EA 15 MIN: HCPCS

## 2024-03-05 ENCOUNTER — HOSPITAL ENCOUNTER (INPATIENT)
Facility: HOSPITAL | Age: 54
LOS: 6 days | Discharge: HOME | DRG: 872 | End: 2024-03-11
Attending: EMERGENCY MEDICINE | Admitting: STUDENT IN AN ORGANIZED HEALTH CARE EDUCATION/TRAINING PROGRAM
Payer: COMMERCIAL

## 2024-03-05 ENCOUNTER — APPOINTMENT (OUTPATIENT)
Facility: HOSPITAL | Age: 54
DRG: 872 | End: 2024-03-05
Payer: COMMERCIAL

## 2024-03-05 ENCOUNTER — HOSPITAL ENCOUNTER (OUTPATIENT)
Facility: HOSPITAL | Age: 54
Discharge: HOME | End: 2024-03-05
Attending: PODIATRIST
Payer: COMMERCIAL

## 2024-03-05 VITALS
HEART RATE: 113 BPM | DIASTOLIC BLOOD PRESSURE: 52 MMHG | TEMPERATURE: 100 F | OXYGEN SATURATION: 100 % | RESPIRATION RATE: 18 BRPM | SYSTOLIC BLOOD PRESSURE: 87 MMHG

## 2024-03-05 DIAGNOSIS — A41.9 SEVERE SEPSIS (HCC): Primary | ICD-10-CM

## 2024-03-05 DIAGNOSIS — R65.20 SEVERE SEPSIS (HCC): Primary | ICD-10-CM

## 2024-03-05 DIAGNOSIS — M86.171 OTHER ACUTE OSTEOMYELITIS OF RIGHT FOOT (HCC): ICD-10-CM

## 2024-03-05 DIAGNOSIS — L08.9 DIABETIC FOOT INFECTION (HCC): ICD-10-CM

## 2024-03-05 DIAGNOSIS — E11.610 CHARCOT FOOT DUE TO DIABETES MELLITUS (HCC): Primary | ICD-10-CM

## 2024-03-05 DIAGNOSIS — E11.628 DIABETIC FOOT INFECTION (HCC): ICD-10-CM

## 2024-03-05 LAB
ALBUMIN SERPL-MCNC: 3 G/DL (ref 3.5–5)
ALBUMIN/GLOB SERPL: 0.7 (ref 1.1–2.2)
ALP SERPL-CCNC: 92 U/L (ref 45–117)
ALT SERPL-CCNC: 30 U/L (ref 12–78)
ANION GAP SERPL CALC-SCNC: 7 MMOL/L (ref 5–15)
APPEARANCE UR: CLEAR
AST SERPL-CCNC: 21 U/L (ref 15–37)
BACTERIA URNS QL MICRO: NEGATIVE /HPF
BASOPHILS # BLD: 0.1 K/UL (ref 0–0.1)
BASOPHILS NFR BLD: 0 % (ref 0–1)
BILIRUB SERPL-MCNC: 0.4 MG/DL (ref 0.2–1)
BILIRUB UR QL: NEGATIVE
BUN SERPL-MCNC: 26 MG/DL (ref 6–20)
BUN/CREAT SERPL: 17 (ref 12–20)
CALCIUM SERPL-MCNC: 9.6 MG/DL (ref 8.5–10.1)
CAOX CRY URNS QL MICRO: ABNORMAL
CHLORIDE SERPL-SCNC: 109 MMOL/L (ref 97–108)
CO2 SERPL-SCNC: 24 MMOL/L (ref 21–32)
COLOR UR: ABNORMAL
CREAT SERPL-MCNC: 1.55 MG/DL (ref 0.7–1.3)
DIFFERENTIAL METHOD BLD: ABNORMAL
EOSINOPHIL # BLD: 0.2 K/UL (ref 0–0.4)
EOSINOPHIL NFR BLD: 2 % (ref 0–7)
EPITH CASTS URNS QL MICRO: ABNORMAL /LPF
ERYTHROCYTE [DISTWIDTH] IN BLOOD BY AUTOMATED COUNT: 14.2 % (ref 11.5–14.5)
GLOBULIN SER CALC-MCNC: 4.6 G/DL (ref 2–4)
GLUCOSE BLD STRIP.AUTO-MCNC: 74 MG/DL (ref 65–117)
GLUCOSE SERPL-MCNC: 123 MG/DL (ref 65–100)
GLUCOSE UR STRIP.AUTO-MCNC: NEGATIVE MG/DL
HCT VFR BLD AUTO: 39.4 % (ref 36.6–50.3)
HGB BLD-MCNC: 12.4 G/DL (ref 12.1–17)
HGB UR QL STRIP: NEGATIVE
HYALINE CASTS URNS QL MICRO: ABNORMAL /LPF (ref 0–5)
IMM GRANULOCYTES # BLD AUTO: 0.1 K/UL (ref 0–0.04)
IMM GRANULOCYTES NFR BLD AUTO: 1 % (ref 0–0.5)
KETONES UR QL STRIP.AUTO: ABNORMAL MG/DL
LACTATE BLD-SCNC: 1.75 MMOL/L (ref 0.4–2)
LACTATE BLD-SCNC: 2.51 MMOL/L (ref 0.4–2)
LEUKOCYTE ESTERASE UR QL STRIP.AUTO: NEGATIVE
LYMPHOCYTES # BLD: 3.2 K/UL (ref 0.8–3.5)
LYMPHOCYTES NFR BLD: 27 % (ref 12–49)
MCH RBC QN AUTO: 29.4 PG (ref 26–34)
MCHC RBC AUTO-ENTMCNC: 31.5 G/DL (ref 30–36.5)
MCV RBC AUTO: 93.4 FL (ref 80–99)
MONOCYTES # BLD: 0.7 K/UL (ref 0–1)
MONOCYTES NFR BLD: 6 % (ref 5–13)
NEUTS SEG # BLD: 7.7 K/UL (ref 1.8–8)
NEUTS SEG NFR BLD: 64 % (ref 32–75)
NITRITE UR QL STRIP.AUTO: NEGATIVE
NRBC # BLD: 0 K/UL (ref 0–0.01)
NRBC BLD-RTO: 0 PER 100 WBC
PH UR STRIP: 5.5 (ref 5–8)
PLATELET # BLD AUTO: 312 K/UL (ref 150–400)
PMV BLD AUTO: 10.9 FL (ref 8.9–12.9)
POTASSIUM SERPL-SCNC: 4 MMOL/L (ref 3.5–5.1)
PROCALCITONIN SERPL-MCNC: 0.08 NG/ML
PROT SERPL-MCNC: 7.6 G/DL (ref 6.4–8.2)
PROT UR STRIP-MCNC: 30 MG/DL
RBC # BLD AUTO: 4.22 M/UL (ref 4.1–5.7)
RBC #/AREA URNS HPF: ABNORMAL /HPF (ref 0–5)
SERVICE CMNT-IMP: NORMAL
SODIUM SERPL-SCNC: 140 MMOL/L (ref 136–145)
SP GR UR REFRACTOMETRY: 1.02
URINE CULTURE IF INDICATED: ABNORMAL
UROBILINOGEN UR QL STRIP.AUTO: 0.2 EU/DL (ref 0.2–1)
WBC # BLD AUTO: 11.9 K/UL (ref 4.1–11.1)
WBC URNS QL MICRO: ABNORMAL /HPF (ref 0–4)

## 2024-03-05 PROCEDURE — 73630 X-RAY EXAM OF FOOT: CPT

## 2024-03-05 PROCEDURE — 71045 X-RAY EXAM CHEST 1 VIEW: CPT

## 2024-03-05 PROCEDURE — 81001 URINALYSIS AUTO W/SCOPE: CPT

## 2024-03-05 PROCEDURE — 85025 COMPLETE CBC W/AUTO DIFF WBC: CPT

## 2024-03-05 PROCEDURE — 93005 ELECTROCARDIOGRAM TRACING: CPT | Performed by: EMERGENCY MEDICINE

## 2024-03-05 PROCEDURE — 82962 GLUCOSE BLOOD TEST: CPT

## 2024-03-05 PROCEDURE — 99214 OFFICE O/P EST MOD 30 MIN: CPT

## 2024-03-05 PROCEDURE — 84145 PROCALCITONIN (PCT): CPT

## 2024-03-05 PROCEDURE — 99285 EMERGENCY DEPT VISIT HI MDM: CPT

## 2024-03-05 PROCEDURE — 2580000003 HC RX 258: Performed by: EMERGENCY MEDICINE

## 2024-03-05 PROCEDURE — 80053 COMPREHEN METABOLIC PANEL: CPT

## 2024-03-05 PROCEDURE — 1100000000 HC RM PRIVATE

## 2024-03-05 PROCEDURE — 6360000002 HC RX W HCPCS: Performed by: EMERGENCY MEDICINE

## 2024-03-05 PROCEDURE — 83605 ASSAY OF LACTIC ACID: CPT

## 2024-03-05 PROCEDURE — 87040 BLOOD CULTURE FOR BACTERIA: CPT

## 2024-03-05 PROCEDURE — 36415 COLL VENOUS BLD VENIPUNCTURE: CPT

## 2024-03-05 RX ORDER — TRIAMCINOLONE ACETONIDE 1 MG/G
OINTMENT TOPICAL ONCE
OUTPATIENT
Start: 2024-03-05 | End: 2024-03-05

## 2024-03-05 RX ORDER — GENTAMICIN SULFATE 1 MG/G
OINTMENT TOPICAL ONCE
OUTPATIENT
Start: 2024-03-05 | End: 2024-03-05

## 2024-03-05 RX ORDER — IBUPROFEN 200 MG
TABLET ORAL ONCE
OUTPATIENT
Start: 2024-03-05 | End: 2024-03-05

## 2024-03-05 RX ORDER — BACITRACIN ZINC AND POLYMYXIN B SULFATE 500; 1000 [USP'U]/G; [USP'U]/G
OINTMENT TOPICAL ONCE
OUTPATIENT
Start: 2024-03-05 | End: 2024-03-05

## 2024-03-05 RX ORDER — LIDOCAINE HYDROCHLORIDE 20 MG/ML
JELLY TOPICAL ONCE
OUTPATIENT
Start: 2024-03-05 | End: 2024-03-05

## 2024-03-05 RX ORDER — LIDOCAINE 50 MG/G
OINTMENT TOPICAL ONCE
OUTPATIENT
Start: 2024-03-05 | End: 2024-03-05

## 2024-03-05 RX ORDER — CLOBETASOL PROPIONATE 0.5 MG/G
OINTMENT TOPICAL ONCE
OUTPATIENT
Start: 2024-03-05 | End: 2024-03-05

## 2024-03-05 RX ORDER — ACETAMINOPHEN 325 MG/1
650 TABLET ORAL EVERY 4 HOURS PRN
Status: DISCONTINUED | OUTPATIENT
Start: 2024-03-05 | End: 2024-03-11 | Stop reason: HOSPADM

## 2024-03-05 RX ORDER — 0.9 % SODIUM CHLORIDE 0.9 %
30 INTRAVENOUS SOLUTION INTRAVENOUS ONCE
Status: COMPLETED | OUTPATIENT
Start: 2024-03-05 | End: 2024-03-06

## 2024-03-05 RX ORDER — BETAMETHASONE DIPROPIONATE 0.5 MG/G
CREAM TOPICAL ONCE
OUTPATIENT
Start: 2024-03-05 | End: 2024-03-05

## 2024-03-05 RX ORDER — SODIUM CHLOR/HYPOCHLOROUS ACID 0.033 %
SOLUTION, IRRIGATION IRRIGATION ONCE
OUTPATIENT
Start: 2024-03-05 | End: 2024-03-05

## 2024-03-05 RX ORDER — GINSENG 100 MG
CAPSULE ORAL ONCE
OUTPATIENT
Start: 2024-03-05 | End: 2024-03-05

## 2024-03-05 RX ORDER — LIDOCAINE HYDROCHLORIDE 40 MG/ML
SOLUTION TOPICAL ONCE
OUTPATIENT
Start: 2024-03-05 | End: 2024-03-05

## 2024-03-05 RX ORDER — 0.9 % SODIUM CHLORIDE 0.9 %
1000 INTRAVENOUS SOLUTION INTRAVENOUS ONCE
Status: DISCONTINUED | OUTPATIENT
Start: 2024-03-05 | End: 2024-03-05

## 2024-03-05 RX ORDER — LIDOCAINE 40 MG/G
CREAM TOPICAL ONCE
OUTPATIENT
Start: 2024-03-05 | End: 2024-03-05

## 2024-03-05 RX ADMIN — Medication 2500 MG: at 22:10

## 2024-03-05 RX ADMIN — SODIUM CHLORIDE 2259 ML: 9 INJECTION, SOLUTION INTRAVENOUS at 22:00

## 2024-03-05 RX ADMIN — PIPERACILLIN SODIUM AND TAZOBACTAM SODIUM 4500 MG: 4; .5 INJECTION, POWDER, LYOPHILIZED, FOR SOLUTION INTRAVENOUS at 22:06

## 2024-03-05 ASSESSMENT — LIFESTYLE VARIABLES
HOW OFTEN DO YOU HAVE A DRINK CONTAINING ALCOHOL: NEVER
HOW MANY STANDARD DRINKS CONTAINING ALCOHOL DO YOU HAVE ON A TYPICAL DAY: PATIENT DOES NOT DRINK

## 2024-03-05 ASSESSMENT — PAIN SCALES - GENERAL
PAINLEVEL_OUTOF10: 5
PAINLEVEL_OUTOF10: 5

## 2024-03-05 ASSESSMENT — PAIN - FUNCTIONAL ASSESSMENT
PAIN_FUNCTIONAL_ASSESSMENT: NONE - DENIES PAIN
PAIN_FUNCTIONAL_ASSESSMENT: 0-10

## 2024-03-05 ASSESSMENT — PAIN DESCRIPTION - LOCATION
LOCATION: BACK
LOCATION: BACK

## 2024-03-05 ASSESSMENT — PAIN DESCRIPTION - PAIN TYPE: TYPE: ACUTE PAIN

## 2024-03-05 ASSESSMENT — PAIN DESCRIPTION - FREQUENCY: FREQUENCY: INTERMITTENT

## 2024-03-05 ASSESSMENT — PAIN DESCRIPTION - DESCRIPTORS: DESCRIPTORS: DISCOMFORT

## 2024-03-05 ASSESSMENT — PAIN DESCRIPTION - ORIENTATION: ORIENTATION: LOWER;MID

## 2024-03-05 NOTE — ED NOTES
Pt. Presents to ED today after being sent by podiatry for possible sepsis of a R foot wound.  Patient finished round of antibiotics today and was showing sirs signs at MD appointment.  Upon arrival to ED patient placed in position of comfort with call bell in reach and on monitor x3.

## 2024-03-05 NOTE — FLOWSHEET NOTE
03/05/24 1655   Wound 03/05/24 Toe (Comment  which one) Right;Lateral 4th and 5th   Date First Assessed/Time First Assessed: 03/05/24 1657   Present on Original Admission: No  Wound Approximate Age at First Assessment (Weeks): 1 weeks  Primary Wound Type: Diabetic Ulcer  Location: Toe (Comment  which one)  Wound Location Orientation:...   Wound Image       Wound Etiology Diabetic   Dressing Status New dressing applied   Wound Cleansed Cleansed with saline;Irrigated with saline;Vashe   Dressing/Treatment Alginate with Ag;Gauze dressing/dressing sponge;ABD;Roll gauze;Ace wrap   Wound Assessment Eschar dry;Eschar moist   Drainage Amount Moderate (25-50%)   Drainage Description Serosanguinous   Odor None   Allyn-wound Assessment Fragile;Maceration;Warm   Margins Flat/open edges;Undefined edges   Wound Thickness Description not for Pressure Injury Full thickness   Wound 01/02/24 Foot Right;Plantar   Date First Assessed/Time First Assessed: 01/02/24 1400   Present on Original Admission: Yes  Wound Approximate Age at First Assessment (Weeks): 3 weeks  Primary Wound Type: Diabetic Ulcer  Location: Foot  Wound Location Orientation: Right;Plantar   Wound Image    Wound Etiology Diabetic   Dressing Status New dressing applied   Wound Cleansed Wound cleanser;Vashe   Dressing/Treatment Collagen;Alginate with Ag;Gauze dressing/dressing sponge;ABD;Roll gauze;Ace wrap   Wound Length (cm) 3 cm   Wound Width (cm) 3 cm   Wound Depth (cm) 4.5 cm   Wound Surface Area (cm^2) 9 cm^2   Change in Wound Size % (l*w) 53.25   Wound Volume (cm^3) 40.5 cm^3   Wound Healing % 40   Wound Assessment Pink/red;Granulation tissue   Drainage Amount Moderate (25-50%)   Drainage Description Serosanguinous   Odor None   Allyn-wound Assessment Blanchable erythema;Edematous   Margins Defined edges;Attached edges   Wound Thickness Description not for Pressure Injury Full thickness

## 2024-03-05 NOTE — WOUND CARE
[facility]   Progress Note and Procedure Note   NO Procedure      Juwan Espinoza  MEDICAL RECORD NUMBER:  [id number]  AGE: 54 y.o. RACE White (non-)  GENDER: male  : 1970  EPISODE DATE:  3/5/2024    Subjective:   Not feeling well  Today's vitals show hypotensive 987/52), Tachycardic (113) and running mild temp at 100  Does not have any pains in the right foot    No chief complaint on file.        HISTORY of PRESENT ILLNESS HPI     Juwan Espinoza is a 54 y.o. male who presents today for wound/ulcer evaluation.   History of Wound Context: diabetic with Charcot foot and grade 4 ulceration   Wound/Ulcer Pain Timing/Severity: none  Quality of pain:   Severity:  0 / 10   Modifying Factors: None  Associated Signs/Symptoms: none    Ulcer Identification:  Ulcer Type: diabetic    Contributing Factors: diabetes    Wound: grade 4 ulcer with granulation filling in to the wound         PAST MEDICAL HISTORY    Past Medical History:   Diagnosis Date    B12 deficiency 1/15/2021    Diabetes (HCC)     type 2    Gout     Hypercholesteremia     Hyperlipidemia     Hypertension     Kidney stone     Neuropathy     bilateral lower legs        PAST SURGICAL HISTORY    Past Surgical History:   Procedure Laterality Date    APPENDECTOMY      COLONOSCOPY      colon polyps in past    FOOT DEBRIDEMENT Right 2023    RIGHT FOOT WASHOUT AND PLACEMENT OF ANTIBIOTIC BEADS performed by Celos Castaneda DPM at [facility] MAIN OR    OTHER SURGICAL HISTORY      anal fissure    TOE AMPUTATION Bilateral 12/10/2023    I&D of foot    TOE AMPUTATION Left     1st & 2nd toe amputated    UPPER GASTROINTESTINAL ENDOSCOPY      WISDOM TOOTH EXTRACTION         FAMILY HISTORY    Family History   Problem Relation Age of Onset    Elevated Lipids Mother     Other Mother         GOUT    Hypertension Mother     Diabetes Mother        SOCIAL HISTORY    Social History     Tobacco Use    Smoking status: Never    Smokeless tobacco:

## 2024-03-06 ENCOUNTER — HOME CARE VISIT (OUTPATIENT)
Dept: HOME HEALTH SERVICES | Facility: HOME HEALTH | Age: 54
End: 2024-03-06
Payer: COMMERCIAL

## 2024-03-06 PROBLEM — M86.9 PYOGENIC INFLAMMATION OF BONE (HCC): Status: ACTIVE | Noted: 2024-03-06

## 2024-03-06 LAB
GLUCOSE BLD STRIP.AUTO-MCNC: 129 MG/DL (ref 65–117)
GLUCOSE BLD STRIP.AUTO-MCNC: 161 MG/DL (ref 65–117)
GLUCOSE BLD STRIP.AUTO-MCNC: 82 MG/DL (ref 65–117)
GLUCOSE BLD STRIP.AUTO-MCNC: 97 MG/DL (ref 65–117)
SERVICE CMNT-IMP: ABNORMAL
SERVICE CMNT-IMP: ABNORMAL
SERVICE CMNT-IMP: NORMAL
SERVICE CMNT-IMP: NORMAL

## 2024-03-06 PROCEDURE — 99221 1ST HOSP IP/OBS SF/LOW 40: CPT

## 2024-03-06 PROCEDURE — 2580000003 HC RX 258: Performed by: INTERNAL MEDICINE

## 2024-03-06 PROCEDURE — A6456 ZINC PASTE BAND W >=3"<5"/YD: HCPCS

## 2024-03-06 PROCEDURE — 6370000000 HC RX 637 (ALT 250 FOR IP): Performed by: STUDENT IN AN ORGANIZED HEALTH CARE EDUCATION/TRAINING PROGRAM

## 2024-03-06 PROCEDURE — 1100000000 HC RM PRIVATE

## 2024-03-06 PROCEDURE — 2580000003 HC RX 258: Performed by: STUDENT IN AN ORGANIZED HEALTH CARE EDUCATION/TRAINING PROGRAM

## 2024-03-06 PROCEDURE — 6370000000 HC RX 637 (ALT 250 FOR IP)

## 2024-03-06 PROCEDURE — 6360000002 HC RX W HCPCS: Performed by: STUDENT IN AN ORGANIZED HEALTH CARE EDUCATION/TRAINING PROGRAM

## 2024-03-06 PROCEDURE — 82962 GLUCOSE BLOOD TEST: CPT

## 2024-03-06 PROCEDURE — 6360000002 HC RX W HCPCS: Performed by: INTERNAL MEDICINE

## 2024-03-06 RX ORDER — SODIUM CHLORIDE 0.9 % (FLUSH) 0.9 %
5-40 SYRINGE (ML) INJECTION EVERY 12 HOURS SCHEDULED
Status: DISCONTINUED | OUTPATIENT
Start: 2024-03-06 | End: 2024-03-11 | Stop reason: HOSPADM

## 2024-03-06 RX ORDER — ONDANSETRON 4 MG/1
4 TABLET, ORALLY DISINTEGRATING ORAL EVERY 8 HOURS PRN
Status: DISCONTINUED | OUTPATIENT
Start: 2024-03-06 | End: 2024-03-11 | Stop reason: HOSPADM

## 2024-03-06 RX ORDER — INSULIN LISPRO 100 [IU]/ML
0-8 INJECTION, SOLUTION INTRAVENOUS; SUBCUTANEOUS
Status: DISCONTINUED | OUTPATIENT
Start: 2024-03-06 | End: 2024-03-11 | Stop reason: HOSPADM

## 2024-03-06 RX ORDER — ONDANSETRON 2 MG/ML
4 INJECTION INTRAMUSCULAR; INTRAVENOUS EVERY 6 HOURS PRN
Status: DISCONTINUED | OUTPATIENT
Start: 2024-03-06 | End: 2024-03-11 | Stop reason: HOSPADM

## 2024-03-06 RX ORDER — DEXTROSE MONOHYDRATE 100 MG/ML
INJECTION, SOLUTION INTRAVENOUS CONTINUOUS PRN
Status: DISCONTINUED | OUTPATIENT
Start: 2024-03-06 | End: 2024-03-11 | Stop reason: HOSPADM

## 2024-03-06 RX ORDER — SODIUM CHLORIDE 9 MG/ML
INJECTION, SOLUTION INTRAVENOUS PRN
Status: DISCONTINUED | OUTPATIENT
Start: 2024-03-06 | End: 2024-03-11 | Stop reason: HOSPADM

## 2024-03-06 RX ORDER — ACETAMINOPHEN 325 MG/1
650 TABLET ORAL EVERY 6 HOURS PRN
Status: DISCONTINUED | OUTPATIENT
Start: 2024-03-06 | End: 2024-03-06 | Stop reason: SDUPTHER

## 2024-03-06 RX ORDER — SODIUM CHLORIDE 0.9 % (FLUSH) 0.9 %
5-40 SYRINGE (ML) INJECTION PRN
Status: DISCONTINUED | OUTPATIENT
Start: 2024-03-06 | End: 2024-03-11 | Stop reason: HOSPADM

## 2024-03-06 RX ORDER — INSULIN LISPRO 100 [IU]/ML
0-4 INJECTION, SOLUTION INTRAVENOUS; SUBCUTANEOUS NIGHTLY
Status: DISCONTINUED | OUTPATIENT
Start: 2024-03-06 | End: 2024-03-11 | Stop reason: HOSPADM

## 2024-03-06 RX ORDER — POLYETHYLENE GLYCOL 3350 17 G/17G
17 POWDER, FOR SOLUTION ORAL DAILY PRN
Status: DISCONTINUED | OUTPATIENT
Start: 2024-03-06 | End: 2024-03-11 | Stop reason: HOSPADM

## 2024-03-06 RX ORDER — INSULIN GLARGINE 100 [IU]/ML
70 INJECTION, SOLUTION SUBCUTANEOUS NIGHTLY
Status: DISCONTINUED | OUTPATIENT
Start: 2024-03-06 | End: 2024-03-06

## 2024-03-06 RX ORDER — METRONIDAZOLE 500 MG/100ML
500 INJECTION, SOLUTION INTRAVENOUS EVERY 8 HOURS
Status: DISCONTINUED | OUTPATIENT
Start: 2024-03-06 | End: 2024-03-11

## 2024-03-06 RX ORDER — INSULIN GLARGINE 100 [IU]/ML
50 INJECTION, SOLUTION SUBCUTANEOUS NIGHTLY
Status: DISCONTINUED | OUTPATIENT
Start: 2024-03-06 | End: 2024-03-11 | Stop reason: HOSPADM

## 2024-03-06 RX ORDER — SODIUM CHLORIDE 9 MG/ML
INJECTION, SOLUTION INTRAVENOUS PRN
Status: DISCONTINUED | OUTPATIENT
Start: 2024-03-06 | End: 2024-03-06 | Stop reason: SDUPTHER

## 2024-03-06 RX ORDER — GABAPENTIN 300 MG/1
600 CAPSULE ORAL 3 TIMES DAILY
Status: DISCONTINUED | OUTPATIENT
Start: 2024-03-06 | End: 2024-03-11 | Stop reason: HOSPADM

## 2024-03-06 RX ORDER — ROSUVASTATIN CALCIUM 10 MG/1
5 TABLET, COATED ORAL DAILY
Status: DISCONTINUED | OUTPATIENT
Start: 2024-03-06 | End: 2024-03-11 | Stop reason: HOSPADM

## 2024-03-06 RX ORDER — ACETAMINOPHEN 650 MG/1
650 SUPPOSITORY RECTAL EVERY 6 HOURS PRN
Status: DISCONTINUED | OUTPATIENT
Start: 2024-03-06 | End: 2024-03-11 | Stop reason: HOSPADM

## 2024-03-06 RX ADMIN — PIPERACILLIN AND TAZOBACTAM 3375 MG: 3; .375 INJECTION, POWDER, LYOPHILIZED, FOR SOLUTION INTRAVENOUS at 06:56

## 2024-03-06 RX ADMIN — ROSUVASTATIN CALCIUM 5 MG: 10 TABLET, COATED ORAL at 09:07

## 2024-03-06 RX ADMIN — SODIUM CHLORIDE, PRESERVATIVE FREE 10 ML: 5 INJECTION INTRAVENOUS at 09:16

## 2024-03-06 RX ADMIN — SODIUM CHLORIDE: 9 INJECTION, SOLUTION INTRAVENOUS at 21:11

## 2024-03-06 RX ADMIN — VANCOMYCIN HYDROCHLORIDE 1000 MG: 1 INJECTION, POWDER, LYOPHILIZED, FOR SOLUTION INTRAVENOUS at 09:15

## 2024-03-06 RX ADMIN — GABAPENTIN 600 MG: 300 CAPSULE ORAL at 21:05

## 2024-03-06 RX ADMIN — Medication 16 G: at 11:53

## 2024-03-06 RX ADMIN — SODIUM CHLORIDE, PRESERVATIVE FREE 10 ML: 5 INJECTION INTRAVENOUS at 21:06

## 2024-03-06 RX ADMIN — INSULIN GLARGINE 50 UNITS: 100 INJECTION, SOLUTION SUBCUTANEOUS at 21:06

## 2024-03-06 RX ADMIN — METRONIDAZOLE 500 MG: 500 INJECTION, SOLUTION INTRAVENOUS at 14:44

## 2024-03-06 RX ADMIN — METRONIDAZOLE 500 MG: 500 INJECTION, SOLUTION INTRAVENOUS at 23:14

## 2024-03-06 RX ADMIN — VANCOMYCIN HYDROCHLORIDE 1000 MG: 1 INJECTION, POWDER, LYOPHILIZED, FOR SOLUTION INTRAVENOUS at 21:11

## 2024-03-06 RX ADMIN — SODIUM CHLORIDE, PRESERVATIVE FREE 10 ML: 5 INJECTION INTRAVENOUS at 09:07

## 2024-03-06 RX ADMIN — GABAPENTIN 600 MG: 300 CAPSULE ORAL at 09:07

## 2024-03-06 RX ADMIN — WATER 2000 MG: 1 INJECTION INTRAMUSCULAR; INTRAVENOUS; SUBCUTANEOUS at 16:05

## 2024-03-06 RX ADMIN — GABAPENTIN 600 MG: 300 CAPSULE ORAL at 14:45

## 2024-03-06 ASSESSMENT — PAIN SCALES - GENERAL: PAINLEVEL_OUTOF10: 0

## 2024-03-06 NOTE — CONSULTS
Received consult well known patient to me   He was sent to the ER by me for suspected Sepsis  Stable superficial gangrene and chronic non healing  diabetic charcot related post ulcer right foot  Had out patient debridement of the ulcer and the bone treatment in the wound care center   X ray findings are most likely result of the debridement of the bone  Will see patient this evening likely does not need any surgical treatment for now   Full note to follow after seeing patient to today

## 2024-03-06 NOTE — PLAN OF CARE
Problem: Discharge Planning  Goal: Discharge to home or other facility with appropriate resources  3/6/2024 0946 by Lizzie Gallardo RN  Outcome: Progressing  3/5/2024 2352 by Asif Cole RN  Outcome: Progressing     Problem: Safety - Adult  Goal: Free from fall injury  3/6/2024 0946 by Lizzie Gallardo RN  Outcome: Progressing  3/5/2024 2352 by Asif Cole RN  Outcome: Progressing     Problem: Skin/Tissue Integrity  Goal: Absence of new skin breakdown  Description: 1.  Monitor for areas of redness and/or skin breakdown  2.  Assess vascular access sites hourly  3.  Every 4-6 hours minimum:  Change oxygen saturation probe site  4.  Every 4-6 hours:  If on nasal continuous positive airway pressure, respiratory therapy assess nares and determine need for appliance change or resting period.  Outcome: Progressing

## 2024-03-06 NOTE — ED NOTES
Report given to ROJELIO Contreras. Nurse was informed of reason for arrival, vitals, labs, medications, orders, procedures, results, anything left pending and current plan of action. Questions were asked and received prior to departure from the patient.

## 2024-03-06 NOTE — DIABETES MGMT
[facility]  DIABETES MANAGEMENT CONSULT    Consulted by Provider for advanced nursing evaluation and care for inpatient blood glucose management.    Evaluation and Action Plan   Juwan Espinoza is a 54 year old male patient with Type 2 diabetes. The patient reports good BG control at home on 100 units Lantus daily, Ozempic ( Wednesdays) and Janumet. The patient' diabetes is managed by his PCP. He is admitted for a right foot infection. Right foot ( 4 and 5th toe) wound ( started as blisters) started about 2 weeks ago. The patient was already following with a podiatrist for a post surgical wound on the bottom of the foot. 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 The patient is NPO today and is not requiring insulin at this time. However, I suspect the BG's will begin to rise since he has not received basal insulin  since yesterday morning. Also the effects of the Ozempic may be wearing off as well since he takes his injections on Wednesdays. I have modified the basal insulin order to 50% of home dose to start this evening. Diabetes management will monitor BG's and insulin requirements and make changes as needed.       Management Rationale Action Plan   Medication   Basal needs Using 50% of home dose    Corrective insulin Using medium dose sensitivity based on weight    Additional orders          Diabetes Discharge Plan   Medication  TBD   Referral  []        Outpatient diabetes education   Additional orders            Initial Presentation   Juwan Espinoza is a 54 y.o. male admitted  after experiencing a right foot ( 4 and 5th toe) wound that started about 2 weeks ago. The patient was already following with a podiatrist for a post surgical wound on the bottom of the foot. 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   LAB: glucose 123. Creatine 1.55. GFR 52.  CXR:Narrative & Impression  EXAM: XR FOOT RIGHT  (MIN 3 VIEWS)     INDICATION: rule out osteo.     COMPARISON: None.     FINDINGS: Three views of the right foot demonstrate chronic fracture dislocation  of the midfoot. There is no abnormal periosteal reaction around the base of the  first metatarsal, the dislocated medial cuneiform, and the talus with possible  involvement of the second through fifth metatarsals as well. There is a soft  tissue ulcer along the plantar aspect of the midfoot.. Diffuse soft tissue  swelling is noted..     IMPRESSION:  Chronic fracture dislocation of the midfoot with interval  development of cortical irregularity and periosteal reaction suspicious for  osteomyelitis..      HX:   Past Medical History:   Diagnosis Date    B12 deficiency 1/15/2021    Diabetes ([facility])     type 2    Gout     Hypercholesteremia     Hyperlipidemia     Hypertension     Kidney stone     Neuropathy     bilateral lower legs        INITIAL DX: Diabetic foot infection ([facility]) [E11.628, L08.9]  Severe sepsis ([facility]) [A41.9, R65.20]  Other acute osteomyelitis of right foot ([facility]) [M86.171]     Current Treatment     TX: Gabapentin. Insulin. Crestor. Abx.     Hospital Course   Clinical progress has been complicated by infection.     Diabetes History   The patient has a hx of Type 2 diabetes. He is managed by his PCP. He resides at home with his wife. The patient reports a good support system. He works from home. Using a knee scooter for ambulation and independent with ADL's.     Diabetes-related Medical History    Neurological complications  Peripheral neuropathy   Vitamin B12 deficiency    Diabetes Medication History  Diabetes drug class Diabetes drug name Additional Comments   Biguanide  Janumet    DPP4 inhibitors Janumet    GLP-1 Zackery Ozempic    Insulin Lantus      Diabetes self-management practices:   Eating pattern   [x] Eating a carbohydrate-controlled meal plan    Physical activity pattern   [x]  Not employing a physical activity program to control BG    Monitoring

## 2024-03-06 NOTE — PROGRESS NOTES
Pharmacy Antimicrobial Kinetic Dosing    Indication for Antimicrobials: Diabetic foot wound     Current Regimen of Each Antimicrobial:  Vancomycin pharmacy consult; Start Date 3/6; Day # 2  Zosyn 3.375gm q 8; Start Date 3/6; Day # 2    Previous Antimicrobial Therapy:   N/A    Goal Level: Vancomycin -600    Date Dose & Interval Measured (mcg/mL) Predicted AUC                       Significant Cultures:  3/5 Blood, paired: ngtd, prelim    Labs:  Recent Labs     Units 24  1853   CREATININE MG/DL  --  1.55*   BUN MG/DL  --  26*   PROCAL ng/mL 0.08  --    WBC K/uL  --  11.9*     Temp (24hrs), Av °F (37.2 °C), Min:98.6 °F (37 °C), Max:100 °F (37.8 °C)    Conditions for Dosing Consideration: None    Creatinine Clearance (mL/min): Estimated Creatinine Clearance: 68 mL/min (A) (based on SCr of 1.55 mg/dL (H)).     Impression/Plan:   Continue vancomycin 1000mg IV q12h for projected   Level scheduled 3/7 with am labs   BMP ordered daily  Antimicrobial stop date pending      Pharmacy will follow daily and adjust medications as appropriate for renal function and/or serum levels.    Thank you,  DACIA RUIZ [facility]

## 2024-03-06 NOTE — H&P
Hospitalist Admission Note    NAME:   Juwan Espinoza   : 1970   MRN: [id number]     Date/Time: 3/6/2024 6:21 AM    Patient PCP: MARVIN Jones MD    ______________________________________________________________________  Given the patient's current clinical presentation, I have a high level of concern for decompensation if discharged from the emergency department.  Complex decision making was performed, which includes reviewing the patient's available past medical records, laboratory results, and x-ray films.       My assessment of this patient's clinical condition and my plan of care is as follows.    Assessment / Plan:    Sepsis  Diabetic foot wound  Hx of osteo, Charcot foot  Concern for ischemia in L foot 5th toe  Possible acute osteo  Foot XR  Chronic fracture dislocation of the midfoot with interval  development of cortical irregularity and periosteal reaction suspicious for osteomyelitis..  - NPO  - Vanc Zosyn  - Podiatry and wound care consulted, defer need for further imaging to Podiatry  - Vascular consulted regarding necrotic appearing 5th toe on R foot    TERE  - IVF, trend    Diabetes mellitus type 2 on chronic insulin POA  Diabetic neuropathy POA  - holding oral meds  - reduced home glargine 100 to 80  - has dexcom    ?Essential hypertension  Hyperlipidemia  - home crestor  - not on antihypertensives?      Medical Decision Making:   I personally reviewed labs: cbc bmp lactic  I personally reviewed imaging: XR foot, CXR  I personally reviewed EKG: NSR  Toxic drug monitoring: monitor creatinine for renal toxicity from vancomycin  Discussed case with: ED provider. After discussion I am in agreement that acuity of patient's medical condition necessitates hospital stay.    Code Status: Full  DVT Prophylaxis: holding in case of procedure  Contact:    Debora Espinoza (Spouse)  [phone] (Mobile)       Subjective:   CHIEF COMPLAINT: R foot swelling    HISTORY OF PRESENT ILLNESS:     Juwan  the midfoot. There is no abnormal periosteal reaction around the base of the first metatarsal, the dislocated medial cuneiform, and the talus with possible involvement of the second through fifth metatarsals as well. There is a soft tissue ulcer along the plantar aspect of the midfoot.. Diffuse soft tissue swelling is noted..     Chronic fracture dislocation of the midfoot with interval development of cortical irregularity and periosteal reaction suspicious for osteomyelitis..    XR CHEST PORTABLE    Result Date: 3/5/2024  EXAM:  XR CHEST PORTABLE INDICATION:   Sepsis COMPARISON: Chest radiograph 11/16/2011. FINDINGS: AP radiograph of the chest was obtained. No evidence of focal consolidation. No pleural effusion or pneumothorax.  Heart, anthony, mediastinum are within normal limits. No acute osseous abnormalities.     No acute cardiopulmonary process.      _______________________________________________________________________    TOTAL TIME:  76 Minutes    Critical Care Provided     Minutes non procedure based    Signed: David L Mendel, MD    Procedures: see electronic medical records for all procedures/Xrays and details which were not copied into this note but were reviewed prior to creation of Plan.

## 2024-03-06 NOTE — PROGRESS NOTES
This nurse was informed by ED RN Eduardo Rivera that Podiatry was consulted in the ED around 2000hrs

## 2024-03-06 NOTE — PROGRESS NOTES
Pharmacy Antimicrobial Kinetic Dosing    Indication for Antimicrobials: Diabetic foot wound     Current Regimen of Each Antimicrobial:  Vancomycin pharmacy consult; Start Date 3/6; Day # 1  Zosyn 3.375gm q 8; Start Date 3/6; Day #1    Previous Antimicrobial Therapy:     Goal Level: Vancomycin -600    Date Dose & Interval Measured (mcg/mL) Predicted AUC                       Significant Cultures:    Labs:  Recent Labs     Units 24  1853   CREATININE MG/DL  --  1.55*   BUN MG/DL  --  26*   PROCAL ng/mL 0.08  --    WBC K/uL  --  11.9*     Temp (24hrs), Av °F (37.2 °C), Min:98.6 °F (37 °C), Max:100 °F (37.8 °C)      Conditions for Dosing Consideration: None    Creatinine Clearance (mL/min): Estimated Creatinine Clearance: 68 mL/min (A) (based on SCr of 1.55 mg/dL (H)).       Impression/Plan:   Vancomycin 1000mg q 12 for projected   Zosyn  Random level with am labs 3/7   Antimicrobial stop date pending      Pharmacy will follow daily and adjust medications as appropriate for renal function and/or serum levels.    Thank you,  Remedios Griffith [facility]

## 2024-03-06 NOTE — CONSULTS
Vascular Surgery Consult Note  3/6/2024    Subjective:     Juwan Espinoza is an obese 54 y.o. male with past medical history significant for diabetes mellitus, hypertension, and hyperlipidemia.  He is a non-smoker.  He suffers from Charcot's foot bilaterally.  His podiatrist is Dr. Castaneda.  Prior to presenting he was taking Crestor.    He is admitted to the hospital with nonhealing diabetic Charcot wound of the right foot with gangrene of the fifth digit.  He is status post right foot washout and placement of antibiotic beads in December 2023.  We have been asked to evaluate for peripheral arterial disease.  ABIs obtained in December 2023 were right 1.18 and left 1.19.  His right digit indice was 0.79.  CTA of the abdomen and pelvis in December of 2023 was significant for displacement of the right DPA with occlusion at the mid foot.      Past medical history  Insulin-dependent diabetes mellitus  Obesity  Diabetic peripheral neuropathy  Charcot's foot bilaterally  Hypertension  Hyperlipidemia  Gout  B12 deficiency  Renal calculi    Past procedural history  Appendectomy  Polypectomy  Right foot washout and placement of antibiotic beads 12/2023  Repair of anal fissure  Left first and second toe amputations  [place] teeth extraction    Family history  Mother: Diabetes mellitus, hypertension, hyperlipidemia, gout    Social history  He is a non-smoker.  He denies alcohol use.     Home medication  Prior to Admission medications    Medication Sig   insulin glargine (LANTUS SOLOSTAR) 100 UNIT/ML injection pen Inject 100 Units into the skin daily   JANUMET  MG per tablet TAKE 1 TABLET BY MOUTH TWICE A DAY WITH FOOD   gabapentin (NEURONTIN) 300 MG capsule TAKE 2 CAPSULES BY MOUTH 3 TIMES A DAY   Multiple Vitamins-Minerals (MULTIVITAL PO) Take 1 tablet by mouth daily.   Semaglutide, 1 MG/DOSE, (OZEMPIC, 1 MG/DOSE,) 4 MG/3ML SOPN INJECT 1 MG BY SUBCUTANEOUS ROUTE EVERY SEVEN (7) DAYS.   rosuvastatin (CRESTOR) 5 MG tablet  0.8 - 3.5 K/UL    Monocytes Absolute 0.7 0.0 - 1.0 K/UL    Eosinophils Absolute 0.2 0.0 - 0.4 K/UL    Basophils Absolute 0.1 0.0 - 0.1 K/UL    Absolute Immature Granulocyte 0.1 (H) 0.00 - 0.04 K/UL    Differential Type AUTOMATED     CMP    Collection Time: 03/05/24  6:53 PM   Result Value Ref Range    Sodium 140 136 - 145 mmol/L    Potassium 4.0 3.5 - 5.1 mmol/L    Chloride 109 (H) 97 - 108 mmol/L    CO2 24 21 - 32 mmol/L    Anion Gap 7 5 - 15 mmol/L    Glucose 123 (H) 65 - 100 mg/dL    BUN 26 (H) 6 - 20 MG/DL    Creatinine 1.55 (H) 0.70 - 1.30 MG/DL    Bun/Cre Ratio 17 12 - 20      Est, Glom Filt Rate 52 (L) >60 ml/min/1.73m2    Calcium 9.6 8.5 - 10.1 MG/DL    Total Bilirubin 0.4 0.2 - 1.0 MG/DL    ALT 30 12 - 78 U/L    AST 21 15 - 37 U/L    Alk Phosphatase 92 45 - 117 U/L    Total Protein 7.6 6.4 - 8.2 g/dL    Albumin 3.0 (L) 3.5 - 5.0 g/dL    Globulin 4.6 (H) 2.0 - 4.0 g/dL    Albumin/Globulin Ratio 0.7 (L) 1.1 - 2.2     Urinalysis with Reflex to Culture    Collection Time: 03/05/24  8:14 PM    Specimen: Urine   Result Value Ref Range    Color, UA YELLOW/STRAW      Appearance CLEAR CLEAR      Specific Gravity, UA 1.021      pH, Urine 5.5 5.0 - 8.0      Protein, UA 30 (A) NEG mg/dL    Glucose, UA Negative NEG mg/dL    Ketones, Urine TRACE (A) NEG mg/dL    Bilirubin Urine Negative NEG      Blood, Urine Negative NEG      Urobilinogen, Urine 0.2 0.2 - 1.0 EU/dL    Nitrite, Urine Negative NEG      Leukocyte Esterase, Urine Negative NEG      WBC, UA 0-4 0 - 4 /hpf    RBC, UA 0-5 0 - 5 /hpf    Epithelial Cells UA FEW FEW /lpf    BACTERIA, URINE Negative NEG /hpf    Urine Culture if Indicated CULTURE NOT INDICATED BY UA RESULT CNI      Calcium Oxalate FEW (A) NEG      Hyaline Casts, UA 0-2 0 - 5 /lpf   Procalcitonin    Collection Time: 03/05/24  8:14 PM   Result Value Ref Range    Procalcitonin 0.08 ng/mL   POCT Glucose    Collection Time: 03/05/24  9:25 PM   Result Value Ref Range    POC Glucose 74 65 - 117 mg/dL

## 2024-03-06 NOTE — ED PROVIDER NOTES
[facility] EMERGENCY DEPT  EMERGENCY DEPARTMENT ENCOUNTER       Pt Name: Juwan Espinoza  MRN: [id number]  Birthdate 1970  Date of evaluation: 3/5/2024  Provider: Sanchez Shields MD   PCP: MARVIN Jones MD  Note Started: 8:25 PM EST 3/5/24     CHIEF COMPLAINT       Chief Complaint   Patient presents with    Wound Infection     Pt arrives via wheelchair after being referred to ED for admission by Dr Castaneda d/t potential sepsis from R foot wound. Pt finished round of doxycycline today but was febrile, tachy and hypotensive in DR Castaneda's office today.         HISTORY OF PRESENT ILLNESS: 1 or more elements      History From: Patient  HPI Limitations: None     Juwan Espinoza is a 54 y.o. male who presents with complaints of possible infection of the right foot as well as possible sepsis.  Patient has a history of diabetes, recent admission at the end of 2023 for septic shock from an infected Charcot foot.  He has been undergoing wound care as an outpatient for the last 2 months, and over the last week he has noticed increased swelling to the right foot as well as some discoloration on the third through fifth digits.  Today when he was in the clinic he was noted to have very low blood pressure and sent to the ER for evaluation of sepsis.  Denies any chest pain or trouble breathing, no vomiting or diarrhea, no symptoms.     Nursing Notes were all reviewed and agreed with or any disagreements were addressed in the HPI.     REVIEW OF SYSTEMS      Review of Systems     Positives and Pertinent negatives as per HPI.    PAST HISTORY     Past Medical History:  Past Medical History:   Diagnosis Date    B12 deficiency 1/15/2021    Diabetes (HCC)     type 2    Gout     Hypercholesteremia     Hyperlipidemia     Hypertension     Kidney stone     Neuropathy     bilateral lower legs         Past Surgical History:  Past Surgical History:   Procedure Laterality Date    APPENDECTOMY      COLONOSCOPY      colon polyps in past     FOOT DEBRIDEMENT Right 12/13/2023    RIGHT FOOT WASHOUT AND PLACEMENT OF ANTIBIOTIC BEADS performed by Celso Castaneda DPM at [facility] MAIN OR    OTHER SURGICAL HISTORY      anal fissure    TOE AMPUTATION Bilateral 12/10/2023    I&D of foot    TOE AMPUTATION Left     1st & 2nd toe amputated    UPPER GASTROINTESTINAL ENDOSCOPY      WISDOM TOOTH EXTRACTION         Family History:  Family History   Problem Relation Age of Onset    Elevated Lipids Mother     Other Mother         GOUT    Hypertension Mother     Diabetes Mother        Social History:  Social History     Tobacco Use    Smoking status: Never    Smokeless tobacco: Never   Substance Use Topics    Alcohol use: No    Drug use: No       Allergies:  Allergies   Allergen Reactions    Atorvastatin Other (See Comments)     Muscle cramps       CURRENT MEDICATIONS      Previous Medications    CONTINUOUS BLOOD GLUC SENSOR (DEXCOM G6 SENSOR) MISC    APPLY SENSOR AND CHANGE EVERY 10 DAYS.    CYANOCOBALAMIN 100 MCG TABLET    Take 1 tablet by mouth daily    DOXYCYCLINE HYCLATE (VIBRA-TABS) 100 MG TABLET    Take 1 tablet by mouth 2 times daily for 14 days    GABAPENTIN (NEURONTIN) 300 MG CAPSULE    TAKE 2 CAPSULES BY MOUTH 3 TIMES A DAY    INSULIN GLARGINE (LANTUS SOLOSTAR) 100 UNIT/ML INJECTION PEN    Inject 100 Units into the skin daily    JANUMET  MG PER TABLET    TAKE 1 TABLET BY MOUTH TWICE A DAY WITH FOOD    MULTIPLE VITAMINS-MINERALS (MULTIVITAL PO)    Take 1 tablet by mouth daily.    ROSUVASTATIN (CRESTOR) 5 MG TABLET    TAKE 1 TABLET BY MOUTH EVERY DAY    SEMAGLUTIDE, 1 MG/DOSE, (OZEMPIC, 1 MG/DOSE,) 4 MG/3ML SOPN    INJECT 1 MG BY SUBCUTANEOUS ROUTE EVERY SEVEN (7) DAYS.       SCREENINGS               No data recorded        PHYSICAL EXAM      ED Triage Vitals [03/05/24 1815]   Enc Vitals Group      BP (!) 87/64      Pulse 85      Respirations 18      Temp 99 °F (37.2 °C)      Temp Source Oral      SpO2 99 %      Weight - Scale 113.4 kg (250 lb)

## 2024-03-07 ENCOUNTER — APPOINTMENT (OUTPATIENT)
Facility: HOSPITAL | Age: 54
DRG: 872 | End: 2024-03-07
Attending: SURGERY
Payer: COMMERCIAL

## 2024-03-07 LAB
ANION GAP SERPL CALC-SCNC: 4 MMOL/L (ref 5–15)
BASOPHILS # BLD: 0 K/UL (ref 0–0.1)
BASOPHILS NFR BLD: 1 % (ref 0–1)
BUN SERPL-MCNC: 16 MG/DL (ref 6–20)
BUN/CREAT SERPL: 15 (ref 12–20)
CALCIUM SERPL-MCNC: 9 MG/DL (ref 8.5–10.1)
CHLORIDE SERPL-SCNC: 111 MMOL/L (ref 97–108)
CO2 SERPL-SCNC: 25 MMOL/L (ref 21–32)
CREAT SERPL-MCNC: 1.05 MG/DL (ref 0.7–1.3)
DIFFERENTIAL METHOD BLD: ABNORMAL
EKG ATRIAL RATE: 85 BPM
EKG DIAGNOSIS: NORMAL
EKG P AXIS: 30 DEGREES
EKG P-R INTERVAL: 164 MS
EKG Q-T INTERVAL: 348 MS
EKG QRS DURATION: 94 MS
EKG QTC CALCULATION (BAZETT): 414 MS
EKG R AXIS: -5 DEGREES
EKG T AXIS: 41 DEGREES
EKG VENTRICULAR RATE: 85 BPM
EOSINOPHIL # BLD: 0.4 K/UL (ref 0–0.4)
EOSINOPHIL NFR BLD: 4 % (ref 0–7)
ERYTHROCYTE [DISTWIDTH] IN BLOOD BY AUTOMATED COUNT: 13.9 % (ref 11.5–14.5)
EST. AVERAGE GLUCOSE BLD GHB EST-MCNC: 137 MG/DL
GLUCOSE BLD STRIP.AUTO-MCNC: 117 MG/DL (ref 65–117)
GLUCOSE BLD STRIP.AUTO-MCNC: 123 MG/DL (ref 65–117)
GLUCOSE BLD STRIP.AUTO-MCNC: 148 MG/DL (ref 65–117)
GLUCOSE BLD STRIP.AUTO-MCNC: 183 MG/DL (ref 65–117)
GLUCOSE SERPL-MCNC: 137 MG/DL (ref 65–100)
HBA1C MFR BLD: 6.4 % (ref 4–5.6)
HCT VFR BLD AUTO: 37.1 % (ref 36.6–50.3)
HGB BLD-MCNC: 11.7 G/DL (ref 12.1–17)
IMM GRANULOCYTES # BLD AUTO: 0.1 K/UL (ref 0–0.04)
IMM GRANULOCYTES NFR BLD AUTO: 1 % (ref 0–0.5)
LYMPHOCYTES # BLD: 2.2 K/UL (ref 0.8–3.5)
LYMPHOCYTES NFR BLD: 27 % (ref 12–49)
MAGNESIUM SERPL-MCNC: 1.7 MG/DL (ref 1.6–2.4)
MCH RBC QN AUTO: 29.9 PG (ref 26–34)
MCHC RBC AUTO-ENTMCNC: 31.5 G/DL (ref 30–36.5)
MCV RBC AUTO: 94.9 FL (ref 80–99)
MONOCYTES # BLD: 0.6 K/UL (ref 0–1)
MONOCYTES NFR BLD: 7 % (ref 5–13)
NEUTS SEG # BLD: 5.2 K/UL (ref 1.8–8)
NEUTS SEG NFR BLD: 60 % (ref 32–75)
NRBC # BLD: 0 K/UL (ref 0–0.01)
NRBC BLD-RTO: 0 PER 100 WBC
PHOSPHATE SERPL-MCNC: 3.6 MG/DL (ref 2.6–4.7)
PLATELET # BLD AUTO: 268 K/UL (ref 150–400)
PMV BLD AUTO: 10.8 FL (ref 8.9–12.9)
POTASSIUM SERPL-SCNC: 4.1 MMOL/L (ref 3.5–5.1)
RBC # BLD AUTO: 3.91 M/UL (ref 4.1–5.7)
SERVICE CMNT-IMP: ABNORMAL
SERVICE CMNT-IMP: NORMAL
SODIUM SERPL-SCNC: 140 MMOL/L (ref 136–145)
VANCOMYCIN SERPL-MCNC: 15.2 UG/ML
WBC # BLD AUTO: 8.5 K/UL (ref 4.1–11.1)

## 2024-03-07 PROCEDURE — 84100 ASSAY OF PHOSPHORUS: CPT

## 2024-03-07 PROCEDURE — 1100000000 HC RM PRIVATE

## 2024-03-07 PROCEDURE — 97605 NEG PRS WND THER DME<=50SQCM: CPT

## 2024-03-07 PROCEDURE — 83735 ASSAY OF MAGNESIUM: CPT

## 2024-03-07 PROCEDURE — 2580000003 HC RX 258: Performed by: STUDENT IN AN ORGANIZED HEALTH CARE EDUCATION/TRAINING PROGRAM

## 2024-03-07 PROCEDURE — 2580000003 HC RX 258: Performed by: INTERNAL MEDICINE

## 2024-03-07 PROCEDURE — 6370000000 HC RX 637 (ALT 250 FOR IP): Performed by: STUDENT IN AN ORGANIZED HEALTH CARE EDUCATION/TRAINING PROGRAM

## 2024-03-07 PROCEDURE — 93922 UPR/L XTREMITY ART 2 LEVELS: CPT

## 2024-03-07 PROCEDURE — 85025 COMPLETE CBC W/AUTO DIFF WBC: CPT

## 2024-03-07 PROCEDURE — 83036 HEMOGLOBIN GLYCOSYLATED A1C: CPT

## 2024-03-07 PROCEDURE — 6360000002 HC RX W HCPCS: Performed by: INTERNAL MEDICINE

## 2024-03-07 PROCEDURE — 80202 ASSAY OF VANCOMYCIN: CPT

## 2024-03-07 PROCEDURE — 6370000000 HC RX 637 (ALT 250 FOR IP)

## 2024-03-07 PROCEDURE — 80048 BASIC METABOLIC PNL TOTAL CA: CPT

## 2024-03-07 PROCEDURE — 82962 GLUCOSE BLOOD TEST: CPT

## 2024-03-07 PROCEDURE — 99223 1ST HOSP IP/OBS HIGH 75: CPT | Performed by: INTERNAL MEDICINE

## 2024-03-07 PROCEDURE — 36415 COLL VENOUS BLD VENIPUNCTURE: CPT

## 2024-03-07 RX ADMIN — VANCOMYCIN HYDROCHLORIDE 1250 MG: 10 INJECTION, POWDER, LYOPHILIZED, FOR SOLUTION INTRAVENOUS at 21:15

## 2024-03-07 RX ADMIN — SODIUM CHLORIDE: 9 INJECTION, SOLUTION INTRAVENOUS at 21:14

## 2024-03-07 RX ADMIN — SODIUM CHLORIDE, PRESERVATIVE FREE 10 ML: 5 INJECTION INTRAVENOUS at 21:10

## 2024-03-07 RX ADMIN — METRONIDAZOLE 500 MG: 500 INJECTION, SOLUTION INTRAVENOUS at 15:14

## 2024-03-07 RX ADMIN — ROSUVASTATIN CALCIUM 5 MG: 10 TABLET, COATED ORAL at 08:54

## 2024-03-07 RX ADMIN — CEFEPIME 2000 MG: 2 INJECTION, POWDER, FOR SOLUTION INTRAVENOUS at 02:40

## 2024-03-07 RX ADMIN — INSULIN GLARGINE 50 UNITS: 100 INJECTION, SOLUTION SUBCUTANEOUS at 21:09

## 2024-03-07 RX ADMIN — SODIUM CHLORIDE: 9 INJECTION, SOLUTION INTRAVENOUS at 02:40

## 2024-03-07 RX ADMIN — METRONIDAZOLE 500 MG: 500 INJECTION, SOLUTION INTRAVENOUS at 06:51

## 2024-03-07 RX ADMIN — GABAPENTIN 600 MG: 300 CAPSULE ORAL at 21:08

## 2024-03-07 RX ADMIN — SODIUM CHLORIDE, PRESERVATIVE FREE 10 ML: 5 INJECTION INTRAVENOUS at 08:58

## 2024-03-07 RX ADMIN — GABAPENTIN 600 MG: 300 CAPSULE ORAL at 08:53

## 2024-03-07 RX ADMIN — SODIUM CHLORIDE: 9 INJECTION, SOLUTION INTRAVENOUS at 06:50

## 2024-03-07 RX ADMIN — SODIUM CHLORIDE, PRESERVATIVE FREE 10 ML: 5 INJECTION INTRAVENOUS at 08:59

## 2024-03-07 RX ADMIN — CEFEPIME 2000 MG: 2 INJECTION, POWDER, FOR SOLUTION INTRAVENOUS at 15:12

## 2024-03-07 RX ADMIN — VANCOMYCIN HYDROCHLORIDE 1250 MG: 10 INJECTION, POWDER, LYOPHILIZED, FOR SOLUTION INTRAVENOUS at 08:58

## 2024-03-07 RX ADMIN — SODIUM CHLORIDE, PRESERVATIVE FREE 10 ML: 5 INJECTION INTRAVENOUS at 21:09

## 2024-03-07 RX ADMIN — GABAPENTIN 600 MG: 300 CAPSULE ORAL at 15:15

## 2024-03-07 ASSESSMENT — PAIN SCALES - GENERAL: PAINLEVEL_OUTOF10: 0

## 2024-03-07 NOTE — PROGRESS NOTES
Vascular Surgery Progress Note  Michelle Unger ACNP-BC      Date:3/7/2024       Room:[address] [place] [address]  Patient Name:Juwan Espinoza     YOB: 1970     Age:54 y.o.    Subjective      Juwan Espinoza is an obese 54 y.o. male with past medical history significant for diabetes mellitus, hypertension, and hyperlipidemia.  He is a non-smoker.  He suffers from Charcot's foot bilaterally.  His podiatrist is Dr. Castaneda.  Prior to presenting he was taking Crestor.     He is admitted to the hospital with nonhealing diabetic Charcot wound of the right foot with gangrene of the fifth digit.  He is status post right foot washout and placement of antibiotic beads in 2023.  We have been asked to evaluate for peripheral arterial disease.  ABIs obtained in 2023 were right 1.18 and left 1.19. His right digit indice was 0.79.  CTA of the abdomen and pelvis in 2023 was significant for displacement of the right DPA with occlusion at the mid foot.     Objective           Vitals Last 24 Hours:  TEMPERATURE:  Temp  Av.9 °F (36.6 °C)  Min: 97.5 °F (36.4 °C)  Max: 98.2 °F (36.8 °C)  RESPIRATIONS RANGE: Resp  Av  Min: 17  Max: 17  PULSE OXIMETRY RANGE: SpO2  Av %  Min: 95 %  Max: 99 %  PULSE RANGE: Pulse  Av.5  Min: 75  Max: 84  BLOOD PRESSURE RANGE: Systolic (24hrs), Av , Min:112 , Max:130   ; Diastolic (24hrs), Av, Min:72, Max:87    I/O (24Hr):    Intake/Output Summary (Last 24 hours) at 3/7/2024 0906  Last data filed at 3/7/2024 0626  Gross per 24 hour   Intake 1060 ml   Output 2800 ml   Net -1740 ml     Objective:  Vital signs: (most recent): Blood pressure 130/87, pulse 75, temperature 98.2 °F (36.8 °C), temperature source Oral, resp. rate 17, height 1.803 m (5' 11\"), weight 119.7 kg (263 lb 12.8 oz), SpO2 99 %.    Constitutional:       Appearance: He is obese.   Cardiovascular:      Rate and Rhythm: Normal rate and regular rhythm.      Comments: Feet are warm and perfused,  ischemic changes of the right fifth digit  Pulmonary:      Effort: Pulmonary effort is normal. No respiratory distress.   Abdominal:      General: There is no distension.      Palpations: Abdomen is soft.   Musculoskeletal:      Comments: Charcot's deformity of the bilateral foot, amputation of the first and second toe of the left foot   Skin:     Comments: Dressing to the right foot dry and intact   Neurological:      Mental Status: He is alert.     Labs    Labs:  CBC:  Recent Labs     03/05/24 1853 03/07/24  0555   WBC 11.9* 8.5   RBC 4.22 3.91*   HGB 12.4 11.7*   HCT 39.4 37.1   MCV 93.4 94.9   RDW 14.2 13.9    268     CHEMISTRIES:  Recent Labs     03/05/24 1853 03/07/24  0555    140   K 4.0 4.1   * 111*   CO2 24 25   BUN 26* 16   CREATININE 1.55* 1.05   GLUCOSE 123* 137*   PHOS  --  3.6   MG  --  1.7       Assessment//Plan           Displacement of the right dorsalis pedis artery with occlusion due to Charcot's foot  Nonhealing diabetic Charcot wound of the right foot with gangrene of the fifth digit  Diabetic neuropathy  -Gabapentin  No role for further vascular evaluation, procedural intervention, or follow-up.  Wound care and debridement per podiatry  Antibiotics per primary team  Vascular surgery signing off we appreciate the opportunity care for Mr. Espinoza.  Please reconsult as needed.     Insulin-dependent diabetes mellitus  -Currently well-controlled on basal bolus insulin  Obesity  Hypoalbuminemia     Renal insufficiency  -Resolved     Hypertension  -Stable  Hyperlipidemia  -Crestor     VTE Prophylaxis:  Per primary team.  No need to hold from vascular standpoint.  SCDs.     Disposition:  Likely home    Electronically signed by Michelle Unger [facility]-BC

## 2024-03-07 NOTE — CONSULTS
headache, numbness, tingling, extremity weakness,  syncope, seizures    Skin: Negative for rash, sores/open wounds   Musculoskeletal: Negative for joint pain, joint swelling, joint erythema    Endocrine: Negative for high or low blood sugars, heat or cold intolerance    Psych: Negative for manic behavior     Vitals:    03/07/24 0815   BP: 130/87   Pulse: 75   Resp: 17   Temp: 98.2 °F (36.8 °C)   SpO2: 99%           PHYSICAL EXAM:  General:          WD, WN. Alert, cooperative, no acute distress    EENT:              EOMI. Anicteric sclerae. MMM  Resp:               CTA bilaterally, no wheezing or rales.  No accessory muscle use  CV:                  Regular  rhythm,  No edema  GI:                   Soft, Non distended, Non tender.  +Bowel sounds  Neurologic:      Alert and oriented X 3, normal speech,   Psych:             Good insight. Not anxious nor agitated  Skin:                No rashes.  No jaundice.  Extremities:R/foot dressing+    Wound care note reviewed and photos reviewed.            Recent Results (from the past 24 hour(s))   POCT Glucose    Collection Time: 03/06/24  5:09 PM   Result Value Ref Range    POC Glucose 161 (H) 65 - 117 mg/dL    Performed by: Sigifredo Ohara PCT    POCT Glucose    Collection Time: 03/06/24  8:34 PM   Result Value Ref Range    POC Glucose 129 (H) 65 - 117 mg/dL    Performed by: Tim Marlow PCT    Vancomycin Level, Random    Collection Time: 03/07/24  5:55 AM   Result Value Ref Range    Vancomycin Rm 15.2 UG/ML   Basic Metabolic Panel    Collection Time: 03/07/24  5:55 AM   Result Value Ref Range    Sodium 140 136 - 145 mmol/L    Potassium 4.1 3.5 - 5.1 mmol/L    Chloride 111 (H) 97 - 108 mmol/L    CO2 25 21 - 32 mmol/L    Anion Gap 4 (L) 5 - 15 mmol/L    Glucose 137 (H) 65 - 100 mg/dL    BUN 16 6 - 20 MG/DL    Creatinine 1.05 0.70 - 1.30 MG/DL    Bun/Cre Ratio 15 12 - 20      Est, Glom Filt Rate >60 >60 ml/min/1.73m2    Calcium 9.0 8.5 - 10.1 MG/DL   CBC with Auto  Differential    Collection Time: 03/07/24  5:55 AM   Result Value Ref Range    WBC 8.5 4.1 - 11.1 K/uL    RBC 3.91 (L) 4.10 - 5.70 M/uL    Hemoglobin 11.7 (L) 12.1 - 17.0 g/dL    Hematocrit 37.1 36.6 - 50.3 %    MCV 94.9 80.0 - 99.0 FL    MCH 29.9 26.0 - 34.0 PG    MCHC 31.5 30.0 - 36.5 g/dL    RDW 13.9 11.5 - 14.5 %    Platelets 268 150 - 400 K/uL    MPV 10.8 8.9 - 12.9 FL    Nucleated RBCs 0.0 0  WBC    nRBC 0.00 0.00 - 0.01 K/uL    Neutrophils % 60 32 - 75 %    Lymphocytes % 27 12 - 49 %    Monocytes % 7 5 - 13 %    Eosinophils % 4 0 - 7 %    Basophils % 1 0 - 1 %    Immature Granulocytes 1 (H) 0.0 - 0.5 %    Neutrophils Absolute 5.2 1.8 - 8.0 K/UL    Lymphocytes Absolute 2.2 0.8 - 3.5 K/UL    Monocytes Absolute 0.6 0.0 - 1.0 K/UL    Eosinophils Absolute 0.4 0.0 - 0.4 K/UL    Basophils Absolute 0.0 0.0 - 0.1 K/UL    Absolute Immature Granulocyte 0.1 (H) 0.00 - 0.04 K/UL    Differential Type AUTOMATED     Magnesium    Collection Time: 03/07/24  5:55 AM   Result Value Ref Range    Magnesium 1.7 1.6 - 2.4 mg/dL   Phosphorus    Collection Time: 03/07/24  5:55 AM   Result Value Ref Range    Phosphorus 3.6 2.6 - 4.7 MG/DL   POCT Glucose    Collection Time: 03/07/24  7:37 AM   Result Value Ref Range    POC Glucose 123 (H) 65 - 117 mg/dL    Performed by: Sigifredo ROMERO    Vascular ankle brachial index (DANG)    Collection Time: 03/07/24 10:48 AM   Result Value Ref Range    Left posterior tibial 174 mmHg    Left dorsalis pedis  mmHg    Right arm  mmHg    Right posterior tibial 160 mmHg    Right dorsalis pedis  mmHg    Right toe pressure 159 mmHg    Body Surface Area 2.38 m2    Right DANG 1.24     Right TBI 1.12     Left DANG 1.23    POCT Glucose    Collection Time: 03/07/24 11:39 AM   Result Value Ref Range    POC Glucose 183 (H) 65 - 117 mg/dL    Performed by: Sigifredo Ohara PCT        Results       Procedure Component Value Units Date/Time    Culture, Wound [[id number]]     Order Status: Sent

## 2024-03-07 NOTE — PROGRESS NOTES
Pharmacy Antimicrobial Kinetic Dosing    Indication for Antimicrobials: Stable superficial gangrene and chronic non-healing diabetic charcot (hx of osteomyelitis)    Current Regimen of Each Antimicrobial:  Vancomycin pharmacy consult; Start Date 3/5; Day # 3  Cefepime 2g IV q12h; Start Date 3/6; Day # 2  Metronidazole 500mg IV q8h; Start Date 3/6; Day # 2    Previous Antimicrobial Therapy:   Zosyn 3/5 - 3/6    Goal Level: Vancomycin -600    Date Dose & Interval Measured (mcg/mL) Predicted AUC   3/7 1000mg q12h 15.2 387                 Significant Cultures:  3/5 Blood, paired: ngtd, prelim    Labs:  Recent Labs     Units 24  0524  1853   CREATININE MG/DL 1.05  --  1.55*   BUN MG/DL 16  --  26*   PROCAL ng/mL  --  0.08  --    WBC K/uL 8.5  --  11.9*     Temp (24hrs), Av.7 °F (36.5 °C), Min:97.5 °F (36.4 °C), Max:97.9 °F (36.6 °C)    Conditions for Dosing Consideration: None    Creatinine Clearance (mL/min): Estimated Creatinine Clearance: 103 mL/min (based on SCr of 1.05 mg/dL).     Impression/Plan:   Adjusted vancomycin to 1250mg IV q12h for projected   Level scheduled for 3/9 0600  BMP ordered daily  Antimicrobial stop date pending      Pharmacy will follow daily and adjust medications as appropriate for renal function and/or serum levels.    Thank you,  DACIA RUIZ [facility]

## 2024-03-07 NOTE — DIABETES MGMT
[facility]  DIABETES MANAGEMENT CONSULT    Consulted by Provider for advanced nursing evaluation and care for inpatient blood glucose management.    Evaluation and Action Plan   Juwan Espinoza is a 54 year old male patient with Type 2 diabetes. The patient reports good BG control at home on 100 units Lantus daily, Ozempic ( Wednesdays) and Janumet. The patient' diabetes is managed by his PCP. He is admitted for a right foot infection. Right foot ( 4 and 5th toe) wound ( started as blisters) started about 2 weeks ago. The patient was already following with a podiatrist for a post surgical wound on the bottom of the foot. 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 The patient is NPO today and is not requiring insulin at this time. However, I suspect the BG's will begin to rise since he has not received basal insulin  since yesterday morning. Also the effects of the Ozempic may be wearing off as well since he takes his injections on Wednesdays. I have modified the basal insulin order to 50% of home dose to start this evening. Diabetes management will monitor BG's and insulin requirements and make changes as needed.   BG's are stable on the current regimen. No changes to the dosing today. The patient will likely discharge on PTA insulin dosing.       Management Rationale Action Plan   Medication   Basal needs Using 50% of home dose    Corrective insulin Using medium dose sensitivity based on weight    Additional orders          Diabetes Discharge Plan   Medication  TBD   Referral  []        Outpatient diabetes education   Additional orders            Initial Presentation   Juwan Espinoza is a 54 y.o. male admitted  after experiencing a right foot ( 4 and 5th toe) wound that started about 2 weeks ago. The patient was already following with a podiatrist for a post surgical wound on the bottom of the foot. The patient has a previous amputation of the  great and second toe on the left foot.   LAB: glucose 123. Creatine 1.55. GFR 52.  CXR:Narrative & Impression  EXAM: XR FOOT RIGHT (MIN 3 VIEWS)     INDICATION: rule out osteo.     COMPARISON: None.     FINDINGS: Three views of the right foot demonstrate chronic fracture dislocation  of the midfoot. There is no abnormal periosteal reaction around the base of the  first metatarsal, the dislocated medial cuneiform, and the talus with possible  involvement of the second through fifth metatarsals as well. There is a soft  tissue ulcer along the plantar aspect of the midfoot.. Diffuse soft tissue  swelling is noted..     IMPRESSION:  Chronic fracture dislocation of the midfoot with interval  development of cortical irregularity and periosteal reaction suspicious for  osteomyelitis..      HX:   Past Medical History:   Diagnosis Date    B12 deficiency 1/15/2021    Diabetes ([facility])     type 2    Gout     Hypercholesteremia     Hyperlipidemia     Hypertension     Kidney stone     Neuropathy     bilateral lower legs        INITIAL DX: Diabetic foot infection ([facility]) [E11.628, L08.9]  Severe sepsis ([facility]) [A41.9, R65.20]  Other acute osteomyelitis of right foot ([facility]) [M86.171]     Current Treatment     TX: Gabapentin. Insulin. Crestor. Abx.     Hospital Course   Clinical progress has been complicated by infection.     Diabetes History   The patient has a hx of Type 2 diabetes. He is managed by his PCP. He resides at home with his wife. The patient reports a good support system. He works from home. Using a knee scooter for ambulation and independent with ADL's.     Diabetes-related Medical History    Neurological complications  Peripheral neuropathy   Vitamin B12 deficiency    Diabetes Medication History  Diabetes drug class Diabetes drug name Additional Comments   Biguanide  Janumet    DPP4 inhibitors Janumet    GLP-1 Zackery Ozempic    Insulin Lantus      Diabetes self-management practices:   Eating pattern   [x] Eating a

## 2024-03-07 NOTE — PLAN OF CARE
Problem: Discharge Planning  Goal: Discharge to home or other facility with appropriate resources  3/6/2024 1959 by Asif Cole RN  Outcome: Progressing  3/6/2024 0946 by Lizzie Gallardo RN  Outcome: Progressing     Problem: Safety - Adult  Goal: Free from fall injury  3/6/2024 1959 by Asif Cole RN  Outcome: Progressing  3/6/2024 0946 by Lizzie Gallardo RN  Outcome: Progressing     Problem: Skin/Tissue Integrity  Goal: Absence of new skin breakdown  Description: 1.  Monitor for areas of redness and/or skin breakdown  2.  Assess vascular access sites hourly  3.  Every 4-6 hours minimum:  Change oxygen saturation probe site  4.  Every 4-6 hours:  If on nasal continuous positive airway pressure, respiratory therapy assess nares and determine need for appliance change or resting period.  3/6/2024 1959 by Asif Cole RN  Outcome: Progressing  3/6/2024 0946 by Lizzie Gallardo RN  Outcome: Progressing     Problem: Chronic Conditions and Co-morbidities  Goal: Patient's chronic conditions and co-morbidity symptoms are monitored and maintained or improved  Outcome: Progressing

## 2024-03-07 NOTE — PROGRESS NOTES
Hospitalist Progress Note    NAME:   Juwan Espinoza   : 1970   MRN: [id number]     Date/Time: 3/7/2024 1:43 PM  Patient PCP: MARVIN Jones MD    Estimated discharge date: 3/9  Barriers: ID recommendation      Assessment / Plan:      Sepsis  Diabetic foot wound  Hx of osteo, Charcot foot  Concern for ischemia in L foot 5th toe  Possible acute osteo  Foot XR  Chronic fracture dislocation of the midfoot with interval  development of cortical irregularity and periosteal reaction suspicious for osteomyelitis..    -Seen by podiatry, currently plan for nonoperative management  -ID consulted to weigh in on possible need for IV antibiotics  -Appreciate vascular's assistance, preliminary DANG result seems to be satisfactory  -Continue current antibiotics, Vanco, cefepime, Flagyl  Blood culture 3/5-negative so far  Wound VAC    TERE, resolved  - IVF, trend     Diabetes mellitus type 2 on chronic insulin POA  Diabetic neuropathy POA  - holding oral meds  - reduced home glargine   - has dexcom     ?Essential hypertension  Hyperlipidemia  - home crestor          Medical Decision Making:   I personally reviewed labs: cbc bmp lactic  I personally reviewed imaging: XR foot, CXR  I personally reviewed EKG: NSR  Toxic drug monitoring: monitor creatinine for renal toxicity from vancomycin  Discussed case with:  Code Status: Full  DVT Prophylaxis: holding in case of procedure  Contact:    Debora Espinoza (Spouse)  [phone] (Mobile)        Subjective:     Chief Complaint / Reason for Physician Visit  \"No events overnight\".  Discussed with RN events overnight.       Objective:     VITALS:   Last 24hrs VS reviewed since prior progress note. Most recent are:  Patient Vitals for the past 24 hrs:   BP Temp Temp src Pulse Resp SpO2 Weight   24 0815 130/87 98.2 °F (36.8 °C) Oral 75 17 99 % --   24 0600 -- -- -- -- -- -- 119.7 kg (263 lb 12.8 oz)   24 112/72 97.5 °F (36.4 °C) Oral 84 17 95 % --  03/05/24 1853 03/07/24  0555   WBC 11.9* 8.5   HGB 12.4 11.7*   HCT 39.4 37.1    268     Recent Labs     03/05/24 1853 03/07/24  0555    140   K 4.0 4.1   * 111*   CO2 24 25   GLUCOSE 123* 137*   BUN 26* 16   CREATININE 1.55* 1.05   CALCIUM 9.6 9.0   MG  --  1.7   PHOS  --  3.6   LABALBU 3.0*  --    BILITOT 0.4  --    AST 21  --    ALT 30  --        Signed: Brad Siddiqui MD

## 2024-03-07 NOTE — CARE COORDINATION
Care Management Initial Assessment       RUR:15%  Readmission? No  1st IM letter given? No  1st  letter given: No     03/07/24 1427   Service Assessment   Patient Orientation Alert and Oriented   Cognition Alert   History Provided By Patient   Primary Caregiver Self   Support Systems Spouse/Significant Other   Patient's Healthcare Decision Maker is: Legal Next of Kin   PCP Verified by CM Yes   Last Visit to PCP Within last 6 months   Prior Functional Level Independent in ADLs/IADLs   Current Functional Level Independent in ADLs/IADLs   Can patient return to prior living arrangement Yes   Ability to make needs known: Good   Family able to assist with home care needs: Yes   Would you like for me to discuss the discharge plan with any other family members/significant others, and if so, who? Yes  (wife)   Financial Resources None   Community Resources None   Social/Functional History   Lives With Spouse   Type of Home House   Home Equipment Wheelchair-manual;Walker, rolling;Cane;Crutches   Receives Help From Family   ADL Assistance Independent   Homemaking Assistance Independent   Homemaking Responsibilities Yes   Ambulation Assistance Independent   Transfer Assistance Independent   Active  Yes   Mode of Transportation Car   Occupation Full time employment   Discharge Planning   Type of Residence House   Living Arrangements Spouse/Significant Other   Current Services Prior To Admission None   Potential Assistance Needed N/A   DME Ordered? No   Potential Assistance Purchasing Medications No   Type of Home Care Services None   Patient expects to be discharged to: House   History of falls? 0   Services At/After Discharge   Transition of Care Consult (CM Consult) N/A   Services At/After Discharge None   [place] Resource Information Provided? No   Mode of Transport at Discharge Other (see comment)  (family)   Confirm Follow Up Transport Family     CM spoke with patient and verified demographics, insurance, and

## 2024-03-07 NOTE — WOUND CARE
Wound VAC application to right plantar foot.    55 y/o male admitted for diabetic foot infection of right 5th toe.  Past Medical History:   Diagnosis Date    B12 deficiency 1/15/2021    Diabetes ([facility])     type 2    Gout     Hypercholesteremia     Hyperlipidemia     Hypertension     Kidney stone     Neuropathy     bilateral lower legs     Past Surgical History:   Procedure Laterality Date    APPENDECTOMY      COLONOSCOPY      colon polyps in past    FOOT DEBRIDEMENT Right 12/13/2023    RIGHT FOOT WASHOUT AND PLACEMENT OF ANTIBIOTIC BEADS performed by Celso Castaneda DPM at [facility] MAIN OR    OTHER SURGICAL HISTORY      anal fissure    TOE AMPUTATION Bilateral 12/10/2023    I&D of foot    TOE AMPUTATION Left     1st & 2nd toe amputated    UPPER GASTROINTESTINAL ENDOSCOPY      WISDOM TOOTH EXTRACTION      Patient is followed by Dr Castaneda and he has been seen by [facility] for wound vac changes to right plantar foot and wound care to right 4th and 5th toes.     Wound cultures done of right 5th toe as requested by Dr Bolton - not much tissue to culture but did my best.      Patient is Type 2 diabetic and has Charcot foot:  Plantar right foot - 3 x 3.8 x 4 cm    4th and 5th toe    Between 4th and 5th toe    Wound Care Documentation:  Negative Pressure Wound Therapy Foot Right;Plantar (Active)   $ Standard NPWT <=50 sq cm PER TX $ Yes 03/07/24 1601   Wound Type Diabetic foot ulcer 03/07/24 1601   Unit Type traditional Ulta 03/07/24 1601   Dressing Type Black Foam 03/07/24 1601   Number of pieces used 2 03/07/24 1601   Number of pieces removed 2 02/25/24 1426   Cycle Continuous 03/07/24 1601   Target Pressure (mmHg) 125 03/07/24 1601   Intensity 1 03/07/24 1601   Irrigation Solution Sodium chloride 0.9% 02/21/24 0930   Canister changed? Yes 02/25/24 1426   Dressing Status New dressing applied 03/07/24 1601   Dressing Changed Changed/New 02/25/24 1426   Drainage Amount Small 02/25/24 1426   Drainage Description

## 2024-03-07 NOTE — PROGRESS NOTES
Seen patient in the room resting  Non healing post op Charcot post debridement ulceration right foot  Patient has finished his IV abx treatments recently  Continued drainage from the right foot   Has a new worsening ulceration on the 5th toe   Patient has been progressing well so far at the outpatient wound care center as of now except the recent septic foot   Can have debridement of the 5th toe but patient would like to continue outpatient treatment and does not want any immediate surgical treatment at this point   There are significant midfoot charcot changes in the foot and post debridement changes in the Cuboid since it was exposed thru the plantar ulceration rest of the x ray findings do suggest Charcot ongoing   Will get ID consult to franky for possible IV abx  Can be discharged with continued out patient wound care treatment for now as he wishes if gets worse may need surgical debridement in the near future   Would advise antibiotic treatment at at discharge   Continue Wound Vac use and home health treatment for now  And can follow up with me in the wound care center

## 2024-03-08 ENCOUNTER — HOME CARE VISIT (OUTPATIENT)
Facility: HOME HEALTH | Age: 54
End: 2024-03-08
Payer: COMMERCIAL

## 2024-03-08 ENCOUNTER — APPOINTMENT (OUTPATIENT)
Facility: HOSPITAL | Age: 54
DRG: 872 | End: 2024-03-08
Payer: COMMERCIAL

## 2024-03-08 PROBLEM — E66.9 OBESITY (BMI 30-39.9): Status: ACTIVE | Noted: 2024-03-08

## 2024-03-08 PROBLEM — E11.628 DIABETIC INFECTION OF RIGHT FOOT (HCC): Status: ACTIVE | Noted: 2018-03-31

## 2024-03-08 PROBLEM — L81.9 DISCOLORATION OF SKIN OF TOE: Status: ACTIVE | Noted: 2024-03-08

## 2024-03-08 PROBLEM — M86.179 ACUTE OSTEOMYELITIS OF FOOT (HCC): Status: ACTIVE | Noted: 2024-03-08

## 2024-03-08 PROBLEM — L08.9 DIABETIC INFECTION OF RIGHT FOOT (HCC): Status: ACTIVE | Noted: 2018-03-31

## 2024-03-08 PROBLEM — M14.60 CHARCOT'S ARTHROPATHY: Status: ACTIVE | Noted: 2023-12-12

## 2024-03-08 LAB
ANION GAP SERPL CALC-SCNC: 6 MMOL/L (ref 5–15)
BASOPHILS # BLD: 0.1 K/UL (ref 0–0.1)
BASOPHILS NFR BLD: 1 % (ref 0–1)
BUN SERPL-MCNC: 13 MG/DL (ref 6–20)
BUN/CREAT SERPL: 12 (ref 12–20)
CALCIUM SERPL-MCNC: 8.8 MG/DL (ref 8.5–10.1)
CHLORIDE SERPL-SCNC: 111 MMOL/L (ref 97–108)
CO2 SERPL-SCNC: 24 MMOL/L (ref 21–32)
CREAT SERPL-MCNC: 1.09 MG/DL (ref 0.7–1.3)
CRP SERPL-MCNC: 1.23 MG/DL (ref 0–0.3)
DIFFERENTIAL METHOD BLD: ABNORMAL
ECHO BSA: 2.38 M2
EOSINOPHIL # BLD: 0.3 K/UL (ref 0–0.4)
EOSINOPHIL NFR BLD: 4 % (ref 0–7)
ERYTHROCYTE [DISTWIDTH] IN BLOOD BY AUTOMATED COUNT: 13.7 % (ref 11.5–14.5)
ERYTHROCYTE [SEDIMENTATION RATE] IN BLOOD: 61 MM/HR (ref 0–20)
GLUCOSE BLD STRIP.AUTO-MCNC: 117 MG/DL (ref 65–117)
GLUCOSE BLD STRIP.AUTO-MCNC: 147 MG/DL (ref 65–117)
GLUCOSE BLD STRIP.AUTO-MCNC: 161 MG/DL (ref 65–117)
GLUCOSE BLD STRIP.AUTO-MCNC: 188 MG/DL (ref 65–117)
GLUCOSE SERPL-MCNC: 205 MG/DL (ref 65–100)
HCT VFR BLD AUTO: 36 % (ref 36.6–50.3)
HGB BLD-MCNC: 11.7 G/DL (ref 12.1–17)
IMM GRANULOCYTES # BLD AUTO: 0 K/UL (ref 0–0.04)
IMM GRANULOCYTES NFR BLD AUTO: 1 % (ref 0–0.5)
LYMPHOCYTES # BLD: 2.4 K/UL (ref 0.8–3.5)
LYMPHOCYTES NFR BLD: 30 % (ref 12–49)
MAGNESIUM SERPL-MCNC: 1.8 MG/DL (ref 1.6–2.4)
MCH RBC QN AUTO: 30.5 PG (ref 26–34)
MCHC RBC AUTO-ENTMCNC: 32.5 G/DL (ref 30–36.5)
MCV RBC AUTO: 93.8 FL (ref 80–99)
MONOCYTES # BLD: 0.5 K/UL (ref 0–1)
MONOCYTES NFR BLD: 6 % (ref 5–13)
NEUTS SEG # BLD: 4.8 K/UL (ref 1.8–8)
NEUTS SEG NFR BLD: 58 % (ref 32–75)
NRBC # BLD: 0 K/UL (ref 0–0.01)
NRBC BLD-RTO: 0 PER 100 WBC
PHOSPHATE SERPL-MCNC: 3.3 MG/DL (ref 2.6–4.7)
PLATELET # BLD AUTO: 264 K/UL (ref 150–400)
PMV BLD AUTO: 10.9 FL (ref 8.9–12.9)
POTASSIUM SERPL-SCNC: 3.9 MMOL/L (ref 3.5–5.1)
RBC # BLD AUTO: 3.84 M/UL (ref 4.1–5.7)
SERVICE CMNT-IMP: ABNORMAL
SERVICE CMNT-IMP: NORMAL
SODIUM SERPL-SCNC: 141 MMOL/L (ref 136–145)
VAS LEFT ABI: 1.23
VAS LEFT DORSALIS PEDIS BP: 173 MMHG
VAS LEFT PTA BP: 174 MMHG
VAS RIGHT ABI: 1.24
VAS RIGHT ARM BP: 142 MMHG
VAS RIGHT DORSALIS PEDIS BP: 176 MMHG
VAS RIGHT PTA BP: 160 MMHG
VAS RIGHT TBI: 1.12
VAS RIGHT TOE PRESSURE: 159 MMHG
WBC # BLD AUTO: 8.1 K/UL (ref 4.1–11.1)

## 2024-03-08 PROCEDURE — 85652 RBC SED RATE AUTOMATED: CPT

## 2024-03-08 PROCEDURE — 73718 MRI LOWER EXTREMITY W/O DYE: CPT

## 2024-03-08 PROCEDURE — 82962 GLUCOSE BLOOD TEST: CPT

## 2024-03-08 PROCEDURE — 6370000000 HC RX 637 (ALT 250 FOR IP)

## 2024-03-08 PROCEDURE — 99231 SBSQ HOSP IP/OBS SF/LOW 25: CPT

## 2024-03-08 PROCEDURE — 99233 SBSQ HOSP IP/OBS HIGH 50: CPT | Performed by: INTERNAL MEDICINE

## 2024-03-08 PROCEDURE — 84100 ASSAY OF PHOSPHORUS: CPT

## 2024-03-08 PROCEDURE — 2580000003 HC RX 258: Performed by: STUDENT IN AN ORGANIZED HEALTH CARE EDUCATION/TRAINING PROGRAM

## 2024-03-08 PROCEDURE — 6370000000 HC RX 637 (ALT 250 FOR IP): Performed by: STUDENT IN AN ORGANIZED HEALTH CARE EDUCATION/TRAINING PROGRAM

## 2024-03-08 PROCEDURE — 36415 COLL VENOUS BLD VENIPUNCTURE: CPT

## 2024-03-08 PROCEDURE — 6360000002 HC RX W HCPCS: Performed by: INTERNAL MEDICINE

## 2024-03-08 PROCEDURE — 86140 C-REACTIVE PROTEIN: CPT

## 2024-03-08 PROCEDURE — 85025 COMPLETE CBC W/AUTO DIFF WBC: CPT

## 2024-03-08 PROCEDURE — 2580000003 HC RX 258: Performed by: INTERNAL MEDICINE

## 2024-03-08 PROCEDURE — 80048 BASIC METABOLIC PNL TOTAL CA: CPT

## 2024-03-08 PROCEDURE — 1100000000 HC RM PRIVATE

## 2024-03-08 PROCEDURE — 83735 ASSAY OF MAGNESIUM: CPT

## 2024-03-08 RX ORDER — ENOXAPARIN SODIUM 100 MG/ML
30 INJECTION SUBCUTANEOUS 2 TIMES DAILY
Status: DISCONTINUED | OUTPATIENT
Start: 2024-03-08 | End: 2024-03-11 | Stop reason: HOSPADM

## 2024-03-08 RX ORDER — HEPARIN SODIUM 5000 [USP'U]/ML
5000 INJECTION, SOLUTION INTRAVENOUS; SUBCUTANEOUS EVERY 8 HOURS SCHEDULED
Status: DISCONTINUED | OUTPATIENT
Start: 2024-03-08 | End: 2024-03-08 | Stop reason: DRUGHIGH

## 2024-03-08 RX ADMIN — SODIUM CHLORIDE: 9 INJECTION, SOLUTION INTRAVENOUS at 00:07

## 2024-03-08 RX ADMIN — CEFEPIME 2000 MG: 2 INJECTION, POWDER, FOR SOLUTION INTRAVENOUS at 17:27

## 2024-03-08 RX ADMIN — SODIUM CHLORIDE, PRESERVATIVE FREE 10 ML: 5 INJECTION INTRAVENOUS at 10:28

## 2024-03-08 RX ADMIN — SODIUM CHLORIDE, PRESERVATIVE FREE 10 ML: 5 INJECTION INTRAVENOUS at 10:37

## 2024-03-08 RX ADMIN — METRONIDAZOLE 500 MG: 500 INJECTION, SOLUTION INTRAVENOUS at 00:07

## 2024-03-08 RX ADMIN — ENOXAPARIN SODIUM 30 MG: 100 INJECTION SUBCUTANEOUS at 22:24

## 2024-03-08 RX ADMIN — SODIUM CHLORIDE, PRESERVATIVE FREE 10 ML: 5 INJECTION INTRAVENOUS at 22:25

## 2024-03-08 RX ADMIN — METRONIDAZOLE 500 MG: 500 INJECTION, SOLUTION INTRAVENOUS at 06:55

## 2024-03-08 RX ADMIN — INSULIN GLARGINE 50 UNITS: 100 INJECTION, SOLUTION SUBCUTANEOUS at 22:24

## 2024-03-08 RX ADMIN — ROSUVASTATIN CALCIUM 5 MG: 10 TABLET, COATED ORAL at 10:25

## 2024-03-08 RX ADMIN — SODIUM CHLORIDE: 9 INJECTION, SOLUTION INTRAVENOUS at 17:26

## 2024-03-08 RX ADMIN — SODIUM CHLORIDE: 9 INJECTION, SOLUTION INTRAVENOUS at 23:08

## 2024-03-08 RX ADMIN — CEFEPIME 2000 MG: 2 INJECTION, POWDER, FOR SOLUTION INTRAVENOUS at 04:04

## 2024-03-08 RX ADMIN — METRONIDAZOLE 500 MG: 500 INJECTION, SOLUTION INTRAVENOUS at 17:35

## 2024-03-08 RX ADMIN — GABAPENTIN 600 MG: 300 CAPSULE ORAL at 22:24

## 2024-03-08 RX ADMIN — SODIUM CHLORIDE: 9 INJECTION, SOLUTION INTRAVENOUS at 17:32

## 2024-03-08 RX ADMIN — METRONIDAZOLE 500 MG: 500 INJECTION, SOLUTION INTRAVENOUS at 23:09

## 2024-03-08 RX ADMIN — SODIUM CHLORIDE: 9 INJECTION, SOLUTION INTRAVENOUS at 10:33

## 2024-03-08 RX ADMIN — VANCOMYCIN HYDROCHLORIDE 1250 MG: 10 INJECTION, POWDER, LYOPHILIZED, FOR SOLUTION INTRAVENOUS at 10:34

## 2024-03-08 RX ADMIN — GABAPENTIN 600 MG: 300 CAPSULE ORAL at 16:02

## 2024-03-08 RX ADMIN — GABAPENTIN 600 MG: 300 CAPSULE ORAL at 10:25

## 2024-03-08 RX ADMIN — VANCOMYCIN HYDROCHLORIDE 1250 MG: 10 INJECTION, POWDER, LYOPHILIZED, FOR SOLUTION INTRAVENOUS at 23:11

## 2024-03-08 NOTE — DIABETES MGMT
[facility]  [facility]  DIABETES MANAGEMENT CONSULT    Consulted by Provider for advanced nursing evaluation and care for inpatient blood glucose management.    Evaluation and Action Plan   Juwan Espinoza is a 54 year old male patient with Type 2 diabetes. The patient reports good BG control at home on 100 units Lantus daily, Ozempic ( Wednesdays) and Janumet. The patient' diabetes is managed by his PCP. He is admitted for a right foot infection. Right foot ( 4 and 5th toe) wound ( started as blisters) started about 2 weeks ago. The patient was already following with a podiatrist for a post surgical wound on the bottom of the foot. 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 The patient is NPO today and is not requiring insulin at this time. However, I suspect the BG's will begin to rise since he has not received basal insulin  since yesterday morning. Also the effects of the Ozempic may be wearing off as well since he takes his injections on Wednesdays. I have modified the basal insulin order to 50% of home dose to start this evening. Diabetes management will monitor BG's and insulin requirements and make changes as needed.   BG's are stable on the current regimen. No changes to the dosing today. The patient will likely discharge on PTA insulin dosing.   Bg's remain stable on the current dosing. The patient is using 50% of home dose. I recommend resuming home insulin at discharge as the patient reports good BG control at home.     Management Rationale Action Plan   Medication   Basal needs Using 50% of home dose    Corrective insulin Using medium dose sensitivity based on weight    Additional orders          Diabetes Discharge Plan   Medication  Resume PTA insulin dosing and follow-up with outpatient provider fir future diabetes management   Referral  []        Outpatient diabetes education   Additional orders            Initial Presentation   Juwan Espinoza  grams/meal      Pain PRN acetaminophen    Infection Zosyn   Vancomycin    Other:   Kidney function  Liver function     TERE  Resolved  Normal      Blood glucose pattern        Assessment and Nursing Intervention   Nursing Diagnosis Risk for unstable blood glucose pattern   Nursing Intervention Domain 550 Decision-making Support   Nursing Interventions Examined current inpatient diabetes/blood glucose control   Explored factors facilitating and impeding inpatient management  Explored corrective strategies with patient and responsible inpatient provider   Informed patient of rational for insulin strategy while hospitalized     Nursing Diagnosis 35842 Ineffective Health Management   Nursing Intervention Domain 5250 Decision-making Support   Nursing Interventions Identified diabetes self-management practices impeding diabetes control  Discussed diabetes survival skills related to  Healthy Plate eating plan; given handouts  Role of physical activity in improving insulin sensitivity and action  Procedure for blood glucose monitoring & options for low-cost products  Medications plan at discharge     Billing Code(s)   [] 07379 IP subsequent hospital care - 50 minutes   [] 60424 IP subsequent hospital care - 35 minutes   [x] 92431 IP subsequent hospital care - 25 minutes   [] 76773 IP initial hospital care - 40 minutes     Before making these care recommendations, I personally reviewed the hospitalization record, including notes, laboratory & diagnostic data and current medications, and examined the patient at the bedside (circumstances permitting) before determining care. More than fifty (50) percent of the time was spent in patient counseling and/or care coordination.  Total minutes: 25 minutes    SELVIN Bahena - CNS  Diabetes Clinical Nurse Specialist  [facility]  Access via [employer]

## 2024-03-08 NOTE — WOUND CARE
Wound care nurse: wound vac to right plantar foot.    Scant - small amount of sero-sang watery drainage in cannister. No issues with dressing or seal to dressing. Wound vac reading 125 mm/hg, continuous    Patient for possible discharge over the weekend. His wife brought in his home wound vac and cannister and wound care nurse switched him to his home wound vac pump.  Patient to go for MRI today and patient told he cannot bring the wound vac equipment to MRI. Patient knows how to disconnect his dressing tubing from cannister tubing to go down for MRI and then can reconnect himself to his home wound vac.  Dr HAYDEE Siddiqui checked in with patient and explained that wether he is discharged on home antibiotics or not depends on results of MRI. If he needs home abxs he will stay until Monday, if he does not need home antibiotics he will be discharged home over the weekend, most likely.    Plan:  nurse will change wound vac dressing to right foot if still here on Monday. AMIRAH LANCE RN, CWON

## 2024-03-08 NOTE — CARE COORDINATION
Transition of Care Plan:    RUR: 16%  Prior Level of Functioning: independent at home with home health assisting with wound care and outpatient center following wound weekly.  Disposition: home with home health  If SNF or IPR: Date FOC offered: na   Follow up appointments: with PCP and specialist  DME needed: none  Transportation at discharge: family  IM/IMM Medicare/ letter given: na  Is patient a  and connected with [facility]? na   If yes, was [place] transfer form completed and [facility] notified?   Caregiver Contact: wife   Debora Espinoza   Mobile: [phone]     Discharge Caregiver contacted prior to discharge? no  Care Conference needed? no  Barriers to discharge:  not medically ready to dc    CM reviewed the chart. Patient discussed in rounds and may need home IV antibiotic.    CM met with patient to discuss discharge planning. States he has done IV antibiotics in the past. [facility] nurse has been seeing him for wound care. He also goes to outpatient wound care center weekly. He would like all services resumes.     CM sent referral to [facility] to resume services, with orders.    CM will continue to follow.    Trista Rodriguez RN  Care Manager  [phone]

## 2024-03-08 NOTE — PROGRESS NOTES
Pharmacist Review and Automatic Dose Adjustment of Prophylactic Enoxaparin         The reviewing pharmacist has made an adjustment to the ordered enoxaparin dose or converted to UFH per the approved [facility] protocol and table as identified below.        Juwan Espinoza is a 54 y.o. male.     Recent Labs     03/05/24  1853 03/07/24  0555 03/08/24  0359   CREATININE 1.55* 1.05 1.09       Estimated Creatinine Clearance: 100 mL/min (based on SCr of 1.09 mg/dL).    Recent Labs     03/07/24  0555 03/08/24  0359   HGB 11.7* 11.7*   HCT 37.1 36.0*    264     No results for input(s): \"INR\" in the last 72 hours.    Height:   Ht Readings from Last 1 Encounters:   03/05/24 1.803 m (5' 11\")     Weight:  Wt Readings from Last 1 Encounters:   03/08/24 119.6 kg (263 lb 12.3 oz)               Plan: Based upon the patient's weight and renal function    Ordered: Heparin 5,000 units SUBQ TID    Changed/converted to    New Order: Enoxaparin 30mg SUBQ BID      Thank you,  Trish Weller, [facility]  3/8/2024, 3:21 PM

## 2024-03-08 NOTE — PROGRESS NOTES
Attempted to schedule hospital follow up PCP appointment.  The office is requesting the patient to call the office to schedule their appointment upon arrival to home per office protocol. Pending patient discharge.  Vicky Valdez, Care Management Assistant

## 2024-03-08 NOTE — PROGRESS NOTES
Completed Specimen: Blood Updated: 03/08/24 0749     Special Requests --        NO SPECIAL REQUESTS  LEFT       Culture NO GROWTH 3 DAYS       Culture, Blood 1 [[id number]] Collected: 03/05/24 1832    Order Status: Completed Specimen: Blood Updated: 03/08/24 0749     Special Requests --        NO SPECIAL REQUESTS  RIGHT       Culture NO GROWTH 3 DAYS               Xray Result (most recent):  XR FOOT RIGHT (MIN 3 VIEWS) 03/05/2024    Narrative  EXAM: XR FOOT RIGHT (MIN 3 VIEWS)    INDICATION: rule out osteo.    COMPARISON: None.    FINDINGS: Three views of the right foot demonstrate chronic fracture dislocation  of the midfoot. There is no abnormal periosteal reaction around the base of the  first metatarsal, the dislocated medial cuneiform, and the talus with possible  involvement of the second through fifth metatarsals as well. There is a soft  tissue ulcer along the plantar aspect of the midfoot.. Diffuse soft tissue  swelling is noted..    Impression  Chronic fracture dislocation of the midfoot with interval  development of cortical irregularity and periosteal reaction suspicious for  osteomyelitis..      XR FOOT RIGHT (MIN 3 VIEWS)    Result Date: 3/5/2024  EXAM: XR FOOT RIGHT (MIN 3 VIEWS) INDICATION: rule out osteo. COMPARISON: None. FINDINGS: Three views of the right foot demonstrate chronic fracture dislocation of the midfoot. There is no abnormal periosteal reaction around the base of the first metatarsal, the dislocated medial cuneiform, and the talus with possible involvement of the second through fifth metatarsals as well. There is a soft tissue ulcer along the plantar aspect of the midfoot.. Diffuse soft tissue swelling is noted..     Chronic fracture dislocation of the midfoot with interval development of cortical irregularity and periosteal reaction suspicious for osteomyelitis..    XR CHEST PORTABLE    Result Date: 3/5/2024  EXAM:  XR CHEST PORTABLE INDICATION:   Sepsis COMPARISON: Chest radiograph  11/16/2011. FINDINGS: AP radiograph of the chest was obtained. No evidence of focal consolidation. No pleural effusion or pneumothorax.  Heart, anthony, mediastinum are within normal limits. No acute osseous abnormalities.     No acute cardiopulmonary process.       Lisa Bolton MD FACP

## 2024-03-08 NOTE — PROGRESS NOTES
Pharmacy Antimicrobial Kinetic Dosing    Indication for Antimicrobials: Stable superficial gangrene and chronic non-healing diabetic charcot (hx of osteomyelitis)    Current Regimen of Each Antimicrobial:  Vancomycin pharmacy consult; Start Date 3/5; Day # 4  Cefepime 2g IV q12h; Start Date 3/6; Day # 3  Metronidazole 500mg IV q8h; Start Date 3/6; Day # 3    Previous Antimicrobial Therapy:   Zosyn 3/5 - 3/6    Goal Level: Vancomycin -600    Date Dose & Interval Measured (mcg/mL) Predicted AUC   3/7 1000mg q12h 15.2 387                 Significant Cultures:  3/5 Blood, paired: ngtd, prelim    Labs:  Recent Labs     Units 24  0359 24  0555 24  1853   CREATININE MG/DL 1.09 1.05  --  1.55*   BUN MG/DL 13 16  --  26*   PROCAL ng/mL  --   --  0.08  --    WBC K/uL 8.1 8.5  --  11.9*     Temp (24hrs), Av.1 °F (36.7 °C), Min:97.9 °F (36.6 °C), Max:98.2 °F (36.8 °C)    Conditions for Dosing Consideration: None    Creatinine Clearance (mL/min): Estimated Creatinine Clearance: 100 mL/min (based on SCr of 1.09 mg/dL).     Impression/Plan:   Continue vancomycin to 1250mg IV q12h for projected   Level scheduled for 3/9 0600  BMP ordered daily  Antimicrobial stop date pending      Pharmacy will follow daily and adjust medications as appropriate for renal function and/or serum levels.    Thank you,  DACIA RUIZ [facility]

## 2024-03-08 NOTE — PROGRESS NOTES
Hospitalist Progress Note    NAME:   Juwan Espinoza   : 1970   MRN: [id number]     Date/Time: 3/8/2024 3:13 PM  Patient PCP: MARVIN Jones MD    Estimated discharge date: 3/9-3/10  Barriers: ID recommendation      Assessment / Plan:      Sepsis  Diabetic foot wound  Hx of osteo, Charcot foot  Concern for ischemia in L foot 5th toe  Possible acute osteo  Foot XR  Chronic fracture dislocation of the midfoot with interval  development of cortical irregularity and periosteal reaction suspicious for osteomyelitis..    -Seen by podiatry, currently plan for nonoperative management  -ID consulted to weigh in on possible need for IV antibiotics  -Appreciate vascular's assistance, preliminary DANG result seems to be satisfactory  -Continue current antibiotics, Vanco, cefepime, Flagyl  Blood culture 3/5-negative so far  Wound VAC  MRI pending    TERE, resolved  - IVF, trend     Diabetes mellitus type 2 on chronic insulin POA  Diabetic neuropathy POA  - holding oral meds  - reduced home glargine   - has dexcom     ?Essential hypertension  Hyperlipidemia  - home crestor          Medical Decision Making:   I personally reviewed labs: cbc bmp lactic  I personally reviewed imaging: XR foot, CXR  I personally reviewed EKG: NSR  Toxic drug monitoring: monitor creatinine for renal toxicity from vancomycin  Discussed case with:  Code Status: Full  DVT Prophylaxis: Heparin subcu  Contact:    Debora Espinoza (Spouse)  [phone] (Mobile)        Subjective:     Chief Complaint / Reason for Physician Visit  \"No events overnight\".  Discussed with RN events overnight.       Objective:     VITALS:   Last 24hrs VS reviewed since prior progress note. Most recent are:  Patient Vitals for the past 24 hrs:   BP Temp Temp src Pulse Resp SpO2 Weight   24 0817 123/89 98.4 °F (36.9 °C) -- 68 -- 98 % --   24 0630 -- -- -- -- -- -- 119.6 kg (263 lb 12.3 oz)   24 195 (!) 114/94 97.9 °F (36.6 °C) Oral 80 16 98 % --      03/05/24 1853 03/07/24  0555 03/08/24  0359   WBC 11.9* 8.5 8.1   HGB 12.4 11.7* 11.7*   HCT 39.4 37.1 36.0*    268 264       Recent Labs     03/05/24 1853 03/07/24  0555 03/08/24  0359    140 141   K 4.0 4.1 3.9   * 111* 111*   CO2 24 25 24   GLUCOSE 123* 137* 205*   BUN 26* 16 13   CREATININE 1.55* 1.05 1.09   CALCIUM 9.6 9.0 8.8   MG  --  1.7 1.8   PHOS  --  3.6 3.3   LABALBU 3.0*  --   --    BILITOT 0.4  --   --    AST 21  --   --    ALT 30  --   --          Signed: Brad Siddiqui MD

## 2024-03-08 NOTE — PROGRESS NOTES
MRI PENDING    Completion of MRI Screening Sheet    Fax to [phone] when completed    Please call [phone]  When this is done    Thank You

## 2024-03-09 LAB
ANION GAP SERPL CALC-SCNC: 5 MMOL/L (ref 5–15)
BASOPHILS # BLD: 0 K/UL (ref 0–0.1)
BASOPHILS NFR BLD: 0 % (ref 0–1)
BUN SERPL-MCNC: 14 MG/DL (ref 6–20)
BUN/CREAT SERPL: 14 (ref 12–20)
CALCIUM SERPL-MCNC: 8.6 MG/DL (ref 8.5–10.1)
CHLORIDE SERPL-SCNC: 109 MMOL/L (ref 97–108)
CO2 SERPL-SCNC: 24 MMOL/L (ref 21–32)
CREAT SERPL-MCNC: 1 MG/DL (ref 0.7–1.3)
DIFFERENTIAL METHOD BLD: ABNORMAL
EOSINOPHIL # BLD: 0.4 K/UL (ref 0–0.4)
EOSINOPHIL NFR BLD: 4 % (ref 0–7)
ERYTHROCYTE [DISTWIDTH] IN BLOOD BY AUTOMATED COUNT: 13.8 % (ref 11.5–14.5)
GLUCOSE BLD STRIP.AUTO-MCNC: 136 MG/DL (ref 65–117)
GLUCOSE BLD STRIP.AUTO-MCNC: 146 MG/DL (ref 65–117)
GLUCOSE BLD STRIP.AUTO-MCNC: 162 MG/DL (ref 65–117)
GLUCOSE BLD STRIP.AUTO-MCNC: 183 MG/DL (ref 65–117)
GLUCOSE SERPL-MCNC: 168 MG/DL (ref 65–100)
HCT VFR BLD AUTO: 34.1 % (ref 36.6–50.3)
HGB BLD-MCNC: 11.1 G/DL (ref 12.1–17)
IMM GRANULOCYTES # BLD AUTO: 0.1 K/UL (ref 0–0.04)
IMM GRANULOCYTES NFR BLD AUTO: 1 % (ref 0–0.5)
LYMPHOCYTES # BLD: 3 K/UL (ref 0.8–3.5)
LYMPHOCYTES NFR BLD: 31 % (ref 12–49)
MAGNESIUM SERPL-MCNC: 1.9 MG/DL (ref 1.6–2.4)
MCH RBC QN AUTO: 30.2 PG (ref 26–34)
MCHC RBC AUTO-ENTMCNC: 32.6 G/DL (ref 30–36.5)
MCV RBC AUTO: 92.9 FL (ref 80–99)
MONOCYTES # BLD: 0.6 K/UL (ref 0–1)
MONOCYTES NFR BLD: 6 % (ref 5–13)
NEUTS SEG # BLD: 5.6 K/UL (ref 1.8–8)
NEUTS SEG NFR BLD: 58 % (ref 32–75)
NRBC # BLD: 0 K/UL (ref 0–0.01)
NRBC BLD-RTO: 0 PER 100 WBC
PHOSPHATE SERPL-MCNC: 2.9 MG/DL (ref 2.6–4.7)
PLATELET # BLD AUTO: 271 K/UL (ref 150–400)
PMV BLD AUTO: 10.9 FL (ref 8.9–12.9)
POTASSIUM SERPL-SCNC: 3.9 MMOL/L (ref 3.5–5.1)
RBC # BLD AUTO: 3.67 M/UL (ref 4.1–5.7)
SERVICE CMNT-IMP: ABNORMAL
SODIUM SERPL-SCNC: 138 MMOL/L (ref 136–145)
VANCOMYCIN SERPL-MCNC: 26.6 UG/ML
WBC # BLD AUTO: 9.6 K/UL (ref 4.1–11.1)

## 2024-03-09 PROCEDURE — 2580000003 HC RX 258: Performed by: STUDENT IN AN ORGANIZED HEALTH CARE EDUCATION/TRAINING PROGRAM

## 2024-03-09 PROCEDURE — 6370000000 HC RX 637 (ALT 250 FOR IP)

## 2024-03-09 PROCEDURE — 1100000000 HC RM PRIVATE

## 2024-03-09 PROCEDURE — 2580000003 HC RX 258: Performed by: INTERNAL MEDICINE

## 2024-03-09 PROCEDURE — 6370000000 HC RX 637 (ALT 250 FOR IP): Performed by: STUDENT IN AN ORGANIZED HEALTH CARE EDUCATION/TRAINING PROGRAM

## 2024-03-09 PROCEDURE — 36415 COLL VENOUS BLD VENIPUNCTURE: CPT

## 2024-03-09 PROCEDURE — 83735 ASSAY OF MAGNESIUM: CPT

## 2024-03-09 PROCEDURE — 80202 ASSAY OF VANCOMYCIN: CPT

## 2024-03-09 PROCEDURE — 80048 BASIC METABOLIC PNL TOTAL CA: CPT

## 2024-03-09 PROCEDURE — 84100 ASSAY OF PHOSPHORUS: CPT

## 2024-03-09 PROCEDURE — 82962 GLUCOSE BLOOD TEST: CPT

## 2024-03-09 PROCEDURE — 85025 COMPLETE CBC W/AUTO DIFF WBC: CPT

## 2024-03-09 PROCEDURE — 6360000002 HC RX W HCPCS: Performed by: INTERNAL MEDICINE

## 2024-03-09 RX ADMIN — SODIUM CHLORIDE, PRESERVATIVE FREE 10 ML: 5 INJECTION INTRAVENOUS at 20:39

## 2024-03-09 RX ADMIN — SODIUM CHLORIDE: 9 INJECTION, SOLUTION INTRAVENOUS at 09:58

## 2024-03-09 RX ADMIN — ROSUVASTATIN CALCIUM 5 MG: 10 TABLET, COATED ORAL at 09:58

## 2024-03-09 RX ADMIN — METRONIDAZOLE 500 MG: 500 INJECTION, SOLUTION INTRAVENOUS at 06:39

## 2024-03-09 RX ADMIN — SODIUM CHLORIDE: 9 INJECTION, SOLUTION INTRAVENOUS at 14:56

## 2024-03-09 RX ADMIN — CEFEPIME 2000 MG: 2 INJECTION, POWDER, FOR SOLUTION INTRAVENOUS at 14:56

## 2024-03-09 RX ADMIN — INSULIN GLARGINE 50 UNITS: 100 INJECTION, SOLUTION SUBCUTANEOUS at 20:40

## 2024-03-09 RX ADMIN — SODIUM CHLORIDE, PRESERVATIVE FREE 10 ML: 5 INJECTION INTRAVENOUS at 10:03

## 2024-03-09 RX ADMIN — SODIUM CHLORIDE, PRESERVATIVE FREE 10 ML: 5 INJECTION INTRAVENOUS at 09:58

## 2024-03-09 RX ADMIN — VANCOMYCIN HYDROCHLORIDE 1250 MG: 10 INJECTION, POWDER, LYOPHILIZED, FOR SOLUTION INTRAVENOUS at 10:00

## 2024-03-09 RX ADMIN — METRONIDAZOLE 500 MG: 500 INJECTION, SOLUTION INTRAVENOUS at 23:02

## 2024-03-09 RX ADMIN — CEFEPIME 2000 MG: 2 INJECTION, POWDER, FOR SOLUTION INTRAVENOUS at 03:07

## 2024-03-09 RX ADMIN — SODIUM CHLORIDE, PRESERVATIVE FREE 10 ML: 5 INJECTION INTRAVENOUS at 20:40

## 2024-03-09 RX ADMIN — GABAPENTIN 600 MG: 300 CAPSULE ORAL at 14:54

## 2024-03-09 RX ADMIN — ENOXAPARIN SODIUM 30 MG: 100 INJECTION SUBCUTANEOUS at 09:58

## 2024-03-09 RX ADMIN — METRONIDAZOLE 500 MG: 500 INJECTION, SOLUTION INTRAVENOUS at 14:59

## 2024-03-09 RX ADMIN — SODIUM CHLORIDE: 9 INJECTION, SOLUTION INTRAVENOUS at 20:47

## 2024-03-09 RX ADMIN — VANCOMYCIN HYDROCHLORIDE 1250 MG: 10 INJECTION, POWDER, LYOPHILIZED, FOR SOLUTION INTRAVENOUS at 20:52

## 2024-03-09 RX ADMIN — GABAPENTIN 600 MG: 300 CAPSULE ORAL at 20:40

## 2024-03-09 RX ADMIN — GABAPENTIN 600 MG: 300 CAPSULE ORAL at 09:58

## 2024-03-09 RX ADMIN — SODIUM CHLORIDE: 9 INJECTION, SOLUTION INTRAVENOUS at 14:57

## 2024-03-09 NOTE — PROGRESS NOTES
.Bedside, Verbal, Recorded, and Written shift change report given to Saray NEWELL (oncoming nurse) by Kathryn ahmadi (offgoing nurse). Report included the following information Nurse Handoff Report, ED SBAR, Adult Overview, Intake/Output, MAR, Med Rec Status, Neuro Assessment, and Event Log.

## 2024-03-09 NOTE — PROGRESS NOTES
Hospitalist Progress Note    NAME:   Juwan Espinoza   : 1970   MRN: [id number]     Date/Time: 3/9/2024 12:58 PM  Patient PCP: MARVIN Jones MD    Estimated discharge date:3/11  Barriers: ID recommendation      Assessment / Plan:      Sepsis  Diabetic foot wound  Hx of osteo, Charcot foot  Concern for ischemia in L foot 5th toe  Possible acute osteo  Foot XR  Chronic fracture dislocation of the midfoot with interval  development of cortical irregularity and periosteal reaction suspicious for osteomyelitis..      MRI Foot-   Charcot changes of the midfoot with superimposed plantar ulceration and sinus  tract and osteomyelitis of the lateral cuneiform and cuboid. The sinus pack  appears to communicate with the joints of the midfoot which may indicate further  areas of osteomyelitis. There is no drainable fluid collection      -Seen by podiatry, currently plan for nonoperative management  -ID consulted to weigh in on possible need for IV antibiotics  -Appreciate vascular's assistance, preliminary DANG result seems to be satisfactory  -Continue current antibiotics, Vanco, cefepime, Flagyl  Blood culture 3/5-negative so far  Wound VAC    -Likely will need IV antibiotics, will need PICC line on Monday    TERE, resolved  - IVF, trend     Diabetes mellitus type 2 on chronic insulin POA  Diabetic neuropathy POA  - holding oral meds  - reduced home glargine   - has dexcom     ?Essential hypertension  Hyperlipidemia  - home crestor          Medical Decision Making:   I personally reviewed labs: cbc bmp lactic  I personally reviewed imaging: XR foot, CXR  I personally reviewed EKG: NSR  Toxic drug monitoring: monitor creatinine for renal toxicity from vancomycin  Discussed case with:  Code Status: Full  DVT Prophylaxis: Heparin subcu  Contact:    Debora Espinoza (Spouse)  [phone] (Mobile)        Subjective:     Chief Complaint / Reason for Physician Visit  \"No events overnight\".  Discussed with RN events      Procedures: see electronic medical records for all procedures/Xrays and details which were not copied into this note but were reviewed prior to creation of Plan.      LABS:  I reviewed today's most current labs and imaging studies.  Pertinent labs include:  Recent Labs     03/07/24  0555 03/08/24  0359 03/09/24  0043   WBC 8.5 8.1 9.6   HGB 11.7* 11.7* 11.1*   HCT 37.1 36.0* 34.1*    264 271       Recent Labs     03/07/24  0555 03/08/24  0359 03/09/24  0043    141 138   K 4.1 3.9 3.9   * 111* 109*   CO2 25 24 24   GLUCOSE 137* 205* 168*   BUN 16 13 14   CREATININE 1.05 1.09 1.00   CALCIUM 9.0 8.8 8.6   MG 1.7 1.8 1.9   PHOS 3.6 3.3 2.9         Signed: Brad Siddiqui MD

## 2024-03-09 NOTE — PROGRESS NOTES
Pharmacy Antimicrobial Kinetic Dosing    Indication for Antimicrobials: Stable superficial gangrene and chronic non-healing diabetic charcot (hx of osteomyelitis)    Current Regimen of Each Antimicrobial:  Vancomycin pharmacy consult; Start Date 3/5; Day # 5  Cefepime 2g IV q12h; Start Date 3/6; Day # 4  Metronidazole 500mg IV q8h; Start Date 3/6; Day # 4    Previous Antimicrobial Therapy:   Zosyn 3/5 - 3/6    Goal Level: Vancomycin -600    Date Dose & Interval Measured (mcg/mL) Predicted AUC   3/7 1000mg q12h 15.2 387   3/9 1250 mg q12h 26.6 (peak) 509           Significant Cultures:  3/5 Blood, paired: ngtd, prelim    Labs:  Recent Labs     Units 24  0043 24  0359 24  0555   CREATININE MG/DL 1.00 1.09 1.05   BUN MG/DL 14 13 16   WBC K/uL 9.6 8.1 8.5     Temp (24hrs), Av.3 °F (36.8 °C), Min:98.1 °F (36.7 °C), Max:98.4 °F (36.9 °C)    Conditions for Dosing Consideration: None    Creatinine Clearance (mL/min): Estimated Creatinine Clearance: 109 mL/min (based on SCr of 1 mg/dL).     Impression/Plan:   Vancomycin level drawn early was 26.6 which extrapolates to therapeutic AUC. Continue vanc 1250 mg q12h  Will order vanc level for 3/10 @ 0800 to get level closer to trough  BMP ordered daily  Antimicrobial stop date pending      Pharmacy will follow daily and adjust medications as appropriate for renal function and/or serum levels.    Thank you,  Shane Platt, [facility]

## 2024-03-10 LAB
ANION GAP SERPL CALC-SCNC: 6 MMOL/L (ref 5–15)
BASOPHILS # BLD: 0.1 K/UL (ref 0–0.1)
BASOPHILS NFR BLD: 1 % (ref 0–1)
BUN SERPL-MCNC: 16 MG/DL (ref 6–20)
BUN/CREAT SERPL: 15 (ref 12–20)
CALCIUM SERPL-MCNC: 8.5 MG/DL (ref 8.5–10.1)
CHLORIDE SERPL-SCNC: 110 MMOL/L (ref 97–108)
CO2 SERPL-SCNC: 25 MMOL/L (ref 21–32)
CREAT SERPL-MCNC: 1.06 MG/DL (ref 0.7–1.3)
DIFFERENTIAL METHOD BLD: ABNORMAL
EOSINOPHIL # BLD: 0.3 K/UL (ref 0–0.4)
EOSINOPHIL NFR BLD: 4 % (ref 0–7)
ERYTHROCYTE [DISTWIDTH] IN BLOOD BY AUTOMATED COUNT: 13.7 % (ref 11.5–14.5)
GLUCOSE BLD STRIP.AUTO-MCNC: 132 MG/DL (ref 65–117)
GLUCOSE BLD STRIP.AUTO-MCNC: 136 MG/DL (ref 65–117)
GLUCOSE BLD STRIP.AUTO-MCNC: 154 MG/DL (ref 65–117)
GLUCOSE BLD STRIP.AUTO-MCNC: 205 MG/DL (ref 65–117)
GLUCOSE SERPL-MCNC: 195 MG/DL (ref 65–100)
HCT VFR BLD AUTO: 35.5 % (ref 36.6–50.3)
HGB BLD-MCNC: 11.1 G/DL (ref 12.1–17)
IMM GRANULOCYTES # BLD AUTO: 0.1 K/UL (ref 0–0.04)
IMM GRANULOCYTES NFR BLD AUTO: 1 % (ref 0–0.5)
LYMPHOCYTES # BLD: 2.4 K/UL (ref 0.8–3.5)
LYMPHOCYTES NFR BLD: 27 % (ref 12–49)
MAGNESIUM SERPL-MCNC: 1.9 MG/DL (ref 1.6–2.4)
MCH RBC QN AUTO: 29.2 PG (ref 26–34)
MCHC RBC AUTO-ENTMCNC: 31.3 G/DL (ref 30–36.5)
MCV RBC AUTO: 93.4 FL (ref 80–99)
MONOCYTES # BLD: 0.6 K/UL (ref 0–1)
MONOCYTES NFR BLD: 7 % (ref 5–13)
NEUTS SEG # BLD: 5.4 K/UL (ref 1.8–8)
NEUTS SEG NFR BLD: 60 % (ref 32–75)
NRBC # BLD: 0 K/UL (ref 0–0.01)
NRBC BLD-RTO: 0 PER 100 WBC
PHOSPHATE SERPL-MCNC: 2.7 MG/DL (ref 2.6–4.7)
PLATELET # BLD AUTO: 267 K/UL (ref 150–400)
PMV BLD AUTO: 10.5 FL (ref 8.9–12.9)
POTASSIUM SERPL-SCNC: 3.7 MMOL/L (ref 3.5–5.1)
RBC # BLD AUTO: 3.8 M/UL (ref 4.1–5.7)
SERVICE CMNT-IMP: ABNORMAL
SODIUM SERPL-SCNC: 141 MMOL/L (ref 136–145)
VANCOMYCIN SERPL-MCNC: 13.6 UG/ML
WBC # BLD AUTO: 8.8 K/UL (ref 4.1–11.1)

## 2024-03-10 PROCEDURE — 6360000002 HC RX W HCPCS: Performed by: INTERNAL MEDICINE

## 2024-03-10 PROCEDURE — 84100 ASSAY OF PHOSPHORUS: CPT

## 2024-03-10 PROCEDURE — 36415 COLL VENOUS BLD VENIPUNCTURE: CPT

## 2024-03-10 PROCEDURE — 2580000003 HC RX 258: Performed by: INTERNAL MEDICINE

## 2024-03-10 PROCEDURE — 6370000000 HC RX 637 (ALT 250 FOR IP): Performed by: INTERNAL MEDICINE

## 2024-03-10 PROCEDURE — 1100000000 HC RM PRIVATE

## 2024-03-10 PROCEDURE — 85025 COMPLETE CBC W/AUTO DIFF WBC: CPT

## 2024-03-10 PROCEDURE — 2580000003 HC RX 258: Performed by: STUDENT IN AN ORGANIZED HEALTH CARE EDUCATION/TRAINING PROGRAM

## 2024-03-10 PROCEDURE — 80202 ASSAY OF VANCOMYCIN: CPT

## 2024-03-10 PROCEDURE — 82962 GLUCOSE BLOOD TEST: CPT

## 2024-03-10 PROCEDURE — 6370000000 HC RX 637 (ALT 250 FOR IP): Performed by: STUDENT IN AN ORGANIZED HEALTH CARE EDUCATION/TRAINING PROGRAM

## 2024-03-10 PROCEDURE — 6370000000 HC RX 637 (ALT 250 FOR IP)

## 2024-03-10 PROCEDURE — 83735 ASSAY OF MAGNESIUM: CPT

## 2024-03-10 PROCEDURE — 80048 BASIC METABOLIC PNL TOTAL CA: CPT

## 2024-03-10 RX ORDER — LACTOBACILLUS RHAMNOSUS GG 10B CELL
1 CAPSULE ORAL
Status: DISCONTINUED | OUTPATIENT
Start: 2024-03-10 | End: 2024-03-11 | Stop reason: HOSPADM

## 2024-03-10 RX ADMIN — GABAPENTIN 600 MG: 300 CAPSULE ORAL at 09:49

## 2024-03-10 RX ADMIN — ENOXAPARIN SODIUM 30 MG: 100 INJECTION SUBCUTANEOUS at 21:35

## 2024-03-10 RX ADMIN — VANCOMYCIN HYDROCHLORIDE 1250 MG: 10 INJECTION, POWDER, LYOPHILIZED, FOR SOLUTION INTRAVENOUS at 22:12

## 2024-03-10 RX ADMIN — SODIUM CHLORIDE: 9 INJECTION, SOLUTION INTRAVENOUS at 15:01

## 2024-03-10 RX ADMIN — SODIUM CHLORIDE, PRESERVATIVE FREE 10 ML: 5 INJECTION INTRAVENOUS at 09:49

## 2024-03-10 RX ADMIN — ENOXAPARIN SODIUM 30 MG: 100 INJECTION SUBCUTANEOUS at 09:49

## 2024-03-10 RX ADMIN — Medication 1 CAPSULE: at 14:59

## 2024-03-10 RX ADMIN — METRONIDAZOLE 500 MG: 500 INJECTION, SOLUTION INTRAVENOUS at 23:04

## 2024-03-10 RX ADMIN — CEFEPIME 2000 MG: 2 INJECTION, POWDER, FOR SOLUTION INTRAVENOUS at 15:03

## 2024-03-10 RX ADMIN — METRONIDAZOLE 500 MG: 500 INJECTION, SOLUTION INTRAVENOUS at 15:02

## 2024-03-10 RX ADMIN — SODIUM CHLORIDE, PRESERVATIVE FREE 10 ML: 5 INJECTION INTRAVENOUS at 09:50

## 2024-03-10 RX ADMIN — GABAPENTIN 600 MG: 300 CAPSULE ORAL at 14:59

## 2024-03-10 RX ADMIN — SODIUM CHLORIDE: 9 INJECTION, SOLUTION INTRAVENOUS at 15:02

## 2024-03-10 RX ADMIN — SODIUM CHLORIDE: 9 INJECTION, SOLUTION INTRAVENOUS at 12:19

## 2024-03-10 RX ADMIN — METRONIDAZOLE 500 MG: 500 INJECTION, SOLUTION INTRAVENOUS at 06:21

## 2024-03-10 RX ADMIN — SODIUM CHLORIDE, PRESERVATIVE FREE 10 ML: 5 INJECTION INTRAVENOUS at 21:38

## 2024-03-10 RX ADMIN — ROSUVASTATIN CALCIUM 5 MG: 10 TABLET, COATED ORAL at 09:49

## 2024-03-10 RX ADMIN — VANCOMYCIN HYDROCHLORIDE 1250 MG: 10 INJECTION, POWDER, LYOPHILIZED, FOR SOLUTION INTRAVENOUS at 12:20

## 2024-03-10 RX ADMIN — GABAPENTIN 600 MG: 300 CAPSULE ORAL at 21:32

## 2024-03-10 RX ADMIN — INSULIN LISPRO 2 UNITS: 100 INJECTION, SOLUTION INTRAVENOUS; SUBCUTANEOUS at 13:02

## 2024-03-10 RX ADMIN — INSULIN GLARGINE 50 UNITS: 100 INJECTION, SOLUTION SUBCUTANEOUS at 21:32

## 2024-03-10 RX ADMIN — CEFEPIME 2000 MG: 2 INJECTION, POWDER, FOR SOLUTION INTRAVENOUS at 03:08

## 2024-03-10 NOTE — PROGRESS NOTES
Hospitalist Progress Note    NAME:   Juwan Espinoza   : 1970   MRN: [id number]     Date/Time: 3/10/2024 1:35 PM  Patient PCP: MARVIN Jones MD    Estimated discharge date:3/11  Barriers: ID recommendation      Assessment / Plan:      Sepsis  Diabetic foot wound  Hx of osteo, Charcot foot  Concern for ischemia in L foot 5th toe  Possible acute osteo  Foot XR  Chronic fracture dislocation of the midfoot with interval  development of cortical irregularity and periosteal reaction suspicious for osteomyelitis..      MRI Foot-   Charcot changes of the midfoot with superimposed plantar ulceration and sinus  tract and osteomyelitis of the lateral cuneiform and cuboid. The sinus pack  appears to communicate with the joints of the midfoot which may indicate further  areas of osteomyelitis. There is no drainable fluid collection      -Seen by podiatry, currently plan for nonoperative management  -ID consulted to weigh in on possible need for IV antibiotics  -Appreciate vascular's assistance, preliminary DANG result seems to be satisfactory  -Continue current antibiotics, Vanco, cefepime, Flagyl  Blood culture 3/5-negative so far  Wound VAC    -Likely will need IV antibiotics, will need PICC line on Monday    TERE, resolved  - IVF, trend     Diabetes mellitus type 2 on chronic insulin POA  Diabetic neuropathy POA  - holding oral meds  - reduced home glargine   - has dexcom     ?Essential hypertension  Hyperlipidemia  - home crestor          Medical Decision Making:   I personally reviewed labs: cbc bmp lactic  I personally reviewed imaging: XR foot, CXR  I personally reviewed EKG: NSR  Toxic drug monitoring: monitor creatinine for renal toxicity from vancomycin  Discussed case with:  Code Status: Full  DVT Prophylaxis: Heparin subcu  Contact:    Debora Espinoza (Spouse)  [phone] (Mobile)        Subjective:     Chief Complaint / Reason for Physician Visit  \"No events overnight\".  Discussed with RN events

## 2024-03-10 NOTE — PROGRESS NOTES
.Bedside, Verbal, Recorded, and Written shift change report given to Saray east (oncoming nurse) by Kathryn ahmadi (offgoing nurse). Report included the following information Nurse Handoff Report, ED SBAR, Adult Overview, Intake/Output, MAR, Recent Results, Neuro Assessment, and Event Log.

## 2024-03-10 NOTE — PROGRESS NOTES
Pharmacy Antimicrobial Kinetic Dosing    Indication for Antimicrobials: Stable superficial gangrene and chronic non-healing diabetic charcot (hx of osteomyelitis)    Current Regimen of Each Antimicrobial:  Vancomycin pharmacy consult; Start Date 3/; Day # 6  Cefepime 2g IV q12h; Start Date 3/6; Day # 5  Metronidazole 500mg IV q8h; Start Date 3/6; Day # 5    Previous Antimicrobial Therapy:   Zosyn 3 - 3    Goal Level: Vancomycin -600    Date Dose & Interval Measured (mcg/mL) Predicted AUC   3/7 1000mg q12h 15.2 387   3/9 1250 mg q12h 26.6 (peak) 509   3/10 1250 mg q12h 13.6 493     Significant Cultures:  3/5 Blood, paired: ngtd, prelim    Labs:  Recent Labs     Units 03/10/24  0159 24  0043 24  0359   CREATININE MG/DL 1.06 1.00 1.09   BUN MG/DL 16 14 13   WBC K/uL 8.8 9.6 8.1     Temp (24hrs), Av.1 °F (36.7 °C), Min:97.9 °F (36.6 °C), Max:98.2 °F (36.8 °C)    Conditions for Dosing Consideration: None    Creatinine Clearance (mL/min): Estimated Creatinine Clearance: 102 mL/min (based on SCr of 1.06 mg/dL).     Impression/Plan:   Continue vancomycin 1250mg IV q12h for a projected   BMP ordered daily  Antimicrobial stop date pending      Pharmacy will follow daily and adjust medications as appropriate for renal function and/or serum levels.    Thank you,  DACIA RUIZ [facility]

## 2024-03-11 ENCOUNTER — TELEPHONE (OUTPATIENT)
Age: 54
End: 2024-03-11

## 2024-03-11 VITALS
RESPIRATION RATE: 20 BRPM | WEIGHT: 262 LBS | SYSTOLIC BLOOD PRESSURE: 164 MMHG | TEMPERATURE: 97.9 F | BODY MASS INDEX: 36.68 KG/M2 | HEIGHT: 71 IN | HEART RATE: 77 BPM | DIASTOLIC BLOOD PRESSURE: 97 MMHG | OXYGEN SATURATION: 100 %

## 2024-03-11 PROBLEM — L98.8 DRAINING CUTANEOUS SINUS TRACT: Status: ACTIVE | Noted: 2024-03-11

## 2024-03-11 PROBLEM — A49.1 GBS (GROUP B STREPTOCOCCUS) INFECTION: Status: ACTIVE | Noted: 2024-03-11

## 2024-03-11 LAB
ANION GAP SERPL CALC-SCNC: 5 MMOL/L (ref 5–15)
BACTERIA SPEC CULT: NORMAL
BACTERIA SPEC CULT: NORMAL
BASOPHILS # BLD: 0.1 K/UL (ref 0–0.1)
BASOPHILS NFR BLD: 1 % (ref 0–1)
BUN SERPL-MCNC: 12 MG/DL (ref 6–20)
BUN/CREAT SERPL: 12 (ref 12–20)
CALCIUM SERPL-MCNC: 8.6 MG/DL (ref 8.5–10.1)
CHLORIDE SERPL-SCNC: 110 MMOL/L (ref 97–108)
CO2 SERPL-SCNC: 25 MMOL/L (ref 21–32)
CREAT SERPL-MCNC: 0.97 MG/DL (ref 0.7–1.3)
DIFFERENTIAL METHOD BLD: ABNORMAL
EOSINOPHIL # BLD: 0.4 K/UL (ref 0–0.4)
EOSINOPHIL NFR BLD: 4 % (ref 0–7)
ERYTHROCYTE [DISTWIDTH] IN BLOOD BY AUTOMATED COUNT: 13.9 % (ref 11.5–14.5)
GLUCOSE BLD STRIP.AUTO-MCNC: 135 MG/DL (ref 65–117)
GLUCOSE BLD STRIP.AUTO-MCNC: 140 MG/DL (ref 65–117)
GLUCOSE SERPL-MCNC: 226 MG/DL (ref 65–100)
HCT VFR BLD AUTO: 35.6 % (ref 36.6–50.3)
HGB BLD-MCNC: 11.2 G/DL (ref 12.1–17)
IMM GRANULOCYTES # BLD AUTO: 0.1 K/UL (ref 0–0.04)
IMM GRANULOCYTES NFR BLD AUTO: 1 % (ref 0–0.5)
LYMPHOCYTES # BLD: 2.4 K/UL (ref 0.8–3.5)
LYMPHOCYTES NFR BLD: 26 % (ref 12–49)
MAGNESIUM SERPL-MCNC: 1.9 MG/DL (ref 1.6–2.4)
MCH RBC QN AUTO: 29.1 PG (ref 26–34)
MCHC RBC AUTO-ENTMCNC: 31.5 G/DL (ref 30–36.5)
MCV RBC AUTO: 92.5 FL (ref 80–99)
MONOCYTES # BLD: 0.6 K/UL (ref 0–1)
MONOCYTES NFR BLD: 6 % (ref 5–13)
NEUTS SEG # BLD: 5.6 K/UL (ref 1.8–8)
NEUTS SEG NFR BLD: 62 % (ref 32–75)
NRBC # BLD: 0 K/UL (ref 0–0.01)
NRBC BLD-RTO: 0 PER 100 WBC
PHOSPHATE SERPL-MCNC: 2.9 MG/DL (ref 2.6–4.7)
PLATELET # BLD AUTO: 249 K/UL (ref 150–400)
PMV BLD AUTO: 10.9 FL (ref 8.9–12.9)
POTASSIUM SERPL-SCNC: 3.6 MMOL/L (ref 3.5–5.1)
RBC # BLD AUTO: 3.85 M/UL (ref 4.1–5.7)
SERVICE CMNT-IMP: ABNORMAL
SERVICE CMNT-IMP: ABNORMAL
SERVICE CMNT-IMP: NORMAL
SERVICE CMNT-IMP: NORMAL
SODIUM SERPL-SCNC: 140 MMOL/L (ref 136–145)
WBC # BLD AUTO: 9.2 K/UL (ref 4.1–11.1)

## 2024-03-11 PROCEDURE — 6360000002 HC RX W HCPCS: Performed by: INTERNAL MEDICINE

## 2024-03-11 PROCEDURE — 82962 GLUCOSE BLOOD TEST: CPT

## 2024-03-11 PROCEDURE — 99233 SBSQ HOSP IP/OBS HIGH 50: CPT | Performed by: INTERNAL MEDICINE

## 2024-03-11 PROCEDURE — 2580000003 HC RX 258: Performed by: STUDENT IN AN ORGANIZED HEALTH CARE EDUCATION/TRAINING PROGRAM

## 2024-03-11 PROCEDURE — 6370000000 HC RX 637 (ALT 250 FOR IP): Performed by: STUDENT IN AN ORGANIZED HEALTH CARE EDUCATION/TRAINING PROGRAM

## 2024-03-11 PROCEDURE — 83735 ASSAY OF MAGNESIUM: CPT

## 2024-03-11 PROCEDURE — 2580000003 HC RX 258: Performed by: INTERNAL MEDICINE

## 2024-03-11 PROCEDURE — 76937 US GUIDE VASCULAR ACCESS: CPT

## 2024-03-11 PROCEDURE — 97605 NEG PRS WND THER DME<=50SQCM: CPT

## 2024-03-11 PROCEDURE — 36415 COLL VENOUS BLD VENIPUNCTURE: CPT

## 2024-03-11 PROCEDURE — 6370000000 HC RX 637 (ALT 250 FOR IP): Performed by: INTERNAL MEDICINE

## 2024-03-11 PROCEDURE — 80048 BASIC METABOLIC PNL TOTAL CA: CPT

## 2024-03-11 PROCEDURE — 84100 ASSAY OF PHOSPHORUS: CPT

## 2024-03-11 PROCEDURE — 02HV33Z INSERTION OF INFUSION DEVICE INTO SUPERIOR VENA CAVA, PERCUTANEOUS APPROACH: ICD-10-PCS | Performed by: STUDENT IN AN ORGANIZED HEALTH CARE EDUCATION/TRAINING PROGRAM

## 2024-03-11 PROCEDURE — 85025 COMPLETE CBC W/AUTO DIFF WBC: CPT

## 2024-03-11 PROCEDURE — C1751 CATH, INF, PER/CENT/MIDLINE: HCPCS

## 2024-03-11 PROCEDURE — 36569 INSJ PICC 5 YR+ W/O IMAGING: CPT

## 2024-03-11 RX ORDER — LACTOBACILLUS RHAMNOSUS GG 10B CELL
1 CAPSULE ORAL
Qty: 30 CAPSULE | Refills: 0 | Status: SHIPPED | OUTPATIENT
Start: 2024-03-12

## 2024-03-11 RX ADMIN — ENOXAPARIN SODIUM 30 MG: 100 INJECTION SUBCUTANEOUS at 09:24

## 2024-03-11 RX ADMIN — ACETAMINOPHEN 650 MG: 325 TABLET ORAL at 07:57

## 2024-03-11 RX ADMIN — SODIUM CHLORIDE, PRESERVATIVE FREE 10 ML: 5 INJECTION INTRAVENOUS at 09:25

## 2024-03-11 RX ADMIN — CEFEPIME 2000 MG: 2 INJECTION, POWDER, FOR SOLUTION INTRAVENOUS at 03:14

## 2024-03-11 RX ADMIN — SODIUM CHLORIDE: 9 INJECTION, SOLUTION INTRAVENOUS at 12:19

## 2024-03-11 RX ADMIN — PIPERACILLIN AND TAZOBACTAM 4500 MG: 4; .5 INJECTION, POWDER, FOR SOLUTION INTRAVENOUS at 12:19

## 2024-03-11 RX ADMIN — ROSUVASTATIN CALCIUM 5 MG: 10 TABLET, COATED ORAL at 09:25

## 2024-03-11 RX ADMIN — Medication 1 CAPSULE: at 09:25

## 2024-03-11 RX ADMIN — GABAPENTIN 600 MG: 300 CAPSULE ORAL at 09:24

## 2024-03-11 RX ADMIN — METRONIDAZOLE 500 MG: 500 INJECTION, SOLUTION INTRAVENOUS at 06:21

## 2024-03-11 ASSESSMENT — PAIN SCALES - WONG BAKER
WONGBAKER_NUMERICALRESPONSE: 2
WONGBAKER_NUMERICALRESPONSE: 0
WONGBAKER_NUMERICALRESPONSE: NO HURT
WONGBAKER_NUMERICALRESPONSE: HURTS A LITTLE BIT

## 2024-03-11 ASSESSMENT — PAIN DESCRIPTION - LOCATION: LOCATION: HEAD

## 2024-03-11 ASSESSMENT — PAIN DESCRIPTION - DESCRIPTORS: DESCRIPTORS: ACHING

## 2024-03-11 ASSESSMENT — PAIN SCALES - GENERAL: PAINLEVEL_OUTOF10: 3

## 2024-03-11 NOTE — PLAN OF CARE
Problem: Discharge Planning  Goal: Discharge to home or other facility with appropriate resources  3/11/2024 1600 by Kathryn Siddiqui RN  Outcome: Progressing  3/11/2024 1347 by Kathryn Siddiqui RN  Outcome: Progressing     Problem: Safety - Adult  Goal: Free from fall injury  3/11/2024 1600 by Kathryn Siddiqui RN  Outcome: Progressing  3/11/2024 1347 by Kathryn Siddiqui RN  Outcome: Progressing     Problem: Skin/Tissue Integrity  Goal: Absence of new skin breakdown  Description: 1.  Monitor for areas of redness and/or skin breakdown  2.  Assess vascular access sites hourly  3.  Every 4-6 hours minimum:  Change oxygen saturation probe site  4.  Every 4-6 hours:  If on nasal continuous positive airway pressure, respiratory therapy assess nares and determine need for appliance change or resting period.  3/11/2024 1600 by Kathryn Siddiqui RN  Outcome: Progressing  3/11/2024 1347 by Kathryn Siddiqui RN  Outcome: Progressing     Problem: Chronic Conditions and Co-morbidities  Goal: Patient's chronic conditions and co-morbidity symptoms are monitored and maintained or improved  3/11/2024 1600 by Kathryn Siddiqui RN  Outcome: Progressing  3/11/2024 1347 by Kathryn Siddiqui RN  Outcome: Progressing

## 2024-03-11 NOTE — PLAN OF CARE
Problem: Discharge Planning  Goal: Discharge to home or other facility with appropriate resources  3/11/2024 1601 by Kathryn Siddiqui RN  Outcome: Adequate for Discharge  3/11/2024 1600 by Kathryn Siddiqui RN  Outcome: Progressing  3/11/2024 1347 by Kathryn Siddiqui RN  Outcome: Progressing     Problem: Safety - Adult  Goal: Free from fall injury  3/11/2024 1601 by Kathryn Siddiqui RN  Outcome: Adequate for Discharge  3/11/2024 1600 by Kathryn Siddiqui RN  Outcome: Progressing  3/11/2024 1347 by Kathryn Siddiqui RN  Outcome: Progressing     Problem: Skin/Tissue Integrity  Goal: Absence of new skin breakdown  Description: 1.  Monitor for areas of redness and/or skin breakdown  2.  Assess vascular access sites hourly  3.  Every 4-6 hours minimum:  Change oxygen saturation probe site  4.  Every 4-6 hours:  If on nasal continuous positive airway pressure, respiratory therapy assess nares and determine need for appliance change or resting period.  3/11/2024 1601 by Kathryn Siddiqui RN  Outcome: Adequate for Discharge  3/11/2024 1600 by Kathryn Siddiqui RN  Outcome: Progressing  3/11/2024 1347 by Kathryn Siddiqui RN  Outcome: Progressing     Problem: Chronic Conditions and Co-morbidities  Goal: Patient's chronic conditions and co-morbidity symptoms are monitored and maintained or improved  3/11/2024 1601 by Kathryn Siddiqui RN  Outcome: Adequate for Discharge  3/11/2024 1600 by Kathryn Siddiqui RN  Outcome: Progressing  3/11/2024 1347 by Kathryn Siddiqui RN  Outcome: Progressing

## 2024-03-11 NOTE — CARE COORDINATION
CM reviewed the chart. Patient is to discharge home with wound care and IV therapy via PICC line.  CM spoke with patient and wishes to retain all prior services.   CM called [facility] and informed of discharge. CM called [employer] and sent referral and faxed orders.   CM called patient's wife and nurse from infusion company is scheduled to come this afternoon.  Wife to transport home.    No further needs were identified.    Okay to dc from CM standpoint, once teaching completed and supplies are ready to deliver.       03/11/24 1350   Services At/After Discharge   Transition of Care Consult (CM Consult) Discharge Planning   Services At/After Discharge Home Health;IV Therapy    Resource Information Provided? No   Mode of Transport at Discharge Other (see comment)  (wife in room and will transport home)   Confirm Follow Up Transport Family   Condition of Participation: Discharge Planning   The Plan for Transition of Care is related to the following treatment goals: Return home with wife. Will have Home Care for wound care and IV therapy.   The Patient and/or Patient Representative was provided with a Choice of Provider? Patient  (patient chose to stay with the same agencies he has had prior)   The Patient and/Or Patient Representative agree with the Discharge Plan? Yes   Freedom of Choice list was provided with basic dialogue that supports the patient's individualized plan of care/goals, treatment preferences, and shares the quality data associated with the providers?  No  (not needed)     Trista Rodriguez RN  Care Manager  [phone]

## 2024-03-11 NOTE — PROGRESS NOTES
.Bedside, Verbal, Recorded, and Written shift change report given to Sarah rn (oncoming nurse) by Kathryn rn (offgoing nurse). Report included the following information Nurse Handoff Report, ED SBAR, Adult Overview, Intake/Output, MAR, Recent Results, Neuro Assessment, and Event Log.

## 2024-03-11 NOTE — DISCHARGE SUMMARY
Discharge Summary    Name: Juwan Espinoza  [id number]  YOB: 1970 (Age: 54 y.o.)   Date of Admission: 3/5/2024  Date of Discharge: 3/11/2024  Attending Physician: Brad Siddiqui MD    Discharge Diagnosis:   sepsis  Diabetic foot wound  Hx of osteo, Charcot foot  Possible acute osteo  TERE, resolved  Diabetes mellitus type 2 on chronic insulin POA  Diabetic neuropathy POA    Consultations:  IP CONSULT TO PODIATRY  IP CONSULT TO PHARMACY  IP CONSULT TO DIABETES MANAGEMENT  IP CONSULT TO VASCULAR SURGERY  IP WOUND CARE NURSE CONSULT TO EVAL  IP CONSULT TO INFECTIOUS DISEASES  IP CONSULT TO CASE MANAGEMENT  IP WOUND CARE NURSE CONSULT TO EVAL  IP CONSULT TO CASE MANAGEMENT  IP WOUND CARE NURSE CONSULT TO EVAL  IP CONSULT TO CASE MANAGEMENT      Brief Admission History/Reason for Admission Per David L Mendel, MD:   Juwan Espinoza is a 54 y.o.  male with PMHx significant for diabetes, Charcot foot, osteomyelitis, presents with 1 week of increased foot swelling.  States his 4th and 5th toe have been black for weeks. No CP, SOB, abdominal pain, nausea vomiting. States he went to routine wound care appt with Podiatry yesterday. Found to have a temp of 100 with elevated HR and decreased blood pressure, reported as \"something over 40\". Referred to ED by Dr. Castaneda. We were asked to admit for work up and evaluation of the above problems.      Brief Hospital Course by Main Problems:     epsis  Diabetic foot wound  Hx of osteo, Charcot foot  Concern for ischemia in L foot 5th toe  Possible acute osteo  Foot XR  Chronic fracture dislocation of the midfoot with interval  development of cortical irregularity and periosteal reaction suspicious for osteomyelitis..        MRI Foot-   Charcot changes of the midfoot with superimposed plantar ulceration and sinus  tract and osteomyelitis of the lateral cuneiform and cuboid. The sinus pack  appears to communicate with the joints of the midfoot    HGB 11.2*   HCT 35.6*   WBC 9.2          Discharge Medications:     Medication List        START taking these medications      lactobacillus capsule  Take 1 capsule by mouth daily (with breakfast)  Start taking on: March 12, 2024            CONTINUE taking these medications      cyanocobalamin 100 MCG tablet     Dexcom G6 Sensor Misc  APPLY SENSOR AND CHANGE EVERY 10 DAYS.     gabapentin 300 MG capsule  Commonly known as: NEURONTIN  TAKE 2 CAPSULES BY MOUTH 3 TIMES A DAY     Janumet  MG per tablet  Generic drug: sitaGLIPtan-metFORMIN  TAKE 1 TABLET BY MOUTH TWICE A DAY WITH FOOD     Lantus SoloStar 100 UNIT/ML injection pen  Generic drug: insulin glargine  Inject 100 Units into the skin daily     MULTIVITAL PO     Ozempic (1 MG/DOSE) 4 MG/3ML Sopn  Generic drug: Semaglutide (1 MG/DOSE)  INJECT 1 MG BY SUBCUTANEOUS ROUTE EVERY SEVEN (7) DAYS.     rosuvastatin 5 MG tablet  Commonly known as: CRESTOR  TAKE 1 TABLET BY MOUTH EVERY DAY            STOP taking these medications      doxycycline hyclate 100 MG tablet  Commonly known as: VIBRA-TABS               Where to Get Your Medications        These medications were sent to [facility]/pharmacy #[id number] [facility] [address] [facility] -  [phone] - F [phone] [address] [facility] [address]      Phone: [phone]   lactobacillus capsule             DISPOSITION:    Home with Family: x      Home with HH/PT/OT/RN:    SNF/LTC:    SHIRA:    OTHER:            Code status: Full code  Recommended diet: diabetic diet  Recommended activity: activity as tolerated  Wound care: See surgical/procedure care instructions      Follow up with:   PCP : MARVIN Jones MD Christensen, J Stephen, MD  [address] [place] [id number] [phone]    Schedule an appointment as soon as possible for a visit in 1 week(s)  To schedule your PCP hospital follow up.          Total time in minutes spent coordinating this discharge (includes going over

## 2024-03-11 NOTE — WOUND CARE
Wound care nurse: wound vac dressing change right plantar foot.    Patient will be discharged home with IV abxs after he receives his PICC line today. Patient to follow up with Dr Castaneda at his clinic tomorrow for this foot wound and right 4th and 5th toe wounds.    Dressing changed and patient placed back to his home wound vac after dressing change    Plantar foot wound on Charcot foot appears pink with trace granulation buds. Periwound maceration treated with Marathon skin barrier. Drainage is small-medium in amount and is sero-sang (adarsh red) colored. NO odor or bailey-wound erythema.    Right 5th toe black eschar on lateral side and moist and grey tissue on medial side. WC nurse placed dry silver alginate between 4th and 5th toes and 4th and 3rd toes.    CM came into room and will be in touch with [facility] for home IV infusions and [facility] for dressing changes  Dr Siddiqui came into room and is writing discharge orders for patient for today.    Plan: Patient for discharge today with home wound vac rental unit in place.    AMIRAH LANCE RN, CWON

## 2024-03-11 NOTE — DISCHARGE INSTRUCTIONS
HOSPITALIST DISCHARGE INSTRUCTIONS    NAME: Juwan Espinoza   :  1970   MRN:  [id number]     Date/Time:  3/11/2024 12:40 PM    ADMIT DATE: 3/5/2024   DISCHARGE DATE: 3/11/2024     sepsis  Diabetic foot wound  Hx of osteo, Charcot foot  Possible acute osteo  TERE, resolved  Diabetes mellitus type 2 on chronic insulin POA  Diabetic neuropathy POA    It is important that you take the medication exactly as they are prescribed.   Keep your medication in the bottles provided by the pharmacist and keep a list of the medication names, dosages, and times to be taken in your wallet.   Do not take other medications without consulting your doctor.       What to do at Home    Recommended diet:  diabetic diet    Recommended activity: activity as tolerated      If you have questions regarding the hospital related prescriptions or hospital related issues please call [facility]' office at . You can always direct your questions to your primary care doctor if you are unable to reach your hospital physician; your PCP works as an extension of your hospital doctor just like your hospital doctor is an extension of your PCP for your time at the hospital ([facility])    If you experience any of the following symptoms then please call your primary care physician or return to the emergency room if you cannot get hold of your doctor:    Fever, chills, nausea, vomiting, or persistent diarrhea  Worsening weakness or new problems with your speech or balance  Dark stools or visible blood in your stools  New Leg swelling or shortness of breath as these could be signs of a clot    Additional Instructions:      Bring these papers with you to your follow up appointments. The papers will help your doctors be sure to continue the care plan from the hospital.              Information obtained by :  I understand that if any problems occur once I am at home I am to contact my physician.    I understand and acknowledge receipt  of the instructions indicated above.                                                                                                                                           Physician's or R.N.'s Signature                                                                  Date/Time                                                                                                                                              Patient or Representative Signature

## 2024-03-11 NOTE — PROGRESS NOTES
Infectious Disease Progress      Impression    Diabetic right foot infection  Charcot arthropathy  With discoloration of 4th, 5th toes  Concern for vascular insufficiency  X-ray foot 3/5+ for chronic fracture dislocation of the midfoot  Interval development of cortical irregularity and periosteal reaction  Suspicious for OM.  MRI R/ foot + Charcot changes of midfoot, superimposed plantar ulceration,  Sinus tract and OM of lateral cuneiform and cuboid.  Sinus tract appears to  Communicate with joints of midfoot which may indicate further areas of OM.  No drainable fluid collection.  Negative blood cultures 3/5        S/p admission 12/9 to 12/18/23  Bilateral Diabetic Foot infections  Right diabetic foot infection  X-ray R/foot + for Charcot foot  Indeterminate midfoot dislocation  No x-ray evidence of OM/soft tissue gas  X-ray of left foot+ OM/septic arthritis of second toe PIP joint  Previous resection of great toe.  S/p I&D of  R/ foot & L/ 2nd toe amputation 12/10  R/mid foot bone biopsy, debridement.  IntraOp cultures+ for MSSA , Gp B strep beta hemolytic  Pahology 12/10 + Acute & chronic OM, chronic OM at bone margins  R/Foot -Reactive bone & acute inflammation, possible acute focal OM  S/p R/foot washout & antibiotic beads 12/13  Patient treated with Unasyn x 6 weeks, Zosyn x 2 weeks  Treated for Strep agalactiae bacteremia  Blood cultures 12/9 + for probable strep agalactiae 1/4  Low repeat BC - 12/11- NG  ECHO negative.     ABIs - R&L normal resting DANG  R/ TBI -0.79     Diabetes mellitus  Improved control  A1c 6.4, previously 8.2       TERE resolved  Cr 0.97    ESR 61, previously 83  CRP 1.23, previously 1.23     Obesity  BMI 36.79    Plan    Change to Zosyn IV  Plan is to discharge on Zosyn IV for 4 to 6 weeks.      Antimicrobial orders for discharge  -Zosyn 3.375 g   IV every 8 hourly x 4 weeks planned end date 4/3  May need to extend antibiotic therapy per clinical course  -Pull line at end of therapy &  CHANGE EVERY 10 DAYS.   JANUMET  MG per tablet   No No   Sig: TAKE 1 TABLET BY MOUTH TWICE A DAY WITH FOOD   Multiple Vitamins-Minerals (MULTIVITAL PO)   Yes No   Sig: Take 1 tablet by mouth daily.   Semaglutide, 1 MG/DOSE, (OZEMPIC, 1 MG/DOSE,) 4 MG/3ML SOPN   No No   Sig: INJECT 1 MG BY SUBCUTANEOUS ROUTE EVERY SEVEN (7) DAYS.   cyanocobalamin 100 MCG tablet   Yes No   Sig: Take 1 tablet by mouth daily   doxycycline hyclate (VIBRA-TABS) 100 MG tablet   No No   Sig: Take 1 tablet by mouth 2 times daily for 14 days   gabapentin (NEURONTIN) 300 MG capsule   No No   Sig: TAKE 2 CAPSULES BY MOUTH 3 TIMES A DAY   insulin glargine (LANTUS SOLOSTAR) 100 UNIT/ML injection pen   No No   Sig: Inject 100 Units into the skin daily   rosuvastatin (CRESTOR) 5 MG tablet   No No   Sig: TAKE 1 TABLET BY MOUTH EVERY DAY      Facility-Administered Medications: None             Review of Systems:     Gen: Negative for chills, fevers, weight loss, weight gain   HEENT: Negative for headache, vision changes, ear ache or discharge, tingling,  nasal discharge, swelling, lumps in neck, sores on tongue   CV:  Negative for chest pain, dyspnea on exertion, leg edema   Lungs: Negative for shortness of breath, cough, wheezing   Abdomen: Negative for abdominal pain, nausea, vomiting, diarrhea, constipation   Genitourinary: Negative for genital pain or genital discharge     Neuro: Negative for headache, numbness, tingling, extremity weakness,  syncope, seizures    Skin: Negative for rash, sores/open wounds   Musculoskeletal: Negative for joint pain, joint swelling, joint erythema    Endocrine: Negative for high or low blood sugars, heat or cold intolerance    Psych: Negative for manic behavior     Vitals:    03/11/24 0753   BP: (!) 164/97   Pulse: 77   Resp:    Temp: 97.9 °F (36.6 °C)   SpO2: 100%           PHYSICAL EXAM:  General:          WD, WN. Alert, cooperative, no acute distress    EENT:              EOMI. Anicteric sclerae.

## 2024-03-11 NOTE — PROCEDURES
Peripherally Inserted Central Catheter (PICC) Procedure Note    INDICATIONS: Zosyn 3.375 grams IV every 8 hours until 4/3/2024    CONSENT:  The procedure, risks, benefits and alternatives were discussed with the patient. All questions were answered, and informed written consent obtained from the patient. Informed consent and Procedure Time Out were completed.    PROCEDURE DETAILS:   Ultrasound was used to confirm patency of the right basilic prior to obtaining venous access. The area to be punctured was cleansed with chlorhexidine 2% for more than 30 seconds, allowed to dry and the site was draped using maximum sterile barrier precautions. Selected vein was once again confirmed with ultrasound and Lidocaine 1% injected followed by puncture of the right basilic with a 21 gauge single wall needle under direct sonographic guidance. Guidewire was advanced and the needle was removed and peel-away sheath was placed. The catheter was then trimmed and advanced through the peel-away sheath using  electronic navigation. The sheath was, brisk blood return confirmed, the catheter was flushed with normal saline and capped.    Maximum sterile barrier was used: cap, mask, sterile gloves, sterile gown, and body drape. All guide wires removed with tip intact by visual inspection. The catheter was stabilized on the skin using a securement device. Antimicrobial impregnated transparent dressing applied using aseptic technique.    Patient tolerated the procedure well with no complications    PICC:  Catheter Type: BARD 4 FR   Single Lumen Power PICC  Insertion Site (vein) :right basilic  Total Length: 42 cm  External Length: 0 cm  UAC:  34 cm  PICC occupies 7 % of Vein    PICC KIT:  Reference #: [id number]  Lot #: [id number]  Expiration date: 03-    FINDINGS/CONCLUSIONS:  No signs of bleeding or symptoms of nerve irritation noted.  Final PICC tip location was  confirmed utilizing 3CG technology. PICC tip is located in the SVC or at CAJ.    PICC is ready for use.    PICC education/ instructions provided to patient and family    Katelynn ChamberlainRN, BSN, CRNI, [facility]  Vascular Access Nurse

## 2024-03-11 NOTE — PROGRESS NOTES
.  Discharge instruction  given to the patient, verbalize understanding, opportunities  to question given , pt have PICC line for him for  long term ab.     Patient left the unit stable with his wife.

## 2024-03-12 ENCOUNTER — HOME CARE VISIT (OUTPATIENT)
Dept: HOME HEALTH SERVICES | Facility: HOME HEALTH | Age: 54
End: 2024-03-12
Payer: COMMERCIAL

## 2024-03-12 ENCOUNTER — HOSPITAL ENCOUNTER (OUTPATIENT)
Facility: HOSPITAL | Age: 54
Discharge: HOME | End: 2024-03-12
Attending: PODIATRIST

## 2024-03-12 DIAGNOSIS — M14.60 CHARCOT'S ARTHROPATHY: Primary | ICD-10-CM

## 2024-03-12 DIAGNOSIS — E11.628 DIABETIC FOOT INFECTION (HCC): ICD-10-CM

## 2024-03-12 DIAGNOSIS — L08.9 DIABETIC FOOT INFECTION (HCC): ICD-10-CM

## 2024-03-12 RX ORDER — LIDOCAINE 50 MG/G
OINTMENT TOPICAL ONCE
OUTPATIENT
Start: 2024-03-12 | End: 2024-03-12

## 2024-03-12 RX ORDER — CLOBETASOL PROPIONATE 0.5 MG/G
OINTMENT TOPICAL ONCE
OUTPATIENT
Start: 2024-03-12 | End: 2024-03-12

## 2024-03-12 RX ORDER — LIDOCAINE 40 MG/G
CREAM TOPICAL ONCE
OUTPATIENT
Start: 2024-03-12 | End: 2024-03-12

## 2024-03-12 RX ORDER — TRIAMCINOLONE ACETONIDE 1 MG/G
OINTMENT TOPICAL ONCE
OUTPATIENT
Start: 2024-03-12 | End: 2024-03-12

## 2024-03-12 RX ORDER — LIDOCAINE HYDROCHLORIDE 40 MG/ML
SOLUTION TOPICAL ONCE
OUTPATIENT
Start: 2024-03-12 | End: 2024-03-12

## 2024-03-12 RX ORDER — BACITRACIN ZINC AND POLYMYXIN B SULFATE 500; 1000 [USP'U]/G; [USP'U]/G
OINTMENT TOPICAL ONCE
OUTPATIENT
Start: 2024-03-12 | End: 2024-03-12

## 2024-03-12 RX ORDER — BETAMETHASONE DIPROPIONATE 0.5 MG/G
CREAM TOPICAL ONCE
OUTPATIENT
Start: 2024-03-12 | End: 2024-03-12

## 2024-03-12 RX ORDER — IBUPROFEN 200 MG
TABLET ORAL ONCE
OUTPATIENT
Start: 2024-03-12 | End: 2024-03-12

## 2024-03-12 RX ORDER — LIDOCAINE HYDROCHLORIDE 20 MG/ML
JELLY TOPICAL ONCE
OUTPATIENT
Start: 2024-03-12 | End: 2024-03-12

## 2024-03-12 RX ORDER — GINSENG 100 MG
CAPSULE ORAL ONCE
OUTPATIENT
Start: 2024-03-12 | End: 2024-03-12

## 2024-03-12 RX ORDER — GENTAMICIN SULFATE 1 MG/G
OINTMENT TOPICAL ONCE
OUTPATIENT
Start: 2024-03-12 | End: 2024-03-12

## 2024-03-12 RX ORDER — SODIUM CHLOR/HYPOCHLOROUS ACID 0.033 %
SOLUTION, IRRIGATION IRRIGATION ONCE
OUTPATIENT
Start: 2024-03-12 | End: 2024-03-12

## 2024-03-12 NOTE — WOUND CARE
Debridement Wound Care        Problem List Items Addressed This Visit    None      Procedure Note  Indications:  Based on my examination of this patient's wound(s)/ulcer(s) today, debridement is  required to promote healing and evaluate the wound base.    Performed by: Celso Castaneda DPM    Consent obtained: Yes    Time out taken: Yes    Debridement: excisional    Using tissue nippers the wound(s)/ulcer(s) was/were sharply debrided down through and including the removal of    epidermis, dermis, subcutaneous tissue, and muscle/fascia    Devitalized Tissue Debrided: fibrin, biofilm, and necrotic/eschar    Pre Debridement Measurements:  Are located in the Wound/Ulcer Documentation Flow Sheet    Diabetic ulcer, muscle necrosis    Wound/Ulcer #: 1 and 2    Post Debridement Measurements:  Wound/Ulcer Descriptions are Pre Debridement except measurements:    Wound Care Documentation:  Negative Pressure Wound Therapy Foot Right;Plantar (Active)   $ Standard NPWT <=50 sq cm PER TX $ Yes 03/11/24 1219   $ Standard NPWT >50 sq cm PER TX $ Yes 01/09/24 1621   Wound Type Diabetic foot ulcer 03/11/24 1219   Unit Type KCI acti vac 03/11/24 1219   Dressing Type Black Foam 03/11/24 1219   Number of pieces used 2 03/11/24 1219   Number of pieces removed 2 03/11/24 1219   Cycle Continuous 03/11/24 1219   Target Pressure (mmHg) 125 03/11/24 1219   Intensity 1 03/11/24 1219   Irrigation Solution Sodium chloride 0.9% 02/21/24 0930   Canister changed? Yes 02/25/24 1426   Dressing Status New dressing applied 03/11/24 1219   Dressing Changed Changed/New 02/25/24 1426   Drainage Amount Small 03/11/24 1219   Drainage Description Sanguinous 02/25/24 1426   Dressing Change Due 03/14/24 03/11/24 1219   Output (ml) 100 ml 03/11/24 1219   Wound Assessment Exposed structure bone;Pale granulation tissue;Granulation tissue 03/11/24 1219   Allyn-wound Assessment Maceration 03/11/24 1219   Shape circular 03/11/24 1219   Odor None 03/11/24 1219

## 2024-03-12 NOTE — ADT AUTH CERT
The sinus pack  appears to communicate with the joints of the midfoot which may indicate further  areas of osteomyelitis. There is no drainable fluid collection        -Seen by podiatry, currently plan for nonoperative management  -ID consulted to weigh in on possible need for IV antibiotics  -Appreciate vascular's assistance, preliminary DANG result seems to be satisfactory  -Continue current antibiotics, Vanco, cefepime, Flagyl  Blood culture 3/5-negative so far  Wound VAC     -Likely will need IV antibiotics, will need PICC line on Monday     TERE, resolved  - IVF, trend     Diabetes mellitus type 2 on chronic insulin POA  Diabetic neuropathy POA  - holding oral meds  - reduced home glargine   - has dexcom     ?Essential hypertension  Hyperlipidemia  - home crestor           Medical Decision Making:   I personally reviewed labs: cbc bmp lactic  I personally reviewed imaging: XR foot, CXR  I personally reviewed EKG: NSR  Toxic drug monitoring: monitor creatinine for renal toxicity from vancomycin  Discussed case with:  Code Status: Full  DVT Prophylaxis: Heparin subcu  Contact:    Debora Espinoza (Spouse)  [phone] (Mobile)         Subjective:      Chief Complaint / Reason for Physician Visit  \"No events overnight\".  Discussed with RN events overnight.         Objective:      VITALS:   Last 24hrs VS reviewed since prior progress note. Most recent are:  Patient Vitals for the past 24 hrs:    BP Temp Pulse Resp SpO2   03/09/24 0806 (!) 142/88 98.1 °F (36.7 °C) 74 17 96 %   03/08/24 2029 134/88 -- 80 16 96 %               Intake/Output Summary (Last 24 hours) at 3/9/2024 1258  Last data filed at 3/9/2024 0633      Gross per 24 hour   Intake 500 ml   Output 700 ml   Net -200 ml            I had a face to face encounter and independently examined this patient on 3/9/2024, as outlined below:  PHYSICAL EXAM:  General:          Alert, cooperative  EENT:              EOMI. Anicteric sclerae.  Resp:               CTA  with:      Comments   Patient x     Family        RN x     Care Manager       Consultant                           Multidiciplinary team rounds were held today with , nursing, pharmacist and clinical coordinator.  Patient's plan of care was discussed; medications were reviewed and discharge planning was addressed.     ________________________________________________________________________  Total NON critical care TIME: 47  Minutes     Total CRITICAL CARE TIME Spent:   Minutes non procedure based         Comments   >50% of visit spent in counseling and coordination of care       ________________________________________________________________________  Brad Siddiqui MD      Procedures: see electronic medical records for all procedures/Xrays and details which were not copied into this note but were reviewed prior to creation of Plan.       LABS:  I reviewed today's most current labs and imaging studies.  Pertinent labs include:        Recent Labs     03/08/24  0359 03/09/24  0043 03/10/24  0159   WBC 8.1 9.6 8.8   HGB 11.7* 11.1* 11.1*   HCT 36.0* 34.1* 35.5*    271 267               Recent Labs     03/08/24  0359 03/09/24  0043 03/10/24  0159    138 141   K 3.9 3.9 3.7   * 109* 110*   CO2 24 24 25   GLUCOSE 205* 168* 195*   BUN 13 14 16   CREATININE 1.09 1.00 1.06   CALCIUM 8.8 8.6 8.5   MG 1.8 1.9 1.9   PHOS 3.3 2.9 2.7            Signed: Brad Siddiqui MD                 Electronically signed by Brad Siddiqui MD at 3/10/2024  1:58 PM

## 2024-03-13 ENCOUNTER — HOME CARE VISIT (OUTPATIENT)
Facility: HOME HEALTH | Age: 54
End: 2024-03-13
Payer: COMMERCIAL

## 2024-03-13 VITALS
OXYGEN SATURATION: 98 % | HEART RATE: 91 BPM | TEMPERATURE: 98.5 F | SYSTOLIC BLOOD PRESSURE: 98 MMHG | DIASTOLIC BLOOD PRESSURE: 78 MMHG | RESPIRATION RATE: 16 BRPM

## 2024-03-13 PROCEDURE — G0299 HHS/HOSPICE OF RN EA 15 MIN: HCPCS

## 2024-03-13 NOTE — HOME HEALTH
Written Teaching Material Utilized: N/A    Interdisciplinary communication with: N/A    Medications reconciled and all medications are available in the home this visit. A list of reconciled medications has been given to the patient/caregiver     Patient/caregiver instructed on plan of care and are agreeable to plan of care at this time.      Discharge planning as follows: When goals are met    Specific plan for next visit: Educate on IV medication

## 2024-03-15 ENCOUNTER — HOME CARE VISIT (OUTPATIENT)
Facility: HOME HEALTH | Age: 54
End: 2024-03-15
Payer: COMMERCIAL

## 2024-03-15 VITALS
TEMPERATURE: 98.2 F | OXYGEN SATURATION: 98 % | HEART RATE: 78 BPM | OXYGEN SATURATION: 96 % | RESPIRATION RATE: 20 BRPM | SYSTOLIC BLOOD PRESSURE: 130 MMHG | SYSTOLIC BLOOD PRESSURE: 118 MMHG | RESPIRATION RATE: 20 BRPM | RESPIRATION RATE: 18 BRPM | TEMPERATURE: 98.1 F | TEMPERATURE: 98.4 F | HEART RATE: 81 BPM | SYSTOLIC BLOOD PRESSURE: 128 MMHG | DIASTOLIC BLOOD PRESSURE: 81 MMHG | OXYGEN SATURATION: 94 % | DIASTOLIC BLOOD PRESSURE: 74 MMHG | DIASTOLIC BLOOD PRESSURE: 70 MMHG | HEART RATE: 80 BPM

## 2024-03-15 ASSESSMENT — ENCOUNTER SYMPTOMS
HEMOPTYSIS: 0
HEMOPTYSIS: 0

## 2024-03-15 NOTE — HOME HEALTH
Subjective: Pt states he has feeling to right foot   Falls since last visit No(if yes complete the Fall Tracking Form and include bsrifallreport):   Caregiver involvement changes: Wife is primary caregiver   Home health supplies by type and quantity ordered/delivered this visit include: none     Clinician asked if patient has had any physician contact since last home care visit and patient states: NO  Clinician asked if patient has any new or changed medications and patient states:  NO   If Yes, were medications reconciled? NO   Was the certifying physician notified of changes in medications? NO     Clinical assessment (what this visit means for the patient overall and need for ongoing skilled care) and progress or lack of progress towards SPECIFIC goals: Pt continues to require SN for wound care     Written Teaching Material Utilized: N/A    Interdisciplinary communication with: N/A for the purpose of NA     Discharge planning as follows: When wound is 100% healed    Specific plan for next visit: Wound care

## 2024-03-15 NOTE — HOME HEALTH
Subjective: Pt states he has some sinus drainage right now   Falls since last visit No(if yes complete the Fall Tracking Form and include bsrifallreport):   Caregiver involvement changes: Wife is primary caregiver   Home health supplies by type and quantity ordered/delivered this visit include: none     Clinician asked if patient has had any physician contact since last home care visit and patient states: NO  Clinician asked if patient has any new or changed medications and patient states:  NO   If Yes, were medications reconciled? NO   Was the certifying physician notified of changes in medications? NO     Clinical assessment (what this visit means for the patient overall and need for ongoing skilled care) and progress or lack of progress towards SPECIFIC goals: Pt continues to require SN for wound care     Written Teaching Material Utilized: N/A    Interdisciplinary communication with: N/A for the purpose of NA     Discharge planning as follows: When wound is 100% healed    Specific plan for next visit: Wound care

## 2024-03-15 NOTE — HOME HEALTH
Subjective: Pt states he has some congestion   Falls since last visit No(if yes complete the Fall Tracking Form and include bsrifallreport):   Caregiver involvement changes: Wife is primary caregiver   Home health supplies by type and quantity ordered/delivered this visit include: none     Clinician asked if patient has had any physician contact since last home care visit and patient states: NO  Clinician asked if patient has any new or changed medications and patient states:  NO   If Yes, were medications reconciled? NO   Was the certifying physician notified of changes in medications? NO     Clinical assessment (what this visit means for the patient overall and need for ongoing skilled care) and progress or lack of progress towards SPECIFIC goals: Pt continues to require SN for wound care     Written Teaching Material Utilized: N/A    Interdisciplinary communication with: N/A for the purpose of NA     Discharge planning as follows: When wound is 100% healed    Specific plan for next visit: Wound care

## 2024-03-18 ENCOUNTER — HOME CARE VISIT (OUTPATIENT)
Facility: HOME HEALTH | Age: 54
End: 2024-03-18
Payer: COMMERCIAL

## 2024-03-18 PROCEDURE — G0299 HHS/HOSPICE OF RN EA 15 MIN: HCPCS

## 2024-03-18 ASSESSMENT — ENCOUNTER SYMPTOMS: HEMOPTYSIS: 0

## 2024-03-19 VITALS
OXYGEN SATURATION: 98 % | HEART RATE: 98 BPM | TEMPERATURE: 98 F | RESPIRATION RATE: 18 BRPM | SYSTOLIC BLOOD PRESSURE: 128 MMHG | DIASTOLIC BLOOD PRESSURE: 80 MMHG

## 2024-03-20 NOTE — HOME HEALTH
Subjective: Pt states his swelling has gone down.  Falls since last visit No(if yes complete the Fall Tracking Form and include bsrifallreport):   Caregiver involvement changes: na  Home health supplies by type and quantity ordered/delivered this visit include: na    Clinician asked if patient has had any physician contact since last home care visit and patient states: NO  Clinician asked if patient has any new or changed medications and patient states:  N/A   If Yes, were medications reconciled? N/A   Was the certifying physician notified of changes in medications? N/A     Clinical assessment (what this visit means for the patient overall and need for ongoing skilled care) and progress or lack of progress towards SPECIFIC goals: Continued Sn needed for wound care, wound care goal not met. Pt picc line dressing not scheduled to be change during this visit, Sn educated on s/s of infection and when to notify MD/HHA.    Written Teaching Material Utilized: N/A    Interdisciplinary communication with: N/A     Discharge planning as follows: When wound is 95% healed    Specific plan for next visit: wound care, picc line care

## 2024-03-22 ENCOUNTER — HOME CARE VISIT (OUTPATIENT)
Facility: HOME HEALTH | Age: 54
End: 2024-03-22
Payer: COMMERCIAL

## 2024-03-22 VITALS
DIASTOLIC BLOOD PRESSURE: 72 MMHG | OXYGEN SATURATION: 98 % | HEART RATE: 71 BPM | RESPIRATION RATE: 16 BRPM | SYSTOLIC BLOOD PRESSURE: 120 MMHG | TEMPERATURE: 96.8 F

## 2024-03-22 PROCEDURE — G0299 HHS/HOSPICE OF RN EA 15 MIN: HCPCS

## 2024-03-22 NOTE — HOME HEALTH
Subjective: Patient states he had terrible pain last night but not having pain right now  Falls since last visit No(if yes complete the Fall Tracking Form and include bsrifallreport):   Caregiver involvement changes: no  Home health supplies by type and quantity ordered/delivered this visit include: no    Clinician asked if patient has had any physician contact since last home care visit and patient states: N/A  Clinician asked if patient has any new or changed medications and patient states:  N/A   If Yes, were medications reconciled? N/A   Was the certifying physician notified of changes in medications? N/A     Clinical assessment (what this visit means for the patient overall and need for ongoing skilled care) and progress or lack of progress towards SPECIFIC goals: Patient wound care has been completed and patient wound is becoming to shallow I think for wound vac but still has a sort of undermining and a long tunnel. And wound produces alot of drainage still. SN needed for further treatment and evaluation.    Written Teaching Material Utilized: N/A    Interdisciplinary communication with: N/A     Discharge planning as follows: When goals are met    Specific plan for next visit: Instruct in safety issues regarding dog hairs

## 2024-03-23 ENCOUNTER — HOME CARE VISIT (OUTPATIENT)
Dept: HOME HEALTH SERVICES | Facility: HOME HEALTH | Age: 54
End: 2024-03-23
Payer: COMMERCIAL

## 2024-03-23 ENCOUNTER — HOME CARE VISIT (OUTPATIENT)
Facility: HOME HEALTH | Age: 54
End: 2024-03-23
Payer: COMMERCIAL

## 2024-03-23 VITALS
OXYGEN SATURATION: 99 % | RESPIRATION RATE: 16 BRPM | TEMPERATURE: 98.1 F | HEART RATE: 76 BPM | DIASTOLIC BLOOD PRESSURE: 64 MMHG | SYSTOLIC BLOOD PRESSURE: 110 MMHG

## 2024-03-23 PROCEDURE — G0299 HHS/HOSPICE OF RN EA 15 MIN: HCPCS

## 2024-03-23 NOTE — HOME HEALTH
Subjective: Patient denies pain at this time.  Falls since last visit No(if yes complete the Fall Tracking Form and include bsrifallreport):   Caregiver involvement changes: no  Home health supplies by type and quantity ordered/delivered this visit include: no    Clinician asked if patient has had any physician contact since last home care visit and patient states: N/A  Clinician asked if patient has any new or changed medications and patient states:  N/A   If Yes, were medications reconciled? N/A   Was the certifying physician notified of changes in medications? N/A     Clinical assessment (what this visit means for the patient overall and need for ongoing skilled care) and progress or lack of progress towards SPECIFIC goals: Patient called office for a visit due to finding a blood clot in the floor.Patient turned off wound vac while he was cleaning himself up in restroom and he turned to leave he noticed what he thought was a lrg puddle of blood in the floor which turned out to be a very lrg blood clot. Patient states it continued to dribble blood so he took off the entire bandage and called the office. The wound appears to be normal as it was yesterday when I saw him but the wound is almost flush with the level of the skin. We are leaving the wound vac off until I can reach the wound care office on monday to discuss with someone. Celso Rao, DPM is out of town but hopefully staff will know how to contact him. Pt educated on infection s/s, any feelings of unwellness, to go to hospital. Patient voiced complete understanding.    Written Teaching Material Utilized: N/A    Interdisciplinary communication with: N/A    Discharge planning as follows: When goals are met    Specific plan for next visit: Instruct in safety issues regarding Tripping hazards

## 2024-03-26 LAB
ALBUMIN SERPL-MCNC: 3.9 G/DL (ref 3.8–4.9)
ALBUMIN/GLOB SERPL: 1.4 {RATIO} (ref 1.2–2.2)
ALP SERPL-CCNC: 79 IU/L (ref 44–121)
ALT SERPL-CCNC: 19 IU/L (ref 0–44)
AST SERPL-CCNC: 20 IU/L (ref 0–40)
BILIRUB SERPL-MCNC: <0.2 MG/DL (ref 0–1.2)
BUN SERPL-MCNC: 13 MG/DL (ref 6–24)
BUN/CREAT SERPL: 13 (ref 9–20)
CALCIUM SERPL-MCNC: 9.9 MG/DL (ref 8.7–10.2)
CHLORIDE SERPL-SCNC: 107 MMOL/L (ref 96–106)
CO2 SERPL-SCNC: 16 MMOL/L (ref 20–29)
CREAT SERPL-MCNC: 1.03 MG/DL (ref 0.76–1.27)
EGFRCR SERPLBLD CKD-EPI 2021: 85 ML/MIN/1.73
ERYTHROCYTE [DISTWIDTH] IN BLOOD BY AUTOMATED COUNT: 14.4 % (ref 11.6–15.4)
ERYTHROCYTE [SEDIMENTATION RATE] IN BLOOD BY WESTERGREN METHOD: 88 MM/HR (ref 0–30)
GLOBULIN SER CALC-MCNC: 2.8 G/DL (ref 1.5–4.5)
GLUCOSE SERPL-MCNC: 97 MG/DL (ref 70–99)
HCT VFR BLD AUTO: 36.5 % (ref 37.5–51)
HGB BLD-MCNC: 12 G/DL (ref 13–17.7)
MCH RBC QN AUTO: 29.3 PG (ref 26.6–33)
MCHC RBC AUTO-ENTMCNC: 32.9 G/DL (ref 31.5–35.7)
MCV RBC AUTO: 89 FL (ref 79–97)
PLATELET # BLD AUTO: 270 X10E3/UL (ref 150–450)
POTASSIUM SERPL-SCNC: 4.7 MMOL/L (ref 3.5–5.2)
PROT SERPL-MCNC: 6.7 G/DL (ref 6–8.5)
RBC # BLD AUTO: 4.09 X10E6/UL (ref 4.14–5.8)
SODIUM SERPL-SCNC: 144 MMOL/L (ref 134–144)
WBC # BLD AUTO: 9.9 X10E3/UL (ref 3.4–10.8)

## 2024-03-27 ENCOUNTER — HOME CARE VISIT (OUTPATIENT)
Facility: HOME HEALTH | Age: 54
End: 2024-03-27
Payer: COMMERCIAL

## 2024-03-27 PROCEDURE — G0300 HHS/HOSPICE OF LPN EA 15 MIN: HCPCS

## 2024-03-29 ENCOUNTER — FOLLOWUP TELEPHONE ENCOUNTER (OUTPATIENT)
Age: 54
End: 2024-03-29

## 2024-03-29 ENCOUNTER — HOME CARE VISIT (OUTPATIENT)
Facility: HOME HEALTH | Age: 54
End: 2024-03-29
Payer: COMMERCIAL

## 2024-03-29 ENCOUNTER — TELEPHONE (OUTPATIENT)
Age: 54
End: 2024-03-29

## 2024-03-29 PROCEDURE — G0300 HHS/HOSPICE OF LPN EA 15 MIN: HCPCS

## 2024-03-29 NOTE — TELEPHONE ENCOUNTER
.Spoke with patient HIPAA verified advised that Pt's carbon dioxide was 16 and chloride was 107. Please tell pt to not to eat too much salty food or eating out. Remind him to follow up with Dr. Bolton on 4/2/24 as scheduled. Patient is aware and verbalized understanding

## 2024-03-29 NOTE — TELEPHONE ENCOUNTER
----- Message from SELVIN Guerrero NP sent at 3/27/2024  2:39 PM EDT -----  Pt's carbon dioxide was 16 and chloride was 107. Please tell pt to not to eat too much salty food or eating out. Remind him to follow up with Dr. Bolton on 4/2/24 as scheduled.

## 2024-03-29 NOTE — TELEPHONE ENCOUNTER
Left message for patient to call back.Pt's carbon dioxide was 16 and chloride was 107. Please tell pt to not to eat too much salty food or eating out. Appointment to follow up with Dr. Bolton on 4/2/24 as scheduled.

## 2024-04-01 ENCOUNTER — HOME CARE VISIT (OUTPATIENT)
Facility: HOME HEALTH | Age: 54
End: 2024-04-01
Payer: COMMERCIAL

## 2024-04-01 VITALS
DIASTOLIC BLOOD PRESSURE: 90 MMHG | TEMPERATURE: 97.7 F | HEART RATE: 87 BPM | SYSTOLIC BLOOD PRESSURE: 127 MMHG | OXYGEN SATURATION: 97 % | RESPIRATION RATE: 20 BRPM

## 2024-04-01 PROCEDURE — G0300 HHS/HOSPICE OF LPN EA 15 MIN: HCPCS

## 2024-04-02 ENCOUNTER — TELEMEDICINE (OUTPATIENT)
Age: 54
End: 2024-04-02
Payer: COMMERCIAL

## 2024-04-02 ENCOUNTER — TELEPHONE (OUTPATIENT)
Age: 54
End: 2024-04-02

## 2024-04-02 ENCOUNTER — HOSPITAL ENCOUNTER (OUTPATIENT)
Facility: HOSPITAL | Age: 54
Discharge: HOME | End: 2024-04-02
Attending: PODIATRIST
Payer: COMMERCIAL

## 2024-04-02 VITALS
HEART RATE: 76 BPM | OXYGEN SATURATION: 98 % | RESPIRATION RATE: 18 BRPM | SYSTOLIC BLOOD PRESSURE: 100 MMHG | OXYGEN SATURATION: 97 % | RESPIRATION RATE: 18 BRPM | TEMPERATURE: 98.1 F | DIASTOLIC BLOOD PRESSURE: 64 MMHG | SYSTOLIC BLOOD PRESSURE: 128 MMHG | TEMPERATURE: 98.6 F | DIASTOLIC BLOOD PRESSURE: 60 MMHG | HEART RATE: 76 BPM

## 2024-04-02 DIAGNOSIS — L08.9 DIABETIC FOOT INFECTION (HCC): ICD-10-CM

## 2024-04-02 DIAGNOSIS — L08.9 DIABETIC INFECTION OF RIGHT FOOT (HCC): Primary | ICD-10-CM

## 2024-04-02 DIAGNOSIS — N17.9 AKI (ACUTE KIDNEY INJURY) (HCC): ICD-10-CM

## 2024-04-02 DIAGNOSIS — R78.81 STREPTOCOCCAL BACTEREMIA: ICD-10-CM

## 2024-04-02 DIAGNOSIS — E11.628 DIABETIC FOOT INFECTION (HCC): ICD-10-CM

## 2024-04-02 DIAGNOSIS — B95.5 STREPTOCOCCAL BACTEREMIA: ICD-10-CM

## 2024-04-02 DIAGNOSIS — E11.42 DIABETIC POLYNEUROPATHY ASSOCIATED WITH TYPE 2 DIABETES MELLITUS (HCC): ICD-10-CM

## 2024-04-02 DIAGNOSIS — A49.01 MSSA (METHICILLIN SUSCEPTIBLE STAPHYLOCOCCUS AUREUS) INFECTION: ICD-10-CM

## 2024-04-02 DIAGNOSIS — E11.628 DIABETIC INFECTION OF RIGHT FOOT (HCC): Primary | ICD-10-CM

## 2024-04-02 DIAGNOSIS — M14.60 CHARCOT'S ARTHROPATHY: ICD-10-CM

## 2024-04-02 DIAGNOSIS — E66.9 OBESITY (BMI 30-39.9): ICD-10-CM

## 2024-04-02 DIAGNOSIS — E11.65 POORLY CONTROLLED DIABETES MELLITUS (HCC): ICD-10-CM

## 2024-04-02 DIAGNOSIS — M14.60 CHARCOT'S ARTHROPATHY: Primary | ICD-10-CM

## 2024-04-02 PROCEDURE — 99214 OFFICE O/P EST MOD 30 MIN: CPT | Performed by: INTERNAL MEDICINE

## 2024-04-02 PROCEDURE — 97597 DBRDMT OPN WND 1ST 20 CM/<: CPT

## 2024-04-02 PROCEDURE — 3044F HG A1C LEVEL LT 7.0%: CPT | Performed by: INTERNAL MEDICINE

## 2024-04-02 RX ORDER — BACITRACIN ZINC AND POLYMYXIN B SULFATE 500; 1000 [USP'U]/G; [USP'U]/G
OINTMENT TOPICAL ONCE
OUTPATIENT
Start: 2024-04-02 | End: 2024-04-02

## 2024-04-02 RX ORDER — CLOBETASOL PROPIONATE 0.5 MG/G
OINTMENT TOPICAL ONCE
OUTPATIENT
Start: 2024-04-02 | End: 2024-04-02

## 2024-04-02 RX ORDER — LIDOCAINE HYDROCHLORIDE 20 MG/ML
JELLY TOPICAL ONCE
OUTPATIENT
Start: 2024-04-02 | End: 2024-04-02

## 2024-04-02 RX ORDER — GINSENG 100 MG
CAPSULE ORAL ONCE
OUTPATIENT
Start: 2024-04-02 | End: 2024-04-02

## 2024-04-02 RX ORDER — SODIUM CHLOR/HYPOCHLOROUS ACID 0.033 %
SOLUTION, IRRIGATION IRRIGATION ONCE
OUTPATIENT
Start: 2024-04-02 | End: 2024-04-02

## 2024-04-02 RX ORDER — IBUPROFEN 200 MG
TABLET ORAL ONCE
OUTPATIENT
Start: 2024-04-02 | End: 2024-04-02

## 2024-04-02 RX ORDER — LIDOCAINE HYDROCHLORIDE 40 MG/ML
SOLUTION TOPICAL ONCE
OUTPATIENT
Start: 2024-04-02 | End: 2024-04-02

## 2024-04-02 RX ORDER — LIDOCAINE 40 MG/G
CREAM TOPICAL ONCE
OUTPATIENT
Start: 2024-04-02 | End: 2024-04-02

## 2024-04-02 RX ORDER — GENTAMICIN SULFATE 1 MG/G
OINTMENT TOPICAL ONCE
OUTPATIENT
Start: 2024-04-02 | End: 2024-04-02

## 2024-04-02 RX ORDER — BETAMETHASONE DIPROPIONATE 0.5 MG/G
CREAM TOPICAL ONCE
OUTPATIENT
Start: 2024-04-02 | End: 2024-04-02

## 2024-04-02 RX ORDER — LIDOCAINE 50 MG/G
OINTMENT TOPICAL ONCE
OUTPATIENT
Start: 2024-04-02 | End: 2024-04-02

## 2024-04-02 RX ORDER — TRIAMCINOLONE ACETONIDE 1 MG/G
OINTMENT TOPICAL ONCE
OUTPATIENT
Start: 2024-04-02 | End: 2024-04-02

## 2024-04-02 ASSESSMENT — ENCOUNTER SYMPTOMS
HEMOPTYSIS: 0
HEMOPTYSIS: 0

## 2024-04-02 NOTE — HOME HEALTH
Subjective: Pt states he has follow up appointment with foot Doctor on 4/2/24  Falls since last visit No(if yes complete the Fall Tracking Form and include bsrifallreport):   Caregiver involvement changes: Wife is primary caregiver   Home health supplies by type and quantity ordered/delivered this visit include: none     Clinician asked if patient has had any physician contact since last home care visit and patient states: NO  Clinician asked if patient has any new or changed medications and patient states:  NO   If Yes, were medications reconciled? NO   Was the certifying physician notified of changes in medications? NO     Clinical assessment (what this visit means for the patient overall and need for ongoing skilled care) and progress or lack of progress towards SPECIFIC goals: Pt continues to require SN for wound care, weekly PICC line dressing changes via Sterile technique, Labs every other week     Written Teaching Material Utilized: NA     Interdisciplinary communication with: N/A for the purpose of NA     Discharge planning as follows: When wound is 100% healed    Specific plan for next visit: Wound care

## 2024-04-02 NOTE — TELEPHONE ENCOUNTER
Please let infusion company and home health podiatry would like extension of antibiotic by 2 more weeks.  Thank you    Antimicrobial orders for discharge  -Zosyn 3.375 g   IV every 8 hourly end date 4/17  -Pull line at end of therapy & after ID evaluation.    -Weekly CBC, CMP-& ESR / CRP every other week  -Fax reports to [phone], call with critical labs  at [phone]  -Encourage adequate fluids, daily probiotic/yogurt  -If line malfunction occurs and home health cannot reposition  please send patient to ED immediately  -ID follow-up -4/2 at 3 PM-in person or virtual  - If persistent side effects occur stop antibiotic and call-ID/PCP.

## 2024-04-02 NOTE — PROGRESS NOTES
Virtual Visit    Juwan Espinoza is a 54 y.o. male here for   Chief Complaint   Patient presents with    Follow-up       All medication reviewed.     There were no vitals filed for this visit.     1. Have you been to the ER, urgent care clinic since your last visit?  Hospitalized since your last visit? -3/5/24-3/11/24 [facility] for diabetic foot infection.    2. Have you seen or consulted any other health care providers outside of the [facility] since your last visit?  Include any pap smears or colon screening.-No      
4/3  Extend 2 more weeks to total of 6 weeks end date   -Pull line at end of therapy & after ID evaluation.    -Weekly CBC, CMP-& ESR / CRP every other week  -Fax reports to [phone], call with critical labs  at [phone]  -Encourage adequate fluids, daily probiotic/yogurt  -If line malfunction occurs and home health cannot reposition  please send patient to ED immediately  - If persistent side effects occur stop antibiotic and call-ID/PCP     Possible adverse effects of long term antibiotics are inclusive of but not limited to following  BM suppression, neutropenia , cytopenias , aplastic anemia hemorrhage liver & renal dysfunction/ liver , renal failure  , GI dysfunction- N, V  Diarrhea,C.difficile disease, rash , allergy , anaphylaxis.toxic epidermal necrolysis  Neuro toxicity , seizure disorder  Side effects tend to be more pronounced in the elderly       Extensive review of chart notes, labs, imaging, cultures done  Additionally review of done: Recent reports-Labs, cultures, imaging  D/w -hospitalist, ROJELIO Espinoza, was evaluated through a synchronous (real-time) audio-video encounter. The patient (or guardian if applicable) is aware that this is a billable service, which includes applicable co-pays. This Virtual Visit was conducted with patient's (and/or legal guardian's) consent. Patient identification was verified, and a caregiver was present when appropriate.   The patient was located at Home: [address] [place] [address]  Provider was located at Facility (Appt Dept): [address] [place] [address]  Confirm you are appropriately licensed, registered, or certified to deliver care in the state where the patient is located as indicated above. If you are not or unsure, please re-schedule the visit: Yes, I confirm.     Juwan Espinoza (:  1970) is a Established patient, presenting virtually for evaluation of the following:   Pt is seen on hospital

## 2024-04-02 NOTE — HOME HEALTH
Subjective: Pt had no complaints or concerns at this time   Falls since last visit No(if yes complete the Fall Tracking Form and include bsrifallreport):   Caregiver involvement changes: Wife is primary caregiver   Home health supplies by type and quantity ordered/delivered this visit include: none     Clinician asked if patient has had any physician contact since last home care visit and patient states: NO  Clinician asked if patient has any new or changed medications and patient states:  NO   If Yes, were medications reconciled? NO   Was the certifying physician notified of changes in medications? NO     Clinical assessment (what this visit means for the patient overall and need for ongoing skilled care) and progress or lack of progress towards SPECIFIC goals: Pt continues to require SN for wound care     Written Teaching Material Utilized: N/A    Interdisciplinary communication with: N/A for the purpose of NA     Discharge planning as follows: When wound is 100% healed    Specific plan for next visit: Wound care

## 2024-04-02 NOTE — WOUND CARE
and requested her to extension of IV abx tx for another 2 weeks and she agreed with the plan  Packing in the wound and continue home care

## 2024-04-03 ENCOUNTER — HOME CARE VISIT (OUTPATIENT)
Facility: HOME HEALTH | Age: 54
End: 2024-04-03
Payer: COMMERCIAL

## 2024-04-03 VITALS
SYSTOLIC BLOOD PRESSURE: 124 MMHG | RESPIRATION RATE: 16 BRPM | OXYGEN SATURATION: 98 % | TEMPERATURE: 98 F | HEART RATE: 90 BPM | DIASTOLIC BLOOD PRESSURE: 82 MMHG

## 2024-04-03 PROCEDURE — G0299 HHS/HOSPICE OF RN EA 15 MIN: HCPCS

## 2024-04-03 NOTE — TELEPHONE ENCOUNTER
Spoke with Bart @ [facility] and gave verbal reading of new orders and she acknowledge able to see new orders in patient chart. Orders faxed to Efren @ [phone] and spoke with Lachelle to advise new orders to extend therapy.

## 2024-04-03 NOTE — HOME HEALTH
Subjective: Patient denies pain this am.  Falls since last visit No(if yes complete the Fall Tracking Form and include bsrifallreport):   Caregiver involvement changes: no  Home health supplies by type and quantity ordered/delivered this visit include: no    Clinician asked if patient has had any physician contact since last home care visit and patient states: N/A  Clinician asked if patient has any new or changed medications and patient states:  N/A   If Yes, were medications reconciled? N/A   Was the certifying physician notified of changes in medications? N/A     Clinical assessment (what this visit means for the patient overall and need for ongoing skilled care) and progress or lack of progress towards SPECIFIC goals: Patients wound care is complete. Pt little toe is healing very well. Ptient states that the doctor is happy with healing process.     Written Teaching Material Utilized: N/A    Interdisciplinary communication with: N/A     Discharge planning as follows: When goals are met    Specific plan for next visit: Educate in s/s of independance and when to call MD CRISTINA or 911

## 2024-04-04 ASSESSMENT — ENCOUNTER SYMPTOMS: HEMOPTYSIS: 0

## 2024-04-04 NOTE — HOME HEALTH
Subjective: Pt states that he sees Dr. Castaneda tomorrow   Falls since last visit No(if yes complete the Fall Tracking Form and include bsrifallreport):   Caregiver involvement changes: Wife is primary caregiver   Home health supplies by type and quantity ordered/delivered this visit include: none     Clinician asked if patient has had any physician contact since last home care visit and patient states: NO  Clinician asked if patient has any new or changed medications and patient states:  NO   If Yes, were medications reconciled? NO   Was the certifying physician notified of changes in medications? NO     Clinical assessment (what this visit means for the patient overall and need for ongoing skilled care) and progress or lack of progress towards SPECIFIC goals: Pt continues to require SN for wound care, weekly labwork, and PICC line dressing changes via sterile technique    Written Teaching Material Utilized: N/A    Interdisciplinary communication with: N/A for the purpose of NA     Discharge planning as follows: When wound is 100% healed    Specific plan for next visit: Wound care

## 2024-04-04 NOTE — PLAN OF CARE
Discharge Instructions from  Wound Care Clinic at [facility]   [address]   [address]  [place] [address] [phone] Fax [phone]    NAME:  Juwan Espinoza  YOB: 1970  MEDICAL RECORD NUMBER:  [id number]  DATE:  4/2/2024      [facility]    Wound Cleansing:   Do not scrub or use excessive force.  Cleanse wound prior to applying a clean dressing with:  [] Normal Saline [] Keep Wound Dry in Shower    [] Wound Cleanser   [] Cleanse wound with Mild Soap & Water  [] May Shower at Discharge   [x] Other:  Vashe    Topical Treatments:  Do not apply lotions, creams, or ointments to wound bed unless directed.        Dressings:           Wound Location Right Foot   [x] Apply Primary Dressing:       [] MediHoney Gel [] Alginate with Silver [] Alginate   [] Collagen [] Collagen with Silver   [] Santyl with Moisten saline gauze     [] Hydrocolloid   [] MediHoney Alginate [] Foam with Silver   [] Foam   [] Hydrofera Blue    [] Mepilex Border    [] Moisten with Saline [] Hydrogel [] Mepitel     [] Bactroban/Mupirocin [] Polysporin  [] Other:    [x] Pack wound loosely with  [x] 1/4 in Iodoform gauze       [x] Cover and Secure with:     [x] Gauze [x] Cheryl or  [x] Kerlix   [x] Ace Wrap [] Cover Roll Tape [x] ABD or Super Absorbant dressing      [x] Change dressing: [x] Two times per week        Negative Pressure:           Wound Location:  Wound Vac Discontinued       Edema Control:   [x] Elevate leg(s) above the level of the heart when sitting.      [x] Avoid prolonged standing in one place.     Off-Loading:   [x] Off-loading when [x] walking  [] in bed [] sitting  [x] Total non-weight bearing  [x] Right Leg  [] Left Leg   [x] Assistive Device [x] Walker [] Cane  [] Wheelchair  [] Crutches   [x] Surgical shoe    [] Podus Boot(s)   [] Foam Boot(s)  [] Roll About    [] Cast Boot [] CROW Boot  [] Other:      Dietary:  [x] Diet as tolerated: [x] Calorie Diabetic Diet:  [x]

## 2024-04-05 ENCOUNTER — HOME CARE VISIT (OUTPATIENT)
Facility: HOME HEALTH | Age: 54
End: 2024-04-05
Payer: COMMERCIAL

## 2024-04-05 VITALS
RESPIRATION RATE: 16 BRPM | OXYGEN SATURATION: 98 % | TEMPERATURE: 98.3 F | DIASTOLIC BLOOD PRESSURE: 78 MMHG | SYSTOLIC BLOOD PRESSURE: 142 MMHG | HEART RATE: 79 BPM

## 2024-04-05 PROCEDURE — G0299 HHS/HOSPICE OF RN EA 15 MIN: HCPCS

## 2024-04-05 NOTE — HOME HEALTH
Subjective: Patient states no pain today  Falls since last visit No(if yes complete the Fall Tracking Form and include bsrifallreport):   Caregiver involvement changes: no  Home health supplies by type and quantity ordered/delivered this visit include: no    Clinician asked if patient has had any physician contact since last home care visit and patient states: N/A  Clinician asked if patient has any new or changed medications and patient states:  N/A   If Yes, were medications reconciled? N/A   Was the certifying physician notified of changes in medications? N/A     Clinical assessment (what this visit means for the patient overall and need for ongoing skilled care) and progress or lack of progress towards SPECIFIC goals: Patients wound care is complete. SN needed for further eval and treatment.    Written Teaching Material Utilized: N/A    Interdisciplinary communication with: N/A     Discharge planning as follows: When goals are met    Specific plan for next visit: Instruct in safety issues regarding Diet

## 2024-04-08 ENCOUNTER — HOME CARE VISIT (OUTPATIENT)
Facility: HOME HEALTH | Age: 54
End: 2024-04-08
Payer: COMMERCIAL

## 2024-04-08 VITALS
TEMPERATURE: 98 F | OXYGEN SATURATION: 98 % | HEART RATE: 84 BPM | RESPIRATION RATE: 20 BRPM | SYSTOLIC BLOOD PRESSURE: 112 MMHG | DIASTOLIC BLOOD PRESSURE: 80 MMHG

## 2024-04-08 PROCEDURE — G0300 HHS/HOSPICE OF LPN EA 15 MIN: HCPCS

## 2024-04-08 ASSESSMENT — ENCOUNTER SYMPTOMS: HEMOPTYSIS: 0

## 2024-04-08 NOTE — HOME HEALTH
Subjective: Pt states MD wants tunneling to wound packed   Falls since last visit No(if yes complete the Fall Tracking Form and include bsrifallreport):   Caregiver involvement changes: Wife is primary caregiver   Home health supplies by type and quantity ordered/delivered this visit include: none     Clinician asked if patient has had any physician contact since last home care visit and patient states: NO  Clinician asked if patient has any new or changed medications and patient states:  NO   If Yes, were medications reconciled? NO   Was the certifying physician notified of changes in medications? NO     Clinical assessment (what this visit means for the patient overall and need for ongoing skilled care) and progress or lack of progress towards SPECIFIC goals: Pt continues to require SN for wound care     Written Teaching Material Utilized: N/A    Interdisciplinary communication with: N/A for the purpose of NA     Discharge planning as follows: When wound is 100% healed    Specific plan for next visit: Wound care

## 2024-04-09 LAB
ALBUMIN SERPL-MCNC: 4.2 G/DL (ref 3.8–4.9)
ALBUMIN/GLOB SERPL: 1.4 {RATIO} (ref 1.2–2.2)
ALP SERPL-CCNC: 86 IU/L (ref 44–121)
ALT SERPL-CCNC: 31 IU/L (ref 0–44)
AST SERPL-CCNC: 41 IU/L (ref 0–40)
BILIRUB SERPL-MCNC: <0.2 MG/DL (ref 0–1.2)
BUN SERPL-MCNC: 15 MG/DL (ref 6–24)
BUN/CREAT SERPL: 15 (ref 9–20)
CALCIUM SERPL-MCNC: 9.6 MG/DL (ref 8.7–10.2)
CHLORIDE SERPL-SCNC: 106 MMOL/L (ref 96–106)
CO2 SERPL-SCNC: 16 MMOL/L (ref 20–29)
CREAT SERPL-MCNC: 1.02 MG/DL (ref 0.76–1.27)
CRP SERPL-MCNC: 8 MG/L (ref 0–10)
EGFRCR SERPLBLD CKD-EPI 2021: 86 ML/MIN/1.73
ERYTHROCYTE [DISTWIDTH] IN BLOOD BY AUTOMATED COUNT: 14.4 % (ref 11.6–15.4)
ERYTHROCYTE [SEDIMENTATION RATE] IN BLOOD BY WESTERGREN METHOD: 54 MM/HR (ref 0–30)
GLOBULIN SER CALC-MCNC: 2.9 G/DL (ref 1.5–4.5)
GLUCOSE SERPL-MCNC: 89 MG/DL (ref 70–99)
HCT VFR BLD AUTO: 37.7 % (ref 37.5–51)
HGB BLD-MCNC: 12.8 G/DL (ref 13–17.7)
MCH RBC QN AUTO: 30 PG (ref 26.6–33)
MCHC RBC AUTO-ENTMCNC: 34 G/DL (ref 31.5–35.7)
MCV RBC AUTO: 88 FL (ref 79–97)
PLATELET # BLD AUTO: 337 X10E3/UL (ref 150–450)
POTASSIUM SERPL-SCNC: 4.7 MMOL/L (ref 3.5–5.2)
PROT SERPL-MCNC: 7.1 G/DL (ref 6–8.5)
RBC # BLD AUTO: 4.27 X10E6/UL (ref 4.14–5.8)
SODIUM SERPL-SCNC: 143 MMOL/L (ref 134–144)
WBC # BLD AUTO: 9.2 X10E3/UL (ref 3.4–10.8)

## 2024-04-10 ENCOUNTER — HOME CARE VISIT (OUTPATIENT)
Facility: HOME HEALTH | Age: 54
End: 2024-04-10
Payer: COMMERCIAL

## 2024-04-10 VITALS
OXYGEN SATURATION: 98 % | TEMPERATURE: 97.6 F | RESPIRATION RATE: 20 BRPM | SYSTOLIC BLOOD PRESSURE: 112 MMHG | HEART RATE: 74 BPM | DIASTOLIC BLOOD PRESSURE: 80 MMHG

## 2024-04-10 PROCEDURE — G0300 HHS/HOSPICE OF LPN EA 15 MIN: HCPCS

## 2024-04-12 ENCOUNTER — HOME CARE VISIT (OUTPATIENT)
Facility: HOME HEALTH | Age: 54
End: 2024-04-12

## 2024-04-12 VITALS
RESPIRATION RATE: 16 BRPM | OXYGEN SATURATION: 97 % | HEART RATE: 82 BPM | DIASTOLIC BLOOD PRESSURE: 90 MMHG | SYSTOLIC BLOOD PRESSURE: 138 MMHG | TEMPERATURE: 97.2 F

## 2024-04-12 PROCEDURE — G0299 HHS/HOSPICE OF RN EA 15 MIN: HCPCS

## 2024-04-12 ASSESSMENT — ENCOUNTER SYMPTOMS: PAIN LOCATION - PAIN QUALITY: ACHING

## 2024-04-15 ENCOUNTER — HOME CARE VISIT (OUTPATIENT)
Facility: HOME HEALTH | Age: 54
End: 2024-04-15
Payer: COMMERCIAL

## 2024-04-15 VITALS
DIASTOLIC BLOOD PRESSURE: 80 MMHG | RESPIRATION RATE: 18 BRPM | OXYGEN SATURATION: 98 % | TEMPERATURE: 97.3 F | HEART RATE: 76 BPM | SYSTOLIC BLOOD PRESSURE: 116 MMHG

## 2024-04-15 PROCEDURE — G0300 HHS/HOSPICE OF LPN EA 15 MIN: HCPCS

## 2024-04-16 ENCOUNTER — TELEPHONE (OUTPATIENT)
Age: 54
End: 2024-04-16

## 2024-04-16 NOTE — TELEPHONE ENCOUNTER
Please inform home health that patient is good for pull of line after completing antibiotics on 4/17.  I have discussed case with his podiatrist & he is good with conclusion of antibiotic therapy as scheduled

## 2024-04-16 NOTE — TELEPHONE ENCOUNTER
Called Home Health and spoke with Nurse Partida and made her aware that patient is good for pull of line after completing antibiotics on 4/17/2024. Provider dicussed case with podiatrist

## 2024-04-19 ENCOUNTER — HOME CARE VISIT (OUTPATIENT)
Facility: HOME HEALTH | Age: 54
End: 2024-04-19
Payer: COMMERCIAL

## 2024-04-19 VITALS
TEMPERATURE: 97.9 F | HEART RATE: 86 BPM | SYSTOLIC BLOOD PRESSURE: 130 MMHG | RESPIRATION RATE: 16 BRPM | DIASTOLIC BLOOD PRESSURE: 80 MMHG | OXYGEN SATURATION: 99 %

## 2024-04-19 PROCEDURE — G0299 HHS/HOSPICE OF RN EA 15 MIN: HCPCS

## 2024-04-19 ASSESSMENT — ENCOUNTER SYMPTOMS: PAIN LOCATION - PAIN QUALITY: SHARP ACHING

## 2024-04-22 ENCOUNTER — HOME CARE VISIT (OUTPATIENT)
Facility: HOME HEALTH | Age: 54
End: 2024-04-22
Payer: COMMERCIAL

## 2024-04-22 VITALS
SYSTOLIC BLOOD PRESSURE: 98 MMHG | OXYGEN SATURATION: 97 % | TEMPERATURE: 98.4 F | DIASTOLIC BLOOD PRESSURE: 64 MMHG | RESPIRATION RATE: 16 BRPM | HEART RATE: 93 BPM

## 2024-04-22 PROBLEM — N17.9 AKI (ACUTE KIDNEY INJURY) (HCC): Status: RESOLVED | Noted: 2023-12-12 | Resolved: 2024-04-22

## 2024-04-22 PROCEDURE — G0299 HHS/HOSPICE OF RN EA 15 MIN: HCPCS

## 2024-04-23 NOTE — HOME HEALTH
Subjective: Patient denies pain today  Falls since last visit No(if yes complete the Fall Tracking Form and include bsrifallreport):   Caregiver involvement changes: no  Home health supplies by type and quantity ordered/delivered this visit include: no    Clinician asked if patient has had any physician contact since last home care visit and patient states: N/A  Clinician asked if patient has any new or changed medications and patient states:  N/A   If Yes, were medications reconciled? N/A   Was the certifying physician notified of changes in medications? N/A     Clinical assessment (what this visit means for the patient overall and need for ongoing skilled care) and progress or lack of progress towards SPECIFIC goals: Patients wound care is complete. Pt BP is low and his PCP is aware and told him to stop his BP meds. SN needed for further eval and treatment    Written Teaching Material Utilized: N/A    Interdisciplinary communication with: N/A     Discharge planning as follows: When goals are met    Specific plan for next visit: Assess caregiver for ability to teach back wound care

## 2024-04-26 ENCOUNTER — HOME CARE VISIT (OUTPATIENT)
Facility: HOME HEALTH | Age: 54
End: 2024-04-26
Payer: COMMERCIAL

## 2024-04-26 PROCEDURE — G0300 HHS/HOSPICE OF LPN EA 15 MIN: HCPCS

## 2024-04-29 ENCOUNTER — HOME CARE VISIT (OUTPATIENT)
Facility: HOME HEALTH | Age: 54
End: 2024-04-29
Payer: COMMERCIAL

## 2024-04-29 PROCEDURE — G0300 HHS/HOSPICE OF LPN EA 15 MIN: HCPCS

## 2024-04-29 ASSESSMENT — ENCOUNTER SYMPTOMS
HEMOPTYSIS: 0
HEMOPTYSIS: 0

## 2024-04-30 ENCOUNTER — HOSPITAL ENCOUNTER (OUTPATIENT)
Facility: HOSPITAL | Age: 54
Discharge: HOME | End: 2024-04-30
Attending: PODIATRIST
Payer: COMMERCIAL

## 2024-04-30 VITALS
DIASTOLIC BLOOD PRESSURE: 79 MMHG | RESPIRATION RATE: 20 BRPM | HEART RATE: 91 BPM | HEART RATE: 77 BPM | SYSTOLIC BLOOD PRESSURE: 138 MMHG | DIASTOLIC BLOOD PRESSURE: 82 MMHG | TEMPERATURE: 98 F | OXYGEN SATURATION: 97 % | RESPIRATION RATE: 20 BRPM | OXYGEN SATURATION: 99 % | TEMPERATURE: 97.6 F | SYSTOLIC BLOOD PRESSURE: 138 MMHG

## 2024-04-30 DIAGNOSIS — M14.60 CHARCOT'S ARTHROPATHY: Primary | ICD-10-CM

## 2024-04-30 DIAGNOSIS — L08.9 DIABETIC FOOT INFECTION (HCC): ICD-10-CM

## 2024-04-30 DIAGNOSIS — E11.628 DIABETIC FOOT INFECTION (HCC): ICD-10-CM

## 2024-04-30 PROCEDURE — 11045 DBRDMT SUBQ TISS EACH ADDL: CPT

## 2024-04-30 PROCEDURE — 11042 DBRDMT SUBQ TIS 1ST 20SQCM/<: CPT

## 2024-04-30 RX ORDER — GINSENG 100 MG
CAPSULE ORAL ONCE
OUTPATIENT
Start: 2024-04-30 | End: 2024-04-30

## 2024-04-30 RX ORDER — BETAMETHASONE DIPROPIONATE 0.5 MG/G
CREAM TOPICAL ONCE
OUTPATIENT
Start: 2024-04-30 | End: 2024-04-30

## 2024-04-30 RX ORDER — SODIUM CHLOR/HYPOCHLOROUS ACID 0.033 %
SOLUTION, IRRIGATION IRRIGATION ONCE
OUTPATIENT
Start: 2024-04-30 | End: 2024-04-30

## 2024-04-30 RX ORDER — BACITRACIN ZINC AND POLYMYXIN B SULFATE 500; 1000 [USP'U]/G; [USP'U]/G
OINTMENT TOPICAL ONCE
OUTPATIENT
Start: 2024-04-30 | End: 2024-04-30

## 2024-04-30 RX ORDER — LIDOCAINE HYDROCHLORIDE 20 MG/ML
JELLY TOPICAL ONCE
OUTPATIENT
Start: 2024-04-30 | End: 2024-04-30

## 2024-04-30 RX ORDER — LIDOCAINE 50 MG/G
OINTMENT TOPICAL ONCE
OUTPATIENT
Start: 2024-04-30 | End: 2024-04-30

## 2024-04-30 RX ORDER — LIDOCAINE HYDROCHLORIDE 40 MG/ML
SOLUTION TOPICAL ONCE
OUTPATIENT
Start: 2024-04-30 | End: 2024-04-30

## 2024-04-30 RX ORDER — IBUPROFEN 200 MG
TABLET ORAL ONCE
OUTPATIENT
Start: 2024-04-30 | End: 2024-04-30

## 2024-04-30 RX ORDER — GENTAMICIN SULFATE 1 MG/G
OINTMENT TOPICAL ONCE
OUTPATIENT
Start: 2024-04-30 | End: 2024-04-30

## 2024-04-30 RX ORDER — TRIAMCINOLONE ACETONIDE 1 MG/G
OINTMENT TOPICAL ONCE
OUTPATIENT
Start: 2024-04-30 | End: 2024-04-30

## 2024-04-30 RX ORDER — CLOBETASOL PROPIONATE 0.5 MG/G
OINTMENT TOPICAL ONCE
OUTPATIENT
Start: 2024-04-30 | End: 2024-04-30

## 2024-04-30 RX ORDER — LIDOCAINE 40 MG/G
CREAM TOPICAL ONCE
OUTPATIENT
Start: 2024-04-30 | End: 2024-04-30

## 2024-04-30 ASSESSMENT — PAIN SCALES - GENERAL: PAINLEVEL_OUTOF10: 0

## 2024-04-30 ASSESSMENT — ENCOUNTER SYMPTOMS
HEMOPTYSIS: 0
HEMOPTYSIS: 0

## 2024-04-30 NOTE — HOME HEALTH
Subjective: Pt states he is going to see Dr. Castaneda on 4/30/24  Falls since last visit No(if yes complete the Fall Tracking Form and include bsrifallreport):   Caregiver involvement changes: Wife assist as needed  Home health supplies by type and quantity ordered/delivered this visit include: no    Clinician asked if patient has had any physician contact since last home care visit and patient states: NO  Clinician asked if patient has any new or changed medications and patient states:  NO   If Yes, were medications reconciled? NO   Was the certifying physician notified of changes in medications? NO     Clinical assessment (what this visit means for the patient overall and need for ongoing skilled care) and progress or lack of progress towards SPECIFIC goals: Pt continues to require SN for wound care     Written Teaching Material Utilized: N/A    Interdisciplinary communication with: N/A for the purpose of NA     Discharge planning as follows: When wound is 100% healed    Specific plan for next visit: Wound care

## 2024-04-30 NOTE — WOUND CARE
Debridement Wound Care        Problem List Items Addressed This Visit    None      Procedure Note  Indications:  Based on my examination of this patient's wound(s)/ulcer(s) today, debridement is  required to promote healing and evaluate the wound base.    Performed by: Celso Castaneda DPM    Consent obtained: Yes    Time out taken: Yes    Debridement: excisional    Using curette the wound(s)/ulcer(s) was/were sharply debrided down through and including the removal of    epidermis, dermis, and subcutaneous tissue    Devitalized Tissue Debrided: fibrin, biofilm, and slough    Pre Debridement Measurements:  Are located in the Wound/Ulcer Documentation Flow Sheet    Diabetic ulcer, muscle necrosis    Wound/Ulcer #: 1    Post Debridement Measurements:  Wound/Ulcer Descriptions are Pre Debridement except measurements:    Wound Care Documentation:  Wound 01/02/24 Foot Right;Plantar (Active)   Wound Image    04/02/24 1717   Wound Etiology Diabetic 03/12/24 1441   Dressing Status New dressing applied 04/29/24 1100   Wound Cleansed Wound cleanser;Vashe 04/29/24 1100   Dressing/Treatment ABD;Gauze dressing/dressing sponge;Roll gauze;Tape/Soft cloth adhesive tape 04/29/24 1100   Offloading for Diabetic Foot Ulcers Felt or foam 03/12/24 1441   Dressing Change Due 03/19/24 03/12/24 1441   Wound Length (cm) 4.5 cm 04/29/24 1100   Wound Width (cm) 4.5 cm 04/29/24 1100   Wound Depth (cm) 3.5 cm 04/29/24 1100   Wound Surface Area (cm^2) 20.25 cm^2 04/29/24 1100   Change in Wound Size % (l*w) -5.19 04/29/24 1100   Wound Volume (cm^3) 70.875 cm^3 04/29/24 1100   Wound Healing % -5 04/29/24 1100   Post-Procedure Length (cm) 3.6 cm 03/12/24 1441   Post-Procedure Width (cm) 3.1 cm 03/12/24 1441   Post-Procedure Depth (cm) 5.5 cm 03/12/24 1441   Post-Procedure Surface Area (cm^2) 11.16 cm^2 03/12/24 1441   Post-Procedure Volume (cm^3) 61.38 cm^3 03/12/24 1441   Distance Tunneling (cm) 4.5 cm 04/08/24 0930   Tunneling Position ___

## 2024-05-01 ENCOUNTER — HOME CARE VISIT (OUTPATIENT)
Facility: HOME HEALTH | Age: 54
End: 2024-05-01
Payer: COMMERCIAL

## 2024-05-01 VITALS
SYSTOLIC BLOOD PRESSURE: 139 MMHG | TEMPERATURE: 98.7 F | OXYGEN SATURATION: 98 % | HEART RATE: 85 BPM | RESPIRATION RATE: 20 BRPM | DIASTOLIC BLOOD PRESSURE: 84 MMHG

## 2024-05-01 VITALS
HEART RATE: 99 BPM | RESPIRATION RATE: 16 BRPM | OXYGEN SATURATION: 97 % | SYSTOLIC BLOOD PRESSURE: 132 MMHG | TEMPERATURE: 98.2 F | DIASTOLIC BLOOD PRESSURE: 82 MMHG

## 2024-05-01 PROCEDURE — G0299 HHS/HOSPICE OF RN EA 15 MIN: HCPCS

## 2024-05-01 NOTE — PLAN OF CARE
Discharge Instructions from  Wound Care Clinic at [facility]   [address]   [address]  [place] [address] [phone] Fax [phone]    NAME:  Juwan Espinoza  YOB: 1970  MEDICAL RECORD NUMBER:  [id number]  DATE:  4/30/2024    Wound Cleansing:   Do not scrub or use excessive force.  Cleanse wound prior to applying a clean dressing with:  [x] Other: VASHE     Topical Treatments:  Do not apply lotions, creams, or ointments to wound bed unless directed.        Dressings:           Wound Location Right Foot   [x] Apply Primary Dressing:       [x] Alginate   [x] Collagen    [x] Cover and Secure with:     [x] coban [x] Cheryl   OR [x] Kerlix     [x] ABD     [x] Other: super absorbant dressing   Avoid contact of tape with skin.  [x] Change dressing: [x] two times per week         Off-Loading:   [x] Off-loading when [x] walking   [x] sitting  [x] Total non-weight bearing  [x] Right Leg     [x] Assistive Device [x] Wheelchair     [x] Surgical shoe    [x] Roll About        Dietary:  [x] Diet as tolerated: [x] Calorie Diabetic Diet: [x] No Added Salt:  [x] Increase Protein:                  Return Appointment:  [x] ECF or Home Healthcare: [facility]  in [place]  [x] Return Appointment: With Leonel   in  3-4 Week(s)       Electronically signed SANIYA ORR RN on 5/1/2024 at 11:43 AM     Wound Care Center Information: Should you experience any significant changes in your wound(s) or have questions about your wound care, please contact the [facility] at MONDAY - FRIDAY 8:00 am - 4:30.  If you need help with your wound outside these hours and cannot wait until we are again available, contact your PCP or go to the hospital emergency room.     PLEASE NOTE: IF YOU ARE UNABLE TO OBTAIN WOUND SUPPLIES, CONTINUE TO USE THE SUPPLIES YOU HAVE AVAILABLE UNTIL YOU ARE ABLE TO REACH US. IT IS MOST IMPORTANT TO KEEP THE WOUND COVERED AT ALL TIMES.

## 2024-05-01 NOTE — HOME HEALTH
Subjective: Patient denies pain today  Falls since last visit No(if yes complete the Fall Tracking Form and include bsrifallreport):   Caregiver involvement changes: no  Home health supplies by type and quantity ordered/delivered this visit include: no    Clinician asked if patient has had any physician contact since last home care visit and patient states: N/A  Clinician asked if patient has any new or changed medications and patient states:  N/A   If Yes, were medications reconciled? N/A   Was the certifying physician notified of changes in medications? N/A     Clinical assessment (what this visit means for the patient overall and need for ongoing skilled care) and progress or lack of progress towards SPECIFIC goals: Patient states that The doctor really scrapped at his foot yesterday hoping to get it to cont to heal. Wound care complete for this visit. SN needed for further eval and treatment.    Written Teaching Material Utilized: N/A    Interdisciplinary communication with: N/A     Discharge planning as follows: When goals are met    Specific plan for next visit: Instruct in safety issues regarding Tripping hazards

## 2024-05-01 NOTE — FLOWSHEET NOTE
04/30/24 1610   Anesthetic   Anesthetic None   Right Leg Edema Point of Measurement   Compression Therapy Compression not appropriate;Compression not ordered   Left Leg Edema Point of Measurement   Compression Therapy Compression not appropriate   Musculoskeletal   Musculoskeletal (WDL) WDL   Foot Assessment   Foot Assessment X   Toe Nail Assessment   Toe Nail Assessment X   Wound 01/02/24 Foot Right;Plantar   Date First Assessed/Time First Assessed: 01/02/24 1400   Present on Original Admission: Yes  Wound Approximate Age at First Assessment (Weeks): 3 weeks  Primary Wound Type: Diabetic Ulcer  Location: Foot  Wound Location Orientation: Right;Plantar   Wound Image     Wound Etiology Diabetic   Dressing Status New dressing applied   Wound Cleansed Wound cleanser   Dressing/Treatment Collagen   Offloading for Diabetic Foot Ulcers Offloading ordered;Knee scooter   Dressing Change Due 05/06/24   Wound Length (cm) 4.5 cm   Wound Width (cm) 4.5 cm   Wound Depth (cm) 2 cm   Wound Surface Area (cm^2) 20.25 cm^2   Change in Wound Size % (l*w) -5.19   Wound Volume (cm^3) 40.5 cm^3   Wound Healing % 40   Post-Procedure Length (cm) 4.6 cm   Post-Procedure Width (cm) 4.6 cm   Post-Procedure Depth (cm) 2.2 cm   Post-Procedure Surface Area (cm^2) 21.16 cm^2   Post-Procedure Volume (cm^3) 46.552 cm^3   Wound Assessment Devitalized tissue   Drainage Amount Moderate (25-50%)   Drainage Description Serosanguinous   Odor None   Allyn-wound Assessment Fragile;Maceration   Margins Defined edges   Wound Thickness Description not for Pressure Injury Full thickness   Pain Assessment   Pain Assessment None - Denies Pain   Pain Level 0     Applied collagen powder, mepitel one, alginate, absorbant dressing, tito and coban.

## 2024-05-03 ENCOUNTER — HOME CARE VISIT (OUTPATIENT)
Facility: HOME HEALTH | Age: 54
End: 2024-05-03
Payer: COMMERCIAL

## 2024-05-06 ENCOUNTER — HOME CARE VISIT (OUTPATIENT)
Facility: HOME HEALTH | Age: 54
End: 2024-05-06
Payer: COMMERCIAL

## 2024-05-06 VITALS
TEMPERATURE: 98.3 F | SYSTOLIC BLOOD PRESSURE: 126 MMHG | OXYGEN SATURATION: 98 % | HEART RATE: 88 BPM | RESPIRATION RATE: 16 BRPM | DIASTOLIC BLOOD PRESSURE: 64 MMHG

## 2024-05-06 PROCEDURE — G0300 HHS/HOSPICE OF LPN EA 15 MIN: HCPCS

## 2024-05-07 NOTE — HOME HEALTH
Subjective: \"I'm doing alright.\"  Falls since last visit No(if yes complete the Fall Tracking Form and include bsrifallreport):   Caregiver involvement changes: No  Home health supplies by type and quantity ordered/delivered this visit include: drawtex    Clinician asked if patient has had any physician contact since last home care visit and patient states: NO  Clinician asked if patient has any new or changed medications and patient states:  NO   If Yes, were medications reconciled? N/A   Was the certifying physician notified of changes in medications? N/A     Clinical assessment (what this visit means for the patient overall and need for ongoing skilled care) and progress or lack of progress towards SPECIFIC goals: Pt at risk for rehospitalization r/t fall risk, infection, wound exacerbation.  Wound healing goals not met.    Written Teaching Material Utilized: N/A    Interdisciplinary communication with: N/A    Discharge planning as follows: When goals are met    Specific plan for next visit: Assessment, wound care, education as needed
Yes

## 2024-05-08 ENCOUNTER — HOME CARE VISIT (OUTPATIENT)
Facility: HOME HEALTH | Age: 54
End: 2024-05-08
Payer: COMMERCIAL

## 2024-05-08 PROCEDURE — G0300 HHS/HOSPICE OF LPN EA 15 MIN: HCPCS

## 2024-05-09 VITALS
RESPIRATION RATE: 20 BRPM | SYSTOLIC BLOOD PRESSURE: 133 MMHG | OXYGEN SATURATION: 98 % | HEART RATE: 86 BPM | DIASTOLIC BLOOD PRESSURE: 80 MMHG | TEMPERATURE: 98 F

## 2024-05-09 ASSESSMENT — ENCOUNTER SYMPTOMS: HEMOPTYSIS: 0

## 2024-05-09 NOTE — HOME HEALTH
Subjective: Pt states that MD clcut around the edges in hopes the edges with close in   Falls since last visit No(if yes complete the Fall Tracking Form and include bsrifallreport):   Caregiver involvement changes: Wife is primary caregiver   Home health supplies by type and quantity ordered/delivered this visit include: none     Clinician asked if patient has had any physician contact since last home care visit and patient states: NO  Clinician asked if patient has any new or changed medications and patient states:  NO   If Yes, were medications reconciled? NO   Was the certifying physician notified of changes in medications? NO     Clinical assessment (what this visit means for the patient overall and need for ongoing skilled care) and progress or lack of progress towards SPECIFIC goals: Pt continues to require SN for wound care     Written Teaching Material Utilized: N/A    Interdisciplinary communication with: N/A for the purpose of NA     Discharge planning as follows: When wound is 100% healed    Specific plan for next visit: Wound care

## 2024-05-10 ENCOUNTER — HOME CARE VISIT (OUTPATIENT)
Facility: HOME HEALTH | Age: 54
End: 2024-05-10
Payer: COMMERCIAL

## 2024-05-10 PROCEDURE — G0300 HHS/HOSPICE OF LPN EA 15 MIN: HCPCS

## 2024-05-12 VITALS
SYSTOLIC BLOOD PRESSURE: 142 MMHG | OXYGEN SATURATION: 98 % | HEART RATE: 74 BPM | RESPIRATION RATE: 20 BRPM | DIASTOLIC BLOOD PRESSURE: 90 MMHG | TEMPERATURE: 98 F

## 2024-05-12 NOTE — HOME HEALTH
Subjective: Pt states the dressing slid earlier   Falls since last visit No(if yes complete the Fall Tracking Form and include bsrifallreport):   Caregiver involvement changes: Wife is primary caregiver   Home health supplies by type and quantity ordered/delivered this visit include: none     Clinician asked if patient has had any physician contact since last home care visit and patient states: NO  Clinician asked if patient has any new or changed medications and patient states:  NO   If Yes, were medications reconciled? NO   Was the certifying physician notified of changes in medications? NO     Clinical assessment (what this visit means for the patient overall and need for ongoing skilled care) and progress or lack of progress towards SPECIFIC goals: Pt continues to require SN for wound care     Written Teaching Material Utilized: N/A    Interdisciplinary communication with: N/A for the purpose of NA     Discharge planning as follows: Is no longer homebound    Specific plan for next visit: Wound care

## 2024-05-13 ENCOUNTER — HOME CARE VISIT (OUTPATIENT)
Facility: HOME HEALTH | Age: 54
End: 2024-05-13
Payer: COMMERCIAL

## 2024-05-13 VITALS
RESPIRATION RATE: 16 BRPM | TEMPERATURE: 98.2 F | OXYGEN SATURATION: 98 % | SYSTOLIC BLOOD PRESSURE: 126 MMHG | HEART RATE: 81 BPM | DIASTOLIC BLOOD PRESSURE: 74 MMHG

## 2024-05-13 PROCEDURE — G0299 HHS/HOSPICE OF RN EA 15 MIN: HCPCS

## 2024-05-13 ASSESSMENT — ENCOUNTER SYMPTOMS: PAIN LOCATION - PAIN QUALITY: ACHING

## 2024-05-13 NOTE — HOME HEALTH
Subjective: Patient states having some pain in his knees  Falls since last visit No(if yes complete the Fall Tracking Form and include bsrifallreport):   Caregiver involvement changes: no  Home health supplies by type and quantity ordered/delivered this visit include: no    Clinician asked if patient has had any physician contact since last home care visit and patient states: N/A  Clinician asked if patient has any new or changed medications and patient states:  NO   If Yes, were medications reconciled? N/A   Was the certifying physician notified of changes in medications? N/A     Clinical assessment (what this visit means for the patient overall and need for ongoing skilled care) and progress or lack of progress towards SPECIFIC goals: Patient states that his knees are aching today. Patients wound is stagnant at this point, Im not seeing any changes thus far. SN needed for further eval and treatment    Written Teaching Material Utilized: N/A    Interdisciplinary communication with: N/A     Discharge planning as follows: When goals are met    Specific plan for next visit: Instruct in safety issues regarding Tripping hazards

## 2024-05-15 ENCOUNTER — HOME CARE VISIT (OUTPATIENT)
Facility: HOME HEALTH | Age: 54
End: 2024-05-15
Payer: COMMERCIAL

## 2024-05-15 VITALS
DIASTOLIC BLOOD PRESSURE: 64 MMHG | OXYGEN SATURATION: 98 % | SYSTOLIC BLOOD PRESSURE: 130 MMHG | HEART RATE: 92 BPM | RESPIRATION RATE: 16 BRPM | TEMPERATURE: 98.2 F

## 2024-05-15 PROCEDURE — G0299 HHS/HOSPICE OF RN EA 15 MIN: HCPCS

## 2024-05-15 NOTE — HOME HEALTH
Subjective: Patient denies pain at this time but states he did have a headache.  Falls since last visit No(if yes complete the Fall Tracking Form and include bsrifallreport):   Caregiver involvement changes: no  Home health supplies by type and quantity ordered/delivered this visit include: no    Clinician asked if patient has had any physician contact since last home care visit and patient states: N/A  Clinician asked if patient has any new or changed medications and patient states:  N/A   If Yes, were medications reconciled? N/A   Was the certifying physician notified of changes in medications? N/A     Clinical assessment (what this visit means for the patient overall and need for ongoing skilled care) and progress or lack of progress towards SPECIFIC goals: Patients wound care is complete for this visit, no changes noted. SN needed for further eval and treatment.     Written Teaching Material Utilized: N/A    Interdisciplinary communication with: N/A     Discharge planning as follows: When goals are met    Specific plan for next visit: Instruct in safety issues regarding Diet

## 2024-05-20 ENCOUNTER — HOME CARE VISIT (OUTPATIENT)
Facility: HOME HEALTH | Age: 54
End: 2024-05-20
Payer: COMMERCIAL

## 2024-05-20 PROCEDURE — G0300 HHS/HOSPICE OF LPN EA 15 MIN: HCPCS

## 2024-05-21 ENCOUNTER — HOSPITAL ENCOUNTER (OUTPATIENT)
Facility: HOSPITAL | Age: 54
Discharge: HOME | End: 2024-05-21
Attending: PODIATRIST
Payer: COMMERCIAL

## 2024-05-21 VITALS
TEMPERATURE: 98.3 F | SYSTOLIC BLOOD PRESSURE: 142 MMHG | RESPIRATION RATE: 20 BRPM | DIASTOLIC BLOOD PRESSURE: 93 MMHG | HEART RATE: 80 BPM

## 2024-05-21 VITALS
DIASTOLIC BLOOD PRESSURE: 77 MMHG | TEMPERATURE: 98 F | SYSTOLIC BLOOD PRESSURE: 131 MMHG | HEART RATE: 83 BPM | RESPIRATION RATE: 18 BRPM | OXYGEN SATURATION: 99 %

## 2024-05-21 DIAGNOSIS — M14.60 CHARCOT'S ARTHROPATHY: Primary | ICD-10-CM

## 2024-05-21 DIAGNOSIS — E11.628 DIABETIC FOOT INFECTION (HCC): ICD-10-CM

## 2024-05-21 DIAGNOSIS — L08.9 DIABETIC FOOT INFECTION (HCC): ICD-10-CM

## 2024-05-21 PROCEDURE — 11043 DBRDMT MUSC&/FSCA 1ST 20/<: CPT

## 2024-05-21 RX ORDER — LIDOCAINE 40 MG/G
CREAM TOPICAL ONCE
OUTPATIENT
Start: 2024-05-21 | End: 2024-05-21

## 2024-05-21 RX ORDER — BACITRACIN ZINC AND POLYMYXIN B SULFATE 500; 1000 [USP'U]/G; [USP'U]/G
OINTMENT TOPICAL ONCE
OUTPATIENT
Start: 2024-05-21 | End: 2024-05-21

## 2024-05-21 RX ORDER — LIDOCAINE 50 MG/G
OINTMENT TOPICAL ONCE
OUTPATIENT
Start: 2024-05-21 | End: 2024-05-21

## 2024-05-21 RX ORDER — LIDOCAINE HYDROCHLORIDE 20 MG/ML
JELLY TOPICAL ONCE
OUTPATIENT
Start: 2024-05-21 | End: 2024-05-21

## 2024-05-21 RX ORDER — BETAMETHASONE DIPROPIONATE 0.5 MG/G
CREAM TOPICAL ONCE
OUTPATIENT
Start: 2024-05-21 | End: 2024-05-21

## 2024-05-21 RX ORDER — SODIUM CHLOR/HYPOCHLOROUS ACID 0.033 %
SOLUTION, IRRIGATION IRRIGATION ONCE
OUTPATIENT
Start: 2024-05-21 | End: 2024-05-21

## 2024-05-21 RX ORDER — TRIAMCINOLONE ACETONIDE 1 MG/G
OINTMENT TOPICAL ONCE
OUTPATIENT
Start: 2024-05-21 | End: 2024-05-21

## 2024-05-21 RX ORDER — GINSENG 100 MG
CAPSULE ORAL ONCE
OUTPATIENT
Start: 2024-05-21 | End: 2024-05-21

## 2024-05-21 RX ORDER — GENTAMICIN SULFATE 1 MG/G
OINTMENT TOPICAL ONCE
OUTPATIENT
Start: 2024-05-21 | End: 2024-05-21

## 2024-05-21 RX ORDER — CLOBETASOL PROPIONATE 0.5 MG/G
OINTMENT TOPICAL ONCE
OUTPATIENT
Start: 2024-05-21 | End: 2024-05-21

## 2024-05-21 RX ORDER — LIDOCAINE HYDROCHLORIDE 40 MG/ML
SOLUTION TOPICAL ONCE
OUTPATIENT
Start: 2024-05-21 | End: 2024-05-21

## 2024-05-21 RX ORDER — IBUPROFEN 200 MG
TABLET ORAL ONCE
OUTPATIENT
Start: 2024-05-21 | End: 2024-05-21

## 2024-05-21 ASSESSMENT — PAIN SCALES - GENERAL: PAINLEVEL_OUTOF10: 0

## 2024-05-21 ASSESSMENT — ENCOUNTER SYMPTOMS: HEMOPTYSIS: 0

## 2024-05-21 NOTE — HOME HEALTH
Subjective: Pt states he did not have to change dressing over the weekend   Falls since last visit No(if yes complete the Fall Tracking Form and include bsrifallreport):   Caregiver involvement changes: Wife assist as needed   Home health supplies by type and quantity ordered/delivered this visit include: none     Clinician asked if patient has had any physician contact since last home care visit and patient states: NO  Clinician asked if patient has any new or changed medications and patient states:  NO   If Yes, were medications reconciled? NO  Was the certifying physician notified of changes in medications? NO     Clinical assessment (what this visit means for the patient overall and need for ongoing skilled care) and progress or lack of progress towards SPECIFIC goals: Pt continues to require SN for wound care     Written Teaching Material Utilized: N/A    Interdisciplinary communication with: N/A for the purpose of NA     Discharge planning as follows: When wound is 100% healed    Specific plan for next visit: Wound care

## 2024-05-21 NOTE — WOUND CARE
Debridement Wound Care        Problem List Items Addressed This Visit    None      Procedure Note  Indications:  Based on my examination of this patient's wound(s)/ulcer(s) today, debridement is  required to promote healing and evaluate the wound base.    Performed by: Celso Castaneda DPM    Consent obtained: Yes    Time out taken: Yes    Debridement: excisional    Using curette the wound(s)/ulcer(s) was/were sharply debrided down through and including the removal of    epidermis, dermis, subcutaneous tissue, and muscle/fascia    Devitalized Tissue Debrided: fibrin, biofilm, and slough    Pre Debridement Measurements:  Are located in the Wound/Ulcer Documentation Flow Sheet    Diabetic ulcer, muscle necrosis    Wound/Ulcer #: 1    Post Debridement Measurements:  Wound/Ulcer Descriptions are Pre Debridement except measurements:    Wound Care Documentation:  Wound 01/02/24 Foot Right;Plantar (Active)   Wound Image    04/30/24 1610   Wound Etiology Diabetic 05/20/24 0900   Dressing Status New dressing applied 05/20/24 0900   Wound Cleansed Betadine/povidone iodine;Vashe 05/20/24 0900   Dressing/Treatment ABD;Gauze dressing/dressing sponge;Moist to dry;Roll gauze;Tape/Soft cloth adhesive tape 05/20/24 0900   Offloading for Diabetic Foot Ulcers Offloading ordered;Knee scooter 05/01/24 0914   Dressing Change Due 05/06/24 04/30/24 1610   Wound Length (cm) 4.5 cm 05/21/24 1515   Wound Width (cm) 5 cm 05/21/24 1515   Wound Depth (cm) 4.4 cm 05/21/24 1515   Wound Surface Area (cm^2) 22.5 cm^2 05/21/24 1515   Change in Wound Size % (l*w) -16.88 05/21/24 1515   Wound Volume (cm^3) 99 cm^3 05/21/24 1515   Wound Healing % -47 05/21/24 1515   Post-Procedure Length (cm) 4.6 cm 04/30/24 1610   Post-Procedure Width (cm) 4.6 cm 04/30/24 1610   Post-Procedure Depth (cm) 2.2 cm 04/30/24 1610   Post-Procedure Surface Area (cm^2) 21.16 cm^2 04/30/24 1610   Post-Procedure Volume (cm^3) 46.552 cm^3 04/30/24 1610   Distance Tunneling

## 2024-05-22 ENCOUNTER — HOME CARE VISIT (OUTPATIENT)
Facility: HOME HEALTH | Age: 54
End: 2024-05-22
Payer: COMMERCIAL

## 2024-05-22 VITALS
DIASTOLIC BLOOD PRESSURE: 82 MMHG | SYSTOLIC BLOOD PRESSURE: 130 MMHG | RESPIRATION RATE: 20 BRPM | OXYGEN SATURATION: 98 % | HEART RATE: 87 BPM | TEMPERATURE: 98.1 F

## 2024-05-22 PROCEDURE — G0300 HHS/HOSPICE OF LPN EA 15 MIN: HCPCS

## 2024-05-22 NOTE — DISCHARGE INSTRUCTIONS
Discharge Instructions from  Wound Care Clinic at [facility]   [address]   [address]  [place] [address] [phone] Fax [phone]    NAME:  Juwan Espinoza  YOB: 1970  MEDICAL RECORD NUMBER:  [id number]  DATE: 05/22/24      Wound Cleansing:   Do not scrub or use excessive force.  Cleanse wound prior to applying a clean dressing with:  [] Normal Saline [] Keep Wound Dry in Shower    [] Wound Cleanser   [] Cleanse wound with Mild Soap & Water  [] May Shower at Discharge   [] Other:      Topical Treatments:  Do not apply lotions, creams, or ointments to wound bed unless directed.   [] Apply moisturizing lotion to skin surrounding the wound prior to dressing change.  [] Apply antifungal ointment to skin surrounding the wound prior to dressing change.  [] Apply thin film of moisture barrier ointment to skin immediately around wound.  [] Other:       Dressings:           Wound Location Right foot   [x] Apply Primary Dressing:       [] MediHoney Gel [] Alginate with Silver [x] Alginate   [x] Collagen in depths only [] Collagen with Silver   [] Santyl with Moisten saline gauze     [] Hydrocolloid   [] MediHoney Alginate [] Foam with Silver   [] Foam   [] Hydrofera Blue    [] Mepilex Border    [] Moisten with Saline [] Hydrogel [] Mepitel     [] Bactroban/Mupirocin [] Polysporin  [x] Other:  Mesalt to hypergranulated tissue  [] Pack wound loosely with  [] Iodoform   [] Plain Packing  [] Other   [x] Cover and Secure with:     [x] Gauze [] Cheryl [] Kerlix   [x] Ace Wrap [] Cover Roll Tape [] ABD     [x] Other: Superabsorbent dressing    Avoid contact of tape with skin.  [x] Change dressing: [] Daily    [] Every Other Day [x] Three times per week   [] Once a week [] Do Not Change Dressing   [] Other:     Dietary:  [] Diet as tolerated: [x] Calorie Diabetic Diet: [] No Added Salt:  [] Increase Protein: [] Other:   Activity:  [] Non weight bearing right foot             Return

## 2024-05-22 NOTE — FLOWSHEET NOTE
05/21/24 1515   Wound 01/02/24 Foot Right;Plantar   Date First Assessed/Time First Assessed: 01/02/24 1400   Present on Original Admission: Yes  Wound Approximate Age at First Assessment (Weeks): 3 weeks  Primary Wound Type: Diabetic Ulcer  Location: Foot  Wound Location Orientation: Right;Plantar   Wound Image   (post-debridement image not saved)   Wound Etiology Diabetic   Dressing Status New dressing applied   Wound Cleansed Wound cleanser   Dressing/Treatment Collagen  (mesalt, alginate, mextra, tape)   Offloading for Diabetic Foot Ulcers Knee scooter;Offloading not required   Dressing Change Due 05/24/24   Wound Length (cm) 4.5 cm   Wound Width (cm) 5 cm   Wound Depth (cm) 4.4 cm   Wound Surface Area (cm^2) 22.5 cm^2   Change in Wound Size % (l*w) -16.88   Wound Volume (cm^3) 99 cm^3   Wound Healing % -47   Post-Procedure Length (cm) 4.6 cm   Post-Procedure Width (cm) 5.1 cm   Post-Procedure Depth (cm) 4.5 cm   Post-Procedure Surface Area (cm^2) 23.46 cm^2   Post-Procedure Volume (cm^3) 105.57 cm^3   Undermining Starts ___ O'Clock 12   Undermining Ends___ O'Clock 12   Undermining Maxium Distance (cm) 1   Wound Assessment Pale granulation tissue;Hyper granulation tissue   Drainage Amount Moderate (25-50%)   Drainage Description Serosanguinous   Odor None   Allyn-wound Assessment Maceration   Margins Unattached edges   Wound Thickness Description not for Pressure Injury Full thickness

## 2024-05-23 ASSESSMENT — ENCOUNTER SYMPTOMS: HEMOPTYSIS: 0

## 2024-05-23 NOTE — HOME HEALTH
Subjective: Pt states Dr. Gann will be doing skin graft on 6/25/24  Falls since last visit No(if yes complete the Fall Tracking Form and include bsrifallreport):   Caregiver involvement changes: Wife is primary caregiver   Home health supplies by type and quantity ordered/delivered this visit include: none     Clinician asked if patient has had any physician contact since last home care visit and patient states: NO  Clinician asked if patient has any new or changed medications and patient states:  NO   If Yes, were medications reconciled? NO   Was the certifying physician notified of changes in medications? NO     Clinical assessment (what this visit means for the patient overall and need for ongoing skilled care) and progress or lack of progress towards SPECIFIC goals: Pt continues to require SN for wound care     Written Teaching Material Utilized: N/A    Interdisciplinary communication with: N/A for the purpose of NA     Discharge planning as follows: When wound is 100% healed    Specific plan for next visit: Wound care

## 2024-05-24 ENCOUNTER — HOME CARE VISIT (OUTPATIENT)
Facility: HOME HEALTH | Age: 54
End: 2024-05-24
Payer: COMMERCIAL

## 2024-05-24 PROCEDURE — G0300 HHS/HOSPICE OF LPN EA 15 MIN: HCPCS

## 2024-05-27 ENCOUNTER — HOME CARE VISIT (OUTPATIENT)
Dept: HOME HEALTH SERVICES | Facility: HOME HEALTH | Age: 54
End: 2024-05-27
Payer: COMMERCIAL

## 2024-05-28 VITALS
HEART RATE: 76 BPM | RESPIRATION RATE: 20 BRPM | TEMPERATURE: 98.6 F | OXYGEN SATURATION: 98 % | SYSTOLIC BLOOD PRESSURE: 112 MMHG | DIASTOLIC BLOOD PRESSURE: 73 MMHG

## 2024-05-28 ASSESSMENT — ENCOUNTER SYMPTOMS: HEMOPTYSIS: 0

## 2024-05-28 NOTE — HOME HEALTH
Subjective: Pt had no complaints or concerns at this time   Falls since last visit No(if yes complete the Fall Tracking Form and include bsrifallreport):   Caregiver involvement changes: Wife is primary caregiver   Home health supplies by type and quantity ordered/delivered this visit include: none     Clinician asked if patient has had any physician contact since last home care visit and patient states: NO  Clinician asked if patient has any new or changed medications and patient states:  NO  If Yes, were medications reconciled? NO   Was the certifying physician notified of changes in medications? NO     Clinical assessment (what this visit means for the patient overall and need for ongoing skilled care) and progress or lack of progress towards SPECIFIC goals: Pt continues to require SN for wound care     Written Teaching Material Utilized: NA     Interdisciplinary communication with: N/A for the purpose of NA     Discharge planning as follows: When wound is 100% healed    Specific plan for next visit: Wound care

## 2024-05-29 ENCOUNTER — HOME CARE VISIT (OUTPATIENT)
Facility: HOME HEALTH | Age: 54
End: 2024-05-29
Payer: COMMERCIAL

## 2024-05-29 VITALS
OXYGEN SATURATION: 98 % | RESPIRATION RATE: 16 BRPM | SYSTOLIC BLOOD PRESSURE: 138 MMHG | DIASTOLIC BLOOD PRESSURE: 64 MMHG | HEART RATE: 77 BPM | TEMPERATURE: 98.2 F

## 2024-05-29 PROCEDURE — G0299 HHS/HOSPICE OF RN EA 15 MIN: HCPCS

## 2024-05-29 NOTE — HOME HEALTH
Subjective: Patient states he has his regular everyday pain.  Falls since last visit No(if yes complete the Fall Tracking Form and include bsrifallreport):   Caregiver involvement changes: no  Home health supplies by type and quantity ordered/delivered this visit include: no    Clinician asked if patient has had any physician contact since last home care visit and patient states: N/A  Clinician asked if patient has any new or changed medications and patient states:  N/A   If Yes, were medications reconciled? N/A   Was the certifying physician notified of changes in medications? N/A     Clinical assessment (what this visit means for the patient overall and need for ongoing skilled care) and progress or lack of progress towards SPECIFIC goals: Patients wound care is complete for this visit. Patient has copious amounts of drainage. No s/s of infection. SN needed for further eval and treatment.     Written Teaching Material Utilized: N/A    Interdisciplinary communication with: N/A    Discharge planning as follows: When goals are met    Specific plan for next visit: Educate in s/s of infection and when to call MD CRISTINA or [phone]

## 2024-05-31 ENCOUNTER — HOME CARE VISIT (OUTPATIENT)
Facility: HOME HEALTH | Age: 54
End: 2024-05-31
Payer: COMMERCIAL

## 2024-05-31 VITALS
RESPIRATION RATE: 16 BRPM | HEART RATE: 77 BPM | DIASTOLIC BLOOD PRESSURE: 78 MMHG | SYSTOLIC BLOOD PRESSURE: 132 MMHG | TEMPERATURE: 97.8 F | OXYGEN SATURATION: 98 %

## 2024-05-31 PROCEDURE — G0299 HHS/HOSPICE OF RN EA 15 MIN: HCPCS

## 2024-05-31 ASSESSMENT — ENCOUNTER SYMPTOMS: PAIN LOCATION - PAIN QUALITY: ACHING

## 2024-06-03 ENCOUNTER — HOME CARE VISIT (OUTPATIENT)
Facility: HOME HEALTH | Age: 54
End: 2024-06-03
Payer: COMMERCIAL

## 2024-06-03 PROCEDURE — G0300 HHS/HOSPICE OF LPN EA 15 MIN: HCPCS

## 2024-06-05 ENCOUNTER — HOME CARE VISIT (OUTPATIENT)
Facility: HOME HEALTH | Age: 54
End: 2024-06-05
Payer: COMMERCIAL

## 2024-06-05 VITALS
HEART RATE: 78 BPM | SYSTOLIC BLOOD PRESSURE: 137 MMHG | OXYGEN SATURATION: 98 % | TEMPERATURE: 97.4 F | RESPIRATION RATE: 29 BRPM | DIASTOLIC BLOOD PRESSURE: 83 MMHG

## 2024-06-05 PROCEDURE — G0300 HHS/HOSPICE OF LPN EA 15 MIN: HCPCS

## 2024-06-05 ASSESSMENT — ENCOUNTER SYMPTOMS: HEMOPTYSIS: 0

## 2024-06-05 NOTE — HOME HEALTH
Subjective: Pt had no complaints or concerns at this time   Falls since last visit No(if yes complete the Fall Tracking Form and include bsrifallreport):   Caregiver involvement changes: Wife assist as needed   Home health supplies by type and quantity ordered/delivered this visit include: none     Clinician asked if patient has had any physician contact since last home care visit and patient states: NO  Clinician asked if patient has any new or changed medications and patient states:  NO   If Yes, were medications reconciled? NO   Was the certifying physician notified of changes in medications? NO     Clinical assessment (what this visit means for the patient overall and need for ongoing skilled care) and progress or lack of progress towards SPECIFIC goals: Pt continues to require SN for wound care     Written Teaching Material Utilized: N/A    Interdisciplinary communication with: N/A for the purpose of NA    Discharge planning as follows: When wound is 100% healed    Specific plan for next visit: Wound care

## 2024-06-07 VITALS
OXYGEN SATURATION: 99 % | SYSTOLIC BLOOD PRESSURE: 128 MMHG | HEART RATE: 74 BPM | DIASTOLIC BLOOD PRESSURE: 90 MMHG | TEMPERATURE: 98.1 F | RESPIRATION RATE: 20 BRPM

## 2024-06-07 ASSESSMENT — ENCOUNTER SYMPTOMS: HEMOPTYSIS: 0

## 2024-06-07 NOTE — HOME HEALTH
Subjective: Pt states drainage soaked through to ace wrap  Falls since last visit No(if yes complete the Fall Tracking Form and include bsrifallreport):   Caregiver involvement changes: Wife assist as needed   Home health supplies by type and quantity ordered/delivered this visit include: yes     Clinician asked if patient has had any physician contact since last home care visit and patient states: NO  Clinician asked if patient has any new or changed medications and patient states:  NO   If Yes, were medications reconciled? NO   Was the certifying physician notified of changes in medications? NO     Clinical assessment (what this visit means for the patient overall and need for ongoing skilled care) and progress or lack of progress towards SPECIFIC goals: Pt continues to require SN for wound care     Written Teaching Material Utilized: N/A    Interdisciplinary communication with: N/A for the purpose of NA     Discharge planning as follows: When wound is 100% healed    Specific plan for next visit: Wound care

## 2024-06-10 ENCOUNTER — HOME CARE VISIT (OUTPATIENT)
Facility: HOME HEALTH | Age: 54
End: 2024-06-10
Payer: COMMERCIAL

## 2024-06-10 PROCEDURE — G0300 HHS/HOSPICE OF LPN EA 15 MIN: HCPCS

## 2024-06-12 ENCOUNTER — HOME CARE VISIT (OUTPATIENT)
Facility: HOME HEALTH | Age: 54
End: 2024-06-12
Payer: COMMERCIAL

## 2024-06-12 PROCEDURE — G0300 HHS/HOSPICE OF LPN EA 15 MIN: HCPCS

## 2024-06-14 ENCOUNTER — HOME CARE VISIT (OUTPATIENT)
Facility: HOME HEALTH | Age: 54
End: 2024-06-14

## 2024-06-14 VITALS
RESPIRATION RATE: 16 BRPM | DIASTOLIC BLOOD PRESSURE: 78 MMHG | SYSTOLIC BLOOD PRESSURE: 132 MMHG | TEMPERATURE: 97.6 F | OXYGEN SATURATION: 97 % | HEART RATE: 83 BPM

## 2024-06-14 PROCEDURE — G0299 HHS/HOSPICE OF RN EA 15 MIN: HCPCS

## 2024-06-17 ENCOUNTER — HOME CARE VISIT (OUTPATIENT)
Facility: HOME HEALTH | Age: 54
End: 2024-06-17
Payer: COMMERCIAL

## 2024-06-17 VITALS
SYSTOLIC BLOOD PRESSURE: 138 MMHG | RESPIRATION RATE: 20 BRPM | TEMPERATURE: 99.3 F | RESPIRATION RATE: 20 BRPM | HEART RATE: 84 BPM | TEMPERATURE: 98.4 F | DIASTOLIC BLOOD PRESSURE: 78 MMHG | OXYGEN SATURATION: 98 % | SYSTOLIC BLOOD PRESSURE: 122 MMHG | HEART RATE: 84 BPM | DIASTOLIC BLOOD PRESSURE: 95 MMHG

## 2024-06-17 VITALS
HEART RATE: 84 BPM | RESPIRATION RATE: 16 BRPM | OXYGEN SATURATION: 98 % | SYSTOLIC BLOOD PRESSURE: 132 MMHG | TEMPERATURE: 98.4 F | DIASTOLIC BLOOD PRESSURE: 72 MMHG

## 2024-06-17 PROCEDURE — G0299 HHS/HOSPICE OF RN EA 15 MIN: HCPCS

## 2024-06-17 ASSESSMENT — ENCOUNTER SYMPTOMS
HEMOPTYSIS: 0
HEMOPTYSIS: 0

## 2024-06-17 NOTE — HOME HEALTH
Subjective: Pt states he has so stomach pain today   Falls since last visit No(if yes complete the Fall Tracking Form and include bsrifallreport):   Caregiver involvement changes: Wife assist as needed   Home health supplies by type and quantity ordered/delivered this visit include: none     Clinician asked if patient has had any physician contact since last home care visit and patient states: NO  Clinician asked if patient has any new or changed medications and patient states:  NO   If Yes, were medications reconciled? NO   Was the certifying physician notified of changes in medications? NO     Clinical assessment (what this visit means for the patient overall and need for ongoing skilled care) and progress or lack of progress towards SPECIFIC goals: Pt continues to require SN for wound care    Written Teaching Material Utilized: N/A    Interdisciplinary communication with: N/A for the purpose of NA     Discharge planning as follows: When wound is 100% healed    Specific plan for next visit: Wound care

## 2024-06-17 NOTE — HOME HEALTH
Subjective: Pt states he is ready to be ambulatory again   Falls since last visit No(if yes complete the Fall Tracking Form and include bsrifallreport):   Caregiver involvement changes: Wife assist as needed   Home health supplies by type and quantity ordered/delivered this visit include: none     Clinician asked if patient has had any physician contact since last home care visit and patient states: NO  Clinician asked if patient has any new or changed medications and patient states:  NO   If Yes, were medications reconciled? NO   Was the certifying physician notified of changes in medications? NO     Clinical assessment (what this visit means for the patient overall and need for ongoing skilled care) and progress or lack of progress towards SPECIFIC goals: Pt continues to require SN for wound care     Written Teaching Material Utilized: N/A    Interdisciplinary communication with: N/A for the purpose of NA     Discharge planning as follows: When wound is 100% healed    Specific plan for next visit: Wound care

## 2024-06-17 NOTE — HOME HEALTH
Subjective: Patient is not having pain today  Falls since last visit No(if yes complete the Fall Tracking Form and include bsrifallreport):   Caregiver involvement changes: no  Home health supplies by type and quantity ordered/delivered this visit include: no    Clinician asked if patient has had any physician contact since last home care visit and patient states: N/A  Clinician asked if patient has any new or changed medications and patient states:  N/A   If Yes, were medications reconciled? N/A   Was the certifying physician notified of changes in medications? N/A     Clinical assessment (what this visit means for the patient overall and need for ongoing skilled care) and progress or lack of progress towards SPECIFIC goals: Patients wound is very bulging for this visit as reflected in pictures. Patient has coupios amounts of drainage and a mild stale odor this am.Oder cleared with wound care. Sent Dr Castaneda a text asking him to look at pictures. Wound care is complete. SN needed for further eval and treatment.     Written Teaching Material Utilized: N/A    Interdisciplinary communication with: N/A     Discharge planning as follows: When goals are met    Specific plan for next visit: Educate in s/s of hypoglycemia and when to call MD CRISTINA or [phone]

## 2024-06-19 ENCOUNTER — HOME CARE VISIT (OUTPATIENT)
Dept: HOME HEALTH SERVICES | Facility: HOME HEALTH | Age: 54
End: 2024-06-19
Payer: COMMERCIAL

## 2024-06-21 ENCOUNTER — HOME CARE VISIT (OUTPATIENT)
Facility: HOME HEALTH | Age: 54
End: 2024-06-21
Payer: COMMERCIAL

## 2024-06-21 PROCEDURE — G0300 HHS/HOSPICE OF LPN EA 15 MIN: HCPCS

## 2024-06-24 ENCOUNTER — HOME CARE VISIT (OUTPATIENT)
Facility: HOME HEALTH | Age: 54
End: 2024-06-24
Payer: COMMERCIAL

## 2024-06-24 VITALS
TEMPERATURE: 98.4 F | OXYGEN SATURATION: 98 % | DIASTOLIC BLOOD PRESSURE: 80 MMHG | HEART RATE: 76 BPM | RESPIRATION RATE: 20 BRPM | SYSTOLIC BLOOD PRESSURE: 130 MMHG

## 2024-06-24 VITALS
TEMPERATURE: 97.8 F | OXYGEN SATURATION: 98 % | HEART RATE: 78 BPM | DIASTOLIC BLOOD PRESSURE: 86 MMHG | SYSTOLIC BLOOD PRESSURE: 132 MMHG | RESPIRATION RATE: 16 BRPM

## 2024-06-24 PROCEDURE — G0299 HHS/HOSPICE OF RN EA 15 MIN: HCPCS

## 2024-06-24 ASSESSMENT — ENCOUNTER SYMPTOMS
HEMOPTYSIS: 0
PAIN LOCATION - PAIN QUALITY: ACHING

## 2024-06-24 NOTE — HOME HEALTH
Subjective: Patient states he does not feel well today.  Falls since last visit No(if yes complete the Fall Tracking Form and include bsrifallreport):   Caregiver involvement changes: no  Home health supplies by type and quantity ordered/delivered this visit include: no    Clinician asked if patient has had any physician contact since last home care visit and patient states: N/A  Clinician asked if patient has any new or changed medications and patient states:  N/A   If Yes, were medications reconciled? N/A   Was the certifying physician notified of changes in medications? N/A     Clinical assessment (what this visit means for the patient overall and need for ongoing skilled care) and progress or lack of progress towards SPECIFIC goals: Patient states he is not feeling great today, he does have a bit of pain today. Patient had to change dressing over  the weekend do to bleeding. Patients wound is getting larger and he is going to see Dr Shrestha tomorrow. SN needed for further eval and treatment.     Written Teaching Material Utilized: N/A    Interdisciplinary communication with: N/A     Discharge planning as follows: When goals are met    Specific plan for next visit: Educate in s/s of infection and when to call MD CRISTINA or [phone]

## 2024-06-24 NOTE — HOME HEALTH
Subjective: Pt is contemplating amputation after visit with ortho MD  Falls since last visit No(if yes complete the Fall Tracking Form and include bsrifallreport):   Caregiver involvement changes: Wife assist as needed   Home health supplies by type and quantity ordered/delivered this visit include: none     Clinician asked if patient has had any physician contact since last home care visit and patient states: NO  Clinician asked if patient has any new or changed medications and patient states:  NO   If Yes, were medications reconciled? NO   Was the certifying physician notified of changes in medications? NO     Clinical assessment (what this visit means for the patient overall and need for ongoing skilled care) and progress or lack of progress towards SPECIFIC goals: Pt continues to require SN for wound care     Written Teaching Material Utilized: N/A    Interdisciplinary communication with: N/A for the purpose of NA     Discharge planning as follows: When wound is 100% healed    Specific plan for next visit: Wound care

## 2024-06-25 ENCOUNTER — HOSPITAL ENCOUNTER (OUTPATIENT)
Facility: HOSPITAL | Age: 54
Discharge: HOME | End: 2024-06-25
Attending: PODIATRIST
Payer: COMMERCIAL

## 2024-06-25 DIAGNOSIS — L08.9 DIABETIC FOOT INFECTION (HCC): ICD-10-CM

## 2024-06-25 DIAGNOSIS — M14.60 CHARCOT'S ARTHROPATHY: Primary | ICD-10-CM

## 2024-06-25 DIAGNOSIS — E11.628 DIABETIC FOOT INFECTION (HCC): ICD-10-CM

## 2024-06-25 PROCEDURE — 11043 DBRDMT MUSC&/FSCA 1ST 20/<: CPT

## 2024-06-25 RX ORDER — LIDOCAINE 40 MG/G
CREAM TOPICAL ONCE
OUTPATIENT
Start: 2024-06-25 | End: 2024-06-25

## 2024-06-25 RX ORDER — BACITRACIN ZINC AND POLYMYXIN B SULFATE 500; 1000 [USP'U]/G; [USP'U]/G
OINTMENT TOPICAL ONCE
OUTPATIENT
Start: 2024-06-25 | End: 2024-06-25

## 2024-06-25 RX ORDER — CLOBETASOL PROPIONATE 0.5 MG/G
OINTMENT TOPICAL ONCE
OUTPATIENT
Start: 2024-06-25 | End: 2024-06-25

## 2024-06-25 RX ORDER — LIDOCAINE 50 MG/G
OINTMENT TOPICAL ONCE
OUTPATIENT
Start: 2024-06-25 | End: 2024-06-25

## 2024-06-25 RX ORDER — IBUPROFEN 200 MG
TABLET ORAL ONCE
OUTPATIENT
Start: 2024-06-25 | End: 2024-06-25

## 2024-06-25 RX ORDER — LIDOCAINE HYDROCHLORIDE 20 MG/ML
JELLY TOPICAL ONCE
OUTPATIENT
Start: 2024-06-25 | End: 2024-06-25

## 2024-06-25 RX ORDER — SODIUM CHLOR/HYPOCHLOROUS ACID 0.033 %
SOLUTION, IRRIGATION IRRIGATION ONCE
OUTPATIENT
Start: 2024-06-25 | End: 2024-06-25

## 2024-06-25 RX ORDER — LIDOCAINE HYDROCHLORIDE 40 MG/ML
SOLUTION TOPICAL ONCE
OUTPATIENT
Start: 2024-06-25 | End: 2024-06-25

## 2024-06-25 RX ORDER — GINSENG 100 MG
CAPSULE ORAL ONCE
OUTPATIENT
Start: 2024-06-25 | End: 2024-06-25

## 2024-06-25 RX ORDER — BETAMETHASONE DIPROPIONATE 0.5 MG/G
CREAM TOPICAL ONCE
OUTPATIENT
Start: 2024-06-25 | End: 2024-06-25

## 2024-06-25 RX ORDER — TRIAMCINOLONE ACETONIDE 1 MG/G
OINTMENT TOPICAL ONCE
OUTPATIENT
Start: 2024-06-25 | End: 2024-06-25

## 2024-06-25 RX ORDER — GENTAMICIN SULFATE 1 MG/G
OINTMENT TOPICAL ONCE
OUTPATIENT
Start: 2024-06-25 | End: 2024-06-25

## 2024-06-26 ENCOUNTER — HOME CARE VISIT (OUTPATIENT)
Facility: HOME HEALTH | Age: 54
End: 2024-06-26
Payer: COMMERCIAL

## 2024-06-26 VITALS
HEART RATE: 86 BPM | DIASTOLIC BLOOD PRESSURE: 76 MMHG | TEMPERATURE: 98.1 F | OXYGEN SATURATION: 98 % | SYSTOLIC BLOOD PRESSURE: 121 MMHG | RESPIRATION RATE: 18 BRPM

## 2024-06-26 PROCEDURE — G0300 HHS/HOSPICE OF LPN EA 15 MIN: HCPCS

## 2024-06-26 NOTE — FLOWSHEET NOTE
06/25/24 1500   Wound 01/02/24 Foot Right;Plantar   Date First Assessed/Time First Assessed: 01/02/24 1400   Present on Original Admission: Yes  Wound Approximate Age at First Assessment (Weeks): 3 weeks  Primary Wound Type: Diabetic Ulcer  Location: Foot  Wound Location Orientation: Right;Plantar   Wound Image    Wound Etiology Diabetic   Dressing Status New dressing applied   Wound Cleansed Betadine/povidone iodine;Vashe   Dressing/Treatment ABD;Ace wrap;Alginate with Ag;Collagen;Cast padding;Gauze dressing/dressing sponge;Roll gauze   Offloading for Diabetic Foot Ulcers Offloading ordered;Felt or foam   Wound Length (cm) 5 cm   Wound Width (cm) 4.5 cm   Wound Depth (cm) 3.5 cm   Wound Surface Area (cm^2) 22.5 cm^2   Change in Wound Size % (l*w) -16.88   Wound Volume (cm^3) 78.75 cm^3   Wound Healing % -17   Post-Procedure Length (cm) 5 cm   Post-Procedure Width (cm) 4.5 cm   Post-Procedure Depth (cm) 3.6 cm   Post-Procedure Surface Area (cm^2) 22.5 cm^2   Post-Procedure Volume (cm^3) 81 cm^3   Wound Assessment Bleeding;Pink/red;Hyper granulation tissue   Drainage Amount Large (50-75% saturated)   Drainage Description Serous   Odor None   Allyn-wound Assessment Edematous;Fragile   Margins Attached edges;Defined edges   Wound Thickness Description not for Pressure Injury Full thickness   Klever Scale   Sensory Perceptions 3   Moisture 4   Activity 3   Mobility 3   Nutrition 4   Friction and Shear 3   Klever Scale Score 20

## 2024-06-26 NOTE — WOUND CARE
Has seen [facility] foot and ankle advised surgical options to treat the right foot  Advised patient should follow Dr. Blair for both wound care as well as the surgical care from now on patient and his wife both agree with the future plan at this point       Debridement Wound Care        Problem List Items Addressed This Visit          Endocrine    Diabetic foot infection (HCC)       Other    Charcot's arthropathy - Primary       Procedure Note  Indications:  Based on my examination of this patient's wound(s)/ulcer(s) today, debridement is  required to promote healing and evaluate the wound base.    Performed by: Celso Castaneda DPM    Consent obtained: Yes    Time out taken: Yes    Debridement: excisional    Using curette the wound(s)/ulcer(s) was/were sharply debrided down through and including the removal of    epidermis, dermis, subcutaneous tissue, and muscle/fascia    Devitalized Tissue Debrided: fibrin, biofilm, and slough    Pre Debridement Measurements:  Are located in the Wound/Ulcer Documentation Flow Sheet    Diabetic ulcer, muscle necrosis    Wound/Ulcer #: 1    Post Debridement Measurements:  Wound/Ulcer Descriptions are Pre Debridement except measurements:    Wound Care Documentation:  Wound 01/02/24 Foot Right;Plantar (Active)   Wound Image   06/25/24 1500   Wound Etiology Diabetic 06/25/24 1500   Dressing Status New dressing applied 06/25/24 1500   Wound Cleansed Betadine/povidone iodine;Vashe 06/25/24 1500   Dressing/Treatment ABD;Ace wrap;Alginate with Ag;Collagen;Cast padding;Gauze dressing/dressing sponge;Roll gauze 06/25/24 1500   Offloading for Diabetic Foot Ulcers Offloading ordered;Felt or foam 06/25/24 1500   Dressing Change Due 05/24/24 05/21/24 1515   Wound Length (cm) 5 cm 06/25/24 1500   Wound Width (cm) 4.5 cm 06/25/24 1500   Wound Depth (cm) 3.5 cm 06/25/24 1500   Wound Surface Area (cm^2) 22.5 cm^2 06/25/24 1500   Change in Wound Size % (l*w) -16.88 06/25/24 1500   Wound

## 2024-06-27 VITALS — RESPIRATION RATE: 20 BRPM | SYSTOLIC BLOOD PRESSURE: 130 MMHG | DIASTOLIC BLOOD PRESSURE: 78 MMHG | HEART RATE: 80 BPM

## 2024-06-27 ASSESSMENT — ENCOUNTER SYMPTOMS: HEMOPTYSIS: 0

## 2024-06-27 NOTE — HOME HEALTH
Subjective: Pt states Podiatrist says he has options more than just amputating right foot   Falls since last visit No(if yes complete the Fall Tracking Form and include bsrifallreport):   Caregiver involvement changes: Wife assist as needed   Home health supplies by type and quantity ordered/delivered this visit include: none     Clinician asked if patient has had any physician contact since last home care visit and patient states: NO  Clinician asked if patient has any new or changed medications and patient states:  NO   If Yes, were medications reconciled? NO   Was the certifying physician notified of changes in medications? NO     Clinical assessment (what this visit means for the patient overall and need for ongoing skilled care) and progress or lack of progress towards SPECIFIC goals: Pt continues to require SN for wound care     Written Teaching Material Utilized: N/A    Interdisciplinary communication with: N/A for the purpose of NA     Discharge planning as follows: When wound is 100% healed    Specific plan for next visit: Wound care

## 2024-06-28 ENCOUNTER — HOME CARE VISIT (OUTPATIENT)
Facility: HOME HEALTH | Age: 54
End: 2024-06-28
Payer: COMMERCIAL

## 2024-06-28 PROCEDURE — G0300 HHS/HOSPICE OF LPN EA 15 MIN: HCPCS

## 2024-06-30 VITALS
TEMPERATURE: 98.4 F | OXYGEN SATURATION: 98 % | SYSTOLIC BLOOD PRESSURE: 115 MMHG | DIASTOLIC BLOOD PRESSURE: 82 MMHG | RESPIRATION RATE: 20 BRPM | HEART RATE: 84 BPM

## 2024-06-30 NOTE — HOME HEALTH
Subjective: Pt states he had to change dressing due to heavy drainage   Falls since last visit No(if yes complete the Fall Tracking Form and include bsrifallreport):   Caregiver involvement changes: Wife assist as needed   Home health supplies by type and quantity ordered/delivered this visit include: none     Clinician asked if patient has had any physician contact since last home care visit and patient states: NO  Clinician asked if patient has any new or changed medications and patient states:  NO   If Yes, were medications reconciled? NO   Was the certifying physician notified of changes in medications? NO     Clinical assessment (what this visit means for the patient overall and need for ongoing skilled care) and progress or lack of progress towards SPECIFIC goals: Pt continues to require SN for wound care     Written Teaching Material Utilized: N/A    Interdisciplinary communication with: N/A for the purpose of NA     Discharge planning as follows: When wound is 100% healed    Specific plan for next visit: Wound care

## 2024-07-01 ENCOUNTER — HOME CARE VISIT (OUTPATIENT)
Facility: HOME HEALTH | Age: 54
End: 2024-07-01
Payer: COMMERCIAL

## 2024-07-01 VITALS
TEMPERATURE: 97.8 F | HEART RATE: 86 BPM | RESPIRATION RATE: 20 BRPM | SYSTOLIC BLOOD PRESSURE: 140 MMHG | OXYGEN SATURATION: 98 % | DIASTOLIC BLOOD PRESSURE: 82 MMHG

## 2024-07-01 PROCEDURE — G0300 HHS/HOSPICE OF LPN EA 15 MIN: HCPCS

## 2024-07-02 ASSESSMENT — ENCOUNTER SYMPTOMS: HEMOPTYSIS: 0

## 2024-07-03 ENCOUNTER — HOME CARE VISIT (OUTPATIENT)
Facility: HOME HEALTH | Age: 54
End: 2024-07-03
Payer: COMMERCIAL

## 2024-07-03 PROCEDURE — G0300 HHS/HOSPICE OF LPN EA 15 MIN: HCPCS

## 2024-07-03 NOTE — HOME HEALTH
Subjective: Pt states he had to change Ace Wrap to RLE   Falls since last visit No(if yes complete the Fall Tracking Form and include bsrifallreport):   Caregiver involvement changes: Wife is primary caregiver   Home health supplies by type and quantity ordered/delivered this visit include: none     Clinician asked if patient has had any physician contact since last home care visit and patient states: NO  Clinician asked if patient has any new or changed medications and patient states:  NO   If Yes, were medications reconciled? NO   Was the certifying physician notified of changes in medications? NO     Clinical assessment (what this visit means for the patient overall and need for ongoing skilled care) and progress or lack of progress towards SPECIFIC goals: Pt continues to require SN for wound care     Written Teaching Material Utilized: N/A    Interdisciplinary communication with: N/A for the purpose of NA     Discharge planning as follows: When wound is 100% healed    Specific plan for next visit: Wound care

## 2024-07-05 ENCOUNTER — HOME CARE VISIT (OUTPATIENT)
Facility: HOME HEALTH | Age: 54
End: 2024-07-05
Payer: COMMERCIAL

## 2024-07-05 PROCEDURE — G0300 HHS/HOSPICE OF LPN EA 15 MIN: HCPCS

## 2024-07-07 VITALS
HEART RATE: 75 BPM | RESPIRATION RATE: 20 BRPM | SYSTOLIC BLOOD PRESSURE: 127 MMHG | TEMPERATURE: 98.6 F | OXYGEN SATURATION: 97 % | DIASTOLIC BLOOD PRESSURE: 85 MMHG

## 2024-07-07 NOTE — HOME HEALTH
Subjective: Nakita SEBASTIAN states he can save the foot  Falls since last visit No(if yes complete the Fall Tracking Form and include bsrifallreport):   Caregiver involvement changes: Wife is primary caregiver   Home health supplies by type and quantity ordered/delivered this visit include: none     Clinician asked if patient has had any physician contact since last home care visit and patient states: NO  Clinician asked if patient has any new or changed medications and patient states:  NO   If Yes, were medications reconciled? NO   Was the certifying physician notified of changes in medications? NO     Clinical assessment (what this visit means for the patient overall and need for ongoing skilled care) and progress or lack of progress towards SPECIFIC goals: Pt continues to require SN for wound care     Written Teaching Material Utilized: N/A    Interdisciplinary communication with: N/A for the purpose of NA     Discharge planning as follows: When wound is 100% healed    Specific plan for next visit: Wound care

## 2024-07-08 VITALS
TEMPERATURE: 97.9 F | HEART RATE: 74 BPM | SYSTOLIC BLOOD PRESSURE: 125 MMHG | RESPIRATION RATE: 20 BRPM | DIASTOLIC BLOOD PRESSURE: 69 MMHG | OXYGEN SATURATION: 97 %

## 2024-07-08 ASSESSMENT — ENCOUNTER SYMPTOMS: HEMOPTYSIS: 0

## 2024-07-08 NOTE — HOME HEALTH
Subjective: Pt states he had imaging of his right foot done today   Falls since last visit No(if yes complete the Fall Tracking Form and include bsrifallreport):   Caregiver involvement changes: Wife is primary caregiver   Home health supplies by type and quantity ordered/delivered this visit include: none     Clinician asked if patient has had any physician contact since last home care visit and patient states: NO  Clinician asked if patient has any new or changed medications and patient states:  NO   If Yes, were medications reconciled? NO   Was the certifying physician notified of changes in medications? NO     Clinical assessment (what this visit means for the patient overall and need for ongoing skilled care) and progress or lack of progress towards SPECIFIC goals: Pt continues to require SN for wound care     Written Teaching Material Utilized: N/A    Interdisciplinary communication with: N/A for the purpose of NA     Discharge planning as follows: When wound is 100% healed    Specific plan for next visit: Wound care

## 2024-07-15 ENCOUNTER — HOME CARE VISIT (OUTPATIENT)
Facility: HOME HEALTH | Age: 54
End: 2024-07-15
Payer: COMMERCIAL

## 2024-07-15 VITALS
HEART RATE: 77 BPM | RESPIRATION RATE: 16 BRPM | SYSTOLIC BLOOD PRESSURE: 136 MMHG | OXYGEN SATURATION: 96 % | TEMPERATURE: 98 F | DIASTOLIC BLOOD PRESSURE: 78 MMHG

## 2024-07-15 PROCEDURE — G0299 HHS/HOSPICE OF RN EA 15 MIN: HCPCS

## 2024-07-15 NOTE — HOME HEALTH
Subjective: Patient denies pain  Falls since last visit No(if yes complete the Fall Tracking Form and include bsrifallreport):   Caregiver involvement changes: no  Home health supplies by type and quantity ordered/delivered this visit include: no    Clinician asked if patient has had any physician contact since last home care visit and patient states: N/A  Clinician asked if patient has any new or changed medications and patient states:  N/A   If Yes, were medications reconciled? N/A   Was the certifying physician notified of changes in medications? N/A     Clinical assessment (what this visit means for the patient overall and need for ongoing skilled care) and progress or lack of progress towards SPECIFIC goals: Patient wound is staying the same. Patient is having surgery on friday. Patient will be put on hold. SN needed for further eval and treatment.     Written Teaching Material Utilized: N/A    Interdisciplinary communication with: N/A     Discharge planning as follows: When goals are met    Specific plan for next visit: treatment and eval

## 2024-07-16 ENCOUNTER — HOSPITAL ENCOUNTER (OUTPATIENT)
Facility: HOSPITAL | Age: 54
Discharge: HOME | End: 2024-07-19
Payer: COMMERCIAL

## 2024-07-16 VITALS
SYSTOLIC BLOOD PRESSURE: 142 MMHG | OXYGEN SATURATION: 97 % | DIASTOLIC BLOOD PRESSURE: 91 MMHG | WEIGHT: 240 LBS | TEMPERATURE: 98.3 F | HEIGHT: 71 IN | BODY MASS INDEX: 33.6 KG/M2 | RESPIRATION RATE: 18 BRPM | HEART RATE: 89 BPM

## 2024-07-16 LAB
ABO + RH BLD: NORMAL
ANION GAP SERPL CALC-SCNC: 7 MMOL/L (ref 5–15)
BASOPHILS # BLD: 0.1 K/UL (ref 0–0.1)
BASOPHILS NFR BLD: 1 % (ref 0–1)
BLOOD GROUP ANTIBODIES SERPL: NEGATIVE
BUN SERPL-MCNC: 14 MG/DL (ref 6–20)
BUN/CREAT SERPL: 14 (ref 12–20)
CA-I BLD-MCNC: 9.1 MG/DL (ref 8.5–10.1)
CHLORIDE SERPL-SCNC: 110 MMOL/L (ref 97–108)
CO2 SERPL-SCNC: 23 MMOL/L (ref 21–32)
CREAT SERPL-MCNC: 1 MG/DL (ref 0.7–1.3)
DIFFERENTIAL METHOD BLD: ABNORMAL
EOSINOPHIL # BLD: 0.3 K/UL (ref 0–0.4)
EOSINOPHIL NFR BLD: 3 % (ref 0–7)
ERYTHROCYTE [DISTWIDTH] IN BLOOD BY AUTOMATED COUNT: 14.5 % (ref 11.5–14.5)
GLUCOSE SERPL-MCNC: 191 MG/DL (ref 65–100)
HCT VFR BLD AUTO: 37.7 % (ref 36.6–50.3)
HGB BLD-MCNC: 12.2 G/DL (ref 12.1–17)
IMM GRANULOCYTES # BLD AUTO: 0.1 K/UL (ref 0–0.04)
IMM GRANULOCYTES NFR BLD AUTO: 1 % (ref 0–0.5)
LYMPHOCYTES # BLD: 2.1 K/UL (ref 0.8–3.5)
LYMPHOCYTES NFR BLD: 21 % (ref 12–49)
MCH RBC QN AUTO: 29 PG (ref 26–34)
MCHC RBC AUTO-ENTMCNC: 32.4 G/DL (ref 30–36.5)
MCV RBC AUTO: 89.5 FL (ref 80–99)
MONOCYTES # BLD: 0.7 K/UL (ref 0–1)
MONOCYTES NFR BLD: 7 % (ref 5–13)
MRSA DNA SPEC QL NAA+PROBE: NOT DETECTED
NEUTS SEG # BLD: 7 K/UL (ref 1.8–8)
NEUTS SEG NFR BLD: 67 % (ref 32–75)
NRBC # BLD: 0 K/UL (ref 0–0.01)
NRBC BLD-RTO: 0 PER 100 WBC
PLATELET # BLD AUTO: 262 K/UL (ref 150–400)
PMV BLD AUTO: 10.7 FL (ref 8.9–12.9)
POTASSIUM SERPL-SCNC: 4.4 MMOL/L (ref 3.5–5.1)
RBC # BLD AUTO: 4.21 M/UL (ref 4.1–5.7)
SODIUM SERPL-SCNC: 140 MMOL/L (ref 136–145)
SPECIMEN EXP DATE BLD: NORMAL
WBC # BLD AUTO: 10.1 K/UL (ref 4.1–11.1)

## 2024-07-16 PROCEDURE — 86850 RBC ANTIBODY SCREEN: CPT

## 2024-07-16 PROCEDURE — 86901 BLOOD TYPING SEROLOGIC RH(D): CPT

## 2024-07-16 PROCEDURE — 85025 COMPLETE CBC W/AUTO DIFF WBC: CPT

## 2024-07-16 PROCEDURE — 86900 BLOOD TYPING SEROLOGIC ABO: CPT

## 2024-07-16 PROCEDURE — 36415 COLL VENOUS BLD VENIPUNCTURE: CPT

## 2024-07-16 PROCEDURE — 87641 MR-STAPH DNA AMP PROBE: CPT

## 2024-07-16 PROCEDURE — 80048 BASIC METABOLIC PNL TOTAL CA: CPT

## 2024-07-16 RX ORDER — MULTIVIT-MIN/IRON/FOLIC ACID/K 18-600-40
1 CAPSULE ORAL
COMMUNITY

## 2024-07-19 ENCOUNTER — APPOINTMENT (OUTPATIENT)
Facility: HOSPITAL | Age: 54
End: 2024-07-19
Attending: ORTHOPAEDIC SURGERY
Payer: COMMERCIAL

## 2024-07-19 ENCOUNTER — ANESTHESIA EVENT (OUTPATIENT)
Facility: HOSPITAL | Age: 54
End: 2024-07-19
Payer: COMMERCIAL

## 2024-07-19 ENCOUNTER — HOME CARE VISIT (OUTPATIENT)
Facility: HOME HEALTH | Age: 54
End: 2024-07-19
Payer: COMMERCIAL

## 2024-07-19 ENCOUNTER — ANESTHESIA (OUTPATIENT)
Facility: HOSPITAL | Age: 54
End: 2024-07-19
Payer: COMMERCIAL

## 2024-07-19 ENCOUNTER — HOSPITAL ENCOUNTER (OUTPATIENT)
Facility: HOSPITAL | Age: 54
Discharge: HOME HEALTH | End: 2024-07-20
Attending: ORTHOPAEDIC SURGERY | Admitting: ORTHOPAEDIC SURGERY
Payer: COMMERCIAL

## 2024-07-19 DIAGNOSIS — M14.60 CHARCOT'S ARTHROPATHY: ICD-10-CM

## 2024-07-19 DIAGNOSIS — M14.671 CHARCOT ANKLE, RIGHT: Primary | ICD-10-CM

## 2024-07-19 DIAGNOSIS — M14.679 CHARCOT ARTHROPATHY OF MIDFOOT: ICD-10-CM

## 2024-07-19 LAB
GLUCOSE BLD STRIP.AUTO-MCNC: 115 MG/DL (ref 65–100)
GLUCOSE BLD STRIP.AUTO-MCNC: 83 MG/DL (ref 65–100)
PERFORMED BY:: ABNORMAL
PERFORMED BY:: NORMAL

## 2024-07-19 PROCEDURE — 2500000003 HC RX 250 WO HCPCS: Performed by: NURSE ANESTHETIST, CERTIFIED REGISTERED

## 2024-07-19 PROCEDURE — 36415 COLL VENOUS BLD VENIPUNCTURE: CPT

## 2024-07-19 PROCEDURE — 6360000002 HC RX W HCPCS: Performed by: ORTHOPAEDIC SURGERY

## 2024-07-19 PROCEDURE — 6370000000 HC RX 637 (ALT 250 FOR IP): Performed by: ORTHOPAEDIC SURGERY

## 2024-07-19 PROCEDURE — 2580000003 HC RX 258: Performed by: ORTHOPAEDIC SURGERY

## 2024-07-19 PROCEDURE — 2500000003 HC RX 250 WO HCPCS: Performed by: ANESTHESIOLOGY

## 2024-07-19 PROCEDURE — 2709999900 HC NON-CHARGEABLE SUPPLY: Performed by: ORTHOPAEDIC SURGERY

## 2024-07-19 PROCEDURE — 7100000000 HC PACU RECOVERY - FIRST 15 MIN: Performed by: ORTHOPAEDIC SURGERY

## 2024-07-19 PROCEDURE — 7100000001 HC PACU RECOVERY - ADDTL 15 MIN: Performed by: ORTHOPAEDIC SURGERY

## 2024-07-19 PROCEDURE — 3600000014 HC SURGERY LEVEL 4 ADDTL 15MIN: Performed by: ORTHOPAEDIC SURGERY

## 2024-07-19 PROCEDURE — 3700000001 HC ADD 15 MINUTES (ANESTHESIA): Performed by: ORTHOPAEDIC SURGERY

## 2024-07-19 PROCEDURE — 6360000002 HC RX W HCPCS: Performed by: NURSE ANESTHETIST, CERTIFIED REGISTERED

## 2024-07-19 PROCEDURE — 2580000003 HC RX 258: Performed by: ANESTHESIOLOGY

## 2024-07-19 PROCEDURE — C1713 ANCHOR/SCREW BN/BN,TIS/BN: HCPCS | Performed by: ORTHOPAEDIC SURGERY

## 2024-07-19 PROCEDURE — 2500000003 HC RX 250 WO HCPCS: Performed by: ORTHOPAEDIC SURGERY

## 2024-07-19 PROCEDURE — 6360000002 HC RX W HCPCS: Performed by: ANESTHESIOLOGY

## 2024-07-19 PROCEDURE — 82962 GLUCOSE BLOOD TEST: CPT

## 2024-07-19 PROCEDURE — 3600000004 HC SURGERY LEVEL 4 BASE: Performed by: ORTHOPAEDIC SURGERY

## 2024-07-19 PROCEDURE — 2720000010 HC SURG SUPPLY STERILE: Performed by: ORTHOPAEDIC SURGERY

## 2024-07-19 PROCEDURE — 3700000000 HC ANESTHESIA ATTENDED CARE: Performed by: ORTHOPAEDIC SURGERY

## 2024-07-19 PROCEDURE — 76000 FLUOROSCOPY <1 HR PHYS/QHP: CPT

## 2024-07-19 DEVICE — IMPLANTABLE DEVICE: Type: IMPLANTABLE DEVICE | Site: FOOT | Status: FUNCTIONAL

## 2024-07-19 DEVICE — C-WIRE PAK DOUBLE ENDED ORTHOPAEDIC WIRE, SPADE, .062" (1.57 MM)
Type: IMPLANTABLE DEVICE | Site: FOOT | Status: FUNCTIONAL
Brand: C-WIRE

## 2024-07-19 RX ORDER — MEPERIDINE HYDROCHLORIDE 25 MG/ML
12.5 INJECTION INTRAMUSCULAR; INTRAVENOUS; SUBCUTANEOUS EVERY 5 MIN PRN
Status: DISCONTINUED | OUTPATIENT
Start: 2024-07-19 | End: 2024-07-19 | Stop reason: HOSPADM

## 2024-07-19 RX ORDER — SODIUM CHLORIDE 9 MG/ML
INJECTION, SOLUTION INTRAVENOUS CONTINUOUS
Status: DISCONTINUED | OUTPATIENT
Start: 2024-07-19 | End: 2024-07-21 | Stop reason: HOSPADM

## 2024-07-19 RX ORDER — SODIUM CHLORIDE 0.9 % (FLUSH) 0.9 %
5-40 SYRINGE (ML) INJECTION EVERY 12 HOURS SCHEDULED
Status: DISCONTINUED | OUTPATIENT
Start: 2024-07-19 | End: 2024-07-19 | Stop reason: HOSPADM

## 2024-07-19 RX ORDER — LABETALOL HYDROCHLORIDE 5 MG/ML
10 INJECTION, SOLUTION INTRAVENOUS
Status: DISCONTINUED | OUTPATIENT
Start: 2024-07-19 | End: 2024-07-19 | Stop reason: HOSPADM

## 2024-07-19 RX ORDER — ROCURONIUM BROMIDE 10 MG/ML
INJECTION, SOLUTION INTRAVENOUS PRN
Status: DISCONTINUED | OUTPATIENT
Start: 2024-07-19 | End: 2024-07-19 | Stop reason: SDUPTHER

## 2024-07-19 RX ORDER — OXYCODONE HYDROCHLORIDE 5 MG/1
10 TABLET ORAL PRN
Status: DISCONTINUED | OUTPATIENT
Start: 2024-07-19 | End: 2024-07-19 | Stop reason: HOSPADM

## 2024-07-19 RX ORDER — SODIUM CHLORIDE, SODIUM LACTATE, POTASSIUM CHLORIDE, CALCIUM CHLORIDE 600; 310; 30; 20 MG/100ML; MG/100ML; MG/100ML; MG/100ML
INJECTION, SOLUTION INTRAVENOUS CONTINUOUS
Status: DISCONTINUED | OUTPATIENT
Start: 2024-07-19 | End: 2024-07-19 | Stop reason: HOSPADM

## 2024-07-19 RX ORDER — IRBESARTAN AND HYDROCHLOROTHIAZIDE 300; 12.5 MG/1; MG/1
1 TABLET, FILM COATED ORAL DAILY
COMMUNITY
Start: 2024-05-04

## 2024-07-19 RX ORDER — LIDOCAINE 4 G/G
1 PATCH TOPICAL AS NEEDED
Status: DISCONTINUED | OUTPATIENT
Start: 2024-07-19 | End: 2024-07-19 | Stop reason: HOSPADM

## 2024-07-19 RX ORDER — FENTANYL CITRATE 50 UG/ML
INJECTION, SOLUTION INTRAMUSCULAR; INTRAVENOUS PRN
Status: DISCONTINUED | OUTPATIENT
Start: 2024-07-19 | End: 2024-07-19 | Stop reason: SDUPTHER

## 2024-07-19 RX ORDER — DEXTROSE MONOHYDRATE 100 MG/ML
INJECTION, SOLUTION INTRAVENOUS CONTINUOUS PRN
Status: DISCONTINUED | OUTPATIENT
Start: 2024-07-19 | End: 2024-07-19 | Stop reason: HOSPADM

## 2024-07-19 RX ORDER — SODIUM CHLORIDE, SODIUM LACTATE, POTASSIUM CHLORIDE, CALCIUM CHLORIDE 600; 310; 30; 20 MG/100ML; MG/100ML; MG/100ML; MG/100ML
INJECTION, SOLUTION INTRAVENOUS ONCE
Status: DISCONTINUED | OUTPATIENT
Start: 2024-07-19 | End: 2024-07-19 | Stop reason: HOSPADM

## 2024-07-19 RX ORDER — LIDOCAINE HYDROCHLORIDE 20 MG/ML
INJECTION, SOLUTION EPIDURAL; INFILTRATION; INTRACAUDAL; PERINEURAL PRN
Status: DISCONTINUED | OUTPATIENT
Start: 2024-07-19 | End: 2024-07-19 | Stop reason: SDUPTHER

## 2024-07-19 RX ORDER — GLUCAGON 1 MG/ML
1 KIT INJECTION PRN
Status: DISCONTINUED | OUTPATIENT
Start: 2024-07-19 | End: 2024-07-19 | Stop reason: HOSPADM

## 2024-07-19 RX ORDER — SODIUM CHLORIDE 9 MG/ML
INJECTION, SOLUTION INTRAVENOUS PRN
Status: DISCONTINUED | OUTPATIENT
Start: 2024-07-19 | End: 2024-07-21 | Stop reason: HOSPADM

## 2024-07-19 RX ORDER — ONDANSETRON 4 MG/1
4 TABLET, ORALLY DISINTEGRATING ORAL EVERY 8 HOURS PRN
Status: DISCONTINUED | OUTPATIENT
Start: 2024-07-19 | End: 2024-07-21 | Stop reason: HOSPADM

## 2024-07-19 RX ORDER — PHENYLEPHRINE HCL IN 0.9% NACL 1 MG/10 ML
SYRINGE (ML) INTRAVENOUS PRN
Status: DISCONTINUED | OUTPATIENT
Start: 2024-07-19 | End: 2024-07-19 | Stop reason: SDUPTHER

## 2024-07-19 RX ORDER — SODIUM CHLORIDE 0.9 % (FLUSH) 0.9 %
5-40 SYRINGE (ML) INJECTION PRN
Status: DISCONTINUED | OUTPATIENT
Start: 2024-07-19 | End: 2024-07-21 | Stop reason: HOSPADM

## 2024-07-19 RX ORDER — NALOXONE HYDROCHLORIDE 0.4 MG/ML
INJECTION, SOLUTION INTRAMUSCULAR; INTRAVENOUS; SUBCUTANEOUS PRN
Status: DISCONTINUED | OUTPATIENT
Start: 2024-07-19 | End: 2024-07-19 | Stop reason: HOSPADM

## 2024-07-19 RX ORDER — HYDRALAZINE HYDROCHLORIDE 20 MG/ML
10 INJECTION INTRAMUSCULAR; INTRAVENOUS
Status: DISCONTINUED | OUTPATIENT
Start: 2024-07-19 | End: 2024-07-19 | Stop reason: HOSPADM

## 2024-07-19 RX ORDER — OXYCODONE HYDROCHLORIDE 5 MG/1
5 TABLET ORAL EVERY 4 HOURS PRN
Status: DISCONTINUED | OUTPATIENT
Start: 2024-07-19 | End: 2024-07-21 | Stop reason: HOSPADM

## 2024-07-19 RX ORDER — SODIUM CHLORIDE, SODIUM LACTATE, POTASSIUM CHLORIDE, CALCIUM CHLORIDE 600; 310; 30; 20 MG/100ML; MG/100ML; MG/100ML; MG/100ML
INJECTION, SOLUTION INTRAVENOUS CONTINUOUS PRN
Status: DISCONTINUED | OUTPATIENT
Start: 2024-07-19 | End: 2024-07-19 | Stop reason: SDUPTHER

## 2024-07-19 RX ORDER — ONDANSETRON 2 MG/ML
4 INJECTION INTRAMUSCULAR; INTRAVENOUS EVERY 6 HOURS PRN
Status: DISCONTINUED | OUTPATIENT
Start: 2024-07-19 | End: 2024-07-21 | Stop reason: HOSPADM

## 2024-07-19 RX ORDER — HYDROMORPHONE HYDROCHLORIDE 1 MG/ML
0.5 INJECTION, SOLUTION INTRAMUSCULAR; INTRAVENOUS; SUBCUTANEOUS EVERY 5 MIN PRN
Status: DISCONTINUED | OUTPATIENT
Start: 2024-07-19 | End: 2024-07-19 | Stop reason: HOSPADM

## 2024-07-19 RX ORDER — MIDAZOLAM HYDROCHLORIDE 1 MG/ML
INJECTION INTRAMUSCULAR; INTRAVENOUS PRN
Status: DISCONTINUED | OUTPATIENT
Start: 2024-07-19 | End: 2024-07-19 | Stop reason: SDUPTHER

## 2024-07-19 RX ORDER — SUCCINYLCHOLINE/SOD CL,ISO/PF 200MG/10ML
SYRINGE (ML) INTRAVENOUS PRN
Status: DISCONTINUED | OUTPATIENT
Start: 2024-07-19 | End: 2024-07-19 | Stop reason: SDUPTHER

## 2024-07-19 RX ORDER — SODIUM CHLORIDE 0.9 % (FLUSH) 0.9 %
5-40 SYRINGE (ML) INJECTION PRN
Status: DISCONTINUED | OUTPATIENT
Start: 2024-07-19 | End: 2024-07-19 | Stop reason: HOSPADM

## 2024-07-19 RX ORDER — FENTANYL CITRATE 50 UG/ML
50 INJECTION, SOLUTION INTRAMUSCULAR; INTRAVENOUS EVERY 5 MIN PRN
Status: DISCONTINUED | OUTPATIENT
Start: 2024-07-19 | End: 2024-07-19 | Stop reason: HOSPADM

## 2024-07-19 RX ORDER — OXYCODONE HYDROCHLORIDE 5 MG/1
5 TABLET ORAL PRN
Status: DISCONTINUED | OUTPATIENT
Start: 2024-07-19 | End: 2024-07-19 | Stop reason: HOSPADM

## 2024-07-19 RX ORDER — METOCLOPRAMIDE HYDROCHLORIDE 5 MG/ML
10 INJECTION INTRAMUSCULAR; INTRAVENOUS
Status: DISCONTINUED | OUTPATIENT
Start: 2024-07-19 | End: 2024-07-19 | Stop reason: HOSPADM

## 2024-07-19 RX ORDER — LORAZEPAM 2 MG/ML
0.5 INJECTION INTRAMUSCULAR
Status: DISCONTINUED | OUTPATIENT
Start: 2024-07-19 | End: 2024-07-19 | Stop reason: HOSPADM

## 2024-07-19 RX ORDER — IPRATROPIUM BROMIDE AND ALBUTEROL SULFATE 2.5; .5 MG/3ML; MG/3ML
1 SOLUTION RESPIRATORY (INHALATION)
Status: DISCONTINUED | OUTPATIENT
Start: 2024-07-19 | End: 2024-07-19 | Stop reason: HOSPADM

## 2024-07-19 RX ORDER — SODIUM CHLORIDE 0.9 % (FLUSH) 0.9 %
5-40 SYRINGE (ML) INJECTION EVERY 12 HOURS SCHEDULED
Status: DISCONTINUED | OUTPATIENT
Start: 2024-07-19 | End: 2024-07-21 | Stop reason: HOSPADM

## 2024-07-19 RX ORDER — CELECOXIB 200 MG/1
400 CAPSULE ORAL ONCE
Status: COMPLETED | OUTPATIENT
Start: 2024-07-19 | End: 2024-07-19

## 2024-07-19 RX ORDER — ONDANSETRON 2 MG/ML
INJECTION INTRAMUSCULAR; INTRAVENOUS PRN
Status: DISCONTINUED | OUTPATIENT
Start: 2024-07-19 | End: 2024-07-19 | Stop reason: SDUPTHER

## 2024-07-19 RX ORDER — FAMOTIDINE 10 MG/ML
INJECTION, SOLUTION INTRAVENOUS PRN
Status: DISCONTINUED | OUTPATIENT
Start: 2024-07-19 | End: 2024-07-19 | Stop reason: SDUPTHER

## 2024-07-19 RX ORDER — SODIUM CHLORIDE 9 MG/ML
INJECTION, SOLUTION INTRAVENOUS PRN
Status: DISCONTINUED | OUTPATIENT
Start: 2024-07-19 | End: 2024-07-19 | Stop reason: HOSPADM

## 2024-07-19 RX ORDER — DIPHENHYDRAMINE HYDROCHLORIDE 50 MG/ML
12.5 INJECTION INTRAMUSCULAR; INTRAVENOUS
Status: DISCONTINUED | OUTPATIENT
Start: 2024-07-19 | End: 2024-07-19 | Stop reason: HOSPADM

## 2024-07-19 RX ORDER — PROPOFOL 10 MG/ML
INJECTION, EMULSION INTRAVENOUS PRN
Status: DISCONTINUED | OUTPATIENT
Start: 2024-07-19 | End: 2024-07-19 | Stop reason: SDUPTHER

## 2024-07-19 RX ORDER — ONDANSETRON 2 MG/ML
4 INJECTION INTRAMUSCULAR; INTRAVENOUS
Status: DISCONTINUED | OUTPATIENT
Start: 2024-07-19 | End: 2024-07-19 | Stop reason: HOSPADM

## 2024-07-19 RX ORDER — ASPIRIN 325 MG
325 TABLET, DELAYED RELEASE (ENTERIC COATED) ORAL 2 TIMES DAILY
Status: DISCONTINUED | OUTPATIENT
Start: 2024-07-19 | End: 2024-07-21 | Stop reason: HOSPADM

## 2024-07-19 RX ORDER — ACETAMINOPHEN 325 MG/1
650 TABLET ORAL ONCE
Status: COMPLETED | OUTPATIENT
Start: 2024-07-19 | End: 2024-07-19

## 2024-07-19 RX ORDER — ACETAMINOPHEN 325 MG/1
650 TABLET ORAL EVERY 4 HOURS PRN
Status: DISCONTINUED | OUTPATIENT
Start: 2024-07-19 | End: 2024-07-21 | Stop reason: HOSPADM

## 2024-07-19 RX ADMIN — Medication 100 MCG: at 14:37

## 2024-07-19 RX ADMIN — ASPIRIN 325 MG: 325 TABLET, COATED ORAL at 21:03

## 2024-07-19 RX ADMIN — SODIUM CHLORIDE, POTASSIUM CHLORIDE, SODIUM LACTATE AND CALCIUM CHLORIDE: 600; 310; 30; 20 INJECTION, SOLUTION INTRAVENOUS at 13:33

## 2024-07-19 RX ADMIN — SODIUM CHLORIDE: 9 INJECTION, SOLUTION INTRAVENOUS at 19:51

## 2024-07-19 RX ADMIN — FENTANYL CITRATE 50 MCG: 50 INJECTION INTRAMUSCULAR; INTRAVENOUS at 16:24

## 2024-07-19 RX ADMIN — FAMOTIDINE 20 MG: 10 INJECTION, SOLUTION INTRAVENOUS at 13:33

## 2024-07-19 RX ADMIN — TRANEXAMIC ACID 1000 MG: 1 INJECTION, SOLUTION INTRAVENOUS at 16:21

## 2024-07-19 RX ADMIN — CEFAZOLIN SODIUM 2000 MG: 1 INJECTION, POWDER, FOR SOLUTION INTRAMUSCULAR; INTRAVENOUS at 17:35

## 2024-07-19 RX ADMIN — CELECOXIB 400 MG: 200 CAPSULE ORAL at 11:51

## 2024-07-19 RX ADMIN — PROPOFOL 200 MG: 10 INJECTION, EMULSION INTRAVENOUS at 13:50

## 2024-07-19 RX ADMIN — SUGAMMADEX 200 MG: 100 INJECTION, SOLUTION INTRAVENOUS at 16:28

## 2024-07-19 RX ADMIN — ACETAMINOPHEN 650 MG: 325 TABLET ORAL at 11:51

## 2024-07-19 RX ADMIN — MIDAZOLAM 2 MG: 1 INJECTION INTRAMUSCULAR; INTRAVENOUS at 13:33

## 2024-07-19 RX ADMIN — SODIUM CHLORIDE, POTASSIUM CHLORIDE, SODIUM LACTATE AND CALCIUM CHLORIDE: 600; 310; 30; 20 INJECTION, SOLUTION INTRAVENOUS at 11:54

## 2024-07-19 RX ADMIN — ROCURONIUM BROMIDE 10 MG: 10 INJECTION, SOLUTION INTRAVENOUS at 13:50

## 2024-07-19 RX ADMIN — Medication 100 MCG: at 14:31

## 2024-07-19 RX ADMIN — ONDANSETRON 4 MG: 2 INJECTION INTRAMUSCULAR; INTRAVENOUS at 17:11

## 2024-07-19 RX ADMIN — LIDOCAINE HYDROCHLORIDE 100 MG: 20 INJECTION, SOLUTION EPIDURAL; INFILTRATION; INTRACAUDAL; PERINEURAL at 13:50

## 2024-07-19 RX ADMIN — TRANEXAMIC ACID 1000 MG: 1 INJECTION, SOLUTION INTRAVENOUS at 14:01

## 2024-07-19 RX ADMIN — OXYCODONE 5 MG: 5 TABLET ORAL at 21:03

## 2024-07-19 RX ADMIN — FENTANYL CITRATE 50 MCG: 50 INJECTION INTRAMUSCULAR; INTRAVENOUS at 17:16

## 2024-07-19 RX ADMIN — Medication 100 MCG: at 14:01

## 2024-07-19 RX ADMIN — SODIUM CHLORIDE, PRESERVATIVE FREE 10 ML: 5 INJECTION INTRAVENOUS at 19:55

## 2024-07-19 RX ADMIN — ROCURONIUM BROMIDE 40 MG: 10 INJECTION, SOLUTION INTRAVENOUS at 14:01

## 2024-07-19 RX ADMIN — CEFAZOLIN SODIUM 2000 MG: 1 INJECTION, POWDER, FOR SOLUTION INTRAMUSCULAR; INTRAVENOUS at 13:42

## 2024-07-19 RX ADMIN — FENTANYL CITRATE 50 MCG: 50 INJECTION, SOLUTION INTRAMUSCULAR; INTRAVENOUS at 18:59

## 2024-07-19 RX ADMIN — Medication 100 MCG: at 14:16

## 2024-07-19 RX ADMIN — Medication 140 MG: at 13:52

## 2024-07-19 RX ADMIN — PHENYLEPHRINE HYDROCHLORIDE 50 MCG/MIN: 10 INJECTION INTRAVENOUS at 14:45

## 2024-07-19 NOTE — ANESTHESIA PRE PROCEDURE
Department of Anesthesiology  Preprocedure Note       Name:  Juwan Espinoza   Age:  54 y.o.  :  1970                                          MRN:  [id number]         Date:  2024      Surgeon: Surgeon(s):  Lm Blair MD    Procedure: Procedure(s):  MIDFOOT ARTHRODESIS WITH EXTERNAL FIXATOR APPLICATION, TENDOACHILLES LENGTHENING  .    Medications prior to admission:   Prior to Admission medications    Medication Sig Start Date End Date Taking? Authorizing Provider   gabapentin (NEURONTIN) 300 MG capsule TAKE 2 CAPSULES BY MOUTH 3 TIMES A DAY 24  MARVIN Jones MD   insulin glargine (LANTUS SOLOSTAR) 100 UNIT/ML injection pen Inject 100 Units into the skin daily  Patient taking differently: Inject 80 Units into the skin daily 24   MARVIN Jones MD   Cholecalciferol (VITAMIN D) 50 MCG (2000 UT) CAPS capsule Take 1 capsule by mouth every morning (before breakfast)    ProviderSofia MD   Continuous Glucose Transmitter (DEXCOM G6 TRANSMITTER) MISC USE AS DIRECTED & CHANGE EVERY 10 DAYS. 24   MARVIN Jones MD   lactobacillus (CULTURELLE) capsule Take 1 capsule by mouth daily (with breakfast) 3/12/24   Brad Siddiqui MD   Continuous Blood Gluc Sensor (DEXCOM G6 SENSOR) MISC APPLY SENSOR AND CHANGE EVERY 10 DAYS. 24   MARVIN Jones MD   JANUMET  MG per tablet TAKE 1 TABLET BY MOUTH TWICE A DAY WITH FOOD  Patient taking differently: Take 1 tablet by mouth 2 times daily (with meals) 24   MARVIN Jones MD   Multiple Vitamins-Minerals (MULTIVITAL PO) Take 1 tablet by mouth daily.    ProviderSofia MD   Semaglutide, 1 MG/DOSE, (OZEMPIC, 1 MG/DOSE,) 4 MG/3ML SOPN INJECT 1 MG BY SUBCUTANEOUS ROUTE EVERY SEVEN (7) DAYS.  Patient taking differently: Inject 1 mg into the skin every 7 days INJECT 1 MG BY SUBCUTANEOUS ROUTE EVERY SEVEN (7) DAYS.    Patient stated takes on    MARVIN Jones  comment: Echo:  ·  Left Ventricle: Normal left ventricular systolic function with a visually estimated EF of 55 - 60%. Left ventricle size is normal. Increased wall thickness. Normal wall motion.  ·  Image quality is adequate.  ·  No signs of endocarditis by trans thoracic echocardiogram.     Neuro/Psych:   Negative Neuro/Psych ROS  (+) neuromuscular disease:            GI/Hepatic/Renal:   (+) renal disease: kidney stones          Endo/Other:    (+) DiabetesType II DM.                  ROS comment: On ozempic Abdominal:             Vascular: negative vascular ROS.         Other Findings:           Anesthesia Plan      general     ASA 3     (Standard ASA monitors: continuous EKG, BP, HR, pulse oximeter, temperature, and capnography.)  Induction: intravenous.    MIPS: Postoperative opioids intended and Prophylactic antiemetics administered.  Anesthetic plan and risks discussed with patient (and family, if present.).                      Roberto Carlos Ba MD   7/19/2024

## 2024-07-19 NOTE — OP NOTE
Operative Note      Patient: Juwan Espinoza  YOB: 1970  MRN: [id number]    Date of Procedure: 7/19/2024    Pre-Op Diagnosis Codes:     * Charcot arthropathy of midfoot [M14.679]    Post-Op Diagnosis: Same       Procedure(s):  MIDFOOT ARTHRODESIS WITH EXTERNAL FIXATOR APPLICATION, TENDOACHILLES LENGTHENING  .  Excisional debridement of the plantar wound to include skin subcutaneous tissue and bone over an area measuring 10 cm²    Surgeon(s):  Lm Blair MD    Assistant:   Surgical Assistant: Ilana Ocampo    Anesthesia: General    Estimated Blood Loss (mL): less than 100     Complications: None    Specimens:   ID Type Source Tests Collected by Time Destination   1 : Right foot ulcer Tissue Foot SURGICAL PATHOLOGY Lm Blair MD 7/19/2024 1447        Implants:  Implant Name Type Inv. Item Serial No.  Lot No. LRB No. Used Action   C WIRE FIX L5IN DIA0.062IN DBL END SPADE TIP - SNA  C WIRE FIX L5IN DIA0.062IN DBL END SPADE TIP NA [employer] [id number] Right 5 Implanted   PLATE BNE CONN 90 DEG 3 HOLE OFFSET NS - SNA  PLATE BNE CONN 90 DEG 3 HOLE OFFSET NS NA [employer] NA Right 1 Implanted   NUT ORTH HEX NS - SNA  NUT ORTH HEX NS NA [employer]PUY [employer]WD NA Right 8 Implanted         Drains:   Negative Pressure Wound Therapy Foot Right;Plantar (Active)   Cycle Continuous 07/19/24 1840   Drainage Amount Small 07/19/24 1840   Drainage Description Sanguinous 07/19/24 1840       [REMOVED] Negative Pressure Wound Therapy Foot Right;Plantar (Removed)       Findings:  Infection Present At Time Of Surgery (PATOS) (choose all levels that have infection present):  - Deep Infection (muscle/fascia) present as evidenced by abscess  Other Findings: Patient has dislocated midfoot    Detailed Description of Procedure:   Patient was prepped and draped in the usual manner, timeout was called, antibiotics were given, the orders triple hemisection tendo Achilles lengthening

## 2024-07-20 VITALS
DIASTOLIC BLOOD PRESSURE: 79 MMHG | RESPIRATION RATE: 18 BRPM | SYSTOLIC BLOOD PRESSURE: 142 MMHG | TEMPERATURE: 99.7 F | OXYGEN SATURATION: 93 % | WEIGHT: 242.06 LBS | BODY MASS INDEX: 33.89 KG/M2 | HEIGHT: 71 IN | HEART RATE: 98 BPM

## 2024-07-20 LAB
ANION GAP SERPL CALC-SCNC: 5 MMOL/L (ref 5–15)
BUN SERPL-MCNC: 13 MG/DL (ref 6–20)
BUN/CREAT SERPL: 12 (ref 12–20)
CA-I BLD-MCNC: 8.8 MG/DL (ref 8.5–10.1)
CHLORIDE SERPL-SCNC: 107 MMOL/L (ref 97–108)
CO2 SERPL-SCNC: 27 MMOL/L (ref 21–32)
CREAT SERPL-MCNC: 1.1 MG/DL (ref 0.7–1.3)
ERYTHROCYTE [DISTWIDTH] IN BLOOD BY AUTOMATED COUNT: 14.9 % (ref 11.5–14.5)
GLUCOSE SERPL-MCNC: 124 MG/DL (ref 65–100)
HCT VFR BLD AUTO: 35.7 % (ref 36.6–50.3)
HGB BLD-MCNC: 11.6 G/DL (ref 12.1–17)
MCH RBC QN AUTO: 29.1 PG (ref 26–34)
MCHC RBC AUTO-ENTMCNC: 32.5 G/DL (ref 30–36.5)
MCV RBC AUTO: 89.7 FL (ref 80–99)
NRBC # BLD: 0 K/UL (ref 0–0.01)
NRBC BLD-RTO: 0 PER 100 WBC
PLATELET # BLD AUTO: 218 K/UL (ref 150–400)
PMV BLD AUTO: 10.6 FL (ref 8.9–12.9)
POTASSIUM SERPL-SCNC: 3.9 MMOL/L (ref 3.5–5.1)
RBC # BLD AUTO: 3.98 M/UL (ref 4.1–5.7)
SODIUM SERPL-SCNC: 139 MMOL/L (ref 136–145)
WBC # BLD AUTO: 10.4 K/UL (ref 4.1–11.1)

## 2024-07-20 PROCEDURE — 2580000003 HC RX 258: Performed by: ORTHOPAEDIC SURGERY

## 2024-07-20 PROCEDURE — 80048 BASIC METABOLIC PNL TOTAL CA: CPT

## 2024-07-20 PROCEDURE — 6370000000 HC RX 637 (ALT 250 FOR IP): Performed by: ORTHOPAEDIC SURGERY

## 2024-07-20 PROCEDURE — 85027 COMPLETE CBC AUTOMATED: CPT

## 2024-07-20 PROCEDURE — 36415 COLL VENOUS BLD VENIPUNCTURE: CPT

## 2024-07-20 RX ORDER — ASPIRIN 325 MG
325 TABLET, DELAYED RELEASE (ENTERIC COATED) ORAL 2 TIMES DAILY
Qty: 30 TABLET | Refills: 3 | Status: SHIPPED | OUTPATIENT
Start: 2024-07-20

## 2024-07-20 RX ORDER — AMOXICILLIN AND CLAVULANATE POTASSIUM 875; 125 MG/1; MG/1
1 TABLET, FILM COATED ORAL 2 TIMES DAILY
Qty: 28 TABLET | Refills: 0 | Status: SHIPPED | OUTPATIENT
Start: 2024-07-20 | End: 2024-08-03

## 2024-07-20 RX ORDER — TRAMADOL HYDROCHLORIDE 50 MG/1
50 TABLET ORAL EVERY 6 HOURS PRN
Qty: 20 TABLET | Refills: 0 | Status: SHIPPED | OUTPATIENT
Start: 2024-07-20 | End: 2024-07-27

## 2024-07-20 RX ADMIN — OXYCODONE 5 MG: 5 TABLET ORAL at 01:08

## 2024-07-20 RX ADMIN — SODIUM CHLORIDE, PRESERVATIVE FREE 10 ML: 5 INJECTION INTRAVENOUS at 14:15

## 2024-07-20 RX ADMIN — SODIUM CHLORIDE: 9 INJECTION, SOLUTION INTRAVENOUS at 06:10

## 2024-07-20 RX ADMIN — ASPIRIN 325 MG: 325 TABLET, COATED ORAL at 20:59

## 2024-07-20 RX ADMIN — OXYCODONE 5 MG: 5 TABLET ORAL at 06:02

## 2024-07-20 RX ADMIN — OXYCODONE 5 MG: 5 TABLET ORAL at 14:51

## 2024-07-20 RX ADMIN — OXYCODONE 5 MG: 5 TABLET ORAL at 20:59

## 2024-07-20 RX ADMIN — ACETAMINOPHEN 650 MG: 325 TABLET ORAL at 20:59

## 2024-07-20 RX ADMIN — ASPIRIN 325 MG: 325 TABLET, COATED ORAL at 09:51

## 2024-07-20 NOTE — CARE COORDINATION
CM noted consult for wound vac.  CM spoke with surgeon and explained wound vac process and that insurance authorization would be needed.  Because this is not likely to happen over the weekend; physician has decided to discharge patient with a wound dressing and wound vac will be arranged by surgeon outpatient.      CM met with patient and wife.  Patient was previously arranged with  before surgery.

## 2024-07-20 NOTE — PROGRESS NOTES
RN spoke to Dr Blair via phone @ [phone]. MD confirmed that patient will discharge without woundvac and that woundvac and dressing are to be removed and replaced with guaze, silver mepilex, and pressure dressing to be applied to RLE by attending RN prior to discharge.

## 2024-07-20 NOTE — DISCHARGE INSTRUCTIONS
Patient does not currently have a negative pressure wound dressing.    The patient and his wife are to perform daily wound dressing changes.  This includes packing the wound with a silver packing material, the entire package, and then placing a silver Mepilex dressing over this.  The nursing team and the patient team were both instructed in this.  They have enough dressing supplies to last at least 4 days until the patient receives his wound VAC.

## 2024-07-20 NOTE — PROGRESS NOTES
4 Eyes Skin Assessment     NAME:  Juwan Espinoza  YOB: 1970  MEDICAL RECORD NUMBER:  [id number]    The patient is being assessed for  Transfer to New Unit    I agree that at least one RN has performed a thorough Head to Toe Skin Assessment on the patient. ALL assessment sites listed below have been assessed.      Areas assessed by both nurses:    Head, Face, Ears, Shoulders, Back, Chest, Arms, Elbows, Hands, Sacrum. Buttock, Coccyx, Ischium, Legs. Feet and Heels, and Under Medical Devices         Does the Patient have a Wound? Yes wound(s) were present on assessment. LDA wound assessment was Initiated and completed by RN       Klever Prevention initiated by RN: Yes  Wound Care Orders initiated by RN: No    Pressure Injury (Stage 3,4, Unstageable, DTI, NWPT, and Complex wounds) if present, place Wound referral order by RN under : No    New Ostomies, if present place, Ostomy referral order under : No     Nurse 1 eSignature: Electronically signed by JOS ABURTO RN on 7/20/24 at 12:02 AM EDT    **SHARE this note so that the co-signing nurse can place an eSignature**    Nurse 2 eSignature: Electronically signed by Rosemary Shukla RN on 7/20/24 at 12:31 AM EDT

## 2024-07-20 NOTE — PLAN OF CARE
Problem: Pain  Goal: Verbalizes/displays adequate comfort level or baseline comfort level  Outcome: Progressing      Problem: Skin/Tissue Integrity  Goal: Absence of new skin breakdown  Description: 1.  Monitor for areas of redness and/or skin breakdown  2.  Assess vascular access sites hourly  3.  Every 4-6 hours minimum:  Change oxygen saturation probe site  4.  Every 4-6 hours:  If on nasal continuous positive airway pressure, respiratory therapy assess nares and determine need for appliance change or resting period.  Outcome: Progressing

## 2024-07-20 NOTE — PROGRESS NOTES
Wound Care orders questioned and attempting to clarify previous orders.  Dressing removed and patient with large 3''  deep area @ bottom foot in which requires some type of packing. Orders not clear as to what nursing is to pack wound with and cover affected area. Call placed to Dr. Ashby states on his way to a surgery case at this time will come up and see patient after OR case. Patient foot covered with dry dressing until seen by MD to clarify and rewrite orders in clarity.

## 2024-07-22 ENCOUNTER — HOME CARE VISIT (OUTPATIENT)
Facility: HOME HEALTH | Age: 54
End: 2024-07-22
Payer: COMMERCIAL

## 2024-07-22 PROCEDURE — G0300 HHS/HOSPICE OF LPN EA 15 MIN: HCPCS

## 2024-07-23 ENCOUNTER — HOME CARE VISIT (OUTPATIENT)
Facility: HOME HEALTH | Age: 54
End: 2024-07-23
Payer: COMMERCIAL

## 2024-07-23 PROCEDURE — G0151 HHCP-SERV OF PT,EA 15 MIN: HCPCS

## 2024-07-24 ENCOUNTER — HOME CARE VISIT (OUTPATIENT)
Facility: HOME HEALTH | Age: 54
End: 2024-07-24
Payer: COMMERCIAL

## 2024-07-24 VITALS
HEART RATE: 92 BPM | DIASTOLIC BLOOD PRESSURE: 78 MMHG | TEMPERATURE: 98.3 F | SYSTOLIC BLOOD PRESSURE: 136 MMHG | OXYGEN SATURATION: 95 % | RESPIRATION RATE: 18 BRPM

## 2024-07-24 VITALS
TEMPERATURE: 97.7 F | SYSTOLIC BLOOD PRESSURE: 134 MMHG | RESPIRATION RATE: 16 BRPM | HEART RATE: 92 BPM | DIASTOLIC BLOOD PRESSURE: 84 MMHG | OXYGEN SATURATION: 95 %

## 2024-07-24 PROCEDURE — G0299 HHS/HOSPICE OF RN EA 15 MIN: HCPCS

## 2024-07-24 ASSESSMENT — ENCOUNTER SYMPTOMS
PAIN LOCATION - PAIN QUALITY: ACHING SHARP
PAIN LOCATION - PAIN QUALITY: ACHING, THROBBING

## 2024-07-24 NOTE — HOME HEALTH
Reason for referral, [facility] SUMMARY of clinical condition: SN got a PT order to teach the pt how to get up and down the front steps using crutches so that he could get to the MD appt tomorrow. The PT worked with the pt on the stairs with R handrail when descending the steps ad the crutch under L UE. The Pt needed max A     Clinical Assessment/Skilled reason for admission to home health (What this means for the patient overall and need for ongoing skilled care):The Pt Lives in a two story home with his wife and 5 dogs and a cat. The Pt has had major surgery to R foot to correct a large wound on the bottom of his foot and Charcot foot. The Pt has an external fixator with pins on the R LE and foot. He is R LE NWB at this time. He is mostly using the knee scooter but PT taught him how to hop with  RW and how to hop down the steps using crutches and the rail. The Pt is not safe going up and down steps vis crutch and hopping. He attempted to put his R foot down several times when going up and down the steps and he was too weak and uncoordinated and unsafe to be able to hop up and down the steps. The Pt's wife will not be safe to assist him either. The Pt's wife is looking into a ramp since the pt will have a fixator on for at least 12 weeks. THey agreed that was the safest for him. The PT instructed the pt on the need to keep R LE at hip and knee strong and taught the pt LAQ, seated marching, standing R LE marching only, R hamstring curls only. He demonstrated the ability to do these safely  He has declined any additional PT services at this time    Functional Mobility:  Bed Mobility (rolling/scooting): Minimal /Moderate Assistance (requires assistance with less than 50% of the effort)  Transfers (supine to chair and return to supine): Maximum Assistance (requires assistance with more than 50% of the effort).  Patient uses device: yes  Gait:  Ambulates with maximum assistance using a front wheeled walker and crutches  Safety

## 2024-07-24 NOTE — HOME HEALTH
Subjective: Patient states his pain is a 3-4 today  Falls since last visit No(if yes complete the Fall Tracking Form and include bsrifallreport):   Caregiver involvement changes: no  Home health supplies by type and quantity ordered/delivered this visit include: no    Clinician asked if patient has had any physician contact since last home care visit and patient states: N/A  Clinician asked if patient has any new or changed medications and patient states:  N/A   If Yes, were medications reconciled? N/A   Was the certifying physician notified of changes in medications? N/A     Clinical assessment (what this visit means for the patient overall and need for ongoing skilled care) and progress or lack of progress towards SPECIFIC goals: Patient has a external fixator, along with the open wound to the bottom of the right foot. Patient will go today to have wound vac placed. Patient is having some pain but actually tolerating very well. SN needed for further eval and treatment.     Written Teaching Material Utilized: N/A    Interdisciplinary communication with: N/A     Discharge planning as follows: When goals are met    Specific plan for next visit: Educate in s/s of infection and when to call MD CRISTINA or [phone]

## 2024-07-25 ENCOUNTER — HOME CARE VISIT (OUTPATIENT)
Facility: HOME HEALTH | Age: 54
End: 2024-07-25
Payer: COMMERCIAL

## 2024-07-25 VITALS
DIASTOLIC BLOOD PRESSURE: 74 MMHG | OXYGEN SATURATION: 99 % | SYSTOLIC BLOOD PRESSURE: 128 MMHG | TEMPERATURE: 97.7 F | HEART RATE: 86 BPM | RESPIRATION RATE: 16 BRPM

## 2024-07-25 PROCEDURE — G0299 HHS/HOSPICE OF RN EA 15 MIN: HCPCS

## 2024-07-26 ENCOUNTER — HOME CARE VISIT (OUTPATIENT)
Facility: HOME HEALTH | Age: 54
End: 2024-07-26
Payer: COMMERCIAL

## 2024-07-26 VITALS
DIASTOLIC BLOOD PRESSURE: 75 MMHG | HEART RATE: 74 BPM | TEMPERATURE: 98 F | RESPIRATION RATE: 20 BRPM | OXYGEN SATURATION: 99 % | SYSTOLIC BLOOD PRESSURE: 130 MMHG

## 2024-07-26 PROCEDURE — G0299 HHS/HOSPICE OF RN EA 15 MIN: HCPCS

## 2024-07-26 ASSESSMENT — ENCOUNTER SYMPTOMS: HEMOPTYSIS: 0

## 2024-07-26 NOTE — HOME HEALTH
Subjective:Patient states his fixator is very heavy.  Falls since last visit No(if yes complete the Fall Tracking Form and include bsrifallreport):   Caregiver involvement changes: no  Home health supplies by type and quantity ordered/delivered this visit include: no    Clinician asked if patient has had any physician contact since last home care visit and patient states: N/A  Clinician asked if patient has any new or changed medications and patient states:  N/A   If Yes, were medications reconciled? N/A   Was the certifying physician notified of changes in medications? N/A     Clinical assessment (what this visit means for the patient overall and need for ongoing skilled care) and progress or lack of progress towards SPECIFIC goals: Patient is having no pain today at visit but is having some pain intermittently. Patient wound care is complete. Awaiting wound vac to arrive on friday. SN needed for further eval and treatment.     Written Teaching Material Utilized: N/A    Interdisciplinary communication with: N/A     Discharge planning as follows: When goals are met    Specific plan for next visit: Educate in s/s of infection   and when to call MD CRISTINA or [phone]

## 2024-07-26 NOTE — HOME HEALTH
Subjective: Pt state he had surgery last friday to reconstruct right foot  Falls since last visit No(if yes complete the Fall Tracking Form and include bsrifallreport):   Caregiver involvement changes: Wife is primary caregiver   Home health supplies by type and quantity ordered/delivered this visit include: none     Clinician asked if patient has had any physician contact since last home care visit and patient states: NO  Clinician asked if patient has any new or changed medications and patient states:  NO   If Yes, were medications reconciled? NO   Was the certifying physician notified of changes in medications? NO     Clinical assessment (what this visit means for the patient overall and need for ongoing skilled care) and progress or lack of progress towards SPECIFIC goals: Pt continues to require SN for wound care     Written Teaching Material Utilized: N/A    Interdisciplinary communication with: N/A for the purpose of NA     Discharge planning as follows: When wound is 100% healed    Specific plan for next visit: Wound care

## 2024-07-27 VITALS
TEMPERATURE: 97.5 F | DIASTOLIC BLOOD PRESSURE: 84 MMHG | RESPIRATION RATE: 16 BRPM | HEART RATE: 85 BPM | OXYGEN SATURATION: 96 % | SYSTOLIC BLOOD PRESSURE: 132 MMHG

## 2024-07-27 ASSESSMENT — ENCOUNTER SYMPTOMS: PAIN LOCATION - PAIN QUALITY: ACHING

## 2024-07-27 NOTE — HOME HEALTH
Subjective:Patient states he is having some pain.  Falls since last visit No(if yes complete the Fall Tracking Form and include bsrifallreport):   Caregiver involvement changes: no  Home health supplies by type and quantity ordered/delivered this visit include: no    Clinician asked if patient has had any physician contact since last home care visit and patient states: N/A  Clinician asked if patient has any new or changed medications and patient states:  N/A   If Yes, were medications reconciled? N/A   Was the certifying physician notified of changes in medications? N/A     Clinical assessment (what this visit means for the patient overall and need for ongoing skilled care) and progress or lack of progress towards SPECIFIC goals: Patient wound care is complete for this visit. Wound vac has arrived and placed. Patient tolerated very well. SN needed for further eval and treatment.    Written Teaching Material Utilized: N/A    Interdisciplinary communication with: N/A     Discharge planning as follows: When goals are met    Specific plan for next visit: Educate in s/s of infection and when to call MD CRISTINA or [phone]

## 2024-07-29 ENCOUNTER — HOME CARE VISIT (OUTPATIENT)
Facility: HOME HEALTH | Age: 54
End: 2024-07-29
Payer: COMMERCIAL

## 2024-07-29 VITALS
HEART RATE: 81 BPM | SYSTOLIC BLOOD PRESSURE: 128 MMHG | OXYGEN SATURATION: 98 % | TEMPERATURE: 97.9 F | RESPIRATION RATE: 16 BRPM | DIASTOLIC BLOOD PRESSURE: 78 MMHG

## 2024-07-29 PROCEDURE — G0299 HHS/HOSPICE OF RN EA 15 MIN: HCPCS

## 2024-07-29 NOTE — HOME HEALTH
Subjective: Patient denies pain today but said the night was rough a 10/10 pain.   Falls since last visit No(if yes complete the Fall Tracking Form and include bsrifallreport):   Caregiver involvement changes: no  Home health supplies by type and quantity ordered/delivered this visit include: no    Clinician asked if patient has had any physician contact since last home care visit and patient states: N/A  Clinician asked if patient has any new or changed medications and patient states:  N/A   If Yes, were medications reconciled? N/A   Was the certifying physician notified of changes in medications? N/A     Clinical assessment (what this visit means for the patient overall and need for ongoing skilled care) and progress or lack of progress towards SPECIFIC goals: Patient wound is doing great the incision on medial side is oozing a very small amout of which the doctor said it would and the bottom of the foot is doing great. SN needed for further eval and treatment.     Written Teaching Material Utilized: N/A    Interdisciplinary communication with: N/A    Discharge planning as follows: When goals are met    Specific plan for next visit: Educate on diabetes disease process

## 2024-07-31 ENCOUNTER — HOME CARE VISIT (OUTPATIENT)
Facility: HOME HEALTH | Age: 54
End: 2024-07-31
Payer: COMMERCIAL

## 2024-08-02 ENCOUNTER — HOME CARE VISIT (OUTPATIENT)
Facility: HOME HEALTH | Age: 54
End: 2024-08-02
Payer: COMMERCIAL

## 2024-08-02 PROCEDURE — G0300 HHS/HOSPICE OF LPN EA 15 MIN: HCPCS

## 2024-08-05 ENCOUNTER — HOME CARE VISIT (OUTPATIENT)
Facility: HOME HEALTH | Age: 54
End: 2024-08-05
Payer: COMMERCIAL

## 2024-08-05 VITALS
RESPIRATION RATE: 20 BRPM | DIASTOLIC BLOOD PRESSURE: 70 MMHG | TEMPERATURE: 97.8 F | HEART RATE: 92 BPM | OXYGEN SATURATION: 95 % | SYSTOLIC BLOOD PRESSURE: 128 MMHG

## 2024-08-05 PROCEDURE — G0300 HHS/HOSPICE OF LPN EA 15 MIN: HCPCS

## 2024-08-05 ASSESSMENT — ENCOUNTER SYMPTOMS: HEMOPTYSIS: 0

## 2024-08-05 NOTE — HOME HEALTH
Subjective: Pt states he has had a little pain with external fixator  Falls since last visit No(if yes complete the Fall Tracking Form and include bsrifallreport):   Caregiver involvement changes: Wife is primary caregiver   Home health supplies by type and quantity ordered/delivered this visit include: none     Clinician asked if patient has had any physician contact since last home care visit and patient states: NO  Clinician asked if patient has any new or changed medications and patient states:  NO   If Yes, were medications reconciled? NO   Was the certifying physician notified of changes in medications? NO     Clinical assessment (what this visit means for the patient overall and need for ongoing skilled care) and progress or lack of progress towards SPECIFIC goals: Pt continues to require SN for wound care and external fixator care     Written Teaching Material Utilized: N/A    Interdisciplinary communication with: N/A for the purpose of NA     Discharge planning as follows: When wound is 100% healed    Specific plan for next visit: Wound care

## 2024-08-07 ENCOUNTER — HOME CARE VISIT (OUTPATIENT)
Facility: HOME HEALTH | Age: 54
End: 2024-08-07
Payer: COMMERCIAL

## 2024-08-07 VITALS
RESPIRATION RATE: 18 BRPM | DIASTOLIC BLOOD PRESSURE: 78 MMHG | TEMPERATURE: 97.6 F | HEART RATE: 78 BPM | OXYGEN SATURATION: 95 % | SYSTOLIC BLOOD PRESSURE: 130 MMHG

## 2024-08-07 VITALS
RESPIRATION RATE: 20 BRPM | HEART RATE: 85 BPM | TEMPERATURE: 97.8 F | DIASTOLIC BLOOD PRESSURE: 84 MMHG | OXYGEN SATURATION: 97 % | SYSTOLIC BLOOD PRESSURE: 127 MMHG

## 2024-08-07 PROCEDURE — G0300 HHS/HOSPICE OF LPN EA 15 MIN: HCPCS

## 2024-08-07 ASSESSMENT — ENCOUNTER SYMPTOMS: HEMOPTYSIS: 0

## 2024-08-07 NOTE — HOME HEALTH
Subjective: Pt states that the external fixator is heavy and that it limits his mobility   Falls since last visit No(if yes complete the Fall Tracking Form and include bsrifallreport):   Caregiver involvement changes: Wife assist as needed   Home health supplies by type and quantity ordered/delivered this visit include: none     Clinician asked if patient has had any physician contact since last home care visit and patient states: NO  Clinician asked if patient has any new or changed medications and patient states:  NO   If Yes, were medications reconciled? NO   Was the certifying physician notified of changes in medications? NO     Clinical assessment (what this visit means for the patient overall and need for ongoing skilled care) and progress or lack of progress towards SPECIFIC goals: Pt continues to require SN for wound care     Written Teaching Material Utilized: N/A    Interdisciplinary communication with: N/A for the purpose of NA     Discharge planning as follows: When wound is 100% healed    Specific plan for next visit: Wound care

## 2024-08-09 ENCOUNTER — HOME CARE VISIT (OUTPATIENT)
Facility: HOME HEALTH | Age: 54
End: 2024-08-09
Payer: COMMERCIAL

## 2024-08-09 VITALS
OXYGEN SATURATION: 98 % | TEMPERATURE: 98 F | SYSTOLIC BLOOD PRESSURE: 135 MMHG | HEART RATE: 81 BPM | DIASTOLIC BLOOD PRESSURE: 74 MMHG | RESPIRATION RATE: 20 BRPM

## 2024-08-09 PROCEDURE — G0300 HHS/HOSPICE OF LPN EA 15 MIN: HCPCS

## 2024-08-12 ENCOUNTER — HOME CARE VISIT (OUTPATIENT)
Facility: HOME HEALTH | Age: 54
End: 2024-08-12
Payer: COMMERCIAL

## 2024-08-12 VITALS
SYSTOLIC BLOOD PRESSURE: 138 MMHG | TEMPERATURE: 97.8 F | HEART RATE: 92 BPM | OXYGEN SATURATION: 98 % | DIASTOLIC BLOOD PRESSURE: 86 MMHG | RESPIRATION RATE: 16 BRPM

## 2024-08-12 PROCEDURE — G0299 HHS/HOSPICE OF RN EA 15 MIN: HCPCS

## 2024-08-12 NOTE — HOME HEALTH
Subjective: Patient denies pain  Falls since last visit No(if yes complete the Fall Tracking Form and include bsrifallreport):   Caregiver involvement changes: no  Home health supplies by type and quantity ordered/delivered this visit include: no    Clinician asked if patient has had any physician contact since last home care visit and patient states: N/A  Clinician asked if patient has any new or changed medications and patient states:  N/A   If Yes, were medications reconciled? N/A   Was the certifying physician notified of changes in medications? N/A     Clinical assessment (what this visit means for the patient overall and need for ongoing skilled care) and progress or lack of progress towards SPECIFIC goals: Patient wound it looking very good. No wound vac due to depth of wound. All pin sites look very good without infection and patient denies pain today. SN needed for further treatment and evaluation.    Written Teaching Material Utilized: N/A    Interdisciplinary communication with: N/A     Discharge planning as follows: When goals are met    Specific plan for next visit: Educate in s/s of hypertensive crisis and when to call MD CRISTINA or [phone]

## 2024-08-13 ASSESSMENT — ENCOUNTER SYMPTOMS
HEMOPTYSIS: 0
HEMOPTYSIS: 0

## 2024-08-13 NOTE — HOME HEALTH
Subjective: Pt states the external fixator to RLE is heavy   Falls since last visit No(if yes complete the Fall Tracking Form and include bsrifallreport):   Caregiver involvement changes: Wife assist as needed   Home health supplies by type and quantity ordered/delivered this visit include: none     Clinician asked if patient has had any physician contact since last home care visit and patient states: NO  Clinician asked if patient has any new or changed medications and patient states:  NO   If Yes, were medications reconciled? NO   Was the certifying physician notified of changes in medications? NO     Clinical assessment (what this visit means for the patient overall and need for ongoing skilled care) and progress or lack of progress towards SPECIFIC goals: Pt continues to require SN for wound care     Written Teaching Material Utilized: N/A    Interdisciplinary communication with: N/A for the purpose of NA     Discharge planning as follows: When wound is 100% healed    Specific plan for next visit: Wound care

## 2024-08-14 ENCOUNTER — HOME CARE VISIT (OUTPATIENT)
Facility: HOME HEALTH | Age: 54
End: 2024-08-14
Payer: COMMERCIAL

## 2024-08-14 VITALS
DIASTOLIC BLOOD PRESSURE: 82 MMHG | RESPIRATION RATE: 16 BRPM | HEART RATE: 88 BPM | SYSTOLIC BLOOD PRESSURE: 142 MMHG | OXYGEN SATURATION: 98 % | TEMPERATURE: 97.9 F

## 2024-08-14 PROCEDURE — G0299 HHS/HOSPICE OF RN EA 15 MIN: HCPCS

## 2024-08-14 ASSESSMENT — ENCOUNTER SYMPTOMS: PAIN LOCATION - PAIN QUALITY: SHARP

## 2024-08-14 NOTE — HOME HEALTH
Justification for continued intermittent care: patient with wound care concerns as follows traumatic wound to the right foot     MARVIN Jones MD approved of the following: the need for continued Skilled Nursing home health services and plan of care for   Skilled Nursing for: additional assessment wound care services   Subjective: Patient states he is having pain today and had a hard night.    Clinical assessment (what this visit means for the patient overall and need for ongoing skilled care) and progress or lack of progress towards SPECIFIC goals: Patient is in the second phase of his foot repair with an external fixator to the right leg and foot along with open wound to the bottom which is healing very well now. Next phase, pt will have a cast placed due to the fixator has shifted due to no bones in the middle of his foot. Dr want to place a cast thru the rest of healing process then will have another surgery for rods to be placed in foot for stability and hopefully mobility. SN needed for further eval and wound care.     Falls since last visit No(if yes complete the Fall Tracking Form and include bsrifallreport):     Written Teaching Material Utilized: N/A    Interdisciplinary communication with: N/A    Medications reconciled and all medications are available in the home this visit. A list of reconciled medications has been given to the patient/caregiver     Patient/caregiver instructed on plan of care and are agreeable to plan of care at this time.      Discharge planning as follows: When goals are met    Specific plan for next visit: Instruct in safety issues regarding Proper use of assistive device

## 2024-08-15 NOTE — PERIOP NOTE
Yoel served Dr. Blair to confirm instructions on  prior to planned surgery  MD states patient does not need to hold

## 2024-08-16 ENCOUNTER — HOME CARE VISIT (OUTPATIENT)
Facility: HOME HEALTH | Age: 54
End: 2024-08-16
Payer: COMMERCIAL

## 2024-08-16 VITALS
SYSTOLIC BLOOD PRESSURE: 122 MMHG | RESPIRATION RATE: 16 BRPM | OXYGEN SATURATION: 98 % | HEART RATE: 98 BPM | TEMPERATURE: 97.8 F | DIASTOLIC BLOOD PRESSURE: 64 MMHG

## 2024-08-16 PROCEDURE — G0299 HHS/HOSPICE OF RN EA 15 MIN: HCPCS

## 2024-08-16 ASSESSMENT — ENCOUNTER SYMPTOMS: PAIN LOCATION - PAIN QUALITY: ACHING

## 2024-08-17 NOTE — HOME HEALTH
Subjective:Patient is c/o 4/10 pain at certain pin sites on his leg at the top  Falls since last visit No(if yes complete the Fall Tracking Form and include bsrifallreport):   Caregiver involvement changes: no  Home health supplies by type and quantity ordered/delivered this visit include: no    Clinician asked if patient has had any physician contact since last home care visit and patient states: N/A  Clinician asked if patient has any new or changed medications and patient states:  N/A   If Yes, were medications reconciled? N/A   Was the certifying physician notified of changes in medications? N/A     Clinical assessment (what this visit means for the patient overall and need for ongoing skilled care) and progress or lack of progress towards SPECIFIC goals: Patient is having pain at some of the pin sites, pulling sensation. Looks like device has shifted even more from last visit. Patient states he is gonna take it easy this weekend. Patient wound is healing well. SN needed for further eval and treatment.     Written Teaching Material Utilized: N/A    Interdisciplinary communication with: N/A   Discharge planning as follows: When goals are met    Specific plan for next visit: Instruct in safety issues regarding Tripping hazards

## 2024-08-19 ENCOUNTER — ANESTHESIA EVENT (OUTPATIENT)
Facility: HOSPITAL | Age: 54
End: 2024-08-19
Payer: COMMERCIAL

## 2024-08-19 ENCOUNTER — HOME CARE VISIT (OUTPATIENT)
Facility: HOME HEALTH | Age: 54
End: 2024-08-19
Payer: COMMERCIAL

## 2024-08-19 ENCOUNTER — APPOINTMENT (OUTPATIENT)
Facility: HOSPITAL | Age: 54
End: 2024-08-19
Attending: ORTHOPAEDIC SURGERY
Payer: COMMERCIAL

## 2024-08-19 ENCOUNTER — ANESTHESIA (OUTPATIENT)
Facility: HOSPITAL | Age: 54
End: 2024-08-19
Payer: COMMERCIAL

## 2024-08-19 ENCOUNTER — HOSPITAL ENCOUNTER (OUTPATIENT)
Facility: HOSPITAL | Age: 54
Discharge: HOME | End: 2024-08-19
Attending: ORTHOPAEDIC SURGERY | Admitting: ORTHOPAEDIC SURGERY
Payer: COMMERCIAL

## 2024-08-19 VITALS
SYSTOLIC BLOOD PRESSURE: 130 MMHG | OXYGEN SATURATION: 94 % | DIASTOLIC BLOOD PRESSURE: 80 MMHG | HEIGHT: 70 IN | RESPIRATION RATE: 16 BRPM | HEART RATE: 70 BPM | TEMPERATURE: 97.9 F | BODY MASS INDEX: 34.36 KG/M2 | WEIGHT: 240 LBS

## 2024-08-19 DIAGNOSIS — M14.671 CHARCOT ANKLE, RIGHT: Primary | ICD-10-CM

## 2024-08-19 PROBLEM — S91.301A OPEN WOUND OF PLANTAR ASPECT OF RIGHT FOOT: Status: ACTIVE | Noted: 2024-08-19

## 2024-08-19 LAB
CA-I BLD-MCNC: 1.04 MMOL/L (ref 1.12–1.32)
CA-I BLD-MCNC: 1.11 MMOL/L (ref 1.12–1.32)
CHLORIDE BLD-SCNC: 107 MMOL/L (ref 98–107)
CHLORIDE BLD-SCNC: 109 MMOL/L (ref 98–107)
GLUCOSE BLD STRIP.AUTO-MCNC: 101 MG/DL (ref 65–100)
GLUCOSE BLD STRIP.AUTO-MCNC: 110 MG/DL (ref 65–100)
POTASSIUM BLD-SCNC: 4.1 MMOL/L (ref 3.5–5.5)
POTASSIUM BLD-SCNC: 5.7 MMOL/L (ref 3.5–5.5)
SODIUM BLD-SCNC: 139 MMOL/L (ref 136–145)
SODIUM BLD-SCNC: 142 MMOL/L (ref 136–145)

## 2024-08-19 PROCEDURE — 3700000001 HC ADD 15 MINUTES (ANESTHESIA): Performed by: ORTHOPAEDIC SURGERY

## 2024-08-19 PROCEDURE — 76000 FLUOROSCOPY <1 HR PHYS/QHP: CPT

## 2024-08-19 PROCEDURE — 80047 BASIC METABLC PNL IONIZED CA: CPT

## 2024-08-19 PROCEDURE — 7100000011 HC PHASE II RECOVERY - ADDTL 15 MIN: Performed by: ORTHOPAEDIC SURGERY

## 2024-08-19 PROCEDURE — 7100000001 HC PACU RECOVERY - ADDTL 15 MIN: Performed by: ORTHOPAEDIC SURGERY

## 2024-08-19 PROCEDURE — 6360000002 HC RX W HCPCS: Performed by: NURSE ANESTHETIST, CERTIFIED REGISTERED

## 2024-08-19 PROCEDURE — 2580000003 HC RX 258: Performed by: ORTHOPAEDIC SURGERY

## 2024-08-19 PROCEDURE — 7100000000 HC PACU RECOVERY - FIRST 15 MIN: Performed by: ORTHOPAEDIC SURGERY

## 2024-08-19 PROCEDURE — 3600000012 HC SURGERY LEVEL 2 ADDTL 15MIN: Performed by: ORTHOPAEDIC SURGERY

## 2024-08-19 PROCEDURE — 3700000000 HC ANESTHESIA ATTENDED CARE: Performed by: ORTHOPAEDIC SURGERY

## 2024-08-19 PROCEDURE — 3600000002 HC SURGERY LEVEL 2 BASE: Performed by: ORTHOPAEDIC SURGERY

## 2024-08-19 PROCEDURE — 2709999900 HC NON-CHARGEABLE SUPPLY: Performed by: ORTHOPAEDIC SURGERY

## 2024-08-19 PROCEDURE — 2500000003 HC RX 250 WO HCPCS: Performed by: NURSE ANESTHETIST, CERTIFIED REGISTERED

## 2024-08-19 PROCEDURE — 7100000010 HC PHASE II RECOVERY - FIRST 15 MIN: Performed by: ORTHOPAEDIC SURGERY

## 2024-08-19 RX ORDER — NALOXONE HYDROCHLORIDE 0.4 MG/ML
INJECTION, SOLUTION INTRAMUSCULAR; INTRAVENOUS; SUBCUTANEOUS PRN
Status: DISCONTINUED | OUTPATIENT
Start: 2024-08-19 | End: 2024-08-19 | Stop reason: HOSPADM

## 2024-08-19 RX ORDER — DEXTROSE MONOHYDRATE 100 MG/ML
INJECTION, SOLUTION INTRAVENOUS CONTINUOUS PRN
Status: DISCONTINUED | OUTPATIENT
Start: 2024-08-19 | End: 2024-08-19 | Stop reason: HOSPADM

## 2024-08-19 RX ORDER — PHENYLEPHRINE HCL IN 0.9% NACL 1 MG/10 ML
SYRINGE (ML) INTRAVENOUS PRN
Status: DISCONTINUED | OUTPATIENT
Start: 2024-08-19 | End: 2024-08-19 | Stop reason: SDUPTHER

## 2024-08-19 RX ORDER — SODIUM CHLORIDE, SODIUM LACTATE, POTASSIUM CHLORIDE, CALCIUM CHLORIDE 600; 310; 30; 20 MG/100ML; MG/100ML; MG/100ML; MG/100ML
INJECTION, SOLUTION INTRAVENOUS CONTINUOUS
Status: DISCONTINUED | OUTPATIENT
Start: 2024-08-19 | End: 2024-08-19 | Stop reason: HOSPADM

## 2024-08-19 RX ORDER — LORAZEPAM 2 MG/ML
0.5 INJECTION INTRAMUSCULAR
Status: DISCONTINUED | OUTPATIENT
Start: 2024-08-19 | End: 2024-08-19 | Stop reason: HOSPADM

## 2024-08-19 RX ORDER — SODIUM CHLORIDE, SODIUM LACTATE, POTASSIUM CHLORIDE, CALCIUM CHLORIDE 600; 310; 30; 20 MG/100ML; MG/100ML; MG/100ML; MG/100ML
INJECTION, SOLUTION INTRAVENOUS ONCE
Status: DISCONTINUED | OUTPATIENT
Start: 2024-08-19 | End: 2024-08-19 | Stop reason: HOSPADM

## 2024-08-19 RX ORDER — MEPERIDINE HYDROCHLORIDE 25 MG/ML
12.5 INJECTION INTRAMUSCULAR; INTRAVENOUS; SUBCUTANEOUS EVERY 5 MIN PRN
Status: DISCONTINUED | OUTPATIENT
Start: 2024-08-19 | End: 2024-08-19 | Stop reason: HOSPADM

## 2024-08-19 RX ORDER — HYDROMORPHONE HYDROCHLORIDE 1 MG/ML
0.5 INJECTION, SOLUTION INTRAMUSCULAR; INTRAVENOUS; SUBCUTANEOUS EVERY 5 MIN PRN
Status: DISCONTINUED | OUTPATIENT
Start: 2024-08-19 | End: 2024-08-19 | Stop reason: HOSPADM

## 2024-08-19 RX ORDER — FENTANYL CITRATE 50 UG/ML
50 INJECTION, SOLUTION INTRAMUSCULAR; INTRAVENOUS EVERY 5 MIN PRN
Status: DISCONTINUED | OUTPATIENT
Start: 2024-08-19 | End: 2024-08-19 | Stop reason: HOSPADM

## 2024-08-19 RX ORDER — OXYCODONE HYDROCHLORIDE 5 MG/1
5 TABLET ORAL PRN
Status: DISCONTINUED | OUTPATIENT
Start: 2024-08-19 | End: 2024-08-19 | Stop reason: HOSPADM

## 2024-08-19 RX ORDER — FENTANYL CITRATE 50 UG/ML
INJECTION, SOLUTION INTRAMUSCULAR; INTRAVENOUS PRN
Status: DISCONTINUED | OUTPATIENT
Start: 2024-08-19 | End: 2024-08-19 | Stop reason: SDUPTHER

## 2024-08-19 RX ORDER — GLUCAGON 1 MG/ML
1 KIT INJECTION PRN
Status: DISCONTINUED | OUTPATIENT
Start: 2024-08-19 | End: 2024-08-19 | Stop reason: HOSPADM

## 2024-08-19 RX ORDER — IPRATROPIUM BROMIDE AND ALBUTEROL SULFATE 2.5; .5 MG/3ML; MG/3ML
1 SOLUTION RESPIRATORY (INHALATION)
Status: DISCONTINUED | OUTPATIENT
Start: 2024-08-19 | End: 2024-08-19 | Stop reason: HOSPADM

## 2024-08-19 RX ORDER — SODIUM CHLORIDE 0.9 % (FLUSH) 0.9 %
5-40 SYRINGE (ML) INJECTION EVERY 12 HOURS SCHEDULED
Status: DISCONTINUED | OUTPATIENT
Start: 2024-08-19 | End: 2024-08-19 | Stop reason: HOSPADM

## 2024-08-19 RX ORDER — OXYCODONE HYDROCHLORIDE 5 MG/1
10 TABLET ORAL PRN
Status: DISCONTINUED | OUTPATIENT
Start: 2024-08-19 | End: 2024-08-19 | Stop reason: HOSPADM

## 2024-08-19 RX ORDER — LIDOCAINE HYDROCHLORIDE 20 MG/ML
INJECTION, SOLUTION EPIDURAL; INFILTRATION; INTRACAUDAL; PERINEURAL PRN
Status: DISCONTINUED | OUTPATIENT
Start: 2024-08-19 | End: 2024-08-19 | Stop reason: SDUPTHER

## 2024-08-19 RX ORDER — LABETALOL HYDROCHLORIDE 5 MG/ML
10 INJECTION, SOLUTION INTRAVENOUS
Status: DISCONTINUED | OUTPATIENT
Start: 2024-08-19 | End: 2024-08-19 | Stop reason: HOSPADM

## 2024-08-19 RX ORDER — PROPOFOL 10 MG/ML
INJECTION, EMULSION INTRAVENOUS CONTINUOUS PRN
Status: DISCONTINUED | OUTPATIENT
Start: 2024-08-19 | End: 2024-08-19 | Stop reason: SDUPTHER

## 2024-08-19 RX ORDER — ONDANSETRON 2 MG/ML
4 INJECTION INTRAMUSCULAR; INTRAVENOUS
Status: DISCONTINUED | OUTPATIENT
Start: 2024-08-19 | End: 2024-08-19 | Stop reason: HOSPADM

## 2024-08-19 RX ORDER — SODIUM CHLORIDE 0.9 % (FLUSH) 0.9 %
5-40 SYRINGE (ML) INJECTION PRN
Status: DISCONTINUED | OUTPATIENT
Start: 2024-08-19 | End: 2024-08-19 | Stop reason: HOSPADM

## 2024-08-19 RX ORDER — POVIDONE-IODINE 10 MG/G
OINTMENT TOPICAL PRN
Status: DISCONTINUED | OUTPATIENT
Start: 2024-08-19 | End: 2024-08-19 | Stop reason: ALTCHOICE

## 2024-08-19 RX ORDER — CEFAZOLIN SODIUM 1 G/3ML
INJECTION, POWDER, FOR SOLUTION INTRAMUSCULAR; INTRAVENOUS PRN
Status: DISCONTINUED | OUTPATIENT
Start: 2024-08-19 | End: 2024-08-19 | Stop reason: SDUPTHER

## 2024-08-19 RX ORDER — HYDRALAZINE HYDROCHLORIDE 20 MG/ML
10 INJECTION INTRAMUSCULAR; INTRAVENOUS
Status: DISCONTINUED | OUTPATIENT
Start: 2024-08-19 | End: 2024-08-19 | Stop reason: HOSPADM

## 2024-08-19 RX ORDER — TRAMADOL HYDROCHLORIDE 50 MG/1
50 TABLET ORAL EVERY 6 HOURS PRN
Qty: 20 TABLET | Refills: 0 | Status: SHIPPED | OUTPATIENT
Start: 2024-08-19 | End: 2024-08-24

## 2024-08-19 RX ORDER — MIDAZOLAM HYDROCHLORIDE 1 MG/ML
INJECTION INTRAMUSCULAR; INTRAVENOUS PRN
Status: DISCONTINUED | OUTPATIENT
Start: 2024-08-19 | End: 2024-08-19 | Stop reason: SDUPTHER

## 2024-08-19 RX ORDER — METOCLOPRAMIDE HYDROCHLORIDE 5 MG/ML
10 INJECTION INTRAMUSCULAR; INTRAVENOUS
Status: DISCONTINUED | OUTPATIENT
Start: 2024-08-19 | End: 2024-08-19 | Stop reason: HOSPADM

## 2024-08-19 RX ORDER — SODIUM CHLORIDE 9 MG/ML
INJECTION, SOLUTION INTRAVENOUS PRN
Status: DISCONTINUED | OUTPATIENT
Start: 2024-08-19 | End: 2024-08-19 | Stop reason: HOSPADM

## 2024-08-19 RX ORDER — DIPHENHYDRAMINE HYDROCHLORIDE 50 MG/ML
12.5 INJECTION INTRAMUSCULAR; INTRAVENOUS
Status: DISCONTINUED | OUTPATIENT
Start: 2024-08-19 | End: 2024-08-19 | Stop reason: HOSPADM

## 2024-08-19 RX ADMIN — FENTANYL CITRATE 25 MCG: 50 INJECTION INTRAMUSCULAR; INTRAVENOUS at 14:09

## 2024-08-19 RX ADMIN — CEFAZOLIN 2 G: 1 INJECTION, POWDER, FOR SOLUTION INTRAMUSCULAR; INTRAVENOUS at 13:56

## 2024-08-19 RX ADMIN — FENTANYL CITRATE 50 MCG: 50 INJECTION INTRAMUSCULAR; INTRAVENOUS at 13:56

## 2024-08-19 RX ADMIN — LIDOCAINE HYDROCHLORIDE 60 MG: 20 SOLUTION INTRAVENOUS at 14:00

## 2024-08-19 RX ADMIN — Medication 200 MCG: at 14:35

## 2024-08-19 RX ADMIN — MIDAZOLAM 2 MG: 1 INJECTION INTRAMUSCULAR; INTRAVENOUS at 13:56

## 2024-08-19 RX ADMIN — Medication 100 MCG: at 14:42

## 2024-08-19 RX ADMIN — PROPOFOL 100 MCG/KG/MIN: 10 INJECTION, EMULSION INTRAVENOUS at 14:00

## 2024-08-19 RX ADMIN — SODIUM CHLORIDE, POTASSIUM CHLORIDE, SODIUM LACTATE AND CALCIUM CHLORIDE: 600; 310; 30; 20 INJECTION, SOLUTION INTRAVENOUS at 13:45

## 2024-08-19 RX ADMIN — FENTANYL CITRATE 25 MCG: 50 INJECTION INTRAMUSCULAR; INTRAVENOUS at 14:20

## 2024-08-19 RX ADMIN — Medication 200 MCG: at 14:31

## 2024-08-19 ASSESSMENT — PAIN - FUNCTIONAL ASSESSMENT
PAIN_FUNCTIONAL_ASSESSMENT: NONE - DENIES PAIN
PAIN_FUNCTIONAL_ASSESSMENT: 0-10

## 2024-08-19 NOTE — PERIOP NOTE
IV removed-- tip intact. Right foot dressing CDI with no bleeding or shadowing noted. Patient reports no pain. Cap refill is present on foot. Discharge instructions reviewed with patient-- verbalized understanding. Patient wheeled out via private wheelchair by wife-- no distress noted.

## 2024-08-19 NOTE — ANESTHESIA POSTPROCEDURE EVALUATION
Department of Anesthesiology  Postprocedure Note    Patient: Juwan Espinoza  MRN: [id number]  YOB: 1970  Date of evaluation: 8/19/2024    Procedure Summary       Date: 08/19/24 Room / Location: [facility] MAIN OR 08 / [facility] MAIN OR    Anesthesia Start: 1353 Anesthesia Stop: 1452    Procedure: REMOVAL OF EXTERNAL FIXATOR RIGHT LEG (Right: Leg Lower) Diagnosis:       Degloving injury of plantar foot, right, initial encounter      (Degloving injury of plantar foot, right, initial encounter [S91.301A])    Surgeons: Lm Blair MD Responsible Provider: Sandra Limon MD    Anesthesia Type: MAC ASA Status: 3            Anesthesia Type: MAC    Nikolas Phase I: Nikolas Score: 10    Nikolas Phase II: Nikolas Score: 10    Anesthesia Post Evaluation    Patient location during evaluation: PACU  Patient participation: complete - patient participated  Level of consciousness: awake  Airway patency: patent  Nausea & Vomiting: no nausea and no vomiting  Cardiovascular status: hemodynamically stable  Respiratory status: acceptable  Hydration status: euvolemic  Pain management: adequate    No notable events documented.

## 2024-08-19 NOTE — ANESTHESIA PRE PROCEDURE
Department of Anesthesiology  Preprocedure Note       Name:  Juawn Espinoza   Age:  54 y.o.  :  1970                                          MRN:  [id number]         Date:  2024      Surgeon: Surgeon(s):  Lm Blair MD    Procedure: Procedure(s):  REMOVAL OF EXTERNAL FIXATOR RIGHT LEG    Medications prior to admission:   Prior to Admission medications    Medication Sig Start Date End Date Taking? Authorizing Provider   aspirin 325 MG EC tablet Take 1 tablet by mouth in the morning and at bedtime 24  Yes Lm Blair MD   blood glucose test strips (ASCENSIA AUTODISC VI;ONE TOUCH ULTRA TEST VI) strip CHECK BLOOD SUGAR THREE TIMES DAILY DX CODE E11.9 22  Yes Sofia Ralph MD   gabapentin (NEURONTIN) 300 MG capsule TAKE 2 CAPSULES BY MOUTH 3 TIMES A DAY 24 Yes MARVIN Jones MD   insulin glargine (LANTUS SOLOSTAR) 100 UNIT/ML injection pen Inject 100 Units into the skin daily  Patient taking differently: Inject 80 Units into the skin daily 24  Yes MARVIN Jones MD   Cholecalciferol (VITAMIN D) 50 MCG (2000 UT) CAPS capsule Take 1 capsule by mouth every morning (before breakfast)   Yes Sofia Ralph MD   JANUMET  MG per tablet TAKE 1 TABLET BY MOUTH TWICE A DAY WITH FOOD  Patient taking differently: Take 1 tablet by mouth 2 times daily (with meals) 24  Yes MARVIN Jones MD   Multiple Vitamins-Minerals (MULTIVITAL PO) Take 1 tablet by mouth daily.   Yes Sofia Ralph MD   Semaglutide, 1 MG/DOSE, (OZEMPIC, 1 MG/DOSE,) 4 MG/3ML SOPN INJECT 1 MG BY SUBCUTANEOUS ROUTE EVERY SEVEN (7) DAYS.  Patient taking differently: Inject 1 mg into the skin every 7 days INJECT 1 MG BY SUBCUTANEOUS ROUTE EVERY SEVEN (7) DAYS.    Patient stated takes on   Yes MARVIN Jones MD   rosuvastatin (CRESTOR) 5 MG tablet TAKE 1 TABLET BY MOUTH EVERY DAY  Patient taking differently: Take 1 tablet by

## 2024-08-19 NOTE — OP NOTE
3 cm x 1 cm x 0.5 cm    Detailed Description of Procedure:   Patient was prepped and draped usual manner, timeout was called, the wires were cut, the fixator was then removed, the wire points were prepped with Betadine, they were then removed easily, wounds were then curetted, Betadine cream was applied, wounds were then dressed with Adaptic, the plantar wound measured 3 cm 1 cm 1 cm this was debrided using a curette, skin subcutaneous tissue that was necrotic and biofilm was removed, wound was then dressed with Acticoat, total contact cast was then applied, patient Toller surgery well and left the operating room in stable condition, plan is nonweightbearing    Electronically signed by Lm Blair MD on 8/19/2024 at 3:27 PM

## 2024-08-20 NOTE — WOUND CARE
Dressing Status New dressing applied 02/08/24 1052   Wound Cleansed Wound cleanser 02/08/24 1052   Dressing/Treatment Negative pressure wound therapy 02/08/24 1052   Offloading for Diabetic Foot Ulcers Knee scooter 02/06/24 1615   Dressing Change Due 02/13/24 02/06/24 1615   Wound Length (cm) 4.8 cm 02/06/24 1615   Wound Width (cm) 3.9 cm 02/06/24 1615   Wound Depth (cm) 2.1 cm 02/06/24 1615   Wound Surface Area (cm^2) 18.72 cm^2 02/06/24 1615   Change in Wound Size % (l*w) 2.75 02/06/24 1615   Wound Volume (cm^3) 39.312 cm^3 02/06/24 1615   Wound Healing % 42 02/06/24 1615   Post-Procedure Length (cm) 4.8 cm 02/06/24 1615   Post-Procedure Width (cm) 4 cm 02/06/24 1615   Post-Procedure Depth (cm) 2.1 cm 02/06/24 1615   Post-Procedure Surface Area (cm^2) 19.2 cm^2 02/06/24 1615   Post-Procedure Volume (cm^3) 40.32 cm^3 02/06/24 1615   Distance Tunneling (cm) 4.7 cm 01/02/24 1400   Tunneling Position ___ O'Clock 11 01/09/24 1621   Undermining Maxium Distance (cm) 5.5 01/09/24 1621   Wound Assessment Exposed structure bone;Granulation tissue;Pink/red 02/08/24 1052   Drainage Amount Large (50-75% saturated) 02/08/24 1052   Drainage Description Serosanguinous 02/08/24 1052   Odor None 02/08/24 1052   Allyn-wound Assessment Fragile;Maceration 02/08/24 1052   Margins Epibole (rolled edges) 02/08/24 1052   Wound Thickness Description not for Pressure Injury Full thickness 02/08/24 1052   Number of days: 42         Total Surface Area Debrided:  25 sq cm     Estimated Blood Loss:  Estimated amount of blood loss is 10 ml.    Hemostasis Achieved: Pressure    Procedural Pain: 0 / 10     Post Procedural Pain: 0 / 10     Response to treatment: Well tolerated by patient      
show

## 2024-08-21 ENCOUNTER — HOME CARE VISIT (OUTPATIENT)
Facility: HOME HEALTH | Age: 54
End: 2024-08-21
Payer: COMMERCIAL

## 2024-08-21 PROCEDURE — G0299 HHS/HOSPICE OF RN EA 15 MIN: HCPCS

## 2024-08-22 VITALS
BODY MASS INDEX: 34.44 KG/M2 | WEIGHT: 240 LBS | OXYGEN SATURATION: 97 % | RESPIRATION RATE: 16 BRPM | DIASTOLIC BLOOD PRESSURE: 74 MMHG | HEART RATE: 89 BPM | SYSTOLIC BLOOD PRESSURE: 124 MMHG | TEMPERATURE: 98.2 F

## 2024-08-22 NOTE — HOME HEALTH
Subjective: Patient denies pain  Falls since last visit No(if yes complete the Fall Tracking Form and include bsrifallreport):   Caregiver involvement changes: no  Home health supplies by type and quantity ordered/delivered this visit include: no    Clinician asked if patient has had any physician contact since last home care visit and patient states: N/A  Clinician asked if patient has any new or changed medications and patient states:  N/A   If Yes, were medications reconciled? N/A   Was the certifying physician notified of changes in medications? N/A     Clinical assessment (what this visit means for the patient overall and need for ongoing skilled care) and progress or lack of progress towards SPECIFIC goals: Patient went to surgeon on Monday and he took off the external fixator and placed a cast, his plan is to heal him as much as possible. Then the surgeons plan is to place a internal fixator, rods and such to build up his foot where he removed bones previously. Patient may need service again in the future but does not need our service at this time. Patient is discharged from all Home Health Services.     Written Teaching Material Utilized: N/A    Interdisciplinary communication with: N/A     Discharge planning as follows: When goals are met    Specific plan for next visit: Progress patient to discharge

## 2024-08-29 NOTE — ANESTHESIA POSTPROCEDURE EVALUATION
Department of Anesthesiology  Postprocedure Note    Patient: Juwan Espinoza  MRN: [id number]  YOB: 1970    Procedure Summary       Date: 07/19/24 Room / Location: [facility] MAIN OR 09 / [facility] MAIN OR    Anesthesia Start: 1333 Anesthesia Stop: 1839    Procedures:       MIDFOOT ARTHRODESIS WITH EXTERNAL FIXATOR APPLICATION, TENDOACHILLES LENGTHENING (Right: Foot)      . (Right: Foot) Diagnosis:       Charcot arthropathy of midfoot      (Charcot arthropathy of midfoot [M14.679])    Surgeons: Lm Blair MD Responsible Provider: Roberto Carlos Ba MD    Anesthesia Type: General ASA Status: 3            Anesthesia Type: General    Nikolas Phase I: Nikolas Score: 9    Nikolas Phase II:      Anesthesia Post Evaluation    Patient location during evaluation: PACU  Patient participation: complete - patient cannot participate  Level of consciousness: awake  Pain score: 0  Airway patency: patent  Nausea & Vomiting: no nausea and no vomiting  Cardiovascular status: hemodynamically stable  Respiratory status: acceptable  Hydration status: stable  Multimodal analgesia pain management approach        No notable events documented.

## 2024-10-24 ENCOUNTER — HOSPITAL ENCOUNTER (OUTPATIENT)
Facility: HOSPITAL | Age: 54
Discharge: HOME | End: 2024-10-27
Payer: COMMERCIAL

## 2024-10-24 VITALS
DIASTOLIC BLOOD PRESSURE: 95 MMHG | OXYGEN SATURATION: 98 % | TEMPERATURE: 98.4 F | HEIGHT: 70 IN | WEIGHT: 264.4 LBS | HEART RATE: 80 BPM | SYSTOLIC BLOOD PRESSURE: 145 MMHG | RESPIRATION RATE: 16 BRPM | BODY MASS INDEX: 37.85 KG/M2

## 2024-10-24 LAB
ANION GAP SERPL CALC-SCNC: 6 MMOL/L (ref 2–12)
BASOPHILS # BLD: 0 K/UL (ref 0–0.1)
BASOPHILS NFR BLD: 0 % (ref 0–1)
BUN SERPL-MCNC: 14 MG/DL (ref 6–20)
BUN/CREAT SERPL: 13 (ref 12–20)
CA-I BLD-MCNC: 9.9 MG/DL (ref 8.5–10.1)
CHLORIDE SERPL-SCNC: 108 MMOL/L (ref 97–108)
CO2 SERPL-SCNC: 24 MMOL/L (ref 21–32)
CREAT SERPL-MCNC: 1.1 MG/DL (ref 0.7–1.3)
DIFFERENTIAL METHOD BLD: ABNORMAL
EKG ATRIAL RATE: 78 BPM
EKG DIAGNOSIS: NORMAL
EKG P AXIS: 49 DEGREES
EKG P-R INTERVAL: 170 MS
EKG Q-T INTERVAL: 352 MS
EKG QRS DURATION: 90 MS
EKG QTC CALCULATION (BAZETT): 401 MS
EKG R AXIS: 4 DEGREES
EKG T AXIS: 85 DEGREES
EKG VENTRICULAR RATE: 78 BPM
EOSINOPHIL # BLD: 0.3 K/UL (ref 0–0.4)
EOSINOPHIL NFR BLD: 4 % (ref 0–7)
ERYTHROCYTE [DISTWIDTH] IN BLOOD BY AUTOMATED COUNT: 15.4 % (ref 11.5–14.5)
GLUCOSE SERPL-MCNC: 118 MG/DL (ref 65–100)
HCT VFR BLD AUTO: 40 % (ref 36.6–50.3)
HGB BLD-MCNC: 12.8 G/DL (ref 12.1–17)
IMM GRANULOCYTES # BLD AUTO: 0.1 K/UL (ref 0–0.04)
IMM GRANULOCYTES NFR BLD AUTO: 1 % (ref 0–0.5)
LYMPHOCYTES # BLD: 2.7 K/UL (ref 0.8–3.5)
LYMPHOCYTES NFR BLD: 27 % (ref 12–49)
MCH RBC QN AUTO: 27.7 PG (ref 26–34)
MCHC RBC AUTO-ENTMCNC: 32 G/DL (ref 30–36.5)
MCV RBC AUTO: 86.6 FL (ref 80–99)
MONOCYTES # BLD: 0.7 K/UL (ref 0–1)
MONOCYTES NFR BLD: 7 % (ref 5–13)
MRSA DNA SPEC QL NAA+PROBE: NOT DETECTED
NEUTS SEG # BLD: 6 K/UL (ref 1.8–8)
NEUTS SEG NFR BLD: 61 % (ref 32–75)
NRBC # BLD: 0 K/UL (ref 0–0.01)
NRBC BLD-RTO: 0 PER 100 WBC
PLATELET # BLD AUTO: 259 K/UL (ref 150–400)
PMV BLD AUTO: 11 FL (ref 8.9–12.9)
POTASSIUM SERPL-SCNC: 4.1 MMOL/L (ref 3.5–5.1)
RBC # BLD AUTO: 4.62 M/UL (ref 4.1–5.7)
SODIUM SERPL-SCNC: 138 MMOL/L (ref 136–145)
WBC # BLD AUTO: 9.8 K/UL (ref 4.1–11.1)

## 2024-10-24 PROCEDURE — 36415 COLL VENOUS BLD VENIPUNCTURE: CPT

## 2024-10-24 PROCEDURE — 85025 COMPLETE CBC W/AUTO DIFF WBC: CPT

## 2024-10-24 PROCEDURE — 87641 MR-STAPH DNA AMP PROBE: CPT

## 2024-10-24 PROCEDURE — 93005 ELECTROCARDIOGRAM TRACING: CPT | Performed by: ORTHOPAEDIC SURGERY

## 2024-10-24 PROCEDURE — 80048 BASIC METABOLIC PNL TOTAL CA: CPT

## 2024-10-31 ENCOUNTER — HOSPITAL ENCOUNTER (OUTPATIENT)
Facility: HOSPITAL | Age: 54
Setting detail: OUTPATIENT SURGERY
Discharge: HOME | End: 2024-10-31
Attending: ORTHOPAEDIC SURGERY | Admitting: ORTHOPAEDIC SURGERY
Payer: COMMERCIAL

## 2024-10-31 ENCOUNTER — APPOINTMENT (OUTPATIENT)
Facility: HOSPITAL | Age: 54
End: 2024-10-31
Attending: ORTHOPAEDIC SURGERY
Payer: COMMERCIAL

## 2024-10-31 ENCOUNTER — ANESTHESIA (OUTPATIENT)
Facility: HOSPITAL | Age: 54
End: 2024-10-31
Payer: COMMERCIAL

## 2024-10-31 ENCOUNTER — ANESTHESIA EVENT (OUTPATIENT)
Facility: HOSPITAL | Age: 54
End: 2024-10-31
Payer: COMMERCIAL

## 2024-10-31 VITALS
TEMPERATURE: 98.3 F | SYSTOLIC BLOOD PRESSURE: 140 MMHG | WEIGHT: 250 LBS | RESPIRATION RATE: 18 BRPM | BODY MASS INDEX: 35 KG/M2 | OXYGEN SATURATION: 96 % | DIASTOLIC BLOOD PRESSURE: 89 MMHG | HEIGHT: 71 IN | HEART RATE: 76 BPM

## 2024-10-31 DIAGNOSIS — M14.671 CHARCOT ANKLE, RIGHT: Primary | ICD-10-CM

## 2024-10-31 LAB
GLUCOSE BLD STRIP.AUTO-MCNC: 118 MG/DL (ref 65–100)
GLUCOSE BLD STRIP.AUTO-MCNC: 145 MG/DL (ref 65–100)
PERFORMED BY:: ABNORMAL
PERFORMED BY:: ABNORMAL

## 2024-10-31 PROCEDURE — 2709999900 HC NON-CHARGEABLE SUPPLY: Performed by: ORTHOPAEDIC SURGERY

## 2024-10-31 PROCEDURE — C1769 GUIDE WIRE: HCPCS | Performed by: ORTHOPAEDIC SURGERY

## 2024-10-31 PROCEDURE — 3600000004 HC SURGERY LEVEL 4 BASE: Performed by: ORTHOPAEDIC SURGERY

## 2024-10-31 PROCEDURE — 76000 FLUOROSCOPY <1 HR PHYS/QHP: CPT

## 2024-10-31 PROCEDURE — 2500000003 HC RX 250 WO HCPCS: Performed by: ORTHOPAEDIC SURGERY

## 2024-10-31 PROCEDURE — 2580000003 HC RX 258: Performed by: ORTHOPAEDIC SURGERY

## 2024-10-31 PROCEDURE — 6360000002 HC RX W HCPCS: Performed by: ORTHOPAEDIC SURGERY

## 2024-10-31 PROCEDURE — 2500000003 HC RX 250 WO HCPCS: Performed by: NURSE ANESTHETIST, CERTIFIED REGISTERED

## 2024-10-31 PROCEDURE — 7100000001 HC PACU RECOVERY - ADDTL 15 MIN: Performed by: ORTHOPAEDIC SURGERY

## 2024-10-31 PROCEDURE — C1713 ANCHOR/SCREW BN/BN,TIS/BN: HCPCS | Performed by: ORTHOPAEDIC SURGERY

## 2024-10-31 PROCEDURE — 2720000010 HC SURG SUPPLY STERILE: Performed by: ORTHOPAEDIC SURGERY

## 2024-10-31 PROCEDURE — 3700000001 HC ADD 15 MINUTES (ANESTHESIA): Performed by: ORTHOPAEDIC SURGERY

## 2024-10-31 PROCEDURE — 6370000000 HC RX 637 (ALT 250 FOR IP): Performed by: ORTHOPAEDIC SURGERY

## 2024-10-31 PROCEDURE — 7100000011 HC PHASE II RECOVERY - ADDTL 15 MIN: Performed by: ORTHOPAEDIC SURGERY

## 2024-10-31 PROCEDURE — 7100000000 HC PACU RECOVERY - FIRST 15 MIN: Performed by: ORTHOPAEDIC SURGERY

## 2024-10-31 PROCEDURE — C1602 HC ORTHO MATRIX BONE FILL, DRUG ELUTING: HCPCS | Performed by: ORTHOPAEDIC SURGERY

## 2024-10-31 PROCEDURE — 3700000000 HC ANESTHESIA ATTENDED CARE: Performed by: ORTHOPAEDIC SURGERY

## 2024-10-31 PROCEDURE — 7100000010 HC PHASE II RECOVERY - FIRST 15 MIN: Performed by: ORTHOPAEDIC SURGERY

## 2024-10-31 PROCEDURE — 3600000014 HC SURGERY LEVEL 4 ADDTL 15MIN: Performed by: ORTHOPAEDIC SURGERY

## 2024-10-31 PROCEDURE — 6360000002 HC RX W HCPCS: Performed by: NURSE ANESTHETIST, CERTIFIED REGISTERED

## 2024-10-31 PROCEDURE — 82962 GLUCOSE BLOOD TEST: CPT

## 2024-10-31 DEVICE — IMPLANTABLE DEVICE
Type: IMPLANTABLE DEVICE | Site: FOOT | Status: FUNCTIONAL
Brand: SALVATION

## 2024-10-31 DEVICE — END CAP, SCN
Type: IMPLANTABLE DEVICE | Site: FOOT | Status: FUNCTIONAL
Brand: T2

## 2024-10-31 DEVICE — LOCKING SCREW, PARTIALLY THREADED: Type: IMPLANTABLE DEVICE | Site: FOOT | Status: FUNCTIONAL

## 2024-10-31 DEVICE — COMPRESSION SCREW CANNULATED
Type: IMPLANTABLE DEVICE | Site: FOOT | Status: FUNCTIONAL
Brand: T2

## 2024-10-31 DEVICE — LOCKING SCREW, FULLY THREADED: Type: IMPLANTABLE DEVICE | Site: FOOT | Status: FUNCTIONAL

## 2024-10-31 DEVICE — ANKLE ARTHRODESIS NAIL, RIGHT
Type: IMPLANTABLE DEVICE | Site: FOOT | Status: FUNCTIONAL
Brand: T2

## 2024-10-31 DEVICE — CERAMENT G IS AN IMPLANTABLE BONE VOID FILLER (DEVICE/ DRUG COMBINATION PRODUCT) INDICATED FOR USE AS AN ADJUNCT TO SYSTEMIC ANTIBIOTIC THERAPY AND SURGICAL DEBRIDEMENT (STANDARD TREATMENT APPROACH TO A BONE INFECTION) WHERE THERE IS A NEED FOR SUPPLEMENTAL BONE GRAFT. CERAMENT G COMBINES GENTAMICIN SULFATE WITH A BONE VOID FILLER, CONSISTING OF HYDROXYAPATITE AND CALCIUM SULFATE. BY ELUTING GENTAMICIN, CERAMENT G CAN REDUCE THE RECURRENCE OF CHRONIC OSTEOMYELITIS FROM GENTAMICIN-SENSITIVE MICROORGANISMS IN ORDER TO PROTECT BONE HEALING CERAMENT G CAN ALSO REDUCE THE LIKELIHOOD OF INFECTION SUBSEQUENT TO AN OPEN FRACTURE. BY COMBINING CALCIUM SULFATE AND HYDROXYAPATITE, A BALANCE IS ACHIEVED BETWEEN IMPLANT RESORPTION RATE AND BONE REMODELING RATE. CALCIUM SULFATE ACTS AS A RESORBABLE CARRIER FOR HYDROXYAPATITE. HYDROXYAPATITE HAS A SLOW RESORPTION RATE AND HIGH OSTEOCONDUCTIVITY PROVIDING A SCAFFOLD FOR NEW BONE GENERATION. THE USE OF CERAMENT G ELIMINATES THE NEED TO HARVEST AUTOLOGOUS BONE, THEREBY AVOIDING DONOR SITE MORBIDITY (E.G., PAIN, INFECTION, ETC.) IN PATIENTS WITH A DIAGNOSED INFECTION. CERAMENT G MAY BE IMPLANTED BY AN INJECTABLE SYSTEM. IN BONE INFECTION, CERAMENT G CAN ALSO BE INSERTED AS PRE-SET BEADS.
Type: IMPLANTABLE DEVICE | Site: FOOT | Status: FUNCTIONAL
Brand: CERAMENT® G

## 2024-10-31 RX ORDER — BUPIVACAINE HYDROCHLORIDE 5 MG/ML
INJECTION, SOLUTION EPIDURAL; INTRACAUDAL PRN
Status: DISCONTINUED | OUTPATIENT
Start: 2024-10-31 | End: 2024-10-31 | Stop reason: ALTCHOICE

## 2024-10-31 RX ORDER — LIDOCAINE HYDROCHLORIDE 20 MG/ML
INJECTION, SOLUTION EPIDURAL; INFILTRATION; INTRACAUDAL; PERINEURAL
Status: DISCONTINUED | OUTPATIENT
Start: 2024-10-31 | End: 2024-10-31 | Stop reason: SDUPTHER

## 2024-10-31 RX ORDER — SODIUM CHLORIDE 9 MG/ML
INJECTION, SOLUTION INTRAVENOUS PRN
Status: DISCONTINUED | OUTPATIENT
Start: 2024-10-31 | End: 2024-10-31 | Stop reason: HOSPADM

## 2024-10-31 RX ORDER — HYDROMORPHONE HYDROCHLORIDE 1 MG/ML
0.5 INJECTION, SOLUTION INTRAMUSCULAR; INTRAVENOUS; SUBCUTANEOUS EVERY 5 MIN PRN
Status: DISCONTINUED | OUTPATIENT
Start: 2024-10-31 | End: 2024-10-31 | Stop reason: HOSPADM

## 2024-10-31 RX ORDER — SODIUM CHLORIDE 0.9 % (FLUSH) 0.9 %
5-40 SYRINGE (ML) INJECTION PRN
Status: DISCONTINUED | OUTPATIENT
Start: 2024-10-31 | End: 2024-10-31 | Stop reason: HOSPADM

## 2024-10-31 RX ORDER — OXYCODONE HYDROCHLORIDE 5 MG/1
5 TABLET ORAL PRN
Status: DISCONTINUED | OUTPATIENT
Start: 2024-10-31 | End: 2024-10-31 | Stop reason: HOSPADM

## 2024-10-31 RX ORDER — SODIUM CHLORIDE, SODIUM LACTATE, POTASSIUM CHLORIDE, CALCIUM CHLORIDE 600; 310; 30; 20 MG/100ML; MG/100ML; MG/100ML; MG/100ML
INJECTION, SOLUTION INTRAVENOUS CONTINUOUS
Status: DISCONTINUED | OUTPATIENT
Start: 2024-10-31 | End: 2024-10-31 | Stop reason: HOSPADM

## 2024-10-31 RX ORDER — CELECOXIB 200 MG/1
400 CAPSULE ORAL ONCE
Status: COMPLETED | OUTPATIENT
Start: 2024-10-31 | End: 2024-10-31

## 2024-10-31 RX ORDER — LABETALOL HYDROCHLORIDE 5 MG/ML
10 INJECTION, SOLUTION INTRAVENOUS
Status: DISCONTINUED | OUTPATIENT
Start: 2024-10-31 | End: 2024-10-31 | Stop reason: HOSPADM

## 2024-10-31 RX ORDER — SODIUM CHLORIDE, SODIUM LACTATE, POTASSIUM CHLORIDE, CALCIUM CHLORIDE 600; 310; 30; 20 MG/100ML; MG/100ML; MG/100ML; MG/100ML
INJECTION, SOLUTION INTRAVENOUS ONCE
Status: DISCONTINUED | OUTPATIENT
Start: 2024-10-31 | End: 2024-10-31 | Stop reason: HOSPADM

## 2024-10-31 RX ORDER — OXYCODONE HYDROCHLORIDE 5 MG/1
5 TABLET ORAL EVERY 6 HOURS PRN
Qty: 12 TABLET | Refills: 0 | Status: SHIPPED | OUTPATIENT
Start: 2024-10-31 | End: 2024-11-03

## 2024-10-31 RX ORDER — MIDAZOLAM HYDROCHLORIDE 1 MG/ML
INJECTION, SOLUTION INTRAMUSCULAR; INTRAVENOUS
Status: DISCONTINUED | OUTPATIENT
Start: 2024-10-31 | End: 2024-10-31 | Stop reason: SDUPTHER

## 2024-10-31 RX ORDER — SODIUM CHLORIDE 0.9 % (FLUSH) 0.9 %
5-40 SYRINGE (ML) INJECTION EVERY 12 HOURS SCHEDULED
Status: DISCONTINUED | OUTPATIENT
Start: 2024-10-31 | End: 2024-10-31 | Stop reason: HOSPADM

## 2024-10-31 RX ORDER — FENTANYL CITRATE 50 UG/ML
INJECTION, SOLUTION INTRAMUSCULAR; INTRAVENOUS
Status: DISCONTINUED | OUTPATIENT
Start: 2024-10-31 | End: 2024-10-31 | Stop reason: SDUPTHER

## 2024-10-31 RX ORDER — GLUCAGON 1 MG/ML
1 KIT INJECTION PRN
Status: DISCONTINUED | OUTPATIENT
Start: 2024-10-31 | End: 2024-10-31 | Stop reason: HOSPADM

## 2024-10-31 RX ORDER — ONDANSETRON 2 MG/ML
INJECTION INTRAMUSCULAR; INTRAVENOUS
Status: DISCONTINUED | OUTPATIENT
Start: 2024-10-31 | End: 2024-10-31 | Stop reason: SDUPTHER

## 2024-10-31 RX ORDER — METOCLOPRAMIDE HYDROCHLORIDE 5 MG/ML
10 INJECTION INTRAMUSCULAR; INTRAVENOUS
Status: DISCONTINUED | OUTPATIENT
Start: 2024-10-31 | End: 2024-10-31 | Stop reason: HOSPADM

## 2024-10-31 RX ORDER — MEPERIDINE HYDROCHLORIDE 25 MG/ML
12.5 INJECTION INTRAMUSCULAR; INTRAVENOUS; SUBCUTANEOUS EVERY 5 MIN PRN
Status: DISCONTINUED | OUTPATIENT
Start: 2024-10-31 | End: 2024-10-31 | Stop reason: HOSPADM

## 2024-10-31 RX ORDER — DEXTROSE MONOHYDRATE 100 MG/ML
INJECTION, SOLUTION INTRAVENOUS CONTINUOUS PRN
Status: DISCONTINUED | OUTPATIENT
Start: 2024-10-31 | End: 2024-10-31 | Stop reason: HOSPADM

## 2024-10-31 RX ORDER — NALOXONE HYDROCHLORIDE 0.4 MG/ML
INJECTION, SOLUTION INTRAMUSCULAR; INTRAVENOUS; SUBCUTANEOUS PRN
Status: DISCONTINUED | OUTPATIENT
Start: 2024-10-31 | End: 2024-10-31 | Stop reason: HOSPADM

## 2024-10-31 RX ORDER — HYDRALAZINE HYDROCHLORIDE 20 MG/ML
10 INJECTION INTRAMUSCULAR; INTRAVENOUS
Status: DISCONTINUED | OUTPATIENT
Start: 2024-10-31 | End: 2024-10-31 | Stop reason: HOSPADM

## 2024-10-31 RX ORDER — GINSENG 100 MG
CAPSULE ORAL PRN
Status: DISCONTINUED | OUTPATIENT
Start: 2024-10-31 | End: 2024-10-31 | Stop reason: ALTCHOICE

## 2024-10-31 RX ORDER — IPRATROPIUM BROMIDE AND ALBUTEROL SULFATE 2.5; .5 MG/3ML; MG/3ML
1 SOLUTION RESPIRATORY (INHALATION)
Status: DISCONTINUED | OUTPATIENT
Start: 2024-10-31 | End: 2024-10-31 | Stop reason: HOSPADM

## 2024-10-31 RX ORDER — DIPHENHYDRAMINE HYDROCHLORIDE 50 MG/ML
12.5 INJECTION INTRAMUSCULAR; INTRAVENOUS
Status: DISCONTINUED | OUTPATIENT
Start: 2024-10-31 | End: 2024-10-31 | Stop reason: HOSPADM

## 2024-10-31 RX ORDER — LORAZEPAM 2 MG/ML
0.5 INJECTION INTRAMUSCULAR
Status: DISCONTINUED | OUTPATIENT
Start: 2024-10-31 | End: 2024-10-31 | Stop reason: HOSPADM

## 2024-10-31 RX ORDER — ACETAMINOPHEN 325 MG/1
650 TABLET ORAL ONCE
Status: COMPLETED | OUTPATIENT
Start: 2024-10-31 | End: 2024-10-31

## 2024-10-31 RX ORDER — OXYCODONE HYDROCHLORIDE 5 MG/1
10 TABLET ORAL PRN
Status: DISCONTINUED | OUTPATIENT
Start: 2024-10-31 | End: 2024-10-31 | Stop reason: HOSPADM

## 2024-10-31 RX ORDER — DEXAMETHASONE SODIUM PHOSPHATE 4 MG/ML
INJECTION, SOLUTION INTRA-ARTICULAR; INTRALESIONAL; INTRAMUSCULAR; INTRAVENOUS; SOFT TISSUE
Status: DISCONTINUED | OUTPATIENT
Start: 2024-10-31 | End: 2024-10-31 | Stop reason: SDUPTHER

## 2024-10-31 RX ORDER — PROPOFOL 10 MG/ML
INJECTION, EMULSION INTRAVENOUS
Status: DISCONTINUED | OUTPATIENT
Start: 2024-10-31 | End: 2024-10-31 | Stop reason: SDUPTHER

## 2024-10-31 RX ORDER — ONDANSETRON 2 MG/ML
4 INJECTION INTRAMUSCULAR; INTRAVENOUS
Status: DISCONTINUED | OUTPATIENT
Start: 2024-10-31 | End: 2024-10-31 | Stop reason: HOSPADM

## 2024-10-31 RX ORDER — FENTANYL CITRATE 0.05 MG/ML
50 INJECTION, SOLUTION INTRAMUSCULAR; INTRAVENOUS EVERY 5 MIN PRN
Status: DISCONTINUED | OUTPATIENT
Start: 2024-10-31 | End: 2024-10-31 | Stop reason: HOSPADM

## 2024-10-31 RX ADMIN — FENTANYL CITRATE 100 MCG: 50 INJECTION INTRAMUSCULAR; INTRAVENOUS at 13:21

## 2024-10-31 RX ADMIN — CELECOXIB 400 MG: 200 CAPSULE ORAL at 12:01

## 2024-10-31 RX ADMIN — SODIUM CHLORIDE, POTASSIUM CHLORIDE, SODIUM LACTATE AND CALCIUM CHLORIDE: 600; 310; 30; 20 INJECTION, SOLUTION INTRAVENOUS at 13:17

## 2024-10-31 RX ADMIN — SODIUM CHLORIDE, POTASSIUM CHLORIDE, SODIUM LACTATE AND CALCIUM CHLORIDE: 600; 310; 30; 20 INJECTION, SOLUTION INTRAVENOUS at 12:02

## 2024-10-31 RX ADMIN — CEFAZOLIN 2000 MG: 1 INJECTION, POWDER, FOR SOLUTION INTRAMUSCULAR; INTRAVENOUS at 13:29

## 2024-10-31 RX ADMIN — TRANEXAMIC ACID 1000 MG: 1 INJECTION, SOLUTION INTRAVENOUS at 16:15

## 2024-10-31 RX ADMIN — ACETAMINOPHEN 650 MG: 325 TABLET ORAL at 12:01

## 2024-10-31 RX ADMIN — ONDANSETRON 4 MG: 2 INJECTION INTRAMUSCULAR; INTRAVENOUS at 13:34

## 2024-10-31 RX ADMIN — TRANEXAMIC ACID 1000 MG: 1 INJECTION, SOLUTION INTRAVENOUS at 13:29

## 2024-10-31 RX ADMIN — MIDAZOLAM 2 MG: 1 INJECTION INTRAMUSCULAR; INTRAVENOUS at 13:17

## 2024-10-31 RX ADMIN — LIDOCAINE HYDROCHLORIDE 40 MG: 20 SOLUTION INTRAVENOUS at 13:27

## 2024-10-31 RX ADMIN — DEXAMETHASONE SODIUM PHOSPHATE 4 MG: 4 INJECTION, SOLUTION INTRA-ARTICULAR; INTRALESIONAL; INTRAMUSCULAR; INTRAVENOUS; SOFT TISSUE at 13:34

## 2024-10-31 RX ADMIN — FENTANYL CITRATE 25 MCG: 50 INJECTION INTRAMUSCULAR; INTRAVENOUS at 16:19

## 2024-10-31 RX ADMIN — PROPOFOL 150 MG: 10 INJECTION, EMULSION INTRAVENOUS at 13:27

## 2024-10-31 ASSESSMENT — PAIN - FUNCTIONAL ASSESSMENT
PAIN_FUNCTIONAL_ASSESSMENT: 0-10

## 2024-10-31 ASSESSMENT — PAIN SCALES - GENERAL
PAINLEVEL_OUTOF10: 0

## 2024-10-31 ASSESSMENT — PAIN DESCRIPTION - DESCRIPTORS
DESCRIPTORS: ACHING
DESCRIPTORS: ACHING

## 2024-10-31 NOTE — PERIOP NOTE
1737: Pt to [facility] for recovery. Pt stable and alert. VS taken. Wife at bedside. Foot elevated on pillow.  1750: VS taken, IV removed. Pt getting dressed.Discharge instructions and medications reviewed/given. Patient and wife verbalizes understanding. All questions answered. No distress noted, patient stable. Patient confirmed f/u appt nov 6th.   1800: Pt discharged via wheelchair to private vehicle with wife

## 2024-10-31 NOTE — PERIOP NOTE
Patient alert and oriented x4, VS Stable, 2/10 right foot pain at this time. Wife at bedside. Bed in low position, call bell within reach.    Patient states okay to review and give discharge instructions to wife, Debora.

## 2024-10-31 NOTE — ANESTHESIA POSTPROCEDURE EVALUATION
Department of Anesthesiology  Postprocedure Note    Patient: Juwan Espinoza  MRN: [id number]  YOB: 1970  Date of evaluation: 10/31/2024    Procedure Summary       Date: 10/31/24 Room / Location: [facility] MAIN OR 08 / [facility] MAIN OR    Anesthesia Start: 1317 Anesthesia Stop:     Procedure: PANTALAR ARTHRODESIS RIGHT FOOT, MIDFOOT ARTHRODESIS, CERAMENT (Right: Foot) Diagnosis:       Charcot arthropathy of midfoot      (Charcot arthropathy of midfoot [M14.679])    Surgeons: Lm Blair MD Responsible Provider: Sandra Limon MD    Anesthesia Type: General ASA Status: 3            Anesthesia Type: General    Nikolas Phase I: Nikolas Score: 10    Nikolas Phase II:      Anesthesia Post Evaluation    Patient location during evaluation: PACU  Patient participation: complete - patient participated  Level of consciousness: awake  Airway patency: patent  Nausea & Vomiting: no nausea and no vomiting  Cardiovascular status: hemodynamically stable  Respiratory status: acceptable  Hydration status: euvolemic  Pain management: adequate    No notable events documented.

## 2024-10-31 NOTE — ANESTHESIA PRE PROCEDURE
inflammation of bone M86.9    Discoloration of skin of toe L81.9    Obesity (BMI 30-39.9) E66.9    Acute osteomyelitis of foot M86.179    GBS (group B streptococcus) infection A49.1    Draining cutaneous sinus tract L98.8    Charcot ankle, right M14.671    Open wound of plantar aspect of right foot S91.301A       Past Medical History:        Diagnosis Date    B12 deficiency 1/15/2021    Charcot arthropathy of midfoot     Right foot    Diabetes (HCC)     type 2    Gout     Hypercholesteremia     Hyperlipidemia     patient denied    Hypertension     Kidney stone     Neuropathy     bilateral lower legs    Sepsis (HCC) 12/2023    hx of       Past Surgical History:        Procedure Laterality Date    APPENDECTOMY      COLONOSCOPY      colon polyps in past    FOOT DEBRIDEMENT Right 12/13/2023    RIGHT FOOT WASHOUT AND PLACEMENT OF ANTIBIOTIC BEADS performed by Celso Castaneda DPM at [facility] MAIN OR    FOOT SURGERY Right 7/19/2024    MIDFOOT ARTHRODESIS WITH EXTERNAL FIXATOR APPLICATION, TENDOACHILLES LENGTHENING performed by Lm Blair MD at [facility] MAIN OR    FOOT SURGERY Right 8/19/2024    REMOVAL OF EXTERNAL FIXATOR RIGHT LEG,  Excisional debridement of the right foot wound to include skin subcutaneous tissue over an area measuring 3 cm²  application of total contact cast performed by Lm Blair MD at [facility] MAIN OR    LEG SURGERY Right 7/19/2024    . performed by Lm Blair MD at [facility] MAIN OR    OTHER SURGICAL HISTORY      anal fissure    TOE AMPUTATION Bilateral 12/10/2023    I&D of foot    TOE AMPUTATION Left     1st & 2nd toe amputated    UPPER GASTROINTESTINAL ENDOSCOPY      WISDOM TOOTH EXTRACTION         Social History:    Social History     Tobacco Use    Smoking status: Never    Smokeless tobacco: Never   Substance Use Topics    Alcohol use: No                                Counseling given: Not Answered      Vital Signs (Current):   Vitals:    10/31/24 1140   BP: (!) 155/91   Pulse: 80

## 2024-10-31 NOTE — OP NOTE
Operative Note      Patient: Juwan Espinoza  YOB: 1970  MRN: [id number]    Date of Procedure: 10/31/2024    Pre-Op Diagnosis Codes:      * Charcot arthropathy of midfoot [M14.679]    Post-Op Diagnosis: Same       Procedure(s):  PANTALAR ARTHRODESIS RIGHT FOOT and ankle  Multiple joint MIDFOOT ARTHRODESIS,   Insertion of antibiotic cement-CERAMENT G    Surgeon(s):  mL Blair MD    Assistant:   Surgical Assistant: Jorge Rodriguez    Anesthesia: General    Estimated Blood Loss (mL): less than 100     Complications: None    Specimens:   * No specimens in log *    Implants:  Implant Name Type Inv. Item Serial No.  Lot No. LRB No. Used Action   SCREW BNE L14.5MM DIA8MM YO TI CATHRYN NONLOCKING COMPR FOR - SNA  SCREW BNE L14.5MM DIA8MM YO TI CATHRYN NONLOCKING COMPR FOR NA [employer] [facility] [id number] Right 1 Implanted   NAIL IM L200MM [id number] 130DEG R ANK GRN TI CATHRYN DANIEL FLUT - SNA  NAIL IM L200MM [id number] 130DEG R ANK GRN TI CATHRYN DANIEL FLUT NA [employer] [facility] [id number] Right 1 Implanted   PASTE BNE GRFT 5 ML 60 CALCIUM SULF 40 HA GENTAMICIN - SNA  PASTE BNE GRFT 5 ML 60 CALCIUM SULF 40 HA GENTAMICIN NA [employer] [facility] [id number] Right 1 Implanted   SCREW BNE L40MM DIA5MM YO TI DANIEL PARTIALLY THRD SHFT FOR - SNA  SCREW BNE L40MM DIA5MM YO TI DANIEL PARTIALLY THRD SHFT FOR NA [employer] [facility] [id number] Right 1 Implanted   SCREW BNE L27.5MM DIA5MM YO TI DANIEL FULL THRD SHFT FOR T2 - SNA  SCREW BNE L27.5MM DIA5MM YO TI DANIEL FULL THRD SHFT FOR T2 NA [employer] [facility] [id number] Right 1 Implanted   SCREW BNE L27.5MM DIA5MM YO TI DANIEL FULL THRD SHFT FOR T2 - SNA  SCREW BNE L27.5MM DIA5MM YO TI DANIEL FULL THRD SHFT FOR T2 NA [employer] [facility] [id number] Right 1 Implanted   SCREW BNE L50MM DIA5MM YO TI DANIEL FULL THRD SHFT FOR T2 IM - SNA  SCREW BNE L50MM DIA5MM YO TI DANIEL FULL THRD SHFT FOR T2 IM NA [employer] HOW-WD [id number] Right 1 Implanted    SCREW BNE L40MM DIA5MM YO TI DANIEL PARTIALLY THRD SHFT FOR - SNA  SCREW BNE L40MM DIA5MM YO TI DANIEL PARTIALLY THRD SHFT FOR NA [employer] [facility] [id number] Right 1 Implanted   ENDCAP ORTH L4MM DIA8MM L ANK TI EXTN - SNA  ENDCAP ORTH L4MM DIA8MM L ANK TI EXTN NA [employer] [facility] [id number] Right 1 Implanted   SALVATION  BEAM 7.0 X 105MM THREAD - SNA  SALVATION  BEAM 7.0 X 105MM THREAD NA [employer] [facility] NA Right 1 Implanted   SALVATION  BEAM 7.0 X 115MM THREAD - SNA  SALVATION  BEAM 7.0 X 115MM THREAD NA [employer] [facility] NA Right 1 Implanted         Drains:   [REMOVED] Negative Pressure Wound Therapy Foot Right;Plantar (Removed)       [REMOVED] Negative Pressure Wound Therapy Foot Right;Plantar (Removed)       Findings:  Infection Present At Time Of Surgery (PATOS) (choose all levels that have infection present):  No infection present  Other Findings: Patient had an unstable hindfoot and midfoot    Detailed Description of Procedure:   Patient was prepped and draped in the usual manner, timeout was called, antibiotics were given, DVT prophylaxis was initiated the contralateral side.  Small incisions were made after the tourniquet was elevated over the anterior medial anterior lateral aspect of the ankle, using mini open technique the joint was entered, the cartilage was removed using a bur, once the medial side was done then the lateral side was done after making a small incision bluntly dissecting down to the capsule incision was then made over the sinus Tarsi, skin subtenons tissue were cut, the subtalar joint was identified and then the bur was used to remove the cartilage and the subtalar joint, the nail was then introduced by introducing a wire from the calcaneus into the tibia reaming was then done and then in the distal part of the because of the patient's previous history of infection from the patient's Cerament G was injected into the anterior medullary

## 2025-01-07 NOTE — DISCHARGE INSTRUCTIONS
Elevated Blood Pressure: Care Instructions  Your Care Instructions    Blood pressure is a measure of how hard the blood pushes against the walls of your arteries. It's normal for blood pressure to go up and down throughout the day. But if it stays up over time, you have high blood pressure. Two numbers tell you your blood pressure. The first number is the systolic pressure. It shows how hard the blood pushes when your heart is pumping. The second number is the diastolic pressure. It shows how hard the blood pushes between heartbeats, when your heart is relaxed and filling with blood. An ideal blood pressure in adults is less than 120/80 (say \"120 over 80\"). High blood pressure is 140/90 or higher. You have high blood pressure if your top number is 140 or higher or your bottom number is 90 or higher, or both. The main test for high blood pressure is simple, fast, and painless. To diagnose high blood pressure, your doctor will test your blood pressure at different times. After testing your blood pressure, your doctor may ask you to test it again when you are home. If you are diagnosed with high blood pressure, you can work with your doctor to make a long-term plan to manage it. Follow-up care is a key part of your treatment and safety. Be sure to make and go to all appointments, and call your doctor if you are having problems. It's also a good idea to know your test results and keep a list of the medicines you take. How can you care for yourself at home? · Do not smoke. Smoking increases your risk for heart attack and stroke. If you need help quitting, talk to your doctor about stop-smoking programs and medicines. These can increase your chances of quitting for good. · Stay at a healthy weight. · Try to limit how much sodium you eat to less than 2,300 milligrams (mg) a day. Your doctor may ask you to try to eat less than 1,500 mg a day. · Be physically active.  Get at least 30 minutes of exercise on most Patient has a h/o CAD s/p CABG. Patient is home on Aspirin 81 mg EC tablet daily, Ranolazine 500 mg Q12, and Eliquis 5 mg BID.    Plan:  Continue home Aspirin 81mg  Continue home Eliquis  Continue home Ranolazine    days of the week. Walking is a good choice. You also may want to do other activities, such as running, swimming, cycling, or playing tennis or team sports. · Avoid or limit alcohol. Talk to your doctor about whether you can drink any alcohol. · Eat plenty of fruits, vegetables, and low-fat dairy products. Eat less saturated and total fats. · Learn how to check your blood pressure at home. When should you call for help? Call your doctor now or seek immediate medical care if:  ? · Your blood pressure is much higher than normal (such as 180/110 or higher). ? · You think high blood pressure is causing symptoms such as:  ¨ Severe headache. ¨ Blurry vision. ? Watch closely for changes in your health, and be sure to contact your doctor if:  ? · You do not get better as expected. Where can you learn more? Go to http://jannyLegacy Consulting and Developmentbrien.info/. Enter K093 in the search box to learn more about \"Elevated Blood Pressure: Care Instructions. \"  Current as of: September 21, 2016  Content Version: 11.4  © 3379-7352 [employer]. Care instructions adapted under license by [employer] (which disclaims liability or warranty for this information). If you have questions about a medical condition or this instruction, always ask your healthcare professional. [address] any warranty or liability for your use of this information. Patient Education        Cuts on the Hand Closed With Stitches: Care Instructions  Your Care Instructions    A cut on your hand can be on your fingers, your thumb, or the front or back of your hand. Sometimes a cut can injure the tendons, blood vessels, or nerves of your hand. The doctor used stitches to close the cut. Using stitches also helps the cut heal and reduces scarring. The doctor may have given you a splint to help prevent you from moving your hand, fingers, or thumb.   If the cut went deep and through the skin, the doctor put in two layers of stitches. The deeper layer brings the deep part of the cut together. These stitches will dissolve and don't need to be removed. The stitches in the upper layer are the ones you see on the cut. You will probably have a bandage. You will need to have the stitches removed, usually in 7 to 14 days. The doctor may suggest that you see a hand specialist if the cut is very deep or if you have trouble moving your fingers or have less feeling in your hand. The doctor has checked you carefully, but problems can develop later. If you notice any problems or new symptoms, get medical treatment right away. Follow-up care is a key part of your treatment and safety. Be sure to make and go to all appointments, and call your doctor if you are having problems. It's also a good idea to know your test results and keep a list of the medicines you take. How can you care for yourself at home? · Keep the cut dry for the first 24 to 48 hours. After this, you can shower if your doctor okays it. Pat the cut dry. · Don't soak the cut, such as in a bathtub. Your doctor will tell you when it's safe to get the cut wet. · If your doctor told you how to care for your cut, follow your doctor's instructions. If you did not get instructions, follow this general advice:  ? After the first 24 to 48 hours, wash around the cut with clean water 2 times a day. Don't use hydrogen peroxide or alcohol, which can slow healing. ? You may cover the cut with a thin layer of petroleum jelly, such as Vaseline, and a nonstick bandage. ? Apply more petroleum jelly and replace the bandage as needed. · Prop up the sore hand on a pillow anytime you sit or lie down during the next 3 days. Try to keep it above the level of your heart. This will help reduce swelling. · Avoid any activity that could cause your cut to reopen. · Do not remove the stitches on your own. Your doctor will tell you when to come back to have the stitches removed.   · Be safe with medicines. Take pain medicines exactly as directed. ? If the doctor gave you a prescription medicine for pain, take it as prescribed. ? If you are not taking a prescription pain medicine, ask your doctor if you can take an over-the-counter medicine. When should you call for help? Call your doctor now or seek immediate medical care if:    · You have new pain, or your pain gets worse.     · The skin near the cut is cold or pale or changes color.     · You have tingling, weakness, or numbness near the cut.     · The cut starts to bleed, and blood soaks through the bandage. Oozing small amounts of blood is normal.     · You have trouble moving the area of the hand near the cut.     · You have symptoms of infection, such as:  ? Increased pain, swelling, warmth, or redness around the cut.  ? Red streaks leading from the cut.  ? Pus draining from the cut.  ? A fever.    Watch closely for changes in your health, and be sure to contact your doctor if:    · You do not get better as expected. Where can you learn more? Go to http://janny-brien.info/. Enter T250 in the search box to learn more about \"Cuts on the Hand Closed With Stitches: Care Instructions. \"  Current as of: June 26, 2019  Content Version: 12.2  © 5340-5584 [employer]. Care instructions adapted under license by [employer] (which disclaims liability or warranty for this information). If you have questions about a medical condition or this instruction, always ask your healthcare professional. [place] [address] any warranty or liability for your use of this information. Patient Education        Animal Bites: Care Instructions  Your Care Instructions  After an animal bite, the biggest concern is infection. The chance of infection depends on the type of animal that bit you, where on your body you were bitten, and your general health.  Many animal bites are not closed with stitches, because this can increase the chance of infection. Your bite may take as little as 7 days or as long as several months to heal, depending on how bad it is. Taking good care of your wound at home will help it heal and reduce your chance of infection. The doctor has checked you carefully, but problems can develop later. If you notice any problems or new symptoms, get medical treatment right away. Follow-up care is a key part of your treatment and safety. Be sure to make and go to all appointments, and call your doctor if you are having problems. It's also a good idea to know your test results and keep a list of the medicines you take. How can you care for yourself at home? · If your doctor told you how to care for your wound, follow your doctor's instructions. If you did not get instructions, follow this general advice:  ? After 24 to 48 hours, gently wash the wound with clean water 2 times a day. Do not scrub or soak the wound. Don't use hydrogen peroxide or alcohol, which can slow healing. ? You may cover the wound with a thin layer of petroleum jelly, such as Vaseline, and a nonstick bandage. ? Apply more petroleum jelly and replace the bandage as needed. · After you shower, gently dry the wound with a clean towel. · If your doctor has closed the wound, cover the bandage with a plastic bag before you take a shower. · A small amount of skin redness and swelling around the wound edges and the stitches or staples is normal. Your wound may itch or feel irritated. Do not scratch or rub the wound. · Ask your doctor if you can take an over-the-counter pain medicine, such as acetaminophen (Tylenol), ibuprofen (Advil, Motrin), or naproxen (Aleve). Read and follow all instructions on the label. · Do not take two or more pain medicines at the same time unless the doctor told you to. Many pain medicines have acetaminophen, which is Tylenol. Too much acetaminophen (Tylenol) can be harmful.   · If your bite puts you at risk for rabies, you will get a series of shots over the next few weeks to prevent rabies. Your doctor will tell you when to get the shots. It is very important that you get the full cycle of shots. Follow your doctor's instructions exactly. · You may need a tetanus shot if you have not received one in the last 5 years. · If your doctor prescribed antibiotics, take them as directed. Do not stop taking them just because you feel better. You need to take the full course of antibiotics. When should you call for help? Call your doctor now or seek immediate medical care if:    · The skin near the bite turns cold or pale or it changes color.     · You lose feeling in the area near the bite, or it feels numb or tingly.     · You have trouble moving a limb near the bite.     · You have symptoms of infection, such as:  ? Increased pain, swelling, warmth, or redness near the wound. ? Red streaks leading from the wound. ? Pus draining from the wound. ? A fever.     · Blood soaks through the bandage. Oozing small amounts of blood is normal.     · Your pain is getting worse.    Watch closely for changes in your health, and be sure to contact your doctor if you are not getting better as expected. Where can you learn more? Go to http://janny-brien.info/. Enter T574 in the search box to learn more about \"Animal Bites: Care Instructions. \"  Current as of: June 26, 2019  Content Version: 12.2  © 8430-0116 [employer]. Care instructions adapted under license by [employer] (which disclaims liability or warranty for this information). If you have questions about a medical condition or this instruction, always ask your healthcare professional. [place] [address] any warranty or liability for your use of this information.

## 2025-03-14 ENCOUNTER — HOSPITAL ENCOUNTER (OUTPATIENT)
Facility: HOSPITAL | Age: 55
Discharge: HOME | End: 2025-03-17
Payer: COMMERCIAL

## 2025-03-14 VITALS
SYSTOLIC BLOOD PRESSURE: 114 MMHG | RESPIRATION RATE: 16 BRPM | DIASTOLIC BLOOD PRESSURE: 64 MMHG | OXYGEN SATURATION: 99 % | HEIGHT: 71 IN | HEART RATE: 102 BPM | BODY MASS INDEX: 35.87 KG/M2 | TEMPERATURE: 97.4 F | WEIGHT: 256.2 LBS

## 2025-03-14 LAB
ANION GAP SERPL CALC-SCNC: 10 MMOL/L (ref 2–12)
BASOPHILS # BLD: 0.04 K/UL (ref 0–0.1)
BASOPHILS NFR BLD: 0.5 % (ref 0–1)
BUN SERPL-MCNC: 33 MG/DL (ref 6–20)
BUN/CREAT SERPL: 12 (ref 12–20)
CA-I BLD-MCNC: 9.4 MG/DL (ref 8.5–10.1)
CHLORIDE SERPL-SCNC: 107 MMOL/L (ref 97–108)
CO2 SERPL-SCNC: 24 MMOL/L (ref 21–32)
CREAT SERPL-MCNC: 2.73 MG/DL (ref 0.7–1.3)
DIFFERENTIAL METHOD BLD: ABNORMAL
EOSINOPHIL # BLD: 0.14 K/UL (ref 0–0.4)
EOSINOPHIL NFR BLD: 1.6 % (ref 0–7)
ERYTHROCYTE [DISTWIDTH] IN BLOOD BY AUTOMATED COUNT: 16.2 % (ref 11.5–14.5)
EST. AVERAGE GLUCOSE BLD GHB EST-MCNC: 186 MG/DL
GLUCOSE SERPL-MCNC: 175 MG/DL (ref 65–100)
HBA1C MFR BLD: 8.1 % (ref 4–5.6)
HCT VFR BLD AUTO: 31.8 % (ref 36.6–50.3)
HGB BLD-MCNC: 9.7 G/DL (ref 12.1–17)
IMM GRANULOCYTES # BLD AUTO: 0.11 K/UL (ref 0–0.04)
IMM GRANULOCYTES NFR BLD AUTO: 1.3 % (ref 0–0.5)
LYMPHOCYTES # BLD: 1.13 K/UL (ref 0.8–3.5)
LYMPHOCYTES NFR BLD: 13 % (ref 12–49)
MCH RBC QN AUTO: 26.4 PG (ref 26–34)
MCHC RBC AUTO-ENTMCNC: 30.5 G/DL (ref 30–36.5)
MCV RBC AUTO: 86.6 FL (ref 80–99)
MONOCYTES # BLD: 0.54 K/UL (ref 0–1)
MONOCYTES NFR BLD: 6.2 % (ref 5–13)
NEUTS SEG # BLD: 6.74 K/UL (ref 1.8–8)
NEUTS SEG NFR BLD: 77.4 % (ref 32–75)
NRBC # BLD: 0 K/UL (ref 0–0.01)
NRBC BLD-RTO: 0 PER 100 WBC
PLATELET # BLD AUTO: 432 K/UL (ref 150–400)
PMV BLD AUTO: 9.9 FL (ref 8.9–12.9)
POTASSIUM SERPL-SCNC: 4.5 MMOL/L (ref 3.5–5.1)
RBC # BLD AUTO: 3.67 M/UL (ref 4.1–5.7)
SODIUM SERPL-SCNC: 141 MMOL/L (ref 136–145)
WBC # BLD AUTO: 8.7 K/UL (ref 4.1–11.1)

## 2025-03-14 PROCEDURE — 85025 COMPLETE CBC W/AUTO DIFF WBC: CPT

## 2025-03-14 PROCEDURE — 80048 BASIC METABOLIC PNL TOTAL CA: CPT

## 2025-03-14 PROCEDURE — 36415 COLL VENOUS BLD VENIPUNCTURE: CPT

## 2025-03-14 PROCEDURE — 83036 HEMOGLOBIN GLYCOSYLATED A1C: CPT

## 2025-03-14 ASSESSMENT — PAIN SCALES - GENERAL: PAINLEVEL_OUTOF10: 4

## 2025-03-14 ASSESSMENT — PAIN DESCRIPTION - DESCRIPTORS: DESCRIPTORS: ACHING

## 2025-03-14 ASSESSMENT — PAIN DESCRIPTION - ORIENTATION: ORIENTATION: RIGHT

## 2025-03-14 ASSESSMENT — PAIN DESCRIPTION - LOCATION: LOCATION: ANKLE;FOOT

## 2025-03-14 NOTE — PERIOP NOTE
Dr. Blair notified of BUN  33 Creatinine 2.73 GFR 26 Hgb 9.7 Ok to proceed with surgery, repeat BMP DOS.

## 2025-03-17 ENCOUNTER — APPOINTMENT (OUTPATIENT)
Facility: HOSPITAL | Age: 55
End: 2025-03-17
Attending: ORTHOPAEDIC SURGERY
Payer: COMMERCIAL

## 2025-03-17 ENCOUNTER — HOSPITAL ENCOUNTER (OUTPATIENT)
Facility: HOSPITAL | Age: 55
Discharge: HOME | End: 2025-03-17
Attending: ORTHOPAEDIC SURGERY | Admitting: ORTHOPAEDIC SURGERY
Payer: COMMERCIAL

## 2025-03-17 VITALS
TEMPERATURE: 98.4 F | RESPIRATION RATE: 18 BRPM | DIASTOLIC BLOOD PRESSURE: 68 MMHG | BODY MASS INDEX: 35.84 KG/M2 | WEIGHT: 256 LBS | OXYGEN SATURATION: 98 % | SYSTOLIC BLOOD PRESSURE: 136 MMHG | HEIGHT: 71 IN | HEART RATE: 80 BPM

## 2025-03-17 DIAGNOSIS — S81.801A OPEN WOUND OF RIGHT LOWER LEG, INITIAL ENCOUNTER: ICD-10-CM

## 2025-03-17 LAB
ANION GAP SERPL CALC-SCNC: 7 MMOL/L (ref 2–12)
BUN SERPL-MCNC: 33 MG/DL (ref 6–20)
BUN/CREAT SERPL: 15 (ref 12–20)
CA-I BLD-MCNC: 9.4 MG/DL (ref 8.5–10.1)
CHLORIDE SERPL-SCNC: 110 MMOL/L (ref 97–108)
CO2 SERPL-SCNC: 24 MMOL/L (ref 21–32)
CREAT SERPL-MCNC: 2.26 MG/DL (ref 0.7–1.3)
GLUCOSE BLD STRIP.AUTO-MCNC: 145 MG/DL (ref 65–100)
GLUCOSE SERPL-MCNC: 142 MG/DL (ref 65–100)
PERFORMED BY:: ABNORMAL
POTASSIUM SERPL-SCNC: 4.9 MMOL/L (ref 3.5–5.1)
SODIUM SERPL-SCNC: 141 MMOL/L (ref 136–145)

## 2025-03-17 PROCEDURE — 87077 CULTURE AEROBIC IDENTIFY: CPT

## 2025-03-17 PROCEDURE — 80048 BASIC METABOLIC PNL TOTAL CA: CPT

## 2025-03-17 PROCEDURE — 36415 COLL VENOUS BLD VENIPUNCTURE: CPT

## 2025-03-17 PROCEDURE — 3600000013 HC SURGERY LEVEL 3 ADDTL 15MIN: Performed by: ORTHOPAEDIC SURGERY

## 2025-03-17 PROCEDURE — 87205 SMEAR GRAM STAIN: CPT

## 2025-03-17 PROCEDURE — 87070 CULTURE OTHR SPECIMN AEROBIC: CPT

## 2025-03-17 PROCEDURE — 7100000011 HC PHASE II RECOVERY - ADDTL 15 MIN: Performed by: ORTHOPAEDIC SURGERY

## 2025-03-17 PROCEDURE — 87102 FUNGUS ISOLATION CULTURE: CPT

## 2025-03-17 PROCEDURE — 3600000003 HC SURGERY LEVEL 3 BASE: Performed by: ORTHOPAEDIC SURGERY

## 2025-03-17 PROCEDURE — 82962 GLUCOSE BLOOD TEST: CPT

## 2025-03-17 PROCEDURE — 87186 SC STD MICRODIL/AGAR DIL: CPT

## 2025-03-17 PROCEDURE — C1602 HC ORTHO MATRIX BONE FILL, DRUG ELUTING: HCPCS | Performed by: ORTHOPAEDIC SURGERY

## 2025-03-17 PROCEDURE — 7100000010 HC PHASE II RECOVERY - FIRST 15 MIN: Performed by: ORTHOPAEDIC SURGERY

## 2025-03-17 PROCEDURE — 76000 FLUOROSCOPY <1 HR PHYS/QHP: CPT

## 2025-03-17 PROCEDURE — 2709999900 HC NON-CHARGEABLE SUPPLY: Performed by: ORTHOPAEDIC SURGERY

## 2025-03-17 DEVICE — CERAMENT G IS AN IMPLANTABLE BONE VOID FILLER (DEVICE/ DRUG COMBINATION PRODUCT) INDICATED FOR USE AS AN ADJUNCT TO SYSTEMIC ANTIBIOTIC THERAPY AND SURGICAL DEBRIDEMENT (STANDARD TREATMENT APPROACH TO A BONE INFECTION) WHERE THERE IS A NEED FOR SUPPLEMENTAL BONE GRAFT. CERAMENT G COMBINES GENTAMICIN SULFATE WITH A BONE VOID FILLER, CONSISTING OF HYDROXYAPATITE AND CALCIUM SULFATE. BY ELUTING GENTAMICIN, CERAMENT G CAN REDUCE THE RECURRENCE OF CHRONIC OSTEOMYELITIS FROM GENTAMICIN-SENSITIVE MICROORGANISMS IN ORDER TO PROTECT BONE HEALING CERAMENT G CAN ALSO REDUCE THE LIKELIHOOD OF INFECTION SUBSEQUENT TO AN OPEN FRACTURE. BY COMBINING CALCIUM SULFATE AND HYDROXYAPATITE, A BALANCE IS ACHIEVED BETWEEN IMPLANT RESORPTION RATE AND BONE REMODELING RATE. CALCIUM SULFATE ACTS AS A RESORBABLE CARRIER FOR HYDROXYAPATITE. HYDROXYAPATITE HAS A SLOW RESORPTION RATE AND HIGH OSTEOCONDUCTIVITY PROVIDING A SCAFFOLD FOR NEW BONE GENERATION. THE USE OF CERAMENT G ELIMINATES THE NEED TO HARVEST AUTOLOGOUS BONE, THEREBY AVOIDING DONOR SITE MORBIDITY (E.G., PAIN, INFECTION, ETC.) IN PATIENTS WITH A DIAGNOSED INFECTION. CERAMENT G MAY BE IMPLANTED BY AN INJECTABLE SYSTEM. IN BONE INFECTION, CERAMENT G CAN ALSO BE INSERTED AS PRE-SET BEADS.
Type: IMPLANTABLE DEVICE | Site: LEG | Status: FUNCTIONAL
Brand: CERAMENT® G

## 2025-03-17 ASSESSMENT — PAIN - FUNCTIONAL ASSESSMENT
PAIN_FUNCTIONAL_ASSESSMENT: NONE - DENIES PAIN
PAIN_FUNCTIONAL_ASSESSMENT: 0-10

## 2025-03-17 NOTE — OP NOTE
Operative Note      Patient: Juwan Espinoza  YOB: 1970  MRN: [id number]    Date of Procedure: 3/17/2025    Pre-Op Diagnosis Codes:      * Open wound of right lower leg, initial encounter [S81.801A]    Post-Op Diagnosis: Same       Procedure(s):  RIGHT LEG REMOVAL OF HARDWARE WITH INJECTION OF CEREMENT G    Surgeon(s):  Lm Blair MD    Assistant:   Physician Assistant: Mayra Gould PA-C    Anesthesia: Local    Estimated Blood Loss (mL): Minimal    Complications: Bleeding    Specimens:   ID Type Source Tests Collected by Time Destination   A : RIGHT TIBIA CULTURE #1 Swab Leg CULTURE, FUNGUS Lm Blair MD 3/17/2025 1207    B : RIGHT TIBIA CULTURE #2 Swab Leg CULTURE, FUNGUS, CULTURE, WOUND (WITH GRAM STAIN) Lm Blair MD 3/17/2025 1207    C : RIGHT TIBIA CULTURE #3 Swab Leg CULTURE, FUNGUS, CULTURE, WOUND (WITH GRAM STAIN) Lm Blair MD 3/17/2025 1207    D : RIGHT TIBIA CULTURE #4 Swab Leg CULTURE, FUNGUS, CULTURE, WOUND (WITH GRAM STAIN) Lm Blair MD 3/17/2025 1207    E : RIGHT TIBIA CULTURE #1 Swab Leg CULTURE, FUNGUS, CULTURE, WOUND (WITH GRAM STAIN) Lm Blair MD 3/17/2025 1319        Implants:  Implant Name Type Inv. Item Serial No.  Lot No. LRB No. Used Action   PASTE BNE GRFT 5 ML 60 CALCIUM SULF 40 HA GENTAMICIN - [id number]  PASTE BNE GRFT 5 ML 60 CALCIUM SULF 40 HA GENTAMICIN  [employer]- [id number] 1 Implanted         Drains:   [REMOVED] Negative Pressure Wound Therapy Foot Right;Plantar (Removed)       [REMOVED] Negative Pressure Wound Therapy Foot Right;Plantar (Removed)       Findings:  Infection Present At Time Of Surgery (PATOS) (choose all levels that have infection present):  - Deep Infection (muscle/fascia) present as evidenced by phlegmon  Other Findings: Loose screws    Detailed Description of Procedure:   Patient was prepped and draped in the usual manner, the screws were localized and

## 2025-03-18 LAB
BACTERIA SPEC CULT: NORMAL
GRAM STN SPEC: NORMAL
GRAM STN SPEC: NORMAL
Lab: NORMAL

## 2025-03-19 ENCOUNTER — HOSPITAL ENCOUNTER (EMERGENCY)
Facility: HOSPITAL | Age: 55
Discharge: HOME | End: 2025-03-19
Attending: EMERGENCY MEDICINE
Payer: COMMERCIAL

## 2025-03-19 ENCOUNTER — APPOINTMENT (OUTPATIENT)
Facility: HOSPITAL | Age: 55
End: 2025-03-19
Payer: COMMERCIAL

## 2025-03-19 VITALS
RESPIRATION RATE: 20 BRPM | DIASTOLIC BLOOD PRESSURE: 82 MMHG | OXYGEN SATURATION: 95 % | HEART RATE: 95 BPM | TEMPERATURE: 100.1 F | SYSTOLIC BLOOD PRESSURE: 144 MMHG

## 2025-03-19 DIAGNOSIS — R11.0 NAUSEA: ICD-10-CM

## 2025-03-19 DIAGNOSIS — M54.50 MIDLINE LOW BACK PAIN WITHOUT SCIATICA, UNSPECIFIED CHRONICITY: ICD-10-CM

## 2025-03-19 DIAGNOSIS — N17.9 ACUTE KIDNEY INJURY: Primary | ICD-10-CM

## 2025-03-19 LAB
ALBUMIN SERPL-MCNC: 2.8 G/DL (ref 3.5–5)
ALBUMIN/GLOB SERPL: 0.5 (ref 1.1–2.2)
ALP SERPL-CCNC: 118 U/L (ref 45–117)
ALT SERPL-CCNC: 8 U/L (ref 12–78)
ANION GAP SERPL CALC-SCNC: 6 MMOL/L (ref 2–12)
APPEARANCE UR: CLEAR
AST SERPL-CCNC: 6 U/L (ref 15–37)
BACTERIA URNS QL MICRO: NEGATIVE /HPF
BASOPHILS # BLD: 0.03 K/UL (ref 0–0.1)
BASOPHILS NFR BLD: 0.2 % (ref 0–1)
BILIRUB SERPL-MCNC: 0.4 MG/DL (ref 0.2–1)
BILIRUB UR QL: NEGATIVE
BUN SERPL-MCNC: 27 MG/DL (ref 6–20)
BUN/CREAT SERPL: 14 (ref 12–20)
CALCIUM SERPL-MCNC: 9.5 MG/DL (ref 8.5–10.1)
CHLORIDE SERPL-SCNC: 105 MMOL/L (ref 97–108)
CO2 SERPL-SCNC: 26 MMOL/L (ref 21–32)
COLOR UR: ABNORMAL
CREAT SERPL-MCNC: 1.99 MG/DL (ref 0.7–1.3)
DIFFERENTIAL METHOD BLD: ABNORMAL
EOSINOPHIL # BLD: 0.08 K/UL (ref 0–0.4)
EOSINOPHIL NFR BLD: 0.6 % (ref 0–7)
EPITH CASTS URNS QL MICRO: ABNORMAL /LPF
ERYTHROCYTE [DISTWIDTH] IN BLOOD BY AUTOMATED COUNT: 17.1 % (ref 11.5–14.5)
GLOBULIN SER CALC-MCNC: 5.2 G/DL (ref 2–4)
GLUCOSE SERPL-MCNC: 219 MG/DL (ref 65–100)
GLUCOSE UR STRIP.AUTO-MCNC: NEGATIVE MG/DL
HCT VFR BLD AUTO: 33.7 % (ref 36.6–50.3)
HGB BLD-MCNC: 10.2 G/DL (ref 12.1–17)
HGB UR QL STRIP: NEGATIVE
HYALINE CASTS URNS QL MICRO: ABNORMAL /LPF (ref 0–2)
IMM GRANULOCYTES # BLD AUTO: 0.16 K/UL (ref 0–0.04)
IMM GRANULOCYTES NFR BLD AUTO: 1.2 % (ref 0–0.5)
KETONES UR QL STRIP.AUTO: NEGATIVE MG/DL
LEUKOCYTE ESTERASE UR QL STRIP.AUTO: NEGATIVE
LYMPHOCYTES # BLD: 1.88 K/UL (ref 0.8–3.5)
LYMPHOCYTES NFR BLD: 14.3 % (ref 12–49)
MCH RBC QN AUTO: 26 PG (ref 26–34)
MCHC RBC AUTO-ENTMCNC: 30.3 G/DL (ref 30–36.5)
MCV RBC AUTO: 85.8 FL (ref 80–99)
MONOCYTES # BLD: 0.98 K/UL (ref 0–1)
MONOCYTES NFR BLD: 7.5 % (ref 5–13)
NEUTS SEG # BLD: 9.99 K/UL (ref 1.8–8)
NEUTS SEG NFR BLD: 76.2 % (ref 32–75)
NITRITE UR QL STRIP.AUTO: NEGATIVE
NRBC # BLD: 0 K/UL (ref 0–0.01)
NRBC BLD-RTO: 0 PER 100 WBC
PH UR STRIP: 5.5 (ref 5–8)
PLATELET # BLD AUTO: 432 K/UL (ref 150–400)
PMV BLD AUTO: 10.1 FL (ref 8.9–12.9)
POTASSIUM SERPL-SCNC: 4.5 MMOL/L (ref 3.5–5.1)
PROT SERPL-MCNC: 8 G/DL (ref 6.4–8.2)
PROT UR STRIP-MCNC: 30 MG/DL
RBC # BLD AUTO: 3.93 M/UL (ref 4.1–5.7)
RBC #/AREA URNS HPF: ABNORMAL /HPF (ref 0–5)
SODIUM SERPL-SCNC: 137 MMOL/L (ref 136–145)
SP GR UR REFRACTOMETRY: 1.01
URINE CULTURE IF INDICATED: ABNORMAL
UROBILINOGEN UR QL STRIP.AUTO: 0.2 EU/DL (ref 0.2–1)
WBC # BLD AUTO: 13.1 K/UL (ref 4.1–11.1)
WBC URNS QL MICRO: ABNORMAL /HPF (ref 0–4)

## 2025-03-19 PROCEDURE — 96375 TX/PRO/DX INJ NEW DRUG ADDON: CPT

## 2025-03-19 PROCEDURE — 81001 URINALYSIS AUTO W/SCOPE: CPT

## 2025-03-19 PROCEDURE — 80053 COMPREHEN METABOLIC PANEL: CPT

## 2025-03-19 PROCEDURE — 85025 COMPLETE CBC W/AUTO DIFF WBC: CPT

## 2025-03-19 PROCEDURE — 2580000003 HC RX 258: Performed by: STUDENT IN AN ORGANIZED HEALTH CARE EDUCATION/TRAINING PROGRAM

## 2025-03-19 PROCEDURE — 36415 COLL VENOUS BLD VENIPUNCTURE: CPT

## 2025-03-19 PROCEDURE — 74176 CT ABD & PELVIS W/O CONTRAST: CPT

## 2025-03-19 PROCEDURE — 6360000002 HC RX W HCPCS: Performed by: STUDENT IN AN ORGANIZED HEALTH CARE EDUCATION/TRAINING PROGRAM

## 2025-03-19 PROCEDURE — 99284 EMERGENCY DEPT VISIT MOD MDM: CPT

## 2025-03-19 PROCEDURE — 96374 THER/PROPH/DIAG INJ IV PUSH: CPT

## 2025-03-19 RX ORDER — 0.9 % SODIUM CHLORIDE 0.9 %
500 INTRAVENOUS SOLUTION INTRAVENOUS ONCE
Status: COMPLETED | OUTPATIENT
Start: 2025-03-19 | End: 2025-03-19

## 2025-03-19 RX ORDER — MORPHINE SULFATE 4 MG/ML
4 INJECTION, SOLUTION INTRAMUSCULAR; INTRAVENOUS
Status: COMPLETED | OUTPATIENT
Start: 2025-03-19 | End: 2025-03-19

## 2025-03-19 RX ORDER — ONDANSETRON 4 MG/1
4 TABLET, ORALLY DISINTEGRATING ORAL 3 TIMES DAILY PRN
Qty: 21 TABLET | Refills: 0 | Status: SHIPPED | OUTPATIENT
Start: 2025-03-19

## 2025-03-19 RX ORDER — ONDANSETRON 2 MG/ML
4 INJECTION INTRAMUSCULAR; INTRAVENOUS ONCE
Status: COMPLETED | OUTPATIENT
Start: 2025-03-19 | End: 2025-03-19

## 2025-03-19 RX ADMIN — MORPHINE SULFATE 4 MG: 4 INJECTION, SOLUTION INTRAMUSCULAR; INTRAVENOUS at 12:40

## 2025-03-19 RX ADMIN — ONDANSETRON 4 MG: 2 INJECTION, SOLUTION INTRAMUSCULAR; INTRAVENOUS at 12:40

## 2025-03-19 RX ADMIN — SODIUM CHLORIDE 500 ML: 0.9 INJECTION, SOLUTION INTRAVENOUS at 12:38

## 2025-03-19 ASSESSMENT — PAIN DESCRIPTION - LOCATION
LOCATION: BACK

## 2025-03-19 ASSESSMENT — PAIN DESCRIPTION - DESCRIPTORS
DESCRIPTORS: ACHING

## 2025-03-19 ASSESSMENT — PAIN - FUNCTIONAL ASSESSMENT: PAIN_FUNCTIONAL_ASSESSMENT: 0-10

## 2025-03-19 ASSESSMENT — PAIN SCALES - GENERAL
PAINLEVEL_OUTOF10: 7
PAINLEVEL_OUTOF10: 4
PAINLEVEL_OUTOF10: 8
PAINLEVEL_OUTOF10: 2

## 2025-03-19 ASSESSMENT — PAIN DESCRIPTION - ORIENTATION
ORIENTATION: LEFT;POSTERIOR

## 2025-03-19 NOTE — ED PROVIDER NOTES
[facility] EMERGENCY DEPARTMENT  EMERGENCY DEPARTMENT ENCOUNTER       Pt Name: Juwan Espinoza  MRN: [id number]  Birthdate 1970  Date of evaluation: 3/19/2025  Provider: Gerson Celaya MD   PCP: MARVIN Jones MD  Note Started: 2:53 PM 3/19/25     CHIEF COMPLAINT       Chief Complaint   Patient presents with    Flank Pain     Patient wheeled to triage w c/o mid back pain. Patient reports he had an elevated Cr. And has a h/o kidney stones.        HISTORY OF PRESENT ILLNESS: 1 or more elements      History From: {SAHPI:64983}  {HPI Limitations (Optional):15866}     Juwan Espinoza is a 55 y.o. male who presents ***     Nursing Notes were all reviewed and agreed with or any disagreements were addressed in the HPI.     REVIEW OF SYSTEMS      Review of Systems     Positives and Pertinent negatives as per HPI.    PAST HISTORY     Past Medical History:  Past Medical History:   Diagnosis Date    B12 deficiency 1/15/2021    Charcot arthropathy of midfoot     Right foot    Diabetes (HCC)     type 2    Gout     Hypercholesteremia     Hyperlipidemia     patient denied    Hypertension     Kidney stone     Neuropathy     bilateral lower legs    Sepsis (HCC) 12/2023    hx of left foot         Past Surgical History:  Past Surgical History:   Procedure Laterality Date    APPENDECTOMY      COLONOSCOPY      colon polyps in past    FOOT DEBRIDEMENT Right 12/13/2023    RIGHT FOOT WASHOUT AND PLACEMENT OF ANTIBIOTIC BEADS performed by Celso Castaneda DPM at [facility] MAIN OR    FOOT SURGERY Right 7/19/2024    MIDFOOT ARTHRODESIS WITH EXTERNAL FIXATOR APPLICATION, TENDOACHILLES LENGTHENING performed by Lm Blair MD at [facility] MAIN OR    FOOT SURGERY Right 8/19/2024    REMOVAL OF EXTERNAL FIXATOR RIGHT LEG,  Excisional debridement of the right foot wound to include skin subcutaneous tissue over an area measuring 3 cm²  application of total contact cast performed by Lm Blair MD at [facility] MAIN OR    FOOT

## 2025-03-19 NOTE — ED NOTES
Patient discharged from the ED by Belia SEBASTIAN. Diagnosis, medications, precautions and follow-ups were reviewed with the patient/family. Questions were asked and answered prior to departure.

## 2025-03-19 NOTE — DISCHARGE INSTRUCTIONS
Thank You!    It was a pleasure taking care of you in our Emergency Department today. We know that when you come to our Emergency Department, you are entrusting us with your health, comfort, and safety. Our physicians and nurses honor that trust, and truly appreciate the opportunity to care for you and your loved ones.      We also value your feedback. If you receive a survey about your Emergency Department experience today, please fill it out.  We care about our patients' feedback, and we listen to what you have to say.  Thank you.    Gerson Celaya MD  ________________________________________________________________________  I have included a copy of your lab results and/or radiologic studies from today's visit so you can have them easily available at your follow-up visit. We hope you feel better and please do not hesitate to contact the ED if you have any questions at all!    Recent Results (from the past 12 hours)   CBC with Auto Differential    Collection Time: 03/19/25 12:14 PM   Result Value Ref Range    WBC 13.1 (H) 4.1 - 11.1 K/uL    RBC 3.93 (L) 4.10 - 5.70 M/uL    Hemoglobin 10.2 (L) 12.1 - 17.0 g/dL    Hematocrit 33.7 (L) 36.6 - 50.3 %    MCV 85.8 80.0 - 99.0 FL    MCH 26.0 26.0 - 34.0 PG    MCHC 30.3 30.0 - 36.5 g/dL    RDW 17.1 (H) 11.5 - 14.5 %    Platelets 432 (H) 150 - 400 K/uL    MPV 10.1 8.9 - 12.9 FL    Nucleated RBCs 0.0 0  WBC    nRBC 0.00 0.00 - 0.01 K/uL    Neutrophils % 76.2 (H) 32.0 - 75.0 %    Lymphocytes % 14.3 12.0 - 49.0 %    Monocytes % 7.5 5.0 - 13.0 %    Eosinophils % 0.6 0.0 - 7.0 %    Basophils % 0.2 0.0 - 1.0 %    Immature Granulocytes % 1.2 (H) 0.0 - 0.5 %    Neutrophils Absolute 9.99 (H) 1.80 - 8.00 K/UL    Lymphocytes Absolute 1.88 0.80 - 3.50 K/UL    Monocytes Absolute 0.98 0.00 - 1.00 K/UL    Eosinophils Absolute 0.08 0.00 - 0.40 K/UL    Basophils Absolute 0.03 0.00 - 0.10 K/UL    Immature Granulocytes Absolute 0.16 (H) 0.00 - 0.04 K/UL    Differential Type AUTOMATED  that you follow up with a doctor, nurse practitioner, or physician assistant for ongoing care. If your symptoms become worse or you do not improve as expected and you are unable to reach your usual health care provider, you should return to the Emergency Department. We are available 24 hours a day.    Please take your discharge instructions with you when you go to your follow-up appointment.     If a prescription has been provided, please have it filled as soon as possible to prevent a delay in treatment. Read the entire medication instruction sheet provided to you by the pharmacy. If you have any questions or reservations about taking the medication due to side effects or interactions with other medications, please call your primary care physician or contact the ER to speak with the charge nurse.     Please make an appointment with your family doctor or the physician you were referred to for follow-up of this visit as instructed on your discharge paperwork. Return to the ER if you are unable to be seen or if you are unable to be seen in a timely manner.    Should you experience abdominal pain lasting greater than 6 hours, chest pain, difficulty breathing, fever/chills, numbness/tingling, skin changes or other symptoms that concern you, return to the ED sooner. If you feel worse over the next 24 hours, please return to the ED. We are available 24 hours a day. Thank you for trusting us with your care!

## 2025-03-21 LAB
BACTERIA SPEC CULT: ABNORMAL
GRAM STN SPEC: ABNORMAL
Lab: ABNORMAL
Lab: ABNORMAL

## 2025-03-22 LAB
BACTERIA SPEC CULT: ABNORMAL
BACTERIA SPEC CULT: ABNORMAL
GRAM STN SPEC: ABNORMAL
GRAM STN SPEC: ABNORMAL
Lab: ABNORMAL

## 2025-03-24 LAB
BACTERIA SPEC CULT: NORMAL
Lab: NORMAL

## 2025-03-31 LAB
BACTERIA SPEC CULT: NORMAL
GRAM STN SPEC: NORMAL
GRAM STN SPEC: NORMAL
Lab: NORMAL

## 2025-04-01 ENCOUNTER — APPOINTMENT (OUTPATIENT)
Facility: HOSPITAL | Age: 55
DRG: 982 | End: 2025-04-01
Payer: COMMERCIAL

## 2025-04-01 ENCOUNTER — APPOINTMENT (OUTPATIENT)
Facility: HOSPITAL | Age: 55
End: 2025-04-01
Attending: INTERNAL MEDICINE
Payer: COMMERCIAL

## 2025-04-01 ENCOUNTER — HOSPITAL ENCOUNTER (OUTPATIENT)
Facility: HOSPITAL | Age: 55
Discharge: HOME | End: 2025-04-03
Attending: INTERNAL MEDICINE
Payer: COMMERCIAL

## 2025-04-01 ENCOUNTER — HOSPITAL ENCOUNTER (INPATIENT)
Facility: HOSPITAL | Age: 55
LOS: 4 days | Discharge: HOME | DRG: 982 | End: 2025-04-05
Payer: COMMERCIAL

## 2025-04-01 DIAGNOSIS — M86.10 ACUTE OSTEOMYELITIS (HCC): ICD-10-CM

## 2025-04-01 DIAGNOSIS — R60.0 LOCALIZED EDEMA: ICD-10-CM

## 2025-04-01 DIAGNOSIS — S91.301A OPEN WOUND OF PLANTAR ASPECT OF RIGHT FOOT: Primary | ICD-10-CM

## 2025-04-01 PROBLEM — L08.9 WOUND INFECTION: Status: ACTIVE | Noted: 2025-04-01

## 2025-04-01 PROBLEM — T14.8XXA WOUND INFECTION: Status: ACTIVE | Noted: 2025-04-01

## 2025-04-01 LAB
CRP SERPL-MCNC: 7.79 MG/DL (ref 0–0.3)
ERYTHROCYTE [SEDIMENTATION RATE] IN BLOOD: 108 MM/HR (ref 0–20)
GLUCOSE BLD STRIP.AUTO-MCNC: 148 MG/DL (ref 65–100)
GLUCOSE BLD STRIP.AUTO-MCNC: 199 MG/DL (ref 65–100)
GLUCOSE BLD STRIP.AUTO-MCNC: 236 MG/DL (ref 65–100)
PERFORMED BY:: ABNORMAL
PROCALCITONIN SERPL-MCNC: 0.08 NG/ML

## 2025-04-01 PROCEDURE — 6360000002 HC RX W HCPCS

## 2025-04-01 PROCEDURE — 93971 EXTREMITY STUDY: CPT

## 2025-04-01 PROCEDURE — 73610 X-RAY EXAM OF ANKLE: CPT

## 2025-04-01 PROCEDURE — 85652 RBC SED RATE AUTOMATED: CPT

## 2025-04-01 PROCEDURE — 84145 PROCALCITONIN (PCT): CPT

## 2025-04-01 PROCEDURE — 2580000003 HC RX 258

## 2025-04-01 PROCEDURE — 83540 ASSAY OF IRON: CPT

## 2025-04-01 PROCEDURE — 2500000003 HC RX 250 WO HCPCS

## 2025-04-01 PROCEDURE — 6370000000 HC RX 637 (ALT 250 FOR IP)

## 2025-04-01 PROCEDURE — 73630 X-RAY EXAM OF FOOT: CPT

## 2025-04-01 PROCEDURE — 1100000000 HC RM PRIVATE

## 2025-04-01 PROCEDURE — 36415 COLL VENOUS BLD VENIPUNCTURE: CPT

## 2025-04-01 PROCEDURE — 94761 N-INVAS EAR/PLS OXIMETRY MLT: CPT

## 2025-04-01 PROCEDURE — 82728 ASSAY OF FERRITIN: CPT

## 2025-04-01 PROCEDURE — 82962 GLUCOSE BLOOD TEST: CPT

## 2025-04-01 PROCEDURE — 86140 C-REACTIVE PROTEIN: CPT

## 2025-04-01 RX ORDER — POTASSIUM CHLORIDE 7.45 MG/ML
10 INJECTION INTRAVENOUS PRN
Status: DISCONTINUED | OUTPATIENT
Start: 2025-04-01 | End: 2025-04-05 | Stop reason: HOSPADM

## 2025-04-01 RX ORDER — ACETAMINOPHEN 325 MG/1
650 TABLET ORAL EVERY 6 HOURS PRN
Status: DISCONTINUED | OUTPATIENT
Start: 2025-04-01 | End: 2025-04-05 | Stop reason: HOSPADM

## 2025-04-01 RX ORDER — POLYETHYLENE GLYCOL 3350 17 G/17G
17 POWDER, FOR SOLUTION ORAL DAILY PRN
Status: DISCONTINUED | OUTPATIENT
Start: 2025-04-01 | End: 2025-04-05 | Stop reason: HOSPADM

## 2025-04-01 RX ORDER — SODIUM CHLORIDE 0.9 % (FLUSH) 0.9 %
5-40 SYRINGE (ML) INJECTION EVERY 12 HOURS SCHEDULED
Status: DISCONTINUED | OUTPATIENT
Start: 2025-04-01 | End: 2025-04-05 | Stop reason: HOSPADM

## 2025-04-01 RX ORDER — ACETAMINOPHEN 325 MG/1
650 TABLET ORAL EVERY 4 HOURS PRN
Status: DISCONTINUED | OUTPATIENT
Start: 2025-04-01 | End: 2025-04-05 | Stop reason: HOSPADM

## 2025-04-01 RX ORDER — ENOXAPARIN SODIUM 100 MG/ML
30 INJECTION SUBCUTANEOUS 2 TIMES DAILY
Status: DISCONTINUED | OUTPATIENT
Start: 2025-04-01 | End: 2025-04-05 | Stop reason: HOSPADM

## 2025-04-01 RX ORDER — INSULIN LISPRO 100 [IU]/ML
0-4 INJECTION, SOLUTION INTRAVENOUS; SUBCUTANEOUS
Status: DISCONTINUED | OUTPATIENT
Start: 2025-04-01 | End: 2025-04-02

## 2025-04-01 RX ORDER — MAGNESIUM SULFATE IN WATER 40 MG/ML
2000 INJECTION, SOLUTION INTRAVENOUS PRN
Status: DISCONTINUED | OUTPATIENT
Start: 2025-04-01 | End: 2025-04-05 | Stop reason: HOSPADM

## 2025-04-01 RX ORDER — GABAPENTIN 300 MG/1
600 CAPSULE ORAL 3 TIMES DAILY
Status: DISCONTINUED | OUTPATIENT
Start: 2025-04-01 | End: 2025-04-05 | Stop reason: HOSPADM

## 2025-04-01 RX ORDER — SODIUM CHLORIDE 9 MG/ML
INJECTION, SOLUTION INTRAVENOUS PRN
Status: DISCONTINUED | OUTPATIENT
Start: 2025-04-01 | End: 2025-04-05 | Stop reason: HOSPADM

## 2025-04-01 RX ORDER — ONDANSETRON 4 MG/1
4 TABLET, ORALLY DISINTEGRATING ORAL EVERY 8 HOURS PRN
Status: DISCONTINUED | OUTPATIENT
Start: 2025-04-01 | End: 2025-04-05 | Stop reason: HOSPADM

## 2025-04-01 RX ORDER — SODIUM CHLORIDE 0.9 % (FLUSH) 0.9 %
5-40 SYRINGE (ML) INJECTION PRN
Status: DISCONTINUED | OUTPATIENT
Start: 2025-04-01 | End: 2025-04-05 | Stop reason: HOSPADM

## 2025-04-01 RX ORDER — POTASSIUM CHLORIDE 1500 MG/1
40 TABLET, EXTENDED RELEASE ORAL PRN
Status: DISCONTINUED | OUTPATIENT
Start: 2025-04-01 | End: 2025-04-05 | Stop reason: HOSPADM

## 2025-04-01 RX ORDER — GLUCAGON 1 MG/ML
1 KIT INJECTION PRN
Status: DISCONTINUED | OUTPATIENT
Start: 2025-04-01 | End: 2025-04-05 | Stop reason: HOSPADM

## 2025-04-01 RX ORDER — ROSUVASTATIN CALCIUM 5 MG/1
5 TABLET, COATED ORAL NIGHTLY
Status: DISCONTINUED | OUTPATIENT
Start: 2025-04-01 | End: 2025-04-05 | Stop reason: HOSPADM

## 2025-04-01 RX ORDER — ONDANSETRON 2 MG/ML
4 INJECTION INTRAMUSCULAR; INTRAVENOUS EVERY 6 HOURS PRN
Status: DISCONTINUED | OUTPATIENT
Start: 2025-04-01 | End: 2025-04-05 | Stop reason: HOSPADM

## 2025-04-01 RX ORDER — ACETAMINOPHEN 650 MG/1
650 SUPPOSITORY RECTAL EVERY 6 HOURS PRN
Status: DISCONTINUED | OUTPATIENT
Start: 2025-04-01 | End: 2025-04-05 | Stop reason: HOSPADM

## 2025-04-01 RX ORDER — DEXTROSE MONOHYDRATE 100 MG/ML
INJECTION, SOLUTION INTRAVENOUS CONTINUOUS PRN
Status: DISCONTINUED | OUTPATIENT
Start: 2025-04-01 | End: 2025-04-05 | Stop reason: HOSPADM

## 2025-04-01 RX ADMIN — GABAPENTIN 600 MG: 300 CAPSULE ORAL at 15:53

## 2025-04-01 RX ADMIN — PIPERACILLIN AND TAZOBACTAM 4500 MG: 4; .5 INJECTION, POWDER, FOR SOLUTION INTRAVENOUS at 16:09

## 2025-04-01 RX ADMIN — ROSUVASTATIN CALCIUM 5 MG: 5 TABLET, FILM COATED ORAL at 21:37

## 2025-04-01 RX ADMIN — SODIUM CHLORIDE, PRESERVATIVE FREE 10 ML: 5 INJECTION INTRAVENOUS at 21:40

## 2025-04-01 RX ADMIN — INSULIN LISPRO 1 UNITS: 100 INJECTION, SOLUTION INTRAVENOUS; SUBCUTANEOUS at 18:28

## 2025-04-01 RX ADMIN — PIPERACILLIN AND TAZOBACTAM 3375 MG: 3; .375 INJECTION, POWDER, LYOPHILIZED, FOR SOLUTION INTRAVENOUS at 21:40

## 2025-04-01 RX ADMIN — ACETAMINOPHEN 650 MG: 325 TABLET ORAL at 15:53

## 2025-04-01 RX ADMIN — GABAPENTIN 600 MG: 300 CAPSULE ORAL at 21:37

## 2025-04-01 RX ADMIN — ENOXAPARIN SODIUM 30 MG: 100 INJECTION SUBCUTANEOUS at 21:41

## 2025-04-01 ASSESSMENT — PAIN SCALES - GENERAL
PAINLEVEL_OUTOF10: 4
PAINLEVEL_OUTOF10: 6
PAINLEVEL_OUTOF10: 4

## 2025-04-01 ASSESSMENT — PAIN DESCRIPTION - ORIENTATION
ORIENTATION: RIGHT;POSTERIOR
ORIENTATION: RIGHT

## 2025-04-01 ASSESSMENT — PAIN - FUNCTIONAL ASSESSMENT: PAIN_FUNCTIONAL_ASSESSMENT: ACTIVITIES ARE NOT PREVENTED

## 2025-04-01 ASSESSMENT — PAIN DESCRIPTION - LOCATION
LOCATION: ANKLE;FOOT
LOCATION: ANKLE;FOOT

## 2025-04-01 ASSESSMENT — PAIN DESCRIPTION - DESCRIPTORS
DESCRIPTORS: ACHING;DISCOMFORT;THROBBING
DESCRIPTORS: ACHING

## 2025-04-02 LAB
ANION GAP SERPL CALC-SCNC: 6 MMOL/L (ref 2–12)
BASOPHILS # BLD: 0.03 K/UL (ref 0–0.1)
BASOPHILS NFR BLD: 0.4 % (ref 0–1)
BUN SERPL-MCNC: 15 MG/DL (ref 6–20)
BUN/CREAT SERPL: 13 (ref 12–20)
CA-I BLD-MCNC: 9.2 MG/DL (ref 8.5–10.1)
CHLORIDE SERPL-SCNC: 107 MMOL/L (ref 97–108)
CO2 SERPL-SCNC: 27 MMOL/L (ref 21–32)
CREAT SERPL-MCNC: 1.14 MG/DL (ref 0.7–1.3)
DIFFERENTIAL METHOD BLD: ABNORMAL
EOSINOPHIL # BLD: 0.32 K/UL (ref 0–0.4)
EOSINOPHIL NFR BLD: 3.9 % (ref 0–7)
ERYTHROCYTE [DISTWIDTH] IN BLOOD BY AUTOMATED COUNT: 16.5 % (ref 11.5–14.5)
FERRITIN SERPL-MCNC: 110 NG/ML (ref 26–388)
GLUCOSE BLD STRIP.AUTO-MCNC: 123 MG/DL (ref 65–100)
GLUCOSE BLD STRIP.AUTO-MCNC: 176 MG/DL (ref 65–100)
GLUCOSE BLD STRIP.AUTO-MCNC: 247 MG/DL (ref 65–100)
GLUCOSE BLD STRIP.AUTO-MCNC: 298 MG/DL (ref 65–100)
GLUCOSE SERPL-MCNC: 117 MG/DL (ref 65–100)
HCT VFR BLD AUTO: 29.5 % (ref 36.6–50.3)
HGB BLD-MCNC: 9 G/DL (ref 12.1–17)
IMM GRANULOCYTES # BLD AUTO: 0.1 K/UL (ref 0–0.04)
IMM GRANULOCYTES NFR BLD AUTO: 1.2 % (ref 0–0.5)
IRON SATN MFR SERPL: 13 % (ref 20–50)
IRON SERPL-MCNC: 21 UG/DL (ref 35–150)
LYMPHOCYTES # BLD: 1.98 K/UL (ref 0.8–3.5)
LYMPHOCYTES NFR BLD: 23.9 % (ref 12–49)
MCH RBC QN AUTO: 26.2 PG (ref 26–34)
MCHC RBC AUTO-ENTMCNC: 30.5 G/DL (ref 30–36.5)
MCV RBC AUTO: 85.8 FL (ref 80–99)
MONOCYTES # BLD: 0.54 K/UL (ref 0–1)
MONOCYTES NFR BLD: 6.5 % (ref 5–13)
NEUTS SEG # BLD: 5.3 K/UL (ref 1.8–8)
NEUTS SEG NFR BLD: 64.1 % (ref 32–75)
NRBC # BLD: 0 K/UL (ref 0–0.01)
NRBC BLD-RTO: 0 PER 100 WBC
PERFORMED BY:: ABNORMAL
PLATELET # BLD AUTO: 363 K/UL (ref 150–400)
PMV BLD AUTO: 10.3 FL (ref 8.9–12.9)
POTASSIUM SERPL-SCNC: 4.1 MMOL/L (ref 3.5–5.1)
RBC # BLD AUTO: 3.44 M/UL (ref 4.1–5.7)
SODIUM SERPL-SCNC: 140 MMOL/L (ref 136–145)
TIBC SERPL-MCNC: 157 UG/DL (ref 250–450)
WBC # BLD AUTO: 8.3 K/UL (ref 4.1–11.1)

## 2025-04-02 PROCEDURE — 2500000003 HC RX 250 WO HCPCS

## 2025-04-02 PROCEDURE — 6370000000 HC RX 637 (ALT 250 FOR IP)

## 2025-04-02 PROCEDURE — 85025 COMPLETE CBC W/AUTO DIFF WBC: CPT

## 2025-04-02 PROCEDURE — 6360000002 HC RX W HCPCS

## 2025-04-02 PROCEDURE — 36415 COLL VENOUS BLD VENIPUNCTURE: CPT

## 2025-04-02 PROCEDURE — 82962 GLUCOSE BLOOD TEST: CPT

## 2025-04-02 PROCEDURE — 80048 BASIC METABOLIC PNL TOTAL CA: CPT

## 2025-04-02 PROCEDURE — 6360000002 HC RX W HCPCS: Performed by: INTERNAL MEDICINE

## 2025-04-02 PROCEDURE — 99222 1ST HOSP IP/OBS MODERATE 55: CPT | Performed by: INTERNAL MEDICINE

## 2025-04-02 PROCEDURE — 1100000000 HC RM PRIVATE

## 2025-04-02 PROCEDURE — 2580000003 HC RX 258: Performed by: INTERNAL MEDICINE

## 2025-04-02 PROCEDURE — 2580000003 HC RX 258

## 2025-04-02 RX ORDER — INSULIN LISPRO 100 [IU]/ML
0-16 INJECTION, SOLUTION INTRAVENOUS; SUBCUTANEOUS
Status: DISCONTINUED | OUTPATIENT
Start: 2025-04-02 | End: 2025-04-05 | Stop reason: HOSPADM

## 2025-04-02 RX ADMIN — INSULIN LISPRO 8 UNITS: 100 INJECTION, SOLUTION INTRAVENOUS; SUBCUTANEOUS at 16:35

## 2025-04-02 RX ADMIN — ENOXAPARIN SODIUM 30 MG: 100 INJECTION SUBCUTANEOUS at 09:08

## 2025-04-02 RX ADMIN — SODIUM CHLORIDE, PRESERVATIVE FREE 10 ML: 5 INJECTION INTRAVENOUS at 22:23

## 2025-04-02 RX ADMIN — GABAPENTIN 600 MG: 300 CAPSULE ORAL at 22:05

## 2025-04-02 RX ADMIN — INSULIN LISPRO 4 UNITS: 100 INJECTION, SOLUTION INTRAVENOUS; SUBCUTANEOUS at 22:01

## 2025-04-02 RX ADMIN — SODIUM CHLORIDE, PRESERVATIVE FREE 10 ML: 5 INJECTION INTRAVENOUS at 09:08

## 2025-04-02 RX ADMIN — GABAPENTIN 600 MG: 300 CAPSULE ORAL at 14:50

## 2025-04-02 RX ADMIN — MEROPENEM 1000 MG: 1 INJECTION INTRAVENOUS at 14:50

## 2025-04-02 RX ADMIN — GABAPENTIN 600 MG: 300 CAPSULE ORAL at 09:07

## 2025-04-02 RX ADMIN — MEROPENEM 1000 MG: 1 INJECTION INTRAVENOUS at 22:23

## 2025-04-02 RX ADMIN — ROSUVASTATIN CALCIUM 5 MG: 5 TABLET, FILM COATED ORAL at 22:05

## 2025-04-02 RX ADMIN — PIPERACILLIN AND TAZOBACTAM 3375 MG: 3; .375 INJECTION, POWDER, LYOPHILIZED, FOR SOLUTION INTRAVENOUS at 06:04

## 2025-04-02 RX ADMIN — ENOXAPARIN SODIUM 30 MG: 100 INJECTION SUBCUTANEOUS at 22:02

## 2025-04-02 ASSESSMENT — PAIN SCALES - GENERAL
PAINLEVEL_OUTOF10: 0
PAINLEVEL_OUTOF10: 2
PAINLEVEL_OUTOF10: 0

## 2025-04-02 ASSESSMENT — PAIN DESCRIPTION - ORIENTATION: ORIENTATION: RIGHT

## 2025-04-02 ASSESSMENT — PAIN DESCRIPTION - LOCATION: LOCATION: ANKLE;FOOT

## 2025-04-02 ASSESSMENT — PAIN DESCRIPTION - DESCRIPTORS: DESCRIPTORS: ACHING

## 2025-04-02 NOTE — H&P
Luis Tobin MD  Physician  Internal Medicine     Progress Notes     Signed     Date of Service: 2025  2:35 PM     Signed       Expand All Collapse All        Hospitalist Admission Note     NAME:              Juwan Espinoza   :   1970   MRN:   [id number]      Date/Time: 2025 2:35 PM     Patient PCP: MARVIN Jones MD     ______________________________________________________________________  Given the patient's current clinical presentation, I have a high level of concern for decompensation if discharged from the emergency department.  Complex decision making was performed, which includes reviewing the patient's available past medical records, laboratory results, and x-ray films.        My assessment of this patient's clinical condition and my plan of care is as follows.     Assessment / Plan:     Open wound of the right lower leg  Charcot joint  History of multiple surgical intervention by orthopedic  Consult orthopedic surgery Dr Blair  Will consult infectious disease tomorrow  Check Pro-Rufino, CRP and ESR  Start Zosyn  Wound care consult  Tylenol for pain  X-ray of the right foot and ankle  Follow-up venous duplex for right lower extremities to rule out DVT  He cannot do MRI because of metals in the right foot.     Diabetes mellitus type 2 on chronic insulin POA  Diabetic neuropathy POA  Continue sliding scale     Essential hypertension  Hyperlipidemia  - home crestor     Nonobstructing bilateral nephrolithiasis 5  Lymphadenopathy of the inguinal nodes        Medical Decision Making:   I personally reviewed labs: CBC, BMP  I personally reviewed imaging:reviewed prior CT, MRI  I personally reviewed EKG:  Toxic drug monitoring: Zosyn  Discussed case with: ED provider. After discussion I am in agreement that acuity of patient's medical condition necessitates hospital stay.        Code Status: Full  DVT Prophylaxis: Lovenox  Baseline:      Subjective:   CHIEF COMPLAINT: Persistent right foot

## 2025-04-02 NOTE — PLAN OF CARE
Problem: Chronic Conditions and Co-morbidities  Goal: Patient's chronic conditions and co-morbidity symptoms are monitored and maintained or improved  Outcome: Progressing     Problem: Discharge Planning  Goal: Discharge to home or other facility with appropriate resources  Outcome: Progressing  Flowsheets (Taken 4/2/2025 0834)  Discharge to home or other facility with appropriate resources: Identify discharge learning needs (meds, wound care, etc)     Problem: Neurosensory - Adult  Goal: Achieves stable or improved neurological status  Outcome: Progressing  Flowsheets (Taken 4/2/2025 0834)  Achieves stable or improved neurological status: Monitor temperature, glucose, and sodium. Initiate appropriate interventions as ordered     Problem: Skin/Tissue Integrity - Adult  Goal: Skin integrity remains intact  Description: 1.  Monitor for areas of redness and/or skin breakdown  2.  Assess vascular access sites hourly  3.  Every 4-6 hours minimum:  Change oxygen saturation probe site  4.  Every 4-6 hours:  If on nasal continuous positive airway pressure, respiratory therapy assess nares and determine need for appliance change or resting period  Outcome: Progressing  Flowsheets (Taken 4/2/2025 0834)  Skin Integrity Remains Intact: Monitor for areas of redness and/or skin breakdown  Goal: Incisions, wounds, or drain sites healing without S/S of infection  Outcome: Progressing     Problem: Musculoskeletal - Adult  Goal: Return mobility to safest level of function  Outcome: Progressing  Flowsheets (Taken 4/2/2025 0834)  Return Mobility to Safest Level of Function: Assist with transfers and ambulation using safe patient handling equipment as needed     Problem: Infection - Adult  Goal: Absence of infection at discharge  Outcome: Progressing  Flowsheets (Taken 4/2/2025 0834)  Absence of infection at discharge:   Assess and monitor for signs and symptoms of infection   Instruct and encourage patient and family to use good hand

## 2025-04-02 NOTE — CARE COORDINATION
04/02/25 1000   Service Assessment   Patient Orientation Alert and Oriented   Cognition Alert   History Provided By Patient   Primary Caregiver Self   Accompanied By/Relationship none   Support Systems Spouse/Significant Other   Patient's Healthcare Decision Maker is: Legal Next of Kin   PCP Verified by CM Yes   Last Visit to PCP Within last 3 months   Prior Functional Level Independent in ADLs/IADLs   Current Functional Level Independent in ADLs/IADLs   Can patient return to prior living arrangement Yes   Ability to make needs known: Good   Family able to assist with home care needs: Yes   Would you like for me to discuss the discharge plan with any other family members/significant others, and if so, who? No   Financial Resources None   Community Resources None   Social/Functional History   Lives With Spouse   Home Layout Two level   Home Equipment Crutches;Walker - Rolling;Wheelchair - Manual   Discharge Planning   Living Arrangements Spouse/Significant Other   Current Services Prior To Admission None   Potential Assistance Needed N/A   DME Ordered? No   Type of Home Care Services None   Patient expects to be discharged to: House   History of falls? 0   Services At/After Discharge   Transition of Care Consult (CM Consult) N/A   Services At/After Discharge None   Mode of Transport at Discharge Other (see comment)  (Family)   Confirm Follow Up Transport Self     CM assessment complete and demographics confirmed. Patient states that he lives with his spouse in a 2 story home. Patient states that he is INDEP in his daily care and has had HH in the past. Patient cannot recall the name of the agency but states that he will call someone who can give him the name. Patient has the following DME: WC, walker, crutches, knee scooter.    Current DCP is home possibly with HH.    Preferred pharmacy is [facility]     Advance Care Planning     General Advance Care Planning (ACP) Conversation    Date of Conversation:

## 2025-04-03 ENCOUNTER — APPOINTMENT (OUTPATIENT)
Facility: HOSPITAL | Age: 55
DRG: 982 | End: 2025-04-03
Payer: COMMERCIAL

## 2025-04-03 LAB
ANION GAP SERPL CALC-SCNC: 5 MMOL/L (ref 2–12)
BASOPHILS # BLD: 0.03 K/UL (ref 0–0.1)
BASOPHILS NFR BLD: 0.4 % (ref 0–1)
BUN SERPL-MCNC: 13 MG/DL (ref 6–20)
BUN/CREAT SERPL: 12 (ref 12–20)
CA-I BLD-MCNC: 9 MG/DL (ref 8.5–10.1)
CHLORIDE SERPL-SCNC: 108 MMOL/L (ref 97–108)
CO2 SERPL-SCNC: 28 MMOL/L (ref 21–32)
CREAT SERPL-MCNC: 1.08 MG/DL (ref 0.7–1.3)
DIFFERENTIAL METHOD BLD: ABNORMAL
EOSINOPHIL # BLD: 0.29 K/UL (ref 0–0.4)
EOSINOPHIL NFR BLD: 3.8 % (ref 0–7)
ERYTHROCYTE [DISTWIDTH] IN BLOOD BY AUTOMATED COUNT: 16.6 % (ref 11.5–14.5)
GLUCOSE BLD STRIP.AUTO-MCNC: 149 MG/DL (ref 65–100)
GLUCOSE BLD STRIP.AUTO-MCNC: 183 MG/DL (ref 65–100)
GLUCOSE BLD STRIP.AUTO-MCNC: 229 MG/DL (ref 65–100)
GLUCOSE BLD STRIP.AUTO-MCNC: 252 MG/DL (ref 65–100)
GLUCOSE SERPL-MCNC: 159 MG/DL (ref 65–100)
HCT VFR BLD AUTO: 29.9 % (ref 36.6–50.3)
HGB BLD-MCNC: 9.1 G/DL (ref 12.1–17)
IMM GRANULOCYTES # BLD AUTO: 0.12 K/UL (ref 0–0.04)
IMM GRANULOCYTES NFR BLD AUTO: 1.6 % (ref 0–0.5)
LYMPHOCYTES # BLD: 2.04 K/UL (ref 0.8–3.5)
LYMPHOCYTES NFR BLD: 26.9 % (ref 12–49)
MCH RBC QN AUTO: 26.2 PG (ref 26–34)
MCHC RBC AUTO-ENTMCNC: 30.4 G/DL (ref 30–36.5)
MCV RBC AUTO: 86.2 FL (ref 80–99)
MONOCYTES # BLD: 0.52 K/UL (ref 0–1)
MONOCYTES NFR BLD: 6.9 % (ref 5–13)
NEUTS SEG # BLD: 4.59 K/UL (ref 1.8–8)
NEUTS SEG NFR BLD: 60.4 % (ref 32–75)
NRBC # BLD: 0 K/UL (ref 0–0.01)
NRBC BLD-RTO: 0 PER 100 WBC
PERFORMED BY:: ABNORMAL
PLATELET # BLD AUTO: 361 K/UL (ref 150–400)
PMV BLD AUTO: 10.3 FL (ref 8.9–12.9)
POTASSIUM SERPL-SCNC: 3.9 MMOL/L (ref 3.5–5.1)
RBC # BLD AUTO: 3.47 M/UL (ref 4.1–5.7)
SODIUM SERPL-SCNC: 141 MMOL/L (ref 136–145)
WBC # BLD AUTO: 7.6 K/UL (ref 4.1–11.1)

## 2025-04-03 PROCEDURE — 2580000003 HC RX 258: Performed by: INTERNAL MEDICINE

## 2025-04-03 PROCEDURE — 6370000000 HC RX 637 (ALT 250 FOR IP)

## 2025-04-03 PROCEDURE — 36415 COLL VENOUS BLD VENIPUNCTURE: CPT

## 2025-04-03 PROCEDURE — 6360000002 HC RX W HCPCS

## 2025-04-03 PROCEDURE — 80048 BASIC METABOLIC PNL TOTAL CA: CPT

## 2025-04-03 PROCEDURE — 1100000000 HC RM PRIVATE

## 2025-04-03 PROCEDURE — 85025 COMPLETE CBC W/AUTO DIFF WBC: CPT

## 2025-04-03 PROCEDURE — 6360000002 HC RX W HCPCS: Performed by: INTERNAL MEDICINE

## 2025-04-03 PROCEDURE — 36569 INSJ PICC 5 YR+ W/O IMAGING: CPT

## 2025-04-03 PROCEDURE — 99232 SBSQ HOSP IP/OBS MODERATE 35: CPT | Performed by: INTERNAL MEDICINE

## 2025-04-03 PROCEDURE — 2500000003 HC RX 250 WO HCPCS

## 2025-04-03 PROCEDURE — 73700 CT LOWER EXTREMITY W/O DYE: CPT

## 2025-04-03 PROCEDURE — 82962 GLUCOSE BLOOD TEST: CPT

## 2025-04-03 RX ADMIN — GABAPENTIN 600 MG: 300 CAPSULE ORAL at 08:46

## 2025-04-03 RX ADMIN — INSULIN LISPRO 4 UNITS: 100 INJECTION, SOLUTION INTRAVENOUS; SUBCUTANEOUS at 20:44

## 2025-04-03 RX ADMIN — MEROPENEM 1000 MG: 1 INJECTION INTRAVENOUS at 06:20

## 2025-04-03 RX ADMIN — MEROPENEM 1000 MG: 1 INJECTION INTRAVENOUS at 14:52

## 2025-04-03 RX ADMIN — SODIUM CHLORIDE, PRESERVATIVE FREE 10 ML: 5 INJECTION INTRAVENOUS at 20:45

## 2025-04-03 RX ADMIN — ROSUVASTATIN CALCIUM 5 MG: 5 TABLET, FILM COATED ORAL at 20:44

## 2025-04-03 RX ADMIN — ENOXAPARIN SODIUM 30 MG: 100 INJECTION SUBCUTANEOUS at 08:46

## 2025-04-03 RX ADMIN — INSULIN LISPRO 4 UNITS: 100 INJECTION, SOLUTION INTRAVENOUS; SUBCUTANEOUS at 11:42

## 2025-04-03 RX ADMIN — INSULIN LISPRO 8 UNITS: 100 INJECTION, SOLUTION INTRAVENOUS; SUBCUTANEOUS at 16:49

## 2025-04-03 RX ADMIN — GABAPENTIN 600 MG: 300 CAPSULE ORAL at 14:49

## 2025-04-03 RX ADMIN — GABAPENTIN 600 MG: 300 CAPSULE ORAL at 20:44

## 2025-04-03 RX ADMIN — MEROPENEM 1000 MG: 1 INJECTION INTRAVENOUS at 23:28

## 2025-04-03 ASSESSMENT — PAIN DESCRIPTION - LOCATION: LOCATION: ANKLE;FOOT

## 2025-04-03 ASSESSMENT — PAIN - FUNCTIONAL ASSESSMENT: PAIN_FUNCTIONAL_ASSESSMENT: ACTIVITIES ARE NOT PREVENTED

## 2025-04-03 ASSESSMENT — PAIN SCALES - GENERAL
PAINLEVEL_OUTOF10: 3
PAINLEVEL_OUTOF10: 0

## 2025-04-03 ASSESSMENT — PAIN DESCRIPTION - ORIENTATION: ORIENTATION: RIGHT

## 2025-04-03 ASSESSMENT — PAIN DESCRIPTION - DESCRIPTORS: DESCRIPTORS: ACHING

## 2025-04-03 NOTE — PLAN OF CARE
Problem: Skin/Tissue Integrity - Adult  Goal: Skin integrity remains intact  Description: 1.  Monitor for areas of redness and/or skin breakdown  2.  Assess vascular access sites hourly  3.  Every 4-6 hours minimum:  Change oxygen saturation probe site  4.  Every 4-6 hours:  If on nasal continuous positive airway pressure, respiratory therapy assess nares and determine need for appliance change or resting period  Outcome: Not Progressing     Problem: Infection - Adult  Goal: Absence of infection at discharge  Outcome: Not Progressing    Problem: Chronic Conditions and Co-morbidities  Goal: Patient's chronic conditions and co-morbidity symptoms are monitored and maintained or improved  Outcome: Progressing     Problem: Discharge Planning  Goal: Discharge to home or other facility with appropriate resources  Outcome: Progressing     Problem: Neurosensory - Adult  Goal: Achieves stable or improved neurological status  Outcome: Progressing

## 2025-04-03 NOTE — CONSULTS
INITIAL CONSULTATION    Chief Complaint  Right foot swelling and wound      Current Status/[place]  HPI  Patient comes with the evaluation of the right foot swelling and wound, this is worse, patient had a previous history of Charcot reconstruction had a previous wound which had healed up and subsequently underwent reconstruction, he is subsequently has had recrudescence of infection,, presently he is being admitted for IV antibiotics, he will need prolonged IV antibiotics for presumptive osteomyelitis,  Past Medical History Complicated by  Past Medical History:   Diagnosis Date    B12 deficiency 1/15/2021    Charcot arthropathy of midfoot     Right foot    Diabetes (HCC)     type 2    Gout     Hypercholesteremia     Hyperlipidemia     patient denied    Hypertension     Kidney stone     Neuropathy     bilateral lower legs    Sepsis (HCC) 12/2023    hx of left foot       Past Surgical History  Past Surgical History:   Procedure Laterality Date    APPENDECTOMY      COLONOSCOPY      colon polyps in past    FOOT DEBRIDEMENT Right 12/13/2023    RIGHT FOOT WASHOUT AND PLACEMENT OF ANTIBIOTIC BEADS performed by Celso Castaneda DPM at [facility] MAIN OR    FOOT SURGERY Right 7/19/2024    MIDFOOT ARTHRODESIS WITH EXTERNAL FIXATOR APPLICATION, TENDOACHILLES LENGTHENING performed by Lm Blair MD at [facility] MAIN OR    FOOT SURGERY Right 8/19/2024    REMOVAL OF EXTERNAL FIXATOR RIGHT LEG,  Excisional debridement of the right foot wound to include skin subcutaneous tissue over an area measuring 3 cm²  application of total contact cast performed by Lm Blair MD at [facility] MAIN OR    FOOT SURGERY Right 10/31/2024    PANTALAR ARTHRODESIS RIGHT FOOT and ankle Multiple joint MIDFOOT ARTHRODESIS,  Insertion of antibiotic cement-CERAMENT G performed by Lm Blair MD at [facility] MAIN OR    LEG SURGERY Right 7/19/2024    . performed by Lm Blair MD at [facility] MAIN OR    LEG SURGERY Right 3/17/2025    RIGHT LEG REMOVAL 
TRANSMITTER) MISC USE AS DIRECTED & CHANGE EVERY 10 DAYS. 9 each 3    Multiple Vitamins-Minerals (MULTIVITAL PO) Take 1 tablet by mouth daily.      cyanocobalamin 100 MCG tablet Take 1 tablet by mouth daily       Current Facility-Administered Medications   Medication Dose Route Frequency Provider Last Rate Last Admin    sodium chloride flush 0.9 % injection 5-40 mL  5-40 mL IntraVENous 2 times per day Luis Tobin MD   10 mL at 04/02/25 0908    sodium chloride flush 0.9 % injection 5-40 mL  5-40 mL IntraVENous PRN Luis Tobin MD        0.9 % sodium chloride infusion   IntraVENous PRN Luis Tobin MD        potassium chloride (KLOR-CON M) extended release tablet 40 mEq  40 mEq Oral PRN Luis Tobin MD        Or    potassium bicarb-citric acid (EFFER-K) effervescent tablet 40 mEq  40 mEq Oral PRN Luis Tobin MD        Or    potassium chloride 10 mEq/100 mL IVPB (Peripheral Line)  10 mEq IntraVENous PRN Luis Tobin MD        magnesium sulfate 2000 mg in 50 mL IVPB premix  2,000 mg IntraVENous PRN Luis Tobin MD        enoxaparin Sodium (LOVENOX) injection 30 mg  30 mg SubCUTAneous BID Luis Tobin MD   30 mg at 04/02/25 0908    ondansetron (ZOFRAN-ODT) disintegrating tablet 4 mg  4 mg Oral Q8H PRN Luis Tobin MD        Or    ondansetron (ZOFRAN) injection 4 mg  4 mg IntraVENous Q6H PRN Luis Tobin MD        polyethylene glycol (GLYCOLAX) packet 17 g  17 g Oral Daily PRN Luis Tobin MD        acetaminophen (TYLENOL) tablet 650 mg  650 mg Oral Q6H PRN Luis Tobin MD        Or    acetaminophen (TYLENOL) suppository 650 mg  650 mg Rectal Q6H PRN Luis Tobin MD        rosuvastatin (CRESTOR) tablet 5 mg  5 mg Oral Nightly Luis Tobin MD   5 mg at 04/01/25 2137    insulin lispro (HUMALOG,ADMELOG) injection vial 0-4 Units  0-4 Units SubCUTAneous 4x Daily AC & HS Luis Tobin MD   1 Units at 04/01/25 1828    gabapentin (NEURONTIN) capsule 600 mg  600 mg Oral TID Crista,

## 2025-04-03 NOTE — PROCEDURES
PICC Line Insertion Procedure Note    Procedure: Insertion of #5 fr PICC    Indications:  Long Term IV therapy, Pt./DrKae Preference    Procedure Details   Informed consent was obtained for the procedure, including sedation.  Risks of lung perforation, hemorrhage, and adverse drug reaction were discussed.     #5 fr PICC inserted to the R Basilic vein per hospital protocol.   Blood return:  yes    Findings:  Catheter inserted to 38 cm, with 0 cm. Exposed. Mid upper arm circumference is 30 cm.  There were no changes to vital signs. Catheter was flushed with 30 cc NS. Patient did tolerate procedure well.    Upon completion of procedure, all PICC kit components accounted for and intact.    Recommendations:  Right arm PICC confirmed with 3cg.  PICC Brochure given to patient with teaching instruction.PROCEDURE NOTE  Date: 4/3/2025   Name: Juwan Espinoza  YOB: 1970    Procedures

## 2025-04-03 NOTE — CARE COORDINATION
CM reviewed chart. Plans for IV antibiotics at home for discharge. Discussed with patient. Has done IV antibiotics in past. Referral and prescription sent to [facility].

## 2025-04-03 NOTE — WOUND CARE
[] Verbal Understanding  [] Demonstrated understanding       [] No evidence of learning  [] Refused teaching         [] N/A       Electronically signed by Karmen Chamberlain RN on 4/3/2025 at 1:41 PM

## 2025-04-04 ENCOUNTER — APPOINTMENT (OUTPATIENT)
Facility: HOSPITAL | Age: 55
DRG: 982 | End: 2025-04-04
Payer: COMMERCIAL

## 2025-04-04 ENCOUNTER — ANESTHESIA (OUTPATIENT)
Facility: HOSPITAL | Age: 55
DRG: 982 | End: 2025-04-04
Payer: COMMERCIAL

## 2025-04-04 ENCOUNTER — ANESTHESIA EVENT (OUTPATIENT)
Facility: HOSPITAL | Age: 55
DRG: 982 | End: 2025-04-04
Payer: COMMERCIAL

## 2025-04-04 LAB
ANION GAP SERPL CALC-SCNC: 4 MMOL/L (ref 2–12)
BASOPHILS # BLD: 0 K/UL (ref 0–0.1)
BASOPHILS NFR BLD: 0 % (ref 0–1)
BUN SERPL-MCNC: 15 MG/DL (ref 6–20)
BUN/CREAT SERPL: 15 (ref 12–20)
CA-I BLD-MCNC: 9.5 MG/DL (ref 8.5–10.1)
CHLORIDE SERPL-SCNC: 108 MMOL/L (ref 97–108)
CO2 SERPL-SCNC: 27 MMOL/L (ref 21–32)
CREAT SERPL-MCNC: 1 MG/DL (ref 0.7–1.3)
DIFFERENTIAL METHOD BLD: ABNORMAL
EOSINOPHIL # BLD: 0.32 K/UL (ref 0–0.4)
EOSINOPHIL NFR BLD: 4 % (ref 0–7)
ERYTHROCYTE [DISTWIDTH] IN BLOOD BY AUTOMATED COUNT: 16.3 % (ref 11.5–14.5)
GLUCOSE BLD STRIP.AUTO-MCNC: 130 MG/DL (ref 65–100)
GLUCOSE BLD STRIP.AUTO-MCNC: 149 MG/DL (ref 65–100)
GLUCOSE BLD STRIP.AUTO-MCNC: 159 MG/DL (ref 65–100)
GLUCOSE BLD STRIP.AUTO-MCNC: 201 MG/DL (ref 65–100)
GLUCOSE BLD STRIP.AUTO-MCNC: 211 MG/DL (ref 65–100)
GLUCOSE SERPL-MCNC: 190 MG/DL (ref 65–100)
HCT VFR BLD AUTO: 31.5 % (ref 36.6–50.3)
HGB BLD-MCNC: 9.4 G/DL (ref 12.1–17)
IMM GRANULOCYTES # BLD AUTO: 0 K/UL
IMM GRANULOCYTES NFR BLD AUTO: 0 %
LYMPHOCYTES # BLD: 2.4 K/UL (ref 0.8–3.5)
LYMPHOCYTES NFR BLD: 30 % (ref 12–49)
MCH RBC QN AUTO: 25.8 PG (ref 26–34)
MCHC RBC AUTO-ENTMCNC: 29.8 G/DL (ref 30–36.5)
MCV RBC AUTO: 86.3 FL (ref 80–99)
METAMYELOCYTES NFR BLD MANUAL: 2 %
MONOCYTES # BLD: 0.8 K/UL (ref 0–1)
MONOCYTES NFR BLD: 10 % (ref 5–13)
NEUTS SEG # BLD: 4.32 K/UL (ref 1.8–8)
NEUTS SEG NFR BLD: 54 % (ref 32–75)
NRBC # BLD: 0 K/UL (ref 0–0.01)
NRBC BLD-RTO: 0 PER 100 WBC
PERFORMED BY:: ABNORMAL
PLATELET # BLD AUTO: 370 K/UL (ref 150–400)
PMV BLD AUTO: 10.7 FL (ref 8.9–12.9)
POTASSIUM SERPL-SCNC: 4.2 MMOL/L (ref 3.5–5.1)
RBC # BLD AUTO: 3.65 M/UL (ref 4.1–5.7)
RBC MORPH BLD: ABNORMAL
SODIUM SERPL-SCNC: 139 MMOL/L (ref 136–145)
WBC # BLD AUTO: 8 K/UL (ref 4.1–11.1)

## 2025-04-04 PROCEDURE — 87102 FUNGUS ISOLATION CULTURE: CPT

## 2025-04-04 PROCEDURE — 6370000000 HC RX 637 (ALT 250 FOR IP): Performed by: PHYSICIAN ASSISTANT

## 2025-04-04 PROCEDURE — 2500000003 HC RX 250 WO HCPCS: Performed by: PHYSICIAN ASSISTANT

## 2025-04-04 PROCEDURE — 3600000012 HC SURGERY LEVEL 2 ADDTL 15MIN: Performed by: ORTHOPAEDIC SURGERY

## 2025-04-04 PROCEDURE — 2709999900 HC NON-CHARGEABLE SUPPLY: Performed by: ORTHOPAEDIC SURGERY

## 2025-04-04 PROCEDURE — C1713 ANCHOR/SCREW BN/BN,TIS/BN: HCPCS | Performed by: ORTHOPAEDIC SURGERY

## 2025-04-04 PROCEDURE — 2580000003 HC RX 258: Performed by: INTERNAL MEDICINE

## 2025-04-04 PROCEDURE — 3600000002 HC SURGERY LEVEL 2 BASE: Performed by: ORTHOPAEDIC SURGERY

## 2025-04-04 PROCEDURE — 6360000002 HC RX W HCPCS: Performed by: PHYSICIAN ASSISTANT

## 2025-04-04 PROCEDURE — 76000 FLUOROSCOPY <1 HR PHYS/QHP: CPT

## 2025-04-04 PROCEDURE — 0SPF0JZ REMOVAL OF SYNTHETIC SUBSTITUTE FROM RIGHT ANKLE JOINT, OPEN APPROACH: ICD-10-PCS | Performed by: ORTHOPAEDIC SURGERY

## 2025-04-04 PROCEDURE — 2580000003 HC RX 258: Performed by: PHYSICIAN ASSISTANT

## 2025-04-04 PROCEDURE — 3700000000 HC ANESTHESIA ATTENDED CARE: Performed by: ORTHOPAEDIC SURGERY

## 2025-04-04 PROCEDURE — 6370000000 HC RX 637 (ALT 250 FOR IP): Performed by: INTERNAL MEDICINE

## 2025-04-04 PROCEDURE — 82962 GLUCOSE BLOOD TEST: CPT

## 2025-04-04 PROCEDURE — 80048 BASIC METABOLIC PNL TOTAL CA: CPT

## 2025-04-04 PROCEDURE — 7100000000 HC PACU RECOVERY - FIRST 15 MIN: Performed by: ORTHOPAEDIC SURGERY

## 2025-04-04 PROCEDURE — C1769 GUIDE WIRE: HCPCS | Performed by: ORTHOPAEDIC SURGERY

## 2025-04-04 PROCEDURE — 85025 COMPLETE CBC W/AUTO DIFF WBC: CPT

## 2025-04-04 PROCEDURE — 2580000003 HC RX 258: Performed by: NURSE ANESTHETIST, CERTIFIED REGISTERED

## 2025-04-04 PROCEDURE — 0SRF0J9 REPLACEMENT OF RIGHT ANKLE JOINT WITH SYNTHETIC SUBSTITUTE, CEMENTED, OPEN APPROACH: ICD-10-PCS | Performed by: ORTHOPAEDIC SURGERY

## 2025-04-04 PROCEDURE — 6360000002 HC RX W HCPCS: Performed by: NURSE ANESTHETIST, CERTIFIED REGISTERED

## 2025-04-04 PROCEDURE — 1100000000 HC RM PRIVATE

## 2025-04-04 PROCEDURE — 36415 COLL VENOUS BLD VENIPUNCTURE: CPT

## 2025-04-04 PROCEDURE — 2500000003 HC RX 250 WO HCPCS: Performed by: NURSE ANESTHETIST, CERTIFIED REGISTERED

## 2025-04-04 PROCEDURE — 2720000010 HC SURG SUPPLY STERILE: Performed by: ORTHOPAEDIC SURGERY

## 2025-04-04 PROCEDURE — 87070 CULTURE OTHR SPECIMN AEROBIC: CPT

## 2025-04-04 PROCEDURE — 87205 SMEAR GRAM STAIN: CPT

## 2025-04-04 PROCEDURE — 05HY33Z INSERTION OF INFUSION DEVICE INTO UPPER VEIN, PERCUTANEOUS APPROACH: ICD-10-PCS | Performed by: ORTHOPAEDIC SURGERY

## 2025-04-04 PROCEDURE — C1602 HC ORTHO MATRIX BONE FILL, DRUG ELUTING: HCPCS | Performed by: ORTHOPAEDIC SURGERY

## 2025-04-04 PROCEDURE — 6360000002 HC RX W HCPCS: Performed by: INTERNAL MEDICINE

## 2025-04-04 PROCEDURE — 3700000001 HC ADD 15 MINUTES (ANESTHESIA): Performed by: ORTHOPAEDIC SURGERY

## 2025-04-04 PROCEDURE — 6360000002 HC RX W HCPCS: Performed by: ORTHOPAEDIC SURGERY

## 2025-04-04 PROCEDURE — 6370000000 HC RX 637 (ALT 250 FOR IP)

## 2025-04-04 PROCEDURE — 2500000003 HC RX 250 WO HCPCS

## 2025-04-04 PROCEDURE — 7100000001 HC PACU RECOVERY - ADDTL 15 MIN: Performed by: ORTHOPAEDIC SURGERY

## 2025-04-04 RX ORDER — ROCURONIUM BROMIDE 10 MG/ML
INJECTION, SOLUTION INTRAVENOUS
Status: DISCONTINUED | OUTPATIENT
Start: 2025-04-04 | End: 2025-04-04 | Stop reason: SDUPTHER

## 2025-04-04 RX ORDER — SODIUM CHLORIDE, SODIUM LACTATE, POTASSIUM CHLORIDE, CALCIUM CHLORIDE 600; 310; 30; 20 MG/100ML; MG/100ML; MG/100ML; MG/100ML
INJECTION, SOLUTION INTRAVENOUS
Status: DISCONTINUED | OUTPATIENT
Start: 2025-04-04 | End: 2025-04-04 | Stop reason: SDUPTHER

## 2025-04-04 RX ORDER — OXYCODONE HYDROCHLORIDE 5 MG/1
5 TABLET ORAL EVERY 4 HOURS PRN
Refills: 0 | Status: DISCONTINUED | OUTPATIENT
Start: 2025-04-04 | End: 2025-04-05 | Stop reason: HOSPADM

## 2025-04-04 RX ORDER — PROPOFOL 10 MG/ML
INJECTION, EMULSION INTRAVENOUS
Status: DISCONTINUED | OUTPATIENT
Start: 2025-04-04 | End: 2025-04-04 | Stop reason: SDUPTHER

## 2025-04-04 RX ORDER — FENTANYL CITRATE 50 UG/ML
INJECTION, SOLUTION INTRAMUSCULAR; INTRAVENOUS
Status: DISCONTINUED | OUTPATIENT
Start: 2025-04-04 | End: 2025-04-04 | Stop reason: SDUPTHER

## 2025-04-04 RX ORDER — TOBRAMYCIN 1.2 G/30ML
INJECTION, POWDER, LYOPHILIZED, FOR SOLUTION INTRAVENOUS PRN
Status: DISCONTINUED | OUTPATIENT
Start: 2025-04-04 | End: 2025-04-04 | Stop reason: HOSPADM

## 2025-04-04 RX ORDER — CEFAZOLIN SODIUM 1 G/3ML
INJECTION, POWDER, FOR SOLUTION INTRAMUSCULAR; INTRAVENOUS
Status: DISCONTINUED | OUTPATIENT
Start: 2025-04-04 | End: 2025-04-04 | Stop reason: SDUPTHER

## 2025-04-04 RX ORDER — IPRATROPIUM BROMIDE AND ALBUTEROL SULFATE 2.5; .5 MG/3ML; MG/3ML
1 SOLUTION RESPIRATORY (INHALATION)
Status: DISCONTINUED | OUTPATIENT
Start: 2025-04-04 | End: 2025-04-04 | Stop reason: HOSPADM

## 2025-04-04 RX ORDER — HYDRALAZINE HYDROCHLORIDE 20 MG/ML
10 INJECTION INTRAMUSCULAR; INTRAVENOUS
Status: DISCONTINUED | OUTPATIENT
Start: 2025-04-04 | End: 2025-04-04 | Stop reason: HOSPADM

## 2025-04-04 RX ORDER — SODIUM CHLORIDE, SODIUM LACTATE, POTASSIUM CHLORIDE, CALCIUM CHLORIDE 600; 310; 30; 20 MG/100ML; MG/100ML; MG/100ML; MG/100ML
INJECTION, SOLUTION INTRAVENOUS ONCE
Status: DISCONTINUED | OUTPATIENT
Start: 2025-04-04 | End: 2025-04-04 | Stop reason: HOSPADM

## 2025-04-04 RX ORDER — LIDOCAINE 4 G/G
1 PATCH TOPICAL AS NEEDED
Status: DISCONTINUED | OUTPATIENT
Start: 2025-04-04 | End: 2025-04-04 | Stop reason: HOSPADM

## 2025-04-04 RX ORDER — ONDANSETRON 2 MG/ML
4 INJECTION INTRAMUSCULAR; INTRAVENOUS
Status: DISCONTINUED | OUTPATIENT
Start: 2025-04-04 | End: 2025-04-04 | Stop reason: HOSPADM

## 2025-04-04 RX ORDER — FERROUS SULFATE 325(65) MG
325 TABLET ORAL EVERY OTHER DAY
Status: DISCONTINUED | OUTPATIENT
Start: 2025-04-04 | End: 2025-04-05 | Stop reason: HOSPADM

## 2025-04-04 RX ORDER — SODIUM CHLORIDE 0.9 % (FLUSH) 0.9 %
5-40 SYRINGE (ML) INJECTION PRN
Status: DISCONTINUED | OUTPATIENT
Start: 2025-04-04 | End: 2025-04-04 | Stop reason: HOSPADM

## 2025-04-04 RX ORDER — LIDOCAINE HYDROCHLORIDE 20 MG/ML
INJECTION, SOLUTION EPIDURAL; INFILTRATION; INTRACAUDAL; PERINEURAL
Status: DISCONTINUED | OUTPATIENT
Start: 2025-04-04 | End: 2025-04-04 | Stop reason: SDUPTHER

## 2025-04-04 RX ORDER — HYDROMORPHONE HYDROCHLORIDE 1 MG/ML
0.5 INJECTION, SOLUTION INTRAMUSCULAR; INTRAVENOUS; SUBCUTANEOUS EVERY 5 MIN PRN
Refills: 0 | Status: DISCONTINUED | OUTPATIENT
Start: 2025-04-04 | End: 2025-04-04 | Stop reason: HOSPADM

## 2025-04-04 RX ORDER — SODIUM CHLORIDE 0.9 % (FLUSH) 0.9 %
5-40 SYRINGE (ML) INJECTION EVERY 12 HOURS SCHEDULED
Status: DISCONTINUED | OUTPATIENT
Start: 2025-04-04 | End: 2025-04-04 | Stop reason: HOSPADM

## 2025-04-04 RX ORDER — PHENYLEPHRINE HCL IN 0.9% NACL 1 MG/10 ML
SYRINGE (ML) INTRAVENOUS
Status: DISCONTINUED | OUTPATIENT
Start: 2025-04-04 | End: 2025-04-04 | Stop reason: SDUPTHER

## 2025-04-04 RX ORDER — NALOXONE HYDROCHLORIDE 0.4 MG/ML
INJECTION, SOLUTION INTRAMUSCULAR; INTRAVENOUS; SUBCUTANEOUS PRN
Status: DISCONTINUED | OUTPATIENT
Start: 2025-04-04 | End: 2025-04-04 | Stop reason: HOSPADM

## 2025-04-04 RX ORDER — MIDAZOLAM HYDROCHLORIDE 1 MG/ML
INJECTION, SOLUTION INTRAMUSCULAR; INTRAVENOUS
Status: DISCONTINUED | OUTPATIENT
Start: 2025-04-04 | End: 2025-04-04 | Stop reason: SDUPTHER

## 2025-04-04 RX ORDER — GLUCAGON 1 MG/ML
1 KIT INJECTION PRN
Status: DISCONTINUED | OUTPATIENT
Start: 2025-04-04 | End: 2025-04-04 | Stop reason: HOSPADM

## 2025-04-04 RX ORDER — LABETALOL HYDROCHLORIDE 5 MG/ML
10 INJECTION, SOLUTION INTRAVENOUS
Status: DISCONTINUED | OUTPATIENT
Start: 2025-04-04 | End: 2025-04-04 | Stop reason: HOSPADM

## 2025-04-04 RX ORDER — OXYCODONE HYDROCHLORIDE 5 MG/1
5 TABLET ORAL PRN
Refills: 0 | Status: DISCONTINUED | OUTPATIENT
Start: 2025-04-04 | End: 2025-04-04 | Stop reason: HOSPADM

## 2025-04-04 RX ORDER — FENTANYL CITRATE 0.05 MG/ML
50 INJECTION, SOLUTION INTRAMUSCULAR; INTRAVENOUS EVERY 5 MIN PRN
Refills: 0 | Status: DISCONTINUED | OUTPATIENT
Start: 2025-04-04 | End: 2025-04-04 | Stop reason: HOSPADM

## 2025-04-04 RX ORDER — SODIUM CHLORIDE 9 MG/ML
INJECTION, SOLUTION INTRAVENOUS PRN
Status: DISCONTINUED | OUTPATIENT
Start: 2025-04-04 | End: 2025-04-04 | Stop reason: HOSPADM

## 2025-04-04 RX ORDER — LORAZEPAM 2 MG/ML
0.5 INJECTION INTRAMUSCULAR
Status: DISCONTINUED | OUTPATIENT
Start: 2025-04-04 | End: 2025-04-04 | Stop reason: HOSPADM

## 2025-04-04 RX ORDER — VANCOMYCIN HYDROCHLORIDE 1 G/20ML
INJECTION, POWDER, LYOPHILIZED, FOR SOLUTION INTRAVENOUS PRN
Status: DISCONTINUED | OUTPATIENT
Start: 2025-04-04 | End: 2025-04-04 | Stop reason: HOSPADM

## 2025-04-04 RX ORDER — METOCLOPRAMIDE HYDROCHLORIDE 5 MG/ML
10 INJECTION INTRAMUSCULAR; INTRAVENOUS
Status: DISCONTINUED | OUTPATIENT
Start: 2025-04-04 | End: 2025-04-04 | Stop reason: HOSPADM

## 2025-04-04 RX ORDER — DIPHENHYDRAMINE HYDROCHLORIDE 50 MG/ML
12.5 INJECTION, SOLUTION INTRAMUSCULAR; INTRAVENOUS
Status: DISCONTINUED | OUTPATIENT
Start: 2025-04-04 | End: 2025-04-04 | Stop reason: HOSPADM

## 2025-04-04 RX ORDER — TRANEXAMIC ACID 100 MG/ML
INJECTION, SOLUTION INTRAVENOUS
Status: DISCONTINUED | OUTPATIENT
Start: 2025-04-04 | End: 2025-04-04 | Stop reason: SDUPTHER

## 2025-04-04 RX ORDER — SUCCINYLCHOLINE/SOD CL,ISO/PF 200MG/10ML
SYRINGE (ML) INTRAVENOUS
Status: DISCONTINUED | OUTPATIENT
Start: 2025-04-04 | End: 2025-04-04 | Stop reason: SDUPTHER

## 2025-04-04 RX ORDER — DEXTROSE MONOHYDRATE 100 MG/ML
INJECTION, SOLUTION INTRAVENOUS CONTINUOUS PRN
Status: DISCONTINUED | OUTPATIENT
Start: 2025-04-04 | End: 2025-04-04 | Stop reason: HOSPADM

## 2025-04-04 RX ORDER — ONDANSETRON 2 MG/ML
INJECTION INTRAMUSCULAR; INTRAVENOUS
Status: DISCONTINUED | OUTPATIENT
Start: 2025-04-04 | End: 2025-04-04 | Stop reason: SDUPTHER

## 2025-04-04 RX ORDER — OXYCODONE HYDROCHLORIDE 5 MG/1
10 TABLET ORAL PRN
Refills: 0 | Status: DISCONTINUED | OUTPATIENT
Start: 2025-04-04 | End: 2025-04-04 | Stop reason: HOSPADM

## 2025-04-04 RX ORDER — MEPERIDINE HYDROCHLORIDE 25 MG/ML
12.5 INJECTION INTRAMUSCULAR; INTRAVENOUS; SUBCUTANEOUS EVERY 5 MIN PRN
Refills: 0 | Status: DISCONTINUED | OUTPATIENT
Start: 2025-04-04 | End: 2025-04-04 | Stop reason: HOSPADM

## 2025-04-04 RX ADMIN — ROSUVASTATIN CALCIUM 5 MG: 5 TABLET, FILM COATED ORAL at 20:24

## 2025-04-04 RX ADMIN — ONDANSETRON 4 MG: 2 INJECTION INTRAMUSCULAR; INTRAVENOUS at 14:28

## 2025-04-04 RX ADMIN — SODIUM CHLORIDE, PRESERVATIVE FREE 10 ML: 5 INJECTION INTRAVENOUS at 20:24

## 2025-04-04 RX ADMIN — MIDAZOLAM 2 MG: 1 INJECTION INTRAMUSCULAR; INTRAVENOUS at 13:46

## 2025-04-04 RX ADMIN — FENTANYL CITRATE 100 MCG: 50 INJECTION INTRAMUSCULAR; INTRAVENOUS at 13:54

## 2025-04-04 RX ADMIN — Medication 200 MCG: at 15:46

## 2025-04-04 RX ADMIN — Medication 200 MCG: at 13:57

## 2025-04-04 RX ADMIN — LIDOCAINE HYDROCHLORIDE 100 MG: 20 SOLUTION INTRAVENOUS at 13:54

## 2025-04-04 RX ADMIN — INSULIN LISPRO 4 UNITS: 100 INJECTION, SOLUTION INTRAVENOUS; SUBCUTANEOUS at 20:23

## 2025-04-04 RX ADMIN — FERROUS SULFATE TAB 325 MG (65 MG ELEMENTAL FE) 325 MG: 325 (65 FE) TAB at 10:24

## 2025-04-04 RX ADMIN — Medication 140 MG: at 13:54

## 2025-04-04 RX ADMIN — GABAPENTIN 600 MG: 300 CAPSULE ORAL at 20:23

## 2025-04-04 RX ADMIN — ROCURONIUM BROMIDE 40 MG: 10 INJECTION, SOLUTION INTRAVENOUS at 14:06

## 2025-04-04 RX ADMIN — Medication 100 MCG: at 15:22

## 2025-04-04 RX ADMIN — TRANEXAMIC ACID 1000 MG: 1 INJECTION, SOLUTION INTRAVENOUS at 16:43

## 2025-04-04 RX ADMIN — Medication 200 MCG: at 16:24

## 2025-04-04 RX ADMIN — OXYCODONE HYDROCHLORIDE 5 MG: 5 TABLET ORAL at 18:39

## 2025-04-04 RX ADMIN — Medication 100 MCG: at 15:37

## 2025-04-04 RX ADMIN — PROPOFOL 150 MG: 10 INJECTION, EMULSION INTRAVENOUS at 13:54

## 2025-04-04 RX ADMIN — MEROPENEM 1000 MG: 1 INJECTION INTRAVENOUS at 22:52

## 2025-04-04 RX ADMIN — GLYCERIN 1 DROP: .002; .002; .01 SOLUTION/ DROPS OPHTHALMIC at 20:19

## 2025-04-04 RX ADMIN — SUGAMMADEX 200 MG: 100 INJECTION, SOLUTION INTRAVENOUS at 17:03

## 2025-04-04 RX ADMIN — CEFAZOLIN 2 G: 1 INJECTION, POWDER, FOR SOLUTION INTRAMUSCULAR; INTRAVENOUS at 13:46

## 2025-04-04 RX ADMIN — MEROPENEM 1000 MG: 1 INJECTION INTRAVENOUS at 06:29

## 2025-04-04 RX ADMIN — TRANEXAMIC ACID 1000 MG: 1 INJECTION, SOLUTION INTRAVENOUS at 14:43

## 2025-04-04 RX ADMIN — Medication 200 MCG: at 14:10

## 2025-04-04 RX ADMIN — INSULIN LISPRO 4 UNITS: 100 INJECTION, SOLUTION INTRAVENOUS; SUBCUTANEOUS at 06:33

## 2025-04-04 RX ADMIN — SODIUM CHLORIDE, PRESERVATIVE FREE 10 ML: 5 INJECTION INTRAVENOUS at 10:24

## 2025-04-04 RX ADMIN — Medication 200 MCG: at 15:16

## 2025-04-04 RX ADMIN — SODIUM CHLORIDE, POTASSIUM CHLORIDE, SODIUM LACTATE AND CALCIUM CHLORIDE: 600; 310; 30; 20 INJECTION, SOLUTION INTRAVENOUS at 13:46

## 2025-04-04 RX ADMIN — Medication 200 MCG: at 15:00

## 2025-04-04 RX ADMIN — GABAPENTIN 600 MG: 300 CAPSULE ORAL at 10:23

## 2025-04-04 RX ADMIN — ENOXAPARIN SODIUM 30 MG: 100 INJECTION SUBCUTANEOUS at 20:23

## 2025-04-04 ASSESSMENT — PAIN DESCRIPTION - LOCATION: LOCATION: LEG

## 2025-04-04 ASSESSMENT — PAIN SCALES - GENERAL
PAINLEVEL_OUTOF10: 7
PAINLEVEL_OUTOF10: 0

## 2025-04-04 ASSESSMENT — PAIN DESCRIPTION - DESCRIPTORS: DESCRIPTORS: ACHING

## 2025-04-04 ASSESSMENT — PAIN DESCRIPTION - ORIENTATION: ORIENTATION: RIGHT

## 2025-04-04 NOTE — ANESTHESIA PRE PROCEDURE
Department of Anesthesiology  Preprocedure Note       Name:  Juwan Espinoza   Age:  55 y.o.  :  1970                                          MRN:  [id number]         Date:  2025      Surgeon: Surgeon(s):  Lm Blair MD    Procedure: Procedure(s):  REMOVAL OF LEFT ANKLE NAIL, REINSERTION OF CEMENTED NAIL    Medications prior to admission:   Prior to Admission medications    Medication Sig Start Date End Date Taking? Authorizing Provider   meropenem (MERREM) infusion Infuse 1,000 mg intravenously in the morning and 1,000 mg at noon and 1,000 mg in the evening. Continue through 25    CBC, BMP, CRP and ESR wkly while in IV antibiotics. 25 Yes Tejal Ramon MD   Sitaglipt Base-Metform HCl ER (ZITUVIMET XR)  MG TB24 Take 1 tablet by mouth 2 times daily 3/31/25   MARVIN Jones MD   ondansetron (ZOFRAN-ODT) 4 MG disintegrating tablet Take 1 tablet by mouth 3 times daily as needed for Nausea or Vomiting 3/19/25   Gerson Celaya MD   OZEMPIC, 1 MG/DOSE, 4 MG/3ML SOPN sc injection DIAL AND INJECT UNDER THE SKIN 1 MG WEEKLY 3/13/25   MARVIN Jones MD   gabapentin (NEURONTIN) 300 MG capsule TAKE 2 CAPSULES BY MOUTH 3 TIMES A DAY 1/16/25 1/10/26  MARVIN Jones MD   insulin glargine (LANTUS SOLOSTAR) 100 UNIT/ML injection pen INJECT 100 UNITS SUBCUTANEOUSLY DAILY  Patient taking differently: Inject 80 Units into the skin daily 1/10/25   MARVIN Jones MD   rosuvastatin (CRESTOR) 5 MG tablet TAKE 1 TABLET BY MOUTH EVERY DAY 24   MARVIN Jones MD   Continuous Glucose Sensor (DEXCOM G6 SENSOR) [facility] APPLY SENSOR AND CHANGE EVERY 10 DAYS. 8/15/24   MARVIN Jones MD   blood glucose test strips (ASCENSIA AUTODISC VI;ONE TOUCH ULTRA TEST VI) strip CHECK BLOOD SUGAR THREE TIMES DAILY DX CODE E11.9 22   Provider, MD Sofia   Cholecalciferol (VITAMIN D) 50 MCG ( UT) CAPS capsule Take 1 capsule by mouth every  Bilateral 12/10/2023    I&D of foot    TOE AMPUTATION Left     1st & 2nd toe amputated    UPPER GASTROINTESTINAL ENDOSCOPY      WISDOM TOOTH EXTRACTION         Social History:    Social History     Tobacco Use    Smoking status: Never    Smokeless tobacco: Never   Substance Use Topics    Alcohol use: No                                Counseling given: Not Answered      Vital Signs (Current):   Vitals:    04/03/25 2019 04/04/25 0237 04/04/25 0826 04/04/25 1250   BP: 137/75 103/70 114/73 (!) 138/90   Pulse: 81 77 74 78   Resp: 18 18 18 14   Temp: 99.1 °F (37.3 °C) 98.4 °F (36.9 °C) 98.2 °F (36.8 °C) 98.4 °F (36.9 °C)   TempSrc: Oral Oral Oral Oral   SpO2: 93% 92% 93% 98%   Weight:       Height:                                                  BP Readings from Last 3 Encounters:   04/04/25 (!) 138/90   03/31/25 (!) 90/50   03/19/25 (!) 144/82       NPO Status: Time of last liquid consumption: 0900                        Time of last solid consumption: 2200                        Date of last liquid consumption: 04/03/25                        Date of last solid food consumption: 04/03/25    BMI:   Wt Readings from Last 3 Encounters:   04/01/25 116.6 kg (257 lb)   03/31/25 116.6 kg (257 lb)   03/17/25 116.1 kg (256 lb)     Body mass index is 35.86 kg/m².    CBC:   Lab Results   Component Value Date/Time    WBC 8.0 04/04/2025 06:01 AM    RBC 3.65 04/04/2025 06:01 AM    HGB 9.4 04/04/2025 06:01 AM    HCT 31.5 04/04/2025 06:01 AM    MCV 86.3 04/04/2025 06:01 AM    MCV 94.9 06/20/2019 02:47 PM    RDW 16.3 04/04/2025 06:01 AM     04/04/2025 06:01 AM       CMP:   Lab Results   Component Value Date/Time     04/04/2025 06:01 AM    K 4.2 04/04/2025 06:01 AM     04/04/2025 06:01 AM    CO2 27 04/04/2025 06:01 AM    BUN 15 04/04/2025 06:01 AM    CREATININE 1.00 04/04/2025 06:01 AM    GFRAA 56 02/05/2022 01:16 PM    AGRATIO 1.4 04/08/2024 09:46 AM    LABGLOM 88 04/04/2025 06:01 AM    LABGLOM 86 04/08/2024 09:46 AM

## 2025-04-04 NOTE — PLAN OF CARE
Problem: Chronic Conditions and Co-morbidities  Goal: Patient's chronic conditions and co-morbidity symptoms are monitored and maintained or improved  4/4/2025 1048 by Kitty Huizar RN  Outcome: Progressing  4/3/2025 2158 by ELIJAH Graham RN  Outcome: Progressing     Problem: Discharge Planning  Goal: Discharge to home or other facility with appropriate resources  Outcome: Progressing     Problem: Neurosensory - Adult  Goal: Achieves stable or improved neurological status  4/4/2025 1048 by Kitty Huizar RN  Outcome: Progressing  4/3/2025 2158 by ELIJAH Graham RN  Outcome: Progressing     Problem: Skin/Tissue Integrity - Adult  Goal: Skin integrity remains intact  Description: 1.  Monitor for areas of redness and/or skin breakdown  2.  Assess vascular access sites hourly  3.  Every 4-6 hours minimum:  Change oxygen saturation probe site  4.  Every 4-6 hours:  If on nasal continuous positive airway pressure, respiratory therapy assess nares and determine need for appliance change or resting period  4/4/2025 1048 by Kitty Huizar RN  Outcome: Progressing  4/3/2025 2158 by ELIJAH Graham RN  Outcome: Progressing  Goal: Incisions, wounds, or drain sites healing without S/S of infection  Outcome: Progressing     Problem: Musculoskeletal - Adult  Goal: Return mobility to safest level of function  4/4/2025 1048 by Kitty Huizar RN  Outcome: Progressing  4/3/2025 2158 by ELIJAH Graham RN  Outcome: Progressing     Problem: Gastrointestinal - Adult  Goal: Maintains adequate nutritional intake  4/4/2025 1048 by Kitty Huizar RN  Outcome: Progressing  4/3/2025 2158 by ELIJAH Graham RN  Outcome: Progressing     Problem: Infection - Adult  Goal: Absence of infection at discharge  4/4/2025 1048 by Kitty Huizar RN  Outcome: Progressing  4/3/2025 2158 by ELIJAH Graham RN  Outcome: Progressing  Goal: Absence of infection during hospitalization  Outcome: Progressing

## 2025-04-04 NOTE — CARE COORDINATION
Accepted w/[facility] for IV ABX at discharge.      [facility] is relocating from [place] to [place] starting today, and over the weekend.  Today after 12:00Noon services will be delayed.  [employer] will not be able to issue any IV ABX over the weekend d/t moving.        CORWIN Cobb   no

## 2025-04-04 NOTE — PLAN OF CARE
Problem: Chronic Conditions and Co-morbidities  Goal: Patient's chronic conditions and co-morbidity symptoms are monitored and maintained or improved  4/3/2025 2158 by ELIJAH Graham RN  Outcome: Progressing  4/3/2025 0812 by Kenneth Almaguer RN  Outcome: Progressing  Flowsheets (Taken 4/3/2025 0730)  Care Plan - Patient's Chronic Conditions and Co-Morbidity Symptoms are Monitored and Maintained or Improved:   Monitor and assess patient's chronic conditions and comorbid symptoms for stability, deterioration, or improvement   Collaborate with multidisciplinary team to address chronic and comorbid conditions and prevent exacerbation or deterioration   Update acute care plan with appropriate goals if chronic or comorbid symptoms are exacerbated and prevent overall improvement and discharge     Problem: Discharge Planning  Goal: Discharge to home or other facility with appropriate resources  4/3/2025 0812 by Kenneth Almaguer RN  Outcome: Progressing  Flowsheets (Taken 4/3/2025 0730)  Discharge to home or other facility with appropriate resources:   Identify barriers to discharge with patient and caregiver   Arrange for needed discharge resources and transportation as appropriate   Identify discharge learning needs (meds, wound care, etc)     Problem: Neurosensory - Adult  Goal: Achieves stable or improved neurological status  4/3/2025 2158 by ELIJAH Graham, ROJELIO  Outcome: Progressing  4/3/2025 0812 by Kenneth Almaguer RN  Outcome: Progressing  Flowsheets (Taken 4/3/2025 0730)  Achieves stable or improved neurological status: Assess for and report changes in neurological status     Problem: Skin/Tissue Integrity - Adult  Goal: Skin integrity remains intact  Description: 1.  Monitor for areas of redness and/or skin breakdown  2.  Assess vascular access sites hourly  3.  Every 4-6 hours minimum:  Change oxygen saturation probe site  4.  Every 4-6 hours:  If on nasal continuous positive airway pressure, respiratory

## 2025-04-04 NOTE — OP NOTE
Operative Note      Patient: Juwan Espinoza  YOB: 1970  MRN: [id number]    Date of Procedure: 4/4/2025    Pre-Op Diagnosis Codes:      * Acute osteomyelitis (HCC) [M86.10]    Post-Op Diagnosis: Same       Procedure(s):  REMOVAL OF RIGHT ANKLE NAIL, REINSERTION OF CEMENTED NAIL    Surgeon(s):  Lm Blair MD    Assistant:   Physician Assistant: Mayra Gould PA-C    Anesthesia: General    Estimated Blood Loss (mL): less than 50     Complications: None    Specimens:   ID Type Source Tests Collected by Time Destination   A : RIGHT ANKLE TISSUE Swab Ankle CULTURE, FUNGUS, CULTURE, WOUND (WITH GRAM STAIN) Lm Blair MD 4/4/2025 1512    B : RIGHT CALCANEUS REAMINGS Bone Ankle CULTURE, FUNGUS, CULTURE, WOUND (WITH GRAM STAIN) Lm Blair MD 4/4/2025 1523    C : RIGHT TIBIAL REAMINGS Bone Ankle CULTURE, FUNGUS, CULTURE, TISSUE (WITH GRAM STAIN) Lm Blair MD 4/4/2025 1530        Implants:  Implant Name Type Inv. Item Serial No.  Lot No. LRB No. Used Action   CEMENT BNE 40 GM RADIOPAQUE BA SIMPLEX P - SNA  CEMENT BNE 40 GM RADIOPAQUE BA SIMPLEX P NA [employer] [facility] [id number] Right 2 Implanted   SCREW BNE L27.5MM DIA5MM YO TI DANIEL FULL THRD SHFT FOR T2 - SNA  SCREW BNE L27.5MM DIA5MM YO TI DANIEL FULL THRD SHFT FOR T2 NA [employer] [facility] [id number] Right 1 Explanted   SCREW BNE L40MM DIA5MM YO TI DANIEL PARTIALLY THRD SHFT FOR - SNA  SCREW BNE L40MM DIA5MM YO TI DANIEL PARTIALLY THRD SHFT FOR NA [employer] [facility] [id number] Right 1 Explanted   SCREW BNE L40MM DIA5MM YO TI DANIEL PARTIALLY THRD SHFT FOR - SNA  SCREW BNE L40MM DIA5MM YO TI DANIEL PARTIALLY THRD SHFT FOR NA [employer] [facility] [id number] Right 1 Explanted   SCREW BNE L50MM DIA5MM YO TI DANIEL FULL THRD SHFT FOR T2 IM - SNA  SCREW BNE L50MM DIA5MM YO TI DANIEL FULL THRD SHFT FOR T2 IM NA [employer] HOWM-WD [id number] Right 1 Explanted   NAIL IM L200MM [id number] 130DEG R

## 2025-04-05 VITALS
OXYGEN SATURATION: 90 % | DIASTOLIC BLOOD PRESSURE: 80 MMHG | HEIGHT: 71 IN | SYSTOLIC BLOOD PRESSURE: 144 MMHG | RESPIRATION RATE: 20 BRPM | TEMPERATURE: 99 F | HEART RATE: 89 BPM | WEIGHT: 257 LBS | BODY MASS INDEX: 35.98 KG/M2

## 2025-04-05 LAB
ANION GAP SERPL CALC-SCNC: 5 MMOL/L (ref 2–12)
BASOPHILS # BLD: 0.03 K/UL (ref 0–0.1)
BASOPHILS NFR BLD: 0.3 % (ref 0–1)
BUN SERPL-MCNC: 14 MG/DL (ref 6–20)
BUN/CREAT SERPL: 12 (ref 12–20)
CA-I BLD-MCNC: 9.4 MG/DL (ref 8.5–10.1)
CHLORIDE SERPL-SCNC: 103 MMOL/L (ref 97–108)
CO2 SERPL-SCNC: 29 MMOL/L (ref 21–32)
CREAT SERPL-MCNC: 1.14 MG/DL (ref 0.7–1.3)
DIFFERENTIAL METHOD BLD: ABNORMAL
EOSINOPHIL # BLD: 0.12 K/UL (ref 0–0.4)
EOSINOPHIL NFR BLD: 1.2 % (ref 0–7)
ERYTHROCYTE [DISTWIDTH] IN BLOOD BY AUTOMATED COUNT: 16.8 % (ref 11.5–14.5)
GLUCOSE BLD STRIP.AUTO-MCNC: 200 MG/DL (ref 65–100)
GLUCOSE BLD STRIP.AUTO-MCNC: 229 MG/DL (ref 65–100)
GLUCOSE BLD STRIP.AUTO-MCNC: 238 MG/DL (ref 65–100)
GLUCOSE SERPL-MCNC: 203 MG/DL (ref 65–100)
HCT VFR BLD AUTO: 30.8 % (ref 36.6–50.3)
HGB BLD-MCNC: 9.3 G/DL (ref 12.1–17)
IMM GRANULOCYTES # BLD AUTO: 0.16 K/UL (ref 0–0.04)
IMM GRANULOCYTES NFR BLD AUTO: 1.6 % (ref 0–0.5)
LYMPHOCYTES # BLD: 1.63 K/UL (ref 0.8–3.5)
LYMPHOCYTES NFR BLD: 16.3 % (ref 12–49)
MCH RBC QN AUTO: 26.3 PG (ref 26–34)
MCHC RBC AUTO-ENTMCNC: 30.2 G/DL (ref 30–36.5)
MCV RBC AUTO: 87 FL (ref 80–99)
MONOCYTES # BLD: 0.6 K/UL (ref 0–1)
MONOCYTES NFR BLD: 6 % (ref 5–13)
NEUTS SEG # BLD: 7.44 K/UL (ref 1.8–8)
NEUTS SEG NFR BLD: 74.6 % (ref 32–75)
NRBC # BLD: 0 K/UL (ref 0–0.01)
NRBC BLD-RTO: 0 PER 100 WBC
PERFORMED BY:: ABNORMAL
PLATELET # BLD AUTO: 361 K/UL (ref 150–400)
PMV BLD AUTO: 10 FL (ref 8.9–12.9)
POTASSIUM SERPL-SCNC: 4.4 MMOL/L (ref 3.5–5.1)
RBC # BLD AUTO: 3.54 M/UL (ref 4.1–5.7)
SODIUM SERPL-SCNC: 137 MMOL/L (ref 136–145)
WBC # BLD AUTO: 10 K/UL (ref 4.1–11.1)

## 2025-04-05 PROCEDURE — 6360000002 HC RX W HCPCS: Performed by: PHYSICIAN ASSISTANT

## 2025-04-05 PROCEDURE — 80048 BASIC METABOLIC PNL TOTAL CA: CPT

## 2025-04-05 PROCEDURE — 2580000003 HC RX 258: Performed by: PHYSICIAN ASSISTANT

## 2025-04-05 PROCEDURE — 6370000000 HC RX 637 (ALT 250 FOR IP): Performed by: PHYSICIAN ASSISTANT

## 2025-04-05 PROCEDURE — 2500000003 HC RX 250 WO HCPCS: Performed by: PHYSICIAN ASSISTANT

## 2025-04-05 PROCEDURE — 82962 GLUCOSE BLOOD TEST: CPT

## 2025-04-05 PROCEDURE — 85025 COMPLETE CBC W/AUTO DIFF WBC: CPT

## 2025-04-05 PROCEDURE — 36415 COLL VENOUS BLD VENIPUNCTURE: CPT

## 2025-04-05 RX ORDER — FERROUS SULFATE 325(65) MG
325 TABLET ORAL EVERY OTHER DAY
Qty: 30 TABLET | Refills: 3 | Status: SHIPPED | OUTPATIENT
Start: 2025-04-06

## 2025-04-05 RX ORDER — OXYCODONE HYDROCHLORIDE 5 MG/1
5 TABLET ORAL EVERY 4 HOURS PRN
Qty: 9 TABLET | Refills: 0 | Status: SHIPPED | OUTPATIENT
Start: 2025-04-05 | End: 2025-04-08

## 2025-04-05 RX ADMIN — INSULIN LISPRO 4 UNITS: 100 INJECTION, SOLUTION INTRAVENOUS; SUBCUTANEOUS at 06:17

## 2025-04-05 RX ADMIN — GABAPENTIN 600 MG: 300 CAPSULE ORAL at 08:40

## 2025-04-05 RX ADMIN — INSULIN LISPRO 4 UNITS: 100 INJECTION, SOLUTION INTRAVENOUS; SUBCUTANEOUS at 13:26

## 2025-04-05 RX ADMIN — OXYCODONE HYDROCHLORIDE 5 MG: 5 TABLET ORAL at 02:26

## 2025-04-05 RX ADMIN — ENOXAPARIN SODIUM 30 MG: 100 INJECTION SUBCUTANEOUS at 08:41

## 2025-04-05 RX ADMIN — MEROPENEM 1000 MG: 1 INJECTION INTRAVENOUS at 06:18

## 2025-04-05 RX ADMIN — GLYCERIN 1 DROP: .002; .002; .01 SOLUTION/ DROPS OPHTHALMIC at 08:40

## 2025-04-05 RX ADMIN — SODIUM CHLORIDE, PRESERVATIVE FREE 10 ML: 5 INJECTION INTRAVENOUS at 08:42

## 2025-04-05 RX ADMIN — GABAPENTIN 600 MG: 300 CAPSULE ORAL at 13:27

## 2025-04-05 ASSESSMENT — PAIN DESCRIPTION - DESCRIPTORS: DESCRIPTORS: ACHING

## 2025-04-05 ASSESSMENT — PAIN DESCRIPTION - LOCATION: LOCATION: LEG

## 2025-04-05 ASSESSMENT — PAIN - FUNCTIONAL ASSESSMENT: PAIN_FUNCTIONAL_ASSESSMENT: ACTIVITIES ARE NOT PREVENTED

## 2025-04-05 ASSESSMENT — PAIN DESCRIPTION - ORIENTATION: ORIENTATION: RIGHT

## 2025-04-05 ASSESSMENT — PAIN SCALES - GENERAL
PAINLEVEL_OUTOF10: 5
PAINLEVEL_OUTOF10: 7

## 2025-04-05 NOTE — CARE COORDINATION
1104 Per patient's wife, needed supplies and abx were delivered to the home previously and patient has everything they need to administer home IV abx.    DCP is home with wife.  Patient set up with [facility] and [facility].    Transition of Care Plan:    RUR: 10  Prior Level of Functioning: INDEP  Disposition: Home Health  JUSTIN: 4/5/25  If SNF or IPR: Date FOC offered: N/a  Date FOC received: N/a  Accepting facility: N/a  Date authorization started with reference number: N/a  Date authorization received and expires: N/a  Follow up appointments: Per MD  DME needed: IV abx  Transportation at discharge: Wife  IM/IMM Medicare/ letter given: N/a  Is patient a [place] and connected with [facility]? N/a   If yes, was [place] transfer form completed and [facility] notified?   Caregiver Contact: Patient notified   Discharge Caregiver contacted prior to discharge? N/a  Care Conference needed? no  Barriers to discharge: none      1057 CM noted discharge order.     DCP is home with [facility] and [facility].    Per previous CM note, [facility] is unable to provide services over the weekend due to facility moving locations.     Patient will be able to dc Monday with home IV abx services through [facility].     CM will continue to follow.

## 2025-04-05 NOTE — PLAN OF CARE
Problem: Chronic Conditions and Co-morbidities  Goal: Patient's chronic conditions and co-morbidity symptoms are monitored and maintained or improved  4/4/2025 2315 by ELIJAH Graham RN  Outcome: Progressing  4/4/2025 1048 by Kitty Huizar RN  Outcome: Progressing     Problem: Discharge Planning  Goal: Discharge to home or other facility with appropriate resources  4/4/2025 1048 by Kitty Huizar RN  Outcome: Progressing     Problem: Neurosensory - Adult  Goal: Achieves stable or improved neurological status  4/4/2025 2315 by ELIJAH Graham RN  Outcome: Progressing  4/4/2025 1048 by Kitty Huizar RN  Outcome: Progressing     Problem: Skin/Tissue Integrity - Adult  Goal: Skin integrity remains intact  Description: 1.  Monitor for areas of redness and/or skin breakdown  2.  Assess vascular access sites hourly  3.  Every 4-6 hours minimum:  Change oxygen saturation probe site  4.  Every 4-6 hours:  If on nasal continuous positive airway pressure, respiratory therapy assess nares and determine need for appliance change or resting period  4/4/2025 2315 by ELIJAH Graham RN  Outcome: Progressing  4/4/2025 1048 by Kitty Huizar RN  Outcome: Progressing  Goal: Incisions, wounds, or drain sites healing without S/S of infection  4/4/2025 1048 by Kitty Huizar RN  Outcome: Progressing     Problem: Musculoskeletal - Adult  Goal: Return mobility to safest level of function  4/4/2025 2315 by ELIJAH Graham RN  Outcome: Progressing  4/4/2025 1048 by Kitty Huizar RN  Outcome: Progressing     Problem: Gastrointestinal - Adult  Goal: Maintains adequate nutritional intake  4/4/2025 2315 by ELIJAH Graham RN  Outcome: Progressing  4/4/2025 1048 by Kitty Huizar RN  Outcome: Progressing     Problem: Infection - Adult  Goal: Absence of infection at discharge  4/4/2025 1048 by Kitty Huizar RN  Outcome: Progressing  Goal: Absence of infection during

## 2025-04-05 NOTE — PLAN OF CARE
Problem: Chronic Conditions and Co-morbidities  Goal: Patient's chronic conditions and co-morbidity symptoms are monitored and maintained or improved  4/5/2025 1250 by Alyssia Perez RN  Outcome: Progressing  4/4/2025 2316 by ELIJAH Graham RN  Outcome: Progressing  4/4/2025 2315 by ELIJAH Graham RN  Outcome: Progressing     Problem: Discharge Planning  Goal: Discharge to home or other facility with appropriate resources  Outcome: Progressing     Problem: Neurosensory - Adult  Goal: Achieves stable or improved neurological status  4/5/2025 1250 by Alyssia Perez RN  Outcome: Progressing  4/4/2025 2316 by ELIJAH Graham RN  Outcome: Progressing  4/4/2025 2315 by ELIJAH Graham RN  Outcome: Progressing     Problem: Skin/Tissue Integrity - Adult  Goal: Skin integrity remains intact  Description: 1.  Monitor for areas of redness and/or skin breakdown  2.  Assess vascular access sites hourly  3.  Every 4-6 hours minimum:  Change oxygen saturation probe site  4.  Every 4-6 hours:  If on nasal continuous positive airway pressure, respiratory therapy assess nares and determine need for appliance change or resting period  4/5/2025 1250 by Alyssia Perez RN  Outcome: Progressing  4/4/2025 2316 by ELIJAH Graham RN  Outcome: Progressing  4/4/2025 2315 by ELIJAH Graham RN  Outcome: Progressing  Goal: Incisions, wounds, or drain sites healing without S/S of infection  Outcome: Progressing     Problem: Musculoskeletal - Adult  Goal: Return mobility to safest level of function  4/5/2025 1250 by Alyssia Perez RN  Outcome: Progressing  4/4/2025 2316 by ELIJAH Graham RN  Outcome: Progressing  4/4/2025 2315 by ELIJAH Graham RN  Outcome: Progressing     Problem: Gastrointestinal - Adult  Goal: Maintains adequate nutritional intake  4/5/2025 1250 by Alyssia Perez RN  Outcome: Progressing  4/4/2025 2315 by ELIJAH Graham RN  Outcome: Progressing     Problem: Infection - Adult  Goal:

## 2025-04-05 NOTE — DISCHARGE SUMMARY
Physician Discharge Summary     Patient ID:    Juwan Espinoza  [id number]  55 y.o.  1970    Admit date: 4/1/2025    Discharge date : 4/5/2025      Final Diagnoses:   Wound infection [T14.8XXA, L08.9]  Osteomyelitis  Diabetes mellitus type 2  Neuropathy  Hypertension    Reason for Hospitalization: Acute osteomyelitis    Hospital Course:   Juwan Espinoza is a 54 y.o.  male with PMHx significant for diabetes, Charcot foot, was sent from [facility] here with worsening of the right foot wound.      Patient underwent surgery in October with orthopedic surgery, which was complicated by loose nail.  After that he had several consecutive procedure with Dr. Blair, he was the one who sent patient to the hospital and concern for infection.     Patient was admitted in the hospital in March 2024 with osteomyelitis of the right foot.  He was evaluated by infectious disease, podiatry and vascular surgery.  Decision was made for nonoperative management.  Patient was discharged on Zosyn for total of 6 weeks with PICC line.     On exam patient's food and right ankle are swollen, tender and hot to touch.  All right leg is swollen.     On admission vital signs significant for hypotension blood pressure 90/50  Not tachycardic, heart rate 95  Saturating 100% on room air  Mild leukocytosis/11.4  Initiated on Zosyn    Acute osteomyelitis of the right foot status post removal of right ankle nail, reinsertion of cemented nail by Dr. Blair on 4//2025  Charcot joint  History of multiple surgical intervention by orthopedic  Pro-Rufino, CRP and ESR elevated  X-ray of the right foot and ankle consistent with infection  CT of the ankle did show concern for acute on chronic osteomyelitis.  Patient has PICC line, will be discharged on meropenem with following up with orthopedics outpatient     Diabetes mellitus type 2 on chronic insulin POA  Diabetic neuropathy POA  Patient is on Lantus 80 at home.  He has Dexcom.  He was advised not to

## 2025-04-05 NOTE — PLAN OF CARE
Problem: Chronic Conditions and Co-morbidities  Goal: Patient's chronic conditions and co-morbidity symptoms are monitored and maintained or improved  4/4/2025 2316 by ELIJAH Graham RN  Outcome: Progressing  4/4/2025 2315 by ELIJAH Graham RN  Outcome: Progressing  4/4/2025 1048 by Kitty Huizar RN  Outcome: Progressing     Problem: Discharge Planning  Goal: Discharge to home or other facility with appropriate resources  4/4/2025 1048 by Kitty Huizar RN  Outcome: Progressing     Problem: Neurosensory - Adult  Goal: Achieves stable or improved neurological status  4/4/2025 2316 by ELIJAH Graham RN  Outcome: Progressing  4/4/2025 2315 by ELIJAH Graham RN  Outcome: Progressing  4/4/2025 1048 by Kitty Huizar RN  Outcome: Progressing     Problem: Skin/Tissue Integrity - Adult  Goal: Skin integrity remains intact  Description: 1.  Monitor for areas of redness and/or skin breakdown  2.  Assess vascular access sites hourly  3.  Every 4-6 hours minimum:  Change oxygen saturation probe site  4.  Every 4-6 hours:  If on nasal continuous positive airway pressure, respiratory therapy assess nares and determine need for appliance change or resting period  4/4/2025 2316 by ELIJAH Graham RN  Outcome: Progressing  4/4/2025 2315 by ELIJAH Graham RN  Outcome: Progressing  4/4/2025 1048 by Kitty Huizar RN  Outcome: Progressing  Goal: Incisions, wounds, or drain sites healing without S/S of infection  4/4/2025 1048 by Kitty Huizar RN  Outcome: Progressing     Problem: Musculoskeletal - Adult  Goal: Return mobility to safest level of function  4/4/2025 2316 by ELIJAH Graham RN  Outcome: Progressing  4/4/2025 2315 by ELIJAH Graham RN  Outcome: Progressing  4/4/2025 1048 by Kitty Huizar RN  Outcome: Progressing     Problem: Gastrointestinal - Adult  Goal: Maintains adequate nutritional intake  4/4/2025 2315 by ELIJAH Graham RN  Outcome:

## 2025-04-05 NOTE — ANESTHESIA POSTPROCEDURE EVALUATION
Department of Anesthesiology  Postprocedure Note    Patient: Juwan Espinoza  MRN: [id number]  YOB: 1970  Date of evaluation: 4/4/2025    Procedure Summary       Date: 04/04/25 Room / Location: [facility] MAIN OR 06 / [facility] MAIN OR    Anesthesia Start: 1347 Anesthesia Stop: 1726    Procedure: REMOVAL OF RIGHT ANKLE NAIL, REINSERTION OF CEMENTED NAIL (Right: Ankle) Diagnosis:       Acute osteomyelitis (HCC)      (Acute osteomyelitis (HCC) [M86.10])    Surgeons: Lm Blair MD Responsible Provider: Kulwinder Alvarado Jr., MD    Anesthesia Type: general ASA Status: 3            Anesthesia Type: No value filed.    Nikolas Phase I: Nikolas Score: 10    Nikolas Phase II:      Anesthesia Post Evaluation    Patient location during evaluation: PACU  Patient participation: complete - patient cannot participate  Level of consciousness: awake  Pain score: 0  Airway patency: patent  Nausea & Vomiting: no nausea and no vomiting  Cardiovascular status: hemodynamically stable  Respiratory status: acceptable  Hydration status: stable  Multimodal analgesia pain management approach        No notable events documented.

## 2025-04-05 NOTE — PROGRESS NOTES
Infectious Disease Progress Note           Subjective:   Pt seen and examined at bedside. Continues to do well clinically, denies denies complaints, no acute events since last seen   Objective:   Physical Exam:     /82   Pulse 81   Temp 98.1 °F (36.7 °C) (Oral)   Resp 18   Ht 1.803 m (5' 10.98\")   Wt 116.6 kg (257 lb)   SpO2 93%   BMI 35.86 kg/m²    O2 Device: None (Room air)    Temp (24hrs), Av.2 °F (36.8 °C), Min:98.1 °F (36.7 °C), Max:98.6 °F (37 °C)    No intake/output data recorded.    1901 -  0700  In: -   Out: 1050 [Urine:1050]    General: NAD, alert and following commands   HEENT: CALVIN, Moist mucosa   Lungs: CTA b/l, decreased at the bases   Heart: S1S2+, RRR, no murmur  Abdo: Soft, NT, ND, +BS   : no andrade cath   Exts: right foot dressing in place   Skin: chronic right foot ulcer     Data Review:       Recent Days:    Recent Labs     25  1201 25  0740 25  0534   WBC 11.4* 8.3 7.6   HGB 10.1* 9.0* 9.1*   HCT 31.9* 29.5* 29.9*    363 361     Recent Labs     25  1201 25  0740 25  0534   BUN 14 15 13   CREATININE 1.04 1.14 1.08       Lab Results   Component Value Date/Time    CRP 7.79 2025 03:23 PM          Microbiology     Results       Procedure Component Value Units Date/Time    Culture, Wound (with Gram Stain) [[id number]]     Order Status: Sent Specimen: Foot              Diagnostic   CT ANKLE RIGHT WO CONTRAST  Result Date: 4/3/2025  1.  Lateral plantar hindfoot ulceration with subjacent complex fluid and gas extending to the calcaneal hardware, with mixed erosive changes in the plantar distal calcaneus, concerning for acute on chronic osteomyelitis. 2.  Multifaceted hindfoot and midfoot internal fixations with hardware complications and suboptimal osseous fusion as described above. Electronically signed by JOSEF MCARTHUR ANKLE RIGHT (MIN 3 VIEWS)  Result Date: 2025  Interval midfoot and hindfoot 
                    Infectious Disease Progress Note           Subjective:   Pt seen and examined at bedside. Stable, denies new complaints, no acute events since last seen.  Decreased eye pain, anticipated discharge home today  Objective:   Physical Exam:     BP (!) 144/80   Pulse 89   Temp 99 °F (37.2 °C)   Resp 20   Ht 1.803 m (5' 10.98\")   Wt 116.6 kg (257 lb)   SpO2 90%   BMI 35.86 kg/m²    O2 Device: None (Room air)    Temp (24hrs), Av.8 °F (37.1 °C), Min:98 °F (36.7 °C), Max:99.7 °F (37.6 °C)    701 - 1900  In: 345.3 [P.O.:250]  Out: 300 [Urine:300]   1901 -  07  In: 1316.4 [I.V.:1020]  Out: 1650 [Urine:1500]    General: NAD, alert and following commands   HEENT: CALVIN, Moist mucosa   Lungs: CTA b/l, decreased at the bases   Heart: S1S2+, RRR, no murmur  Abdo: Soft, NT, ND, +BS   : no andrade cath   Exts: right foot dressing in place   Skin: chronic right foot ulcer     Data Review:       Recent Days:    Recent Labs     25  0534 25  0601 25  0913   WBC 7.6 8.0 10.0   HGB 9.1* 9.4* 9.3*   HCT 29.9* 31.5* 30.8*    370 361     Recent Labs     25  0534 25  0601 25  0913   BUN 13 15 14   CREATININE 1.08 1.00 1.14       Lab Results   Component Value Date/Time    CRP 7.79 2025 03:23 PM          Microbiology     Results       Procedure Component Value Units Date/Time    Culture, Fungus [[id number]] Collected: 25 152    Order Status: Sent Specimen: Swab from Ankle Updated: 25 1235    Culture, Wound (with Gram Stain) [[id number]] Collected: 25    Order Status: Sent Specimen: Swab from Ankle Updated: 04/04/25 2134    Culture, Fungus [[id number]] Collected: 25    Order Status: Sent Specimen: Bone from Ankle Updated: 25    Culture, Wound (with Gram Stain) [[id number]] Collected: 25    Order Status: Sent Specimen: Bone from Ankle Updated: 25 1236    Culture, Fungus 
                    Infectious Disease Progress Note           Subjective:   Stable, underwent debridement for right foot today w removal of ankle nail and reinsertion of cemented nail today. Doing well post-op, reports eye pain and swelling   Objective:   Physical Exam:     BP (!) 166/92   Pulse 81   Temp 98.4 °F (36.9 °C)   Resp 18   Ht 1.803 m (5' 10.98\")   Wt 116.6 kg (257 lb)   SpO2 98%   BMI 35.86 kg/m²    O2 Device: None (Room air)    Temp (24hrs), Av.4 °F (36.9 °C), Min:98 °F (36.7 °C), Max:99.1 °F (37.3 °C)    701 - 1900  In: 1214.1 [I.V.:1000]  Out: 150    1901 -  07  In: 610 [P.O.:500; I.V.:10]  Out: -     General: NAD, alert and following commands   HEENT: CALVIN, Moist mucosa   Lungs: CTA b/l, decreased at the bases   Heart: S1S2+, RRR, no murmur  Abdo: Soft, NT, ND, +BS   : no andrade cath   Exts: right foot dressing in place   Skin: chronic right foot ulcer     Data Review:       Recent Days:    Recent Labs     25  0740 25  0534 25  0601   WBC 8.3 7.6 8.0   HGB 9.0* 9.1* 9.4*   HCT 29.5* 29.9* 31.5*    361 370     Recent Labs     25  0740 25  0534 25  0601   BUN 15 13 15   CREATININE 1.14 1.08 1.00       Lab Results   Component Value Date/Time    CRP 7.79 2025 03:23 PM          Microbiology     Results       Procedure Component Value Units Date/Time    Culture, Fungus [[id number]] Collected: 25    Order Status: Sent Specimen: Bone from Ankle     Culture, Tissue (with Gram Stain) [[id number]] Collected: 25    Order Status: Sent Specimen: Bone from Ankle     Culture, Fungus [[id number]] Collected: 25    Order Status: Sent Specimen: Bone from Ankle     Culture, Wound (with Gram Stain) [[id number]] Collected: 25    Order Status: Sent Specimen: Bone from Ankle     Culture, Fungus [[id number]] Collected: 25 151    Order Status: Sent Specimen: Swab from Ankle     Culture, 
    Hospitalist Admission Note    NAME:   Juwan Espinoza   : 1970   MRN: [id number]     Date/Time: 2025 2:35 PM    Patient PCP: MARVIN Jones MD    ______________________________________________________________________  Given the patient's current clinical presentation, I have a high level of concern for decompensation if discharged from the emergency department.  Complex decision making was performed, which includes reviewing the patient's available past medical records, laboratory results, and x-ray films.       My assessment of this patient's clinical condition and my plan of care is as follows.    Assessment / Plan:    Open wound of the right lower leg  Charcot joint  History of multiple surgical intervention by orthopedic  Consult orthopedic surgery Dr Blair  Will consult infectious disease tomorrow  Check Pro-Rufino, CRP and ESR  Start Zosyn  Wound care consult  Tylenol for pain  X-ray of the right foot and ankle  Follow-up venous duplex for right lower extremities to rule out DVT  He cannot do MRI because of metals in the right foot.    Diabetes mellitus type 2 on chronic insulin POA  Diabetic neuropathy POA  Continue sliding scale     Essential hypertension  Hyperlipidemia  - home crestor    Nonobstructing bilateral nephrolithiasis 5  Lymphadenopathy of the inguinal nodes      Medical Decision Making:   I personally reviewed labs: CBC, BMP  I personally reviewed imaging:reviewed prior CT, MRI  I personally reviewed EKG:  Toxic drug monitoring: Zosyn  Discussed case with: ED provider. After discussion I am in agreement that acuity of patient's medical condition necessitates hospital stay.      Code Status: Full  DVT Prophylaxis: Lovenox  Baseline:     Subjective:   CHIEF COMPLAINT: Persistent right foot wound    HISTORY OF PRESENT ILLNESS:     Juwan Espinoza is a 54 y.o.  male with PMHx significant for diabetes, Charcot foot, [facility] with worsening of the right foot wound. 
4 Eyes Skin Assessment     NAME:  Juwan Espinoza  YOB: 1970  MEDICAL RECORD NUMBER:  [id number]    The patient is being assessed for  Admission    I agree that at least one RN has performed a thorough Head to Toe Skin Assessment on the patient. ALL assessment sites listed below have been assessed.      Areas assessed by both nurses:    Head, Face, Ears, Shoulders, Back, Chest, Arms, Elbows, Hands, Sacrum. Buttock, Coccyx, Ischium, Legs. Feet and Heels, and Under Medical Devices         Does the Patient have a Wound? Yes wound(s) were present on assessment. LDA wound assessment was Initiated and completed by RN       Klever Prevention initiated by RN: Yes  Wound Care Orders initiated by RN: Yes    Pressure Injury (Stage 3,4, Unstageable, DTI, NWPT, and Complex wounds) if present, place Wound referral order by RN under : No    New Ostomies, if present place, Ostomy referral order under : No     Nurse 1 eSignature: Electronically signed by Ngozi Jack RN on 4/1/25 at 4:20 PM EDT    **SHARE this note so that the co-signing nurse can place an eSignature**    Nurse 2 eSignature: Electronically signed by Alyssia Perez RN on 4/1/25 at 4:22 PM EDT    
4 Eyes Skin Assessment     NAME:  Juwan Espinoza  YOB: 1970  MEDICAL RECORD NUMBER:  [id number]    The patient is being assessed for  Other Weekly    I agree that at least one RN has performed a thorough Head to Toe Skin Assessment on the patient. ALL assessment sites listed below have been assessed.      Areas assessed by both nurses:    Head, Face, Ears, Shoulders, Back, Chest, Arms, Elbows, Hands, Sacrum. Buttock, Coccyx, Ischium, and Legs. Feet and Heels        Does the Patient have a Wound? Yes wound(s) were present on assessment. LDA wound assessment was Initiated and completed by RN       Klever Prevention initiated by RN: Yes  Wound Care Orders initiated by RN: Yes    Pressure Injury (Stage 3,4, Unstageable, DTI, NWPT, and Complex wounds) if present, place Wound referral order by RN under : Yes    New Ostomies, if present place, Ostomy referral order under : No     Nurse 1 eSignature: Electronically signed by Dipti Perez RN on 4/2/25 at 12:41 AM EDT    **SHARE this note so that the co-signing nurse can place an eSignature**    Nurse 2 eSignature: Electronically signed by Rosemary Shukla RN on 4/2/25 at 4:52 AM EDT    
Dr. Blair spoke with this RN regarding patient surgery scheduled for tomorrow. Patient to be made NPO at midnight. Patient verbalized understanding of this information. Orders placed per MD request. Patient condition stable at this time.   
Orders received from Dr. Tobin to discontinue this patient telemetry. Completed as ordered. Patient condition stable at this time.   
Orders received from MD for PICC line insertion. Vascular access form completed and faxed to nursing supervisor per protocol. Patient verbalized understanding for need of access for long term antibiotics.   
Patient CT evaluated, patient will need to exchange of hardware with cemented hardware, discussed with the patient, we will set him up for surgery tomorrow for exchange of hardware with insertion of antibiotic cement, patient will need IV antibiotics for 6 weeks to 3 months,  
[facility]. Patient was discharge home with family care.     Patient was notified of discharge from physician and nurse. The nurse went over the paperwork with patient and spouse  
Patient was discharge from [facility]. Patient was discharge home with family care and home health.   Patient's IV was removed and the patient dress himself. Patient and the nurse went over the discharge paperwork where the patient stated understanding.  Patient was propelled to the front door of the hospital via wheelchair by staff. Patient had private transportation home.   
Pt back from the OR, in a stable condition,removal of right ankle nail, reinsertion of cemented nail done,vitals fairly stable, will continue to monitor.  
Received Order for Telemetry     Juwan Espinoza   1970   [id number]   Wound infection [T14.8XXA, L08.9]   Luis Tobin MD     Tele Box # [id number] placed on patient at  1435 pm  ER Room # direct admit  Admitting to Room 519  Transferring Nurse rahul  Verified with Primary Nurse rahul at  1435 pm   
Telemetry box #112 verified with monitor Mayra puckett.  
out of the most proximal screw and associated soft tissue edema. Redemonstrated distal tibial resection. IMPRESSION(S): 1.  Redemonstrated hindfoot arthrodesis with interval backing out of the most proximal screw and associated soft tissue edema. Dictated By: JESSIE AREVALO Electronically Verified by: JESSIE AREVALO 3/31/2025 7:59 AM    CT ABDOMEN PELVIS WO CONTRAST Additional Contrast? None  Result Date: 3/19/2025  INDICATION: TERE, low back pain. Evaluate for obstructing stone. Urinary tract obstruction. Lumbar spinal abnormalities Exam: Noncontrast CT of the abdomen and pelvis is performed with 5 mm collimation. Sagittal and coronal reformatted images were also performed. CT dose reduction was achieved through the use of a standardized protocol tailored for this examination and automatic exposure control for dose modulation. Direct comparison is made to prior CT dated December 2023. FINDINGS: The visualized lung bases are clear. Abdomen: Liver: There is a 1.5 cm cyst in the left hepatic lobe. The liver is otherwise normal on noncontrast images. Spleen: The spleen is normal on noncontrast images. Adrenals: The adrenals are normal on noncontrast images. Pancreas: The pancreas is normal on noncontrast images. Gallbladder: The gallbladder is normal on noncontrast images. Kidneys: There is nonspecific bilateral perinephric stranding. There is no hydronephrosis or hydroureter. There is a 3 mm nonobstructing left renal calculus. There is a 1 mm nonobstructing right renal calculus. Bowel: No thickened or dilated loop of large or small bowel seen. Lymph nodes: There is a 4 cm x 2.5 cm right inguinal lymph node which measures 3.1 cm x 1.5 cm on prior CT dated December 2023. Appendix: The appendix is nonvisualized. Pelvis: Urinary bladder is partially filled and grossly normal. Miscellaneous: There are small fat-containing bilateral inguinal hernias. There is no free intraperitoneal gas or fluid. There is no focal 
other bony erosion. There has been remodeling of the first and second metatarsals, which appears chronic. There is marked diffuse soft tissue swelling.     Interval midfoot and hindfoot fixation hardware since 3/5/2024 with diffuse surrounding hardware lucency for which infection is not excluded. Chronic remodeling of the first and second metatarsals. Marked diffuse soft tissue swelling. Electronically signed by Yohannes Arriaga    XR TIBIA FIBULA LEFT (2 VIEWS)  Result Date: 3/31/2025  STUDY: XR TIBIA FIBULA 2 VIEWS RIGHT HISTORY: Z47.89: Encounter for other orthopedic aftercare COMPARISON(S): Right ankle x-ray January 3, 2025 FINDINGS: 4 images demonstrate no acute fractures. Redemonstrated hindfoot arthrodesis with interval backing out of the most proximal screw and associated soft tissue edema. Redemonstrated distal tibial resection. IMPRESSION(S): 1.  Redemonstrated hindfoot arthrodesis with interval backing out of the most proximal screw and associated soft tissue edema. Dictated By: JESSIE AREVALO Electronically Verified by: JESSIE AREVALO 3/31/2025 7:59 AM    CT ABDOMEN PELVIS WO CONTRAST Additional Contrast? None  Result Date: 3/19/2025  INDICATION: TERE, low back pain. Evaluate for obstructing stone. Urinary tract obstruction. Lumbar spinal abnormalities Exam: Noncontrast CT of the abdomen and pelvis is performed with 5 mm collimation. Sagittal and coronal reformatted images were also performed. CT dose reduction was achieved through the use of a standardized protocol tailored for this examination and automatic exposure control for dose modulation. Direct comparison is made to prior CT dated December 2023. FINDINGS: The visualized lung bases are clear. Abdomen: Liver: There is a 1.5 cm cyst in the left hepatic lobe. The liver is otherwise normal on noncontrast images. Spleen: The spleen is normal on noncontrast images. Adrenals: The adrenals are normal on noncontrast images. Pancreas: The pancreas is 
There is a 1 mm nonobstructing right renal calculus. Bowel: No thickened or dilated loop of large or small bowel seen. Lymph nodes: There is a 4 cm x 2.5 cm right inguinal lymph node which measures 3.1 cm x 1.5 cm on prior CT dated December 2023. Appendix: The appendix is nonvisualized. Pelvis: Urinary bladder is partially filled and grossly normal. Miscellaneous: There are small fat-containing bilateral inguinal hernias. There is no free intraperitoneal gas or fluid. There is no focal fluid collection to suggest abscess.     1. Nonobstructing bilateral nephrolithiasis. 2. Interval increase in size of right inguinal lymph node, now measuring 4 cm x 2.5 cm, consistent with lymphadenopathy. Electronically signed by Abdelrahman Alvarez MD    FL LESS THAN 1 HOUR  Result Date: 3/17/2025  Radiology exam is complete. No Radiologist dictation. Please follow up with ordering provider.      _______________________________________________________________________    TOTAL TIME:  76 Minutes    Critical Care Provided     Minutes non procedure based    Signed: Luis Tobin MD    Procedures: see electronic medical records for all procedures/Xrays and details which were not copied into this note but were reviewed prior to creation of Plan.

## 2025-04-05 NOTE — PLAN OF CARE
Problem: Chronic Conditions and Co-morbidities  Goal: Patient's chronic conditions and co-morbidity symptoms are monitored and maintained or improved  4/5/2025 1433 by Alyssia Perez RN  Outcome: Adequate for Discharge  4/5/2025 1250 by Alyssia Perez RN  Outcome: Progressing     Problem: Discharge Planning  Goal: Discharge to home or other facility with appropriate resources  4/5/2025 1433 by Alyssia Perez RN  Outcome: Adequate for Discharge  4/5/2025 1250 by Alyssia Perez RN  Outcome: Progressing     Problem: Neurosensory - Adult  Goal: Achieves stable or improved neurological status  4/5/2025 1433 by Alyssia Perez RN  Outcome: Adequate for Discharge  4/5/2025 1250 by Alyssia Perez RN  Outcome: Progressing     Problem: Skin/Tissue Integrity - Adult  Goal: Skin integrity remains intact  Description: 1.  Monitor for areas of redness and/or skin breakdown  2.  Assess vascular access sites hourly  3.  Every 4-6 hours minimum:  Change oxygen saturation probe site  4.  Every 4-6 hours:  If on nasal continuous positive airway pressure, respiratory therapy assess nares and determine need for appliance change or resting period  4/5/2025 1433 by Alyssia Perez RN  Outcome: Adequate for Discharge  4/5/2025 1250 by Alyssia Perez RN  Outcome: Progressing  Goal: Incisions, wounds, or drain sites healing without S/S of infection  4/5/2025 1433 by Alyssia Perez RN  Outcome: Adequate for Discharge  4/5/2025 1250 by Alyssia Perez RN  Outcome: Progressing     Problem: Musculoskeletal - Adult  Goal: Return mobility to safest level of function  4/5/2025 1433 by Alyssia Perez RN  Outcome: Adequate for Discharge  4/5/2025 1250 by Alyssia Perez RN  Outcome: Progressing     Problem: Gastrointestinal - Adult  Goal: Maintains adequate nutritional intake  4/5/2025 1433 by Alyssia Perez RN  Outcome: Adequate for Discharge  4/5/2025 1250 by Alyssia Perez RN  Outcome: Progressing     Problem: Infection -

## 2025-04-06 LAB
BACTERIA SPEC CULT: NORMAL
GRAM STN SPEC: NORMAL
Lab: NORMAL

## 2025-04-07 LAB
BACTERIA SPEC CULT: NORMAL
GRAM STN SPEC: NORMAL
GRAM STN SPEC: NORMAL
Lab: NORMAL

## 2025-04-08 LAB
BACTERIA SPEC CULT: NORMAL
GRAM STN SPEC: NORMAL
GRAM STN SPEC: NORMAL
Lab: NORMAL

## 2025-04-09 LAB
BACTERIA SPEC CULT: NORMAL
GRAM STN SPEC: NORMAL
GRAM STN SPEC: NORMAL
Lab: NORMAL

## 2025-04-14 LAB
BACTERIA SPEC CULT: NORMAL
Lab: NORMAL

## 2025-04-15 LAB
HCT, EXTERNAL: 30.8
HGB, EXTERNAL: 9.4
MCH, EXTERNAL: 27
MCHC, EXTERNAL: 30.5
MCV, EXTERNAL: 89
MPV, EXTERNAL: NORMAL
NEUTROPHILS, EXTERNAL: NORMAL
PLATELETS, EXTERNAL: 345
RBC, EXTERNAL: 3.48
RDW, EXTERNAL: 17.1
WBC, EXTERNAL: 8.7

## 2025-04-21 LAB
BACTERIA SPEC CULT: NORMAL
Lab: NORMAL

## 2025-04-24 ENCOUNTER — TELEMEDICINE (OUTPATIENT)
Age: 55
End: 2025-04-24
Payer: COMMERCIAL

## 2025-04-24 DIAGNOSIS — M79.671 RIGHT FOOT PAIN: ICD-10-CM

## 2025-04-24 DIAGNOSIS — L08.9 RIGHT FOOT INFECTION: Primary | ICD-10-CM

## 2025-04-24 DIAGNOSIS — M14.60 CHARCOT ARTHROPATHY: ICD-10-CM

## 2025-04-24 DIAGNOSIS — Z79.4 OTHER SPECIFIED DIABETES MELLITUS WITH OTHER SPECIFIED COMPLICATION, WITH LONG-TERM CURRENT USE OF INSULIN (HCC): ICD-10-CM

## 2025-04-24 DIAGNOSIS — E13.69 OTHER SPECIFIED DIABETES MELLITUS WITH OTHER SPECIFIED COMPLICATION, WITH LONG-TERM CURRENT USE OF INSULIN (HCC): ICD-10-CM

## 2025-04-24 PROCEDURE — 99214 OFFICE O/P EST MOD 30 MIN: CPT | Performed by: INTERNAL MEDICINE

## 2025-04-24 PROCEDURE — 3052F HG A1C>EQUAL 8.0%<EQUAL 9.0%: CPT | Performed by: INTERNAL MEDICINE

## 2025-04-24 ASSESSMENT — ENCOUNTER SYMPTOMS
ROS SKIN COMMENTS: RIGHT FOOT ULCER
RESPIRATORY NEGATIVE: 1
GASTROINTESTINAL NEGATIVE: 1

## 2025-04-24 NOTE — PROGRESS NOTES
HPI: Pt seen virtually for f/u of right foot infection. He was recently admitted to [facility] w acute on chronic infection of right heel ulcer/calcaneous osteomyelitis. He is s/p mid foot arthrodesis w ext Fix application, tendoachilles lengthening in 07/2024, underwent hardware removal w injection of cement on 03/17/25. Staph epi and P.aeruginosa (R to Zosyn and I to Cefepime) were isolated from Cxs (03/17). CT of right foot on 04/03 revealed \"Complex fluid and gas extending to the calcaneal hardware, with mixed erosive changes in the plantar distal calcaneus\" He underwent wound debridement w removal of right ankle nail, reinsertion of cemented nail by Dr Blair on 04/04. Intra-op Cx is neg w rare PMNs and no organisms on Gram stain. Pt was discharged on long term Meropenem for a planned 8-12 wks. He recently followed up w Dr Blair in per and incisional wound appeared to be healing as expected, pt notes decreased LE swelling. Most recent blood work on 04/15 revealed CRP 9, ESR was not done, and Cr 0.91. Pt tells blood work was done on 04/20/25 but I have not receive results. He denied acute complaints during todays visit, admitted to tolerating antibiotics w/o an untoward side effects.     ROS  Review of Systems   Constitutional: Negative.    Respiratory: Negative.     Cardiovascular: Negative.    Gastrointestinal: Negative.    Genitourinary: Negative.    Musculoskeletal:         Right arm PICC line    Skin:         Right foot ulcer    Neurological: Negative.          Past Medical History:   Diagnosis Date    B12 deficiency 1/15/2021    Charcot arthropathy of midfoot     Right foot    Diabetes (HCC)     type 2    Gout     Hypercholesteremia     Hyperlipidemia     patient denied    Hypertension     Kidney stone     Neuropathy     bilateral lower legs    Sepsis (HCC) 12/2023    hx of left foot        Past Surgical History:   Procedure Laterality Date    AMPUTATION  12/08/2023    APPENDECTOMY      COLONOSCOPY

## 2025-04-24 NOTE — PROGRESS NOTES
Have you been to the ER, urgent care clinic since your last visit?  Hospitalized since your last visit?   YES - When: approximately 3 weeks ago.  Where and Why: [facility] for [facility]omyel[facility].    Have you seen or consulted any other health care providers outside our system since your last visit?   NO    “Have you had a colorectal cancer screening such as a colonoscopy/FIT/Cologuard?    NO    Date of last Colonoscopy: 4/27/2018  No cologuard on file  No FIT/FOBT on file   No flexible sigmoidoscopy on file     “Have you had a diabetic eye exam?”    YES - Where: [facility]     Date of last diabetic eye exam: 11/1/2022     Chief Complaint   Patient presents with    Follow-Up from Hospital    Osteomyelitis

## 2025-04-28 LAB
BACTERIA SPEC CULT: NORMAL
Lab: NORMAL

## 2025-05-05 LAB
BACTERIA SPEC CULT: NORMAL
Lab: NORMAL

## 2025-05-09 ENCOUNTER — HOSPITAL ENCOUNTER (OUTPATIENT)
Facility: HOSPITAL | Age: 55
Discharge: HOME | End: 2025-05-12
Attending: INTERNAL MEDICINE
Payer: COMMERCIAL

## 2025-05-09 DIAGNOSIS — R59.0 INGUINAL LYMPHADENOPATHY: ICD-10-CM

## 2025-05-09 PROCEDURE — 74176 CT ABD & PELVIS W/O CONTRAST: CPT

## 2025-05-19 LAB
BUN BLDV-MCNC: 22 MG/DL
C-REACTIVE PROTEIN: 5
CALCIUM SERPL-MCNC: 9.5 MG/DL
CHLORIDE BLD-SCNC: 103 MMOL/L
CO2: 23 MMOL/L
CREAT SERPL-MCNC: 1.18 MG/DL
EGFR: 72
GLUCOSE BLD-MCNC: 114 MG/DL
POTASSIUM SERPL-SCNC: 4.4 MMOL/L
SED RATE, AUTOMATED: 33
SODIUM BLD-SCNC: 141 MMOL/L

## 2025-05-22 ENCOUNTER — TELEMEDICINE (OUTPATIENT)
Age: 55
End: 2025-05-22
Payer: COMMERCIAL

## 2025-05-22 DIAGNOSIS — L97.419 DIABETIC ULCER OF RIGHT HEEL ASSOCIATED WITH TYPE 2 DIABETES MELLITUS, UNSPECIFIED ULCER STAGE (HCC): Primary | ICD-10-CM

## 2025-05-22 DIAGNOSIS — E11.621 DIABETIC ULCER OF RIGHT HEEL ASSOCIATED WITH TYPE 2 DIABETES MELLITUS, UNSPECIFIED ULCER STAGE (HCC): Primary | ICD-10-CM

## 2025-05-22 DIAGNOSIS — M14.60 CHARCOT ARTHROPATHY: ICD-10-CM

## 2025-05-22 DIAGNOSIS — M86.671 CHRONIC OSTEOMYELITIS OF RIGHT FOOT (HCC): ICD-10-CM

## 2025-05-22 PROCEDURE — 99214 OFFICE O/P EST MOD 30 MIN: CPT | Performed by: INTERNAL MEDICINE

## 2025-05-22 PROCEDURE — 3052F HG A1C>EQUAL 8.0%<EQUAL 9.0%: CPT | Performed by: INTERNAL MEDICINE

## 2025-05-22 RX ORDER — DOXYCYCLINE 100 MG/1
100 CAPSULE ORAL 2 TIMES DAILY
Qty: 28 CAPSULE | Refills: 2 | Status: SHIPPED | OUTPATIENT
Start: 2025-05-22 | End: 2025-06-05

## 2025-05-22 ASSESSMENT — ENCOUNTER SYMPTOMS
GASTROINTESTINAL NEGATIVE: 1
RESPIRATORY NEGATIVE: 1

## 2025-05-22 NOTE — PROGRESS NOTES
HPI: Pleasant 55 y.o WM seen virtually for a routine f/u chronic right heel ulcer/osteomyelitis complicated by charcot arthropathy.   - He recently underwent debridement w removal of right ankle nail, reinsertion of cemented nail by Dr Blair on 04/04  - Intra-op Cxs from 04/04 were neg x 3, w no organisms or PMNs on Gram stain   - He is on long term Meropenem, targeting Staph epi and P.aeruginosa (R to Zosyn and I to Cefepime) isolated from wound Cx on 03/17  - Pt will complete 8 wks of IV antibiotics on 05/29 following which will d/c PICC line  - Most recent labs on 05/17 revealed CRP of 5  down from 9 (04/13) and ESR 33   - He recently followed up in person w Ortho on 05/21 and incisional wound is healing, swelling improved, still has a cast   - Right LE pain is subjectively better. He denies acute complaints on assessment today.       ROS  Review of Systems   Constitutional: Negative.    Respiratory: Negative.     Cardiovascular: Negative.    Gastrointestinal: Negative.    Genitourinary: Negative.    Musculoskeletal:         Decreased right ankle pain and swelling    Skin: Negative.    Neurological: Negative.        Past Medical History:   Diagnosis Date    B12 deficiency 1/15/2021    Charcot arthropathy of midfoot     Right foot    Diabetes (HCC)     type 2    Gout     Hypercholesteremia     Hyperlipidemia     patient denied    Hypertension     Kidney stone     Neuropathy     bilateral lower legs    Sepsis (HCC) 12/2023    hx of left foot        Past Surgical History:   Procedure Laterality Date    AMPUTATION  12/08/2023    APPENDECTOMY      COLONOSCOPY      colon polyps in past    FOOT DEBRIDEMENT Right 12/13/2023    RIGHT FOOT WASHOUT AND PLACEMENT OF ANTIBIOTIC BEADS performed by Celso Castaneda DPM at [facility] MAIN OR    FOOT SURGERY Right 07/19/2024    MIDFOOT ARTHRODESIS WITH EXTERNAL FIXATOR APPLICATION, TENDOACHILLES LENGTHENING performed by Lm Blair MD at [facility] MAIN OR    FOOT SURGERY Right

## 2025-05-22 NOTE — PROGRESS NOTES
Have you been to the ER, urgent care clinic since your last visit?  Hospitalized since your last visit?   NO    Have you seen or consulted any other health care providers outside our system since your last visit?   NO    “Have you had a colorectal cancer screening such as a colonoscopy/FIT/Cologuard?    NO    Date of last Colonoscopy: 4/27/2018  No cologuard on file  No FIT/FOBT on file   No flexible sigmoidoscopy on file     “Have you had a diabetic eye exam?”    YES - Where: [facility]      Date of last diabetic eye exam: 11/1/2022     Chief Complaint   Patient presents with    Follow-up

## (undated) DEVICE — DRILL, AO, STERILE

## (undated) DEVICE — GOLDVAC ROCKER SWITCH ELECTROSURGICAL SMOKE EVACUATION HANDPIECE: Brand: GOLDVAC

## (undated) DEVICE — KENDALL DL ECG CABLE AND LEAD WIRE SYSTEM, 3-LEAD, SINGLE PATIENT USE: Brand: KENDALL

## (undated) DEVICE — STEPDRILL: Brand: T2

## (undated) DEVICE — SUTURE VICRYL SZ 3-0 L18IN ABSRB UD PS-2 L19MM 1/2 CIR J497G

## (undated) DEVICE — PREMIUM WET SKIN PREP TRAY: Brand: MEDLINE INDUSTRIES, INC.

## (undated) DEVICE — Device

## (undated) DEVICE — THIN OFFSET (9.0 X 0.38 X 25.0MM)

## (undated) DEVICE — GOWN SURG LG 43 IN AAMI LEVEL 3 ORBIS

## (undated) DEVICE — Device: Brand: JELCO

## (undated) DEVICE — REM POLYHESIVE ADULT PATIENT RETURN ELECTRODE: Brand: VALLEYLAB

## (undated) DEVICE — CULTURETTE SGL EVAC TUBE PALL -- 100/CA

## (undated) DEVICE — TOWEL,OR,DSP,ST,BLUE,DLX,1/PK,80PK/CS: Brand: MEDLINE

## (undated) DEVICE — K-WIRE, STERILE

## (undated) DEVICE — GLOVE SURG SZ 65 THK91MIL LTX FREE SYN POLYISOPRENE

## (undated) DEVICE — STAPLER SKIN H3.9MM WIRE DIA0.58MM CRWN 6.9MM 35 STPL FIX

## (undated) DEVICE — BANDAGE COBAN 4 IN COMPR W4INXL5YD FOAM COHESIVE QUIK STK SELF ADH SFT

## (undated) DEVICE — ZIMMER® STERILE DISPOSABLE TOURNIQUET CUFF WITH PLC, DUAL PORT, SINGLE BLADDER, 34 IN. (86 CM)

## (undated) DEVICE — SOLUTION SURG PREP 26 CC PURPREP

## (undated) DEVICE — SUTURE MONOCRYL + SZ 3-0 L27IN ABSRB UD PS1 L24MM 3/8 CIR REV MCP936H

## (undated) DEVICE — TOWEL,OR,DSP,ST,BLUE,STD,4/PK,20PK/CS: Brand: MEDLINE

## (undated) DEVICE — 3M™ IOBAN™ 2 ANTIMICROBIAL INCISE DRAPE 6650EZ: Brand: IOBAN™ 2

## (undated) DEVICE — INFECTION CONTROL KIT SYS

## (undated) DEVICE — GUIDE WIRE, SMOOTH-TIPPED, STERILE

## (undated) DEVICE — UPPER EXTREMITY: Brand: MEDLINE INDUSTRIES, INC.

## (undated) DEVICE — ZIMMER® STERILE DISPOSABLE TOURNIQUET CUFF WITH PLC, DUAL PORT, SINGLE BLADDER, 18 IN. (46 CM)

## (undated) DEVICE — GOWN SURG XL 56.5 IN AAMI LEVEL 3 ORBIS

## (undated) DEVICE — SUTURE NONABSORBABLE MONOFILAMENT 5-0 PS-2 18 IN BLK ETHILON 1666H

## (undated) DEVICE — SUTURE VICRYL + ABSORBABLE BRAIDED 2-0 CP2 27 IN UD  VCP869H

## (undated) DEVICE — PADDING CAST W4INXL4YD NONSTERILE COT RAYON MICROPLEATED

## (undated) DEVICE — GLOVE SURG SZ 65 L12IN FNGR THK79MIL GRN LTX FREE

## (undated) DEVICE — ROD EXT FIX L150MM LNG THRD MR SAFE

## (undated) DEVICE — PRECISION THIN (9.0 X 0.38 X 25.0MM)

## (undated) DEVICE — SUTURE ETHLN SZ 2-0 L30IN NONABSORBABLE BLK L36MM PSLX 3/8 1697H

## (undated) DEVICE — PREP PAD BNS: Brand: CONVERTORS

## (undated) DEVICE — TRAY PREP DRY W/ PREM GLV 2 APPL 6 SPNG 2 UNDPD 1 OVERWRAP

## (undated) DEVICE — PAD,ABDOMINAL,8"X7.5",STERILE,LF,1/PK: Brand: MEDLINE

## (undated) DEVICE — SUTURE MONOCRYL + SZ 4-0 L27IN ABSRB UD L19MM PS-2 3/8 CIR MCP426H

## (undated) DEVICE — T-DRAPE,EXTREMITY,STERILE: Brand: MEDLINE

## (undated) DEVICE — GLOVE SURG SZ 75 CRM LTX FREE POLYISOPRENE POLYMER BEAD ANTI

## (undated) DEVICE — GARMENT,MEDLINE,DVT,INT,CALF,MED, GEN2: Brand: MEDLINE

## (undated) DEVICE — PENCIL ES CRD L10FT HND SWCHING ROCK SWCH W/ EDGE COAT BLDE

## (undated) DEVICE — PADDING,UNDERCAST,COTTON, 4"X4YD STERILE: Brand: MEDLINE

## (undated) DEVICE — SWAB CULT LIQ STUART AGR AERB MOD IN BRK SGL RAYON TIP PLAS

## (undated) DEVICE — COVER LT HNDL PLAS RIG 1 PER PK

## (undated) DEVICE — BASIC SINGLE BASIN-LF: Brand: MEDLINE INDUSTRIES, INC.

## (undated) DEVICE — TUBING, SUCTION, 1/4" X 12', STRAIGHT: Brand: MEDLINE

## (undated) DEVICE — BANDAGE,GAUZE,BULKEE II,4.5"X4.1YD,STRL: Brand: MEDLINE

## (undated) DEVICE — PADDING UNDERCAST W4INXL12FT RAYON POLY SYN NONADHESIVE

## (undated) DEVICE — SPONGE GZ W4XL4IN COT 12 PLY TYP VII WVN C FLD DSGN STERILE

## (undated) DEVICE — GOWN,SIRUS,NONRNF,SETINSLV,XL,20/CS: Brand: MEDLINE

## (undated) DEVICE — SUT ETHLN 3-0 18IN PS2 BLK --

## (undated) DEVICE — SUTURE VCRL SZ 4-0 L27IN ABSRB UD L19MM FS-2 3/8 CIR REV J422H

## (undated) DEVICE — CANISTER WND THER 500 ML NEG PRESSURE GEL TBNG STRL DISP

## (undated) DEVICE — DRAPE,HAND,STERILE: Brand: MEDLINE

## (undated) DEVICE — TUBE SET OR SONICVAC NEXUS SONICONE

## (undated) DEVICE — SOLUTION IRRIG 1000ML 0.9% SOD CHL USP POUR PLAS BTL

## (undated) DEVICE — PADDING CAST W6INXL4YD ST COT RAYON MICROPLEATED HIGHLY

## (undated) DEVICE — YANKAUER,BULB TIP,W/O VENT,RIGID,STERILE: Brand: MEDLINE

## (undated) DEVICE — BLADE,CARBON-STEEL,15,STRL,DISPOSABLE,TB: Brand: MEDLINE

## (undated) DEVICE — SUTURE NONABSORBABLE MONOFILAMENT 4-0 FS-2 18 IN ETHILON 662H

## (undated) DEVICE — SPONGE GZ W4XL4IN COT 12 PLY TYP VII WVN C FLD DSGN

## (undated) DEVICE — SWAB CULT LIQ STUART AGR AERB MOD IN BRK SGL RAYON TIP PLAS 220099] BECTON DICKINSON MICRO]

## (undated) DEVICE — SOLUTION IRRIG 500ML 0.9% SOD CHLO USP POUR PLAS BTL

## (undated) DEVICE — SUTURE ETHILON SZ 3-0 L18IN NONABSORBABLE BLK PS-2 L19MM 3/8 1669H

## (undated) DEVICE — SUTURE MONOCRYL 2-0 SH 27IN UND PLUS MCP417

## (undated) DEVICE — PADDING CAST W4INXL4YD ST COT RAYON MICROPLEATED HIGHLY

## (undated) DEVICE — C-ARMOR C-ARM EQUIPMENT COVERS CLEAR STERILE UNIVERSAL FIT 12 PER CASE: Brand: C-ARMOR

## (undated) DEVICE — THE STERILE LIGHT HANDLE COVER IS USED WITH STERIS SURGICAL LIGHTING AND VISUALIZATION SYSTEMS.

## (undated) DEVICE — ROCKER SWITCH PENCIL HOLSTER: Brand: VALLEYLAB

## (undated) DEVICE — REAMER SHAFT, MOD.TRINKLE: Brand: BIXCUT

## (undated) DEVICE — DRAPE EQUIP C ARM 74X42 IN MOB XR W/ TIE RUBBER BND LF

## (undated) DEVICE — EXTREMITY III-LF: Brand: MEDLINE INDUSTRIES, INC.

## (undated) DEVICE — BOLT EXT FIX OFFSET WIRE MR CONDITIONAL FOR DISTR

## (undated) DEVICE — SOL IRRIGATION INJ NACL 0.9% 500ML BTL

## (undated) DEVICE — 2-CAN™ 450MM DELIVERY CANNULA
Type: IMPLANTABLE DEVICE | Site: FOOT | Status: NON-FUNCTIONAL
Brand: 2-CAN™
Removed: 2024-10-31

## (undated) DEVICE — GLOVE SURG SZ 8 CRM LTX FREE POLYISOPRENE POLYMER BEAD ANTI

## (undated) DEVICE — BURR STRAIGHT 20 MM DIAMETER 3.1 MM

## (undated) DEVICE — GLOVE ORTHO 8   MSG9480

## (undated) DEVICE — CONTAINER SPEC ANAERB VACTNR

## (undated) DEVICE — DRESSING,GAUZE,PETROLATUM,CURAD,3"X9",ST: Brand: CURAD

## (undated) DEVICE — SUTURE NONABSORBABLE MONOFILAMENT 2-0 FS 18 IN ETHILON 664H

## (undated) DEVICE — BANDAGE,GAUZE,CONFORMING,3"X75",STRL,LF: Brand: MEDLINE

## (undated) DEVICE — TUBE SET SONICONE SHARPVAC DISP (MIN ORDER 5)

## (undated) DEVICE — GAUZE,PACKING STRIP,IODOFORM,1/2"X5YD,ST: Brand: CURAD

## (undated) DEVICE — 3M™ TEGADERM™ TRANSPARENT FILM DRESSING FRAME STYLE, 1624W, 2-3/8 IN X 2-3/4 IN (6 CM X 7 CM), 100/CT 4CT/CASE: Brand: 3M™ TEGADERM™

## (undated) DEVICE — DRILL BIT: Brand: SALVATION

## (undated) DEVICE — HANDLE LT SNAP ON ULT DURABLE LENS FOR TRUMPF ALC DISPOSABLE

## (undated) DEVICE — STRAP,POSITIONING,KNEE/BODY,FOAM,4X60": Brand: MEDLINE

## (undated) DEVICE — 7 DAY SILVER-COATED ANTIMICROBIAL BARRIER DRESSING: Brand: ACTICOAT 7  4" X 5"

## (undated) DEVICE — BANDAGE COMPR W4INXL5YD WHT BGE POLY COT M E WRP WV HK AND

## (undated) DEVICE — DRESSING PETRO W3XL8IN N ADH OIL EMUL GZ CURAD

## (undated) DEVICE — 3M™ COBAN™ NL STERILE NON-LATEX SELF-ADHERENT WRAP, 2084S, 4 IN X 5 YD (10 CM X 4,5 M), 18 ROLLS/CASE: Brand: 3M™ COBAN™

## (undated) DEVICE — SUTURE VICRYL + SZ 3-0 L27IN ABSRB UD L26MM SH 1/2 CIR VCP416H

## (undated) DEVICE — GUIDE WIRE, BALL-TIPPED, STERILE

## (undated) DEVICE — NEEDLE HYPO 18GA L1.5IN PNK S STL HUB POLYPR SHLD REG BVL

## (undated) DEVICE — DRESSING,GAUZE,XEROFORM,CURAD,1"X8",ST: Brand: CURAD

## (undated) DEVICE — GLOVE ORANGE PI 7   MSG9070

## (undated) DEVICE — AR-200 IRRIGATION TUBING SET

## (undated) DEVICE — SPONGE LAP 18X18IN STRL -- 5/PK

## (undated) DEVICE — 3M™ STERI-DRAPE™ INCISE DRAPE 1050 (60CM X 45CM): Brand: STERI-DRAPE™

## (undated) DEVICE — DISPOSABLE TOURNIQUET CUFF SINGLE BLADDER, DUAL PORT AND QUICK CONNECT CONNECTOR: Brand: COLOR CUFF

## (undated) DEVICE — NEEDLE HYPO 25GA L1.5IN BVL ORIENTED ECLIPSE

## (undated) DEVICE — GLOVE SURG SZ 8 L12IN FNGR THK79MIL GRN LTX FREE

## (undated) DEVICE — SWAB CULT DBL W/O CHAR RAYON TIP AMIES GEL CLMN FOR COLL

## (undated) DEVICE — DRAPE SHT 3 QTR PROXIMA 53X77 --

## (undated) DEVICE — ZIMMER® STERILE DISPOSABLE TOURNIQUET CUFF WITH PROTECTIVE SLEEVE AND PLC, DUAL PORT, SINGLE BLADDER, 18 IN. (46 CM)

## (undated) DEVICE — BOLT EXT FIX LNG CONN MR CONDITIONAL FOR DISTR OSTEOGENESIS

## (undated) DEVICE — SHEET,DRAPE,UNDERBUTTOCK,GRAD POUCH,PORT: Brand: MEDLINE

## (undated) DEVICE — NUT EXT FIX MRI SAFE FOR DISTR OSTEOGENESIS RNG SYS 10 PER

## (undated) DEVICE — BANDAGE COMPR W6INXL5YD WHT BGE POLY COT M E WRP WV HK AND

## (undated) DEVICE — POST EXT FIX 1 H WIRE MR CONDITIONAL FOR DISTR OSTEOGENESIS

## (undated) DEVICE — EXTREMITY-MRMC: Brand: MEDLINE INDUSTRIES, INC.

## (undated) DEVICE — BLADE,CARBON-STEEL,10,STRL,DISPOSABLE,TB: Brand: MEDLINE

## (undated) DEVICE — PAD,ABDOMINAL,5"X9",ST,LF,25/BX: Brand: MEDLINE INDUSTRIES, INC.

## (undated) DEVICE — DRESSING STERILE PETRO W3XL8IN N ADH OIL EMUL GZ CURAD

## (undated) DEVICE — 4.0MM EGG

## (undated) DEVICE — ROCKER SWITCH PENCIL BLADE ELECTRODE, HOLSTER: Brand: EDGE

## (undated) DEVICE — SYR 10ML LUER LOK 1/5ML GRAD --

## (undated) DEVICE — ELECTRODE PT RET AD L9FT HI MOIST COND ADH HYDRGEL CORDED

## (undated) DEVICE — SUTURE ETHILON SZ 3-0 L18IN NONABSORBABLE BLK L24MM FS-1 3/8 663G

## (undated) DEVICE — STERILE POLYISOPRENE POWDER-FREE SURGICAL GLOVES: Brand: PROTEXIS

## (undated) DEVICE — DRSG GZ OIL EMUL CURAD 3X3 --

## (undated) DEVICE — DRAPE,REIN 53X77,STERILE: Brand: MEDLINE

## (undated) DEVICE — SPONGE GZ W4XL4IN COT RADPQ HIGHLY ABSRB

## (undated) DEVICE — SOLUTION LACTATED RINGERS INJECTION USP

## (undated) DEVICE — INTENDED FOR TISSUE SEPARATION, AND OTHER PROCEDURES THAT REQUIRE A SHARP SURGICAL BLADE TO PUNCTURE OR CUT.: Brand: BARD-PARKER ® CARBON RIB-BACK BLADES

## (undated) DEVICE — SUTURE ETHLN SZ 4-0 L18IN NONABSORBABLE BLK L19MM PS-2 3/8 1667H

## (undated) DEVICE — BOLT EXT FIX SHLDR FOR 8MM RNG MAXFRAME

## (undated) DEVICE — 450 ML BOTTLE OF 0.05% CHLORHEXIDINE GLUCONATE IN 99.95% STERILE WATER FOR IRRIGATION, USP AND APPLICATOR.: Brand: IRRISEPT ANTIMICROBIAL WOUND LAVAGE

## (undated) DEVICE — STEINMANN PIN
Type: IMPLANTABLE DEVICE | Site: FOOT | Status: NON-FUNCTIONAL
Brand: INBONE
Removed: 2024-10-31

## (undated) DEVICE — SUTURE VICRYL + ANTIBACT NO 1 CT 36IN ABSRB BRAID UD VCP959H

## (undated) DEVICE — SUTURE VCRL SZ 3-0 L27IN ABSRB UD L26MM SH 1/2 CIR J416H

## (undated) DEVICE — 3M™ IOBAN™ 2 ANTIMICROBIAL INCISE DRAPE 6648EZ: Brand: IOBAN™ 2

## (undated) DEVICE — GOWN,SIRUS,FABRNF,XL,20/CS: Brand: MEDLINE

## (undated) DEVICE — POST EXT FIX WIRE TALL MR SAFE

## (undated) DEVICE — JELLY,LUBE,STERILE,FLIP TOP,TUBE,2-OZ: Brand: MEDLINE

## (undated) DEVICE — TUBING SUCT 12FR MAL ALUM SHFT FN CAP VENT UNIV CONN W/ OBT

## (undated) DEVICE — BLADE ULTRASONIC L25MM STD BNE BLNT DISP BNE SCALP

## (undated) DEVICE — SUTURE VICRYL + SZ 2-0 L27IN ABSRB UD CT-2 L26MM 1/2 CIR TAPR VCP269H

## (undated) DEVICE — POST EXT FIX SHT WIRE MR CONDITIONAL FOR DISTR OSTEOGENESIS

## (undated) DEVICE — KIT DRSG L 10 X W 7.5 CM THK 3.2 CM SM NEG PRESSURE DRP PD